# Patient Record
Sex: FEMALE | Race: WHITE | Employment: UNEMPLOYED | URBAN - METROPOLITAN AREA
[De-identification: names, ages, dates, MRNs, and addresses within clinical notes are randomized per-mention and may not be internally consistent; named-entity substitution may affect disease eponyms.]

---

## 2017-02-09 RX ORDER — METOPROLOL TARTRATE 25 MG/1
TABLET, FILM COATED ORAL
Qty: 180 TAB | Refills: 3 | Status: SHIPPED | OUTPATIENT
Start: 2017-02-09 | End: 2018-01-08 | Stop reason: SDUPTHER

## 2017-02-09 RX ORDER — CLOPIDOGREL BISULFATE 75 MG/1
TABLET ORAL
Qty: 90 TAB | Refills: 3 | Status: SHIPPED | OUTPATIENT
Start: 2017-02-09 | End: 2017-10-10 | Stop reason: SDUPTHER

## 2017-04-10 ENCOUNTER — HOSPITAL ENCOUNTER (OUTPATIENT)
Dept: MAMMOGRAPHY | Age: 57
Discharge: HOME | End: 2017-04-10
Attending: SPECIALIST
Payer: MEDICARE

## 2017-04-10 DIAGNOSIS — C50.912 MALIGNANT NEOPLASM OF LEFT FEMALE BREAST, UNSPECIFIED SITE OF BREAST: ICD-10-CM

## 2017-04-10 PROCEDURE — 77067 SCR MAMMO BI INCL CAD: CPT

## 2017-04-13 ENCOUNTER — OFFICE VISIT (OUTPATIENT)
Dept: SURGERY | Age: 57
End: 2017-04-13

## 2017-04-13 VITALS
RESPIRATION RATE: 18 BRPM | SYSTOLIC BLOOD PRESSURE: 129 MMHG | BODY MASS INDEX: 30.06 KG/M2 | HEART RATE: 57 BPM | TEMPERATURE: 97.5 F | WEIGHT: 210 LBS | HEIGHT: 70 IN | OXYGEN SATURATION: 97 % | DIASTOLIC BLOOD PRESSURE: 74 MMHG

## 2017-04-13 DIAGNOSIS — J44.1 CHRONIC OBSTRUCTIVE PULMONARY DISEASE WITH ACUTE EXACERBATION (HCC): Chronic | ICD-10-CM

## 2017-04-13 DIAGNOSIS — I25.10 CORONARY ARTERY DISEASE INVOLVING NATIVE CORONARY ARTERY OF NATIVE HEART WITHOUT ANGINA PECTORIS: ICD-10-CM

## 2017-04-13 DIAGNOSIS — C50.912 MALIGNANT NEOPLASM OF LEFT FEMALE BREAST, UNSPECIFIED SITE OF BREAST: Primary | ICD-10-CM

## 2017-04-13 DIAGNOSIS — I15.0 RENOVASCULAR HYPERTENSION: ICD-10-CM

## 2017-04-13 NOTE — PATIENT INSTRUCTIONS
If you have any questions or concerns about today's appointment, the verbal and/or written instructions you were given for follow up care, please call our office at [phone].     [facility] - [address], [facility]5 [facility]    [phone] office  [phone] fax

## 2017-04-13 NOTE — PROGRESS NOTES
Patient is a 49 y.o. female who presents for a follow up breast exam for a previous left mastectomy done for invasive ductal carcinoma in 2010. Patient denies any new breast changes or areas of concern today. 1. Have you been to the ER, urgent care clinic since your last visit? Hospitalized since your last visit? No    2. Have you seen or consulted any other health care providers outside of the [address] [place] [address] since your last visit? Include any pap smears or colon screening.  Yes, oncologist.

## 2017-04-13 NOTE — MR AVS SNAPSHOT
Visit Information Date & Time Provider Department Dept. Phone Encounter #  
 4/13/2017  9:45 AM Nabil Tyson MD [facility] [phone] [id number] Follow-up Instructions Return in about 1 year (around 4/13/2018). Upcoming Health Maintenance Date Due Hepatitis C Screening 1960 FOOT EXAM Q1 7/28/1972 MICROALBUMIN Q1 7/28/1972 EYE EXAM RETINAL OR DILATED Q1 7/28/1972 Pneumococcal 19-64 Highest Risk (1 of 3 - PCV13) 7/28/1981 DTaP/Tdap/Td series (1 - Tdap) 7/28/1983 HEMOGLOBIN A1C Q6M 12/22/2010 FOBT Q 1 YEAR AGE 50-75 7/28/2012 LIPID PANEL Q1 2/13/2013 INFLUENZA AGE 9 TO ADULT 8/1/2016 PAP AKA CERVICAL CYTOLOGY 4/22/2018 BREAST CANCER SCRN MAMMOGRAM 4/10/2019 Allergies as of 4/13/2017  Review Complete On: 4/13/2017 By: Nabil Tyson MD  
 No Known Allergies Current Immunizations  Never Reviewed No immunizations on file. Not reviewed this visit You Were Diagnosed With   
  
 Codes Comments Malignant neoplasm of left female breast, unspecified site of breast ([facility]    -  Primary ICD-10-CM: Q56.464 ICD-9-CM: 174.9 Chronic obstructive pulmonary disease with acute exacerbation (HCC)     ICD-10-CM: J44.1 ICD-9-CM: 491.21 Coronary artery disease involving native coronary artery of native heart without angina pectoris     ICD-10-CM: I25.10 ICD-9-CM: 414.01 Renovascular hypertension     ICD-10-CM: I15.0 ICD-9-CM: 405.91 Vitals BP Pulse Temp Resp Height(growth percentile) Weight(growth percentile) 129/74 (BP 1 Location: Right arm, BP Patient Position: Sitting) (!) 57 97.5 °F (36.4 °C) (Oral) 18 5' 10\" (1.778 m) 210 lb (95.3 kg) SpO2 BMI OB Status Smoking Status 97% 30.13 kg/m2 Chemically Induced Current Every Day Smoker BMI and BSA Data Body Mass Index Body Surface Area  
 30.13 kg/m 2 2.17 m 2 Preferred Pharmacy Pharmacy Name Phone [facility], [place] - [address] [phone] Your Updated Medication List  
  
   
This list is accurate as of: 4/13/17 10:29 AM.  Always use your most recent med list.  
  
  
  
  
 albuterol 90 mcg/actuation inhaler Commonly known as:  PROVENTIL HFA, VENTOLIN HFA, PROAIR HFA Take 1 Puff by inhalation every four (4) hours as needed. aspirin delayed-release 81 mg tablet Take  by mouth daily. clopidogrel 75 mg Tab Commonly known as:  PLAVIX TAKE 1 TABLET EVERY DAY  
  
 CRESTOR 20 mg tablet Generic drug:  rosuvastatin Take 20 mg by mouth daily. * cyanocobalamin 1,000 mcg/mL injection Commonly known as:  VITAMIN B12  
1,000 mcg by IntraMUSCular route every thirty (30) days. * VITAMIN B12 PO Take  by mouth.  
  
 esomeprazole 20 mg capsule Commonly known as:  Laurey Doll Take 20 mg by mouth daily. losartan 25 mg tablet Commonly known as:  COZAAR Take 12.5 mg by mouth two (2) times a day. metFORMIN 1,000 mg tablet Commonly known as:  GLUCOPHAGE Take 1,000 mg by mouth two (2) times daily (with meals). metoprolol tartrate 25 mg tablet Commonly known as:  LOPRESSOR  
TAKE 1 TABLET TWICE DAILY  
  
 nitroglycerin 0.4 mg SL tablet Commonly known as:  NITROSTAT  
1 Tab by SubLINGual route every five (5) minutes as needed for Chest Pain. SYNTHROID 150 mcg tablet Generic drug:  levothyroxine Take  by mouth Daily (before breakfast). tamoxifen 20 mg tablet Commonly known as:  NOLVADEX Take 20 mg by mouth daily. VITAMIN C 250 mg tablet Generic drug:  ascorbic acid (vitamin C) Take 250 mg by mouth daily. VITAMIN D2 PO Take 4,000 Units by mouth daily. * Notice: This list has 2 medication(s) that are the same as other medications prescribed for you. Read the directions carefully, and ask your doctor or other care provider to review them with you. Follow-up Instructions Return in about 1 year (around 4/13/2018). Patient Instructions If you have any questions or concerns about today's appointment, the verbal and/or written instructions you were given for follow up care, please call our office at [phone]. [facility] - [facility] [address] [facility], [address] [place], [address] [place] [address] Road 
 
[phone] office [phone] fax Introducing [facility]! Dear Mary Munoz: Thank you for requesting a [employer] account. Our records indicate that you have previously registered for a [employer] account but its currently inactive. Please call our [employer] support line at [phone]. Additional Information If you have questions, please visit the Frequently Asked Questions section of the [employer] website at [email or web address] Remember, [employer] is NOT to be used for urgent needs. For medical emergencies, dial 911. Now available from your iPhone and Android! Please provide this summary of care documentation to your next provider. Your primary care clinician is listed as [facility] [address] If you have any questions after today's visit, please call [phone].

## 2017-04-13 NOTE — PROGRESS NOTES
Ynes Joy comes to the office today for her 7 year follow-up from a previous left mastectomy for invasive 2 cm ductal carcinoma. [place] lymph nodes were negative. The tumor was estrogen and progesterone receptor positive but HER-2 negative. Around the time of the patient's surgery she had significant coronary artery disease and stents placed and had to be on Plavix when the surgery was done. She developed significant bruising but did heal.  She has been followed by Dr. Nick Martell and was placed on tamoxifen 7 years ago. He has recommended that she be on this for 10 years. She has tolerated this without difficulty. Her menstrual periods stopped when she was on chemotherapy initially. The patient denies any new right breast lumps. She has not had any edema in the left arm. Occasionally she will get some aching in her left elbow and slight weakness but that is intermittent. She denies any other new aches, pains, shortness of breath or headaches. Her last mammogram just 3 days ago was negative.     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    Anxiety     Breast CA ([address] 12/2009    S/P Lt Mastectomy and Chemo    CAD (coronary artery disease) 8/30/2010    S/P CABG X 4 ([address] to LAD, Sequential SVG to D2, Ramus, OM) 01/2011    Cardiomyopathy ([address]     LVEF 60% (08/14), 40% (2011)    Chemotherapy 4/2010    for 4 months    COPD (chronic obstructive pulmonary disease) ([address] 8/30/2010    Depression     Diabetes ([address]     GERD (gastroesophageal reflux disease)     Headache     Hyperlipidemia     Hypertension     Schatzki's ring     S/P EGD and Dilation (07/16)    Thyroid disease     Tobacco abuse      Past Surgical History:   Procedure Laterality Date    BREAST SURGERY PROCEDURE UNLISTED  2/22/10    left w/sentinel node bx    CABG, ARTERY-VEIN, FOUR  12/2010    CARDIAC SURG PROCEDURE UNLIST      stent placement before CABG    HX APPENDECTOMY      HX CHOLECYSTECTOMY      HX GYN      tubal ligation    HX HEART CATHETERIZATION  11/11/09; 10/21/09    HX HEART CATHETERIZATION  10/15/10; 10/06/09    left heart    HX HEART CATHETERIZATION  11/7/2011    left heart cath, no new findings    HX OTHER SURGICAL  4/6/2010    Insertion right internal jugular single lumen MediPort.  HX RADICAL MASTECTOMY Left 2/2010    left, with chemo     Social History     Social History    Marital status:      Spouse name: N/A    Number of children: N/A    Years of education: N/A     Social History Main Topics    Smoking status: Current Every Day Smoker     Packs/day: 0.25    Smokeless tobacco: Never Used    Alcohol use No    Drug use: No    Sexual activity: Not Asked     Other Topics Concern    None     Social History Narrative     Current Outpatient Prescriptions   Medication Sig Dispense Refill    CYANOCOBALAMIN, VITAMIN B-12, (VITAMIN B12 PO) Take  by mouth.  clopidogrel (PLAVIX) 75 mg tab TAKE 1 TABLET EVERY DAY 90 Tab 3    metoprolol tartrate (LOPRESSOR) 25 mg tablet TAKE 1 TABLET TWICE DAILY 180 Tab 3    esomeprazole (NEXIUM) 20 mg capsule Take 20 mg by mouth daily.  losartan (COZAAR) 25 mg tablet Take 12.5 mg by mouth two (2) times a day.  metFORMIN (GLUCOPHAGE) 1,000 mg tablet Take 1,000 mg by mouth two (2) times daily (with meals).  nitroglycerin (NITROSTAT) 0.4 mg SL tablet 1 Tab by SubLINGual route every five (5) minutes as needed for Chest Pain. 1 Bottle 2    albuterol (PROVENTIL HFA, VENTOLIN HFA) 90 mcg/actuation inhaler Take 1 Puff by inhalation every four (4) hours as needed.  levothyroxine (SYNTHROID) 150 mcg tablet Take  by mouth Daily (before breakfast).  aspirin delayed-release 81 mg tablet Take  by mouth daily.  ascorbic acid (VITAMIN C) 250 mg tablet Take 250 mg by mouth daily.  tamoxifen (NOLVADEX) 20 mg tablet Take 20 mg by mouth daily.       ERGOCALCIFEROL, VITAMIN D2, (VITAMIN D PO) Take 4,000 Units by mouth daily.  rosuvastatin (CRESTOR) 20 mg tablet Take 20 mg by mouth daily.  cyanocobalamin (VITAMIN B12) 1,000 mcg/mL injection 1,000 mcg by IntraMUSCular route every thirty (30) days. The patient's review of systems has not changed. She does continue to smoke. She has occasional shortness of breath and uses an inhaler. She denies any new cardiac disease but is being followed by her cardiologist for her coronary artery disease. She denies any new gastrointestinal or genitourinary problems. PHYSICAL EXAM    Visit Vitals    /74 (BP 1 Location: Right arm, BP Patient Position: Sitting)    Pulse (!) 57    Temp 97.5 °F (36.4 °C) (Oral)    Resp 18    Ht 5' 10\" (1.778 m)    Wt 95.3 kg (210 lb)    SpO2 97%    BMI 30.13 kg/m2        Constitutional:  Well-developed, well-nourished, no acute distress. Head:  Head, eyes, ears, nose, throat within normal limits. Skin:  No suspicious moles or rashes. Neck:  No masses or adenopathy. The airway appears normal. Thyroid is not enlarged and there are no palpable thyroid nodules. Lungs:  Lungs are clear to auscultation and percussion. No respiratory distress. No chest wall tenderness. Heart:  Heart is regular with no extra heart sounds or murmur heard. Breast Exam: The right breast is free of any tenderness or masses. The skin appears normal as does the nipple and areolar complex. The right axilla is negative. The left chest appears normal where the patient had her previous mastectomy. There is no evidence of any recurrent disease in the skin or axilla. Abdomen: The abdomen is soft and nontender without organomegaly or masses. Bowel sounds are active and of normal pitch. There is no abdominal distention. No hernias are evident. Extremities:  No tenderness of the extremities and no significant swelling. Psych:  Alert and oriented.     Assessment: 7 years post left mastectomy for 2 cm invasive ductal carcinoma with negative nodes. The tumor was estrogen and progestin receptor positive. The patient underwent chemotherapy and is now on tamoxifen. No evidence of recurrent disease. #2 COPD. #3 coronary artery disease. #4 continues to smoke. Recommendations: I recommend that the patient continue to have yearly right mammograms and breast examination. She was told that she can follow-up with her primary care physician and if they feel I need to see her I would be glad to do that. The above was dictated on voice recognition and there may be unrecognized errors.     Neetu Yoon MD

## 2017-04-19 ENCOUNTER — TELEPHONE (OUTPATIENT)
Dept: CARDIOLOGY CLINIC | Age: 57
End: 2017-04-19

## 2017-05-01 RX ORDER — TRAMADOL HYDROCHLORIDE 50 MG/1
50 TABLET ORAL
COMMUNITY
End: 2019-09-05 | Stop reason: ALTCHOICE

## 2017-05-02 ENCOUNTER — ANESTHESIA EVENT (OUTPATIENT)
Dept: ENDOSCOPY | Age: 57
End: 2017-05-02
Payer: MEDICARE

## 2017-05-03 ENCOUNTER — HOSPITAL ENCOUNTER (OUTPATIENT)
Age: 57
Setting detail: OUTPATIENT SURGERY
Discharge: HOME | End: 2017-05-03
Attending: INTERNAL MEDICINE | Admitting: INTERNAL MEDICINE
Payer: MEDICARE

## 2017-05-03 ENCOUNTER — ANESTHESIA (OUTPATIENT)
Dept: ENDOSCOPY | Age: 57
End: 2017-05-03
Payer: MEDICARE

## 2017-05-03 VITALS
DIASTOLIC BLOOD PRESSURE: 76 MMHG | HEIGHT: 70 IN | WEIGHT: 204 LBS | RESPIRATION RATE: 16 BRPM | TEMPERATURE: 97.4 F | HEART RATE: 56 BPM | BODY MASS INDEX: 29.2 KG/M2 | SYSTOLIC BLOOD PRESSURE: 155 MMHG | OXYGEN SATURATION: 100 %

## 2017-05-03 LAB
GLUCOSE BLD STRIP.AUTO-MCNC: 145 MG/DL (ref 70–110)
GLUCOSE BLD STRIP.AUTO-MCNC: 168 MG/DL (ref 70–110)
GLUCOSE BLD STRIP.AUTO-MCNC: 173 MG/DL (ref 70–110)
HCG UR QL: NEGATIVE

## 2017-05-03 PROCEDURE — 82962 GLUCOSE BLOOD TEST: CPT

## 2017-05-03 PROCEDURE — 76060000031 HC ANESTHESIA FIRST 0.5 HR: Performed by: INTERNAL MEDICINE

## 2017-05-03 PROCEDURE — 88305 TISSUE EXAM BY PATHOLOGIST: CPT | Performed by: INTERNAL MEDICINE

## 2017-05-03 PROCEDURE — 74011250636 HC RX REV CODE- 250/636

## 2017-05-03 PROCEDURE — 81025 URINE PREGNANCY TEST: CPT

## 2017-05-03 PROCEDURE — 77030031670 HC DEV INFL ENDOTEK BIG60 MRTM -B: Performed by: INTERNAL MEDICINE

## 2017-05-03 PROCEDURE — 74011250636 HC RX REV CODE- 250/636: Performed by: NURSE ANESTHETIST, CERTIFIED REGISTERED

## 2017-05-03 PROCEDURE — 77030009426 HC FCPS BIOP ENDOSC BSC -B: Performed by: INTERNAL MEDICINE

## 2017-05-03 PROCEDURE — 74011000250 HC RX REV CODE- 250

## 2017-05-03 PROCEDURE — 74011636637 HC RX REV CODE- 636/637: Performed by: ANESTHESIOLOGY

## 2017-05-03 PROCEDURE — 76040000019: Performed by: INTERNAL MEDICINE

## 2017-05-03 RX ORDER — SODIUM CHLORIDE, SODIUM LACTATE, POTASSIUM CHLORIDE, CALCIUM CHLORIDE 600; 310; 30; 20 MG/100ML; MG/100ML; MG/100ML; MG/100ML
50 INJECTION, SOLUTION INTRAVENOUS CONTINUOUS
Status: DISCONTINUED | OUTPATIENT
Start: 2017-05-04 | End: 2017-05-03 | Stop reason: HOSPADM

## 2017-05-03 RX ORDER — SODIUM CHLORIDE 0.9 % (FLUSH) 0.9 %
5-10 SYRINGE (ML) INJECTION EVERY 8 HOURS
Status: DISCONTINUED | OUTPATIENT
Start: 2017-05-03 | End: 2017-05-03 | Stop reason: HOSPADM

## 2017-05-03 RX ORDER — FAMOTIDINE 20 MG/1
20 TABLET, FILM COATED ORAL ONCE
Status: DISCONTINUED | OUTPATIENT
Start: 2017-05-04 | End: 2017-05-03 | Stop reason: HOSPADM

## 2017-05-03 RX ORDER — INSULIN LISPRO 100 [IU]/ML
INJECTION, SOLUTION INTRAVENOUS; SUBCUTANEOUS ONCE
Status: DISCONTINUED | OUTPATIENT
Start: 2017-05-04 | End: 2017-05-03

## 2017-05-03 RX ORDER — INSULIN LISPRO 100 [IU]/ML
INJECTION, SOLUTION INTRAVENOUS; SUBCUTANEOUS ONCE
Status: COMPLETED | OUTPATIENT
Start: 2017-05-03 | End: 2017-05-03

## 2017-05-03 RX ORDER — DEXTROMETHORPHAN/PSEUDOEPHED 2.5-7.5/.8
1.2 DROPS ORAL
Status: DISCONTINUED | OUTPATIENT
Start: 2017-05-03 | End: 2017-05-03 | Stop reason: HOSPADM

## 2017-05-03 RX ORDER — SODIUM CHLORIDE 0.9 % (FLUSH) 0.9 %
5-10 SYRINGE (ML) INJECTION AS NEEDED
Status: DISCONTINUED | OUTPATIENT
Start: 2017-05-03 | End: 2017-05-03 | Stop reason: HOSPADM

## 2017-05-03 RX ORDER — PROPOFOL 10 MG/ML
INJECTION, EMULSION INTRAVENOUS AS NEEDED
Status: DISCONTINUED | OUTPATIENT
Start: 2017-05-03 | End: 2017-05-03 | Stop reason: HOSPADM

## 2017-05-03 RX ORDER — PROPOFOL 10 MG/ML
INJECTION, EMULSION INTRAVENOUS
Status: DISCONTINUED | OUTPATIENT
Start: 2017-05-03 | End: 2017-05-03 | Stop reason: HOSPADM

## 2017-05-03 RX ORDER — LIDOCAINE HYDROCHLORIDE 20 MG/ML
INJECTION, SOLUTION EPIDURAL; INFILTRATION; INTRACAUDAL; PERINEURAL AS NEEDED
Status: DISCONTINUED | OUTPATIENT
Start: 2017-05-03 | End: 2017-05-03 | Stop reason: HOSPADM

## 2017-05-03 RX ADMIN — PROPOFOL 50 MG: 10 INJECTION, EMULSION INTRAVENOUS at 09:07

## 2017-05-03 RX ADMIN — SODIUM CHLORIDE, SODIUM LACTATE, POTASSIUM CHLORIDE, AND CALCIUM CHLORIDE 50 ML/HR: 600; 310; 30; 20 INJECTION, SOLUTION INTRAVENOUS at 08:51

## 2017-05-03 RX ADMIN — INSULIN LISPRO 2 UNITS: 100 INJECTION, SOLUTION INTRAVENOUS; SUBCUTANEOUS at 09:40

## 2017-05-03 RX ADMIN — PROPOFOL 50 MG: 10 INJECTION, EMULSION INTRAVENOUS at 09:01

## 2017-05-03 RX ADMIN — LIDOCAINE HYDROCHLORIDE 40 MG: 20 INJECTION, SOLUTION EPIDURAL; INFILTRATION; INTRACAUDAL; PERINEURAL at 09:01

## 2017-05-03 RX ADMIN — PROPOFOL 160 MCG/KG/MIN: 10 INJECTION, EMULSION INTRAVENOUS at 09:02

## 2017-05-03 NOTE — PROCEDURES
Colonoscopy Report    Patient: Poli Perry MRN: [id number]  SSN: [id number]    YOB: 1960  Age: 49 y.o. Sex: female      Date of Procedure: 5/3/2017    IMPRESSION:  1. Single 3mm rectal polyp removed with biopsy forceps. 2. Internal hemorrhoids. 3. Otherwise normal colonoscopy. RECOMMENDATIONS:  1. Resume regular diet, Recommend high fiber. 2. Will contact with polyp results in 2 weeks. These results will determine timing to next screening. Patient will be instructed to contact our office if they have not received the results by three weeks. Indication:  Personal history of colon polyps (screening only)  Procedure Performed: Colonoscopy polypectomy (cold biopsy)  Endoscopist: Darell Hall MD  Assistant: Endoscopy Technician-1: Margo Saez  Endoscopy RN-1: Evie Ascencio RN  Endoscopy RN-2: Magi Horton RN  ASA: ASA 3 - Patient with moderate systemic disease with functional limitations  Mallampati Score: II (soft palate, uvula, fauces visible)  Anesthesia: MAC anesthesia  Endoscope:     [x]  CF H 190AL   []  PCF H190AL   []  GIF H 190    Extent of Examination:cecum, which was identified by the ileocecal valve and appendiceal orifice  Quality of Preparation:     []  Excellent   [x]  Very Good   [] Fair but adequate   [] Fair, inadequate   []  Poor      Technique: The procedure was discussed with the patient including risks, benefits, alternatives including risks of IV sedation, bleeding, perforation and missed polyp. A safety timeout was performed. The patient was given incremental doses of intravenous sedation to achieve moderate sedation. The patients vital signs were monitored at all times including heart rate, rhythm, blood pressure and oxygen saturation. The patient was placed in left lateral position. When adequate sedation was achieved a perianal inspection and a digital rectal exam were performed.  Video colonoscope was introduced into the rectum and advanced under direct vision up to the cecum, which was identified by the ileocecal valve and appendiceal orifice. The cecum was identified by IC valve, appendiceal orifice and crows foot. With adequate insufflation and maneuvering of the withdrawing scope, the colonic mucosa was visualized carefully. Retroflexion was performed in the rectum and the distal rectum visualized. The patient tolerated the procedure very well and was transferred to recovery area. Findings:  1. Normal rectal exam.   2. There was a single 3mm sessile polyp in the rectum. The polyp was removed with biopsy forceps. 3. There were small hemorrhoids in the distal rectum. 4. The colonic mucosa was otherwise normal with no other polyps and no ulceration, mass, stricture.        EBL:None  Specimen:   ID Type Source Tests Collected by Time Destination   1 : bx polyp Preservative Rectum  Raz Payan MD 5/3/2017 2849 Pathology       Raz Payan MD  May 3, 2017  9:20 AM

## 2017-05-03 NOTE — IP AVS SNAPSHOT
[address] [address] Korina Rothman  
[phone] Patient: Jeaneth Birch MRN: [id number] :1960 You are allergic to the following Allergen Reactions Shellfish Derived Hives Eyes and Throat swelling Recent Documentation Height Weight Breastfeeding? BMI OB Status Smoking Status 1.778 m 92.5 kg No 29.27 kg/m2 Chemically Induced Current Every Day Smoker Emergency Contacts Name Discharge Info Relation Home Work Mobile [address] [facility] [employer] CAREGIVER [3] Spouse [3] [phone] Daylin Wray DISCHARGE CAREGIVER [3] Friend [5] About your hospitalization You were admitted on:  May 3, 2017 You last received care in the:  [facility] PHASE 2 RECOVERY You were discharged on:  May 3, 2017 Unit phone number:  [phone] Why you were hospitalized Your primary diagnosis was:  Not on File Providers Seen During Your Hospitalizations Provider Role Specialty Primary office phone Richmond Villafana MD Attending Provider Gastroenterology [phone] Your Primary Care Physician (PCP) Primary Care Physician Office Phone Office Fax Hailee Little [phone] Follow-up Information Follow up With Details Comments Contact Info Crystal Davis MD   [facility] [address] 
[phone] Current Discharge Medication List  
  
CONTINUE these medications which have NOT CHANGED Dose & Instructions Dispensing Information Comments Morning Noon Evening Bedtime  
 albuterol 90 mcg/actuation inhaler Commonly known as:  PROVENTIL HFA, VENTOLIN HFA, PROAIR HFA Your last dose was: Your next dose is:    
   
   
 Dose:  1 Puff Take 1 Puff by inhalation every four (4) hours as needed. Refills:  0  
     
   
   
   
  
 aspirin delayed-release 81 mg tablet Your last dose was: Your next dose is: Take  by mouth daily. Refills:  0  
     
   
   
   
  
 clopidogrel 75 mg Tab Commonly known as:  PLAVIX Your last dose was: Your next dose is: TAKE 1 TABLET EVERY DAY Quantity:  90 Tab Refills:  3 CRESTOR 20 mg tablet Generic drug:  rosuvastatin Your last dose was: Your next dose is:    
   
   
 Dose:  20 mg Take 20 mg by mouth daily. Refills:  0  
     
   
   
   
  
 esomeprazole 20 mg capsule Commonly known as:  Hillary Pimentel Your last dose was: Your next dose is:    
   
   
 Dose:  20 mg Take 20 mg by mouth daily. Refills:  0  
     
   
   
   
  
 losartan 25 mg tablet Commonly known as:  COZAAR Your last dose was: Your next dose is:    
   
   
 Dose:  12.5 mg Take 12.5 mg by mouth two (2) times a day. Refills:  0  
     
   
   
   
  
 metFORMIN 1,000 mg tablet Commonly known as:  GLUCOPHAGE Your last dose was: Your next dose is:    
   
   
 Dose:  1000 mg Take 1,000 mg by mouth two (2) times daily (with meals). Refills:  0  
     
   
   
   
  
 metoprolol tartrate 25 mg tablet Commonly known as:  LOPRESSOR Your last dose was: Your next dose is: TAKE 1 TABLET TWICE DAILY Quantity:  180 Tab Refills:  3  
     
   
   
   
  
 nitroglycerin 0.4 mg SL tablet Commonly known as:  NITROSTAT Your last dose was: Your next dose is:    
   
   
 Dose:  0.4 mg  
1 Tab by SubLINGual route every five (5) minutes as needed for Chest Pain. Quantity:  1 Bottle Refills:  2 PROBIOTIC 4X 10-15 mg Tbec Generic drug:  B.infantis-B.ani-B.long-B.bifi Your last dose was: Your next dose is: Take  by mouth daily. Refills:  0  
     
   
   
   
  
 SYNTHROID 150 mcg tablet Generic drug:  levothyroxine Your last dose was: Your next dose is: Take  by mouth Daily (before breakfast). Refills:  0  
     
   
   
   
  
 tamoxifen 20 mg tablet Commonly known as:  NOLVADEX Your last dose was: Your next dose is:    
   
   
 Dose:  20 mg Take 20 mg by mouth daily. Refills:  0  
     
   
   
   
  
 traMADol 50 mg tablet Commonly known as:  ULTRAM  
   
Your last dose was: Your next dose is:    
   
   
 Dose:  50 mg Take 50 mg by mouth every six (6) hours as needed for Pain. Refills:  0  
     
   
   
   
  
 VITAMIN B12 PO Your last dose was: Your next dose is: Take  by mouth. Refills:  0  
     
   
   
   
  
 VITAMIN C 250 mg tablet Generic drug:  ascorbic acid (vitamin C) Your last dose was: Your next dose is:    
   
   
 Dose:  250 mg Take 250 mg by mouth daily. Refills:  0  
     
   
   
   
  
 VITAMIN D2 PO Your last dose was: Your next dose is:    
   
   
 Dose:  4000 Units Take 4,000 Units by mouth daily. Refills:  0 Discharge Instructions Patient Discharge Instructions Carlotta Scarce / [id number] : 1960 Admitted 5/3/2017 Discharged: 5/3/2017 Procedure Impression: 1. Single 3mm rectal polyp removed with biopsy forceps. 2. Internal hemorrhoids. 3. Otherwise normal colonoscopy. Recommendation: 1. Resume regular diet, recommend high fiber 2. Will contact with polyp results in 2 weeks. These results will determine timing to next colon cancer screening. 3. Please contact our office if you have not received the results by three weeks. Recommended Diet: Regular Diet Recommended Activity: 1. Do not drink alcohol, drive or operate machinery for 12 hours 2. Call if any fever, abdominal pain or bleeding noted. Signed By: Rodger Jean Baptiste MD   
 May 3, 2017 DISCHARGE SUMMARY from Nurse The following personal items are in your possession at time of discharge: 
 
Dental Appliances: None Visual Aid: None PATIENT INSTRUCTIONS: 
 
 
F-face looks uneven A-arms unable to move or move unevenly S-speech slurred or non-existent T-time-call 911 as soon as signs and symptoms begin-DO NOT go Back to bed or wait to see if you get better-TIME IS BRAIN. Warning Signs of HEART ATTACK Call 911 if you have these symptoms: 
? Chest discomfort. Most heart attacks involve discomfort in the center of the chest that lasts more than a few minutes, or that goes away and comes back. It can feel like uncomfortable pressure, squeezing, fullness, or pain. ? Discomfort in other areas of the upper body. Symptoms can include pain or discomfort in one or both arms, the back, neck, jaw, or stomach. ? Shortness of breath with or without chest discomfort. ? Other signs may include breaking out in a cold sweat, nausea, or lightheadedness. Don't wait more than five minutes to call [address]! Fast action can save your life. Calling 911 is almost always the fastest way to get lifesaving treatment. Emergency Medical Services staff can begin treatment when they arrive  up to an hour sooner than if someone gets to the hospital by car. The discharge information has been reviewed with the patient and spouse. The patient and spouse verbalized understanding. Discharge medications reviewed with the patient and spouse and appropriate educational materials and side effects teaching were provided. Patient armband removed and given to patient to take home. Patient was informed of the privacy risks if armband lost or stolen Discharge Orders None Introducing [facility]! Dear Ariana Schneider: Thank you for requesting a [employer] account. Our records indicate that you have previously registered for a [employer] account but its currently inactive. Please call our [employer] support line at [phone]. Additional Information If you have questions, please visit the Frequently Asked Questions section of the [employer] website at [email or web address] Remember, [employer] is NOT to be used for urgent needs. For medical emergencies, dial 911. Now available from your iPhone and Android! General Information Please provide this summary of care documentation to your next provider. Patient Signature:  ____________________________________________________________ Date:  ____________________________________________________________  
  
[facility] Provider Signature:  ____________________________________________________________ Date:  ____________________________________________________________

## 2017-05-03 NOTE — DISCHARGE INSTRUCTIONS
Patient Discharge Instructions    Rafa Verma / [id number] : 1960    Admitted 5/3/2017 Discharged: 5/3/2017         Procedure Impression:  1. Single 3mm rectal polyp removed with biopsy forceps. 2. Internal hemorrhoids. 3. Otherwise normal colonoscopy. Recommendation:  1. Resume regular diet, recommend high fiber  2. Will contact with polyp results in 2 weeks. These results will determine timing to next colon cancer screening. 3. Please contact our office if you have not received the results by three weeks. Recommended Diet: Regular Diet    Recommended Activity:    1. Do not drink alcohol, drive or operate machinery for 12 hours   2. Call if any fever, abdominal pain or bleeding noted. Signed By: Sonal Melgar MD     May 3, 2017         DISCHARGE SUMMARY from Nurse    The following personal items are in your possession at time of discharge:    Dental Appliances: None  Visual Aid: None                            PATIENT INSTRUCTIONS:    After general anesthesia or intravenous sedation, for 24 hours or while taking prescription Narcotics:  · Limit your activities  · Do not drive and operate hazardous machinery  · Do not make important personal or business decisions  · Do  not drink alcoholic beverages  · If you have not urinated within 8 hours after discharge, please contact your surgeon on call. Report the following to your surgeon:  · Excessive pain, swelling, redness or odor of or around the surgical area  · Temperature over 100.5  · Nausea and vomiting lasting longer than 4 hours or if unable to take medications  · Any signs of decreased circulation or nerve impairment to extremity: change in color, persistent  numbness, tingling, coldness or increase pain  · Any questions        What to do at Home:  Recommended activity: Activity as tolerated and no driving for today. *  Please give a list of your current medications to your Primary Care Provider.     *  Please update this list whenever your medications are discontinued, doses are      changed, or new medications (including over-the-counter products) are added. *  Please carry medication information at all times in case of emergency situations. These are general instructions for a healthy lifestyle:    No smoking/ No tobacco products/ Avoid exposure to second hand smoke    Surgeon General's Warning:  Quitting smoking now greatly reduces serious risk to your health. Obesity, smoking, and sedentary lifestyle greatly increases your risk for illness    A healthy diet, regular physical exercise & weight monitoring are important for maintaining a healthy lifestyle    You may be retaining fluid if you have a history of heart failure or if you experience any of the following symptoms:  Weight gain of 3 pounds or more overnight or 5 pounds in a week, increased swelling in our hands or feet or shortness of breath while lying flat in bed. Please call your doctor as soon as you notice any of these symptoms; do not wait until your next office visit. Recognize signs and symptoms of STROKE:    F-face looks uneven    A-arms unable to move or move unevenly    S-speech slurred or non-existent    T-time-call 911 as soon as signs and symptoms begin-DO NOT go       Back to bed or wait to see if you get better-TIME IS BRAIN. Warning Signs of HEART ATTACK     Call 911 if you have these symptoms:   Chest discomfort. Most heart attacks involve discomfort in the center of the chest that lasts more than a few minutes, or that goes away and comes back. It can feel like uncomfortable pressure, squeezing, fullness, or pain.  Discomfort in other areas of the upper body. Symptoms can include pain or discomfort in one or both arms, the back, neck, jaw, or stomach.  Shortness of breath with or without chest discomfort.  Other signs may include breaking out in a cold sweat, nausea, or lightheadedness.   Don't wait more than five minutes to call [address]! Fast action can save your life. Calling 911 is almost always the fastest way to get lifesaving treatment. Emergency Medical Services staff can begin treatment when they arrive -- up to an hour sooner than if someone gets to the hospital by car. The discharge information has been reviewed with the patient and spouse. The patient and spouse verbalized understanding. Discharge medications reviewed with the patient and spouse and appropriate educational materials and side effects teaching were provided. Patient armband removed and given to patient to take home.   Patient was informed of the privacy risks if armband lost or stolen

## 2017-05-03 NOTE — H&P
Date of Surgery Update:  Negrito Rondon was seen and examined. History and physical has been reviewed. The patient has been examined.  There have been no significant clinical changes since the completion of the originally dated History and Physical.    Signed By: Jeanne Watson MD     May 3, 2017 7:57 AM

## 2017-05-03 NOTE — ANESTHESIA POSTPROCEDURE EVALUATION
Post-Anesthesia Evaluation and Assessment    Patient: Emeterio Dodd MRN: [id number]  SSN: [id number]    YOB: 1960  Age: 49 y.o. Sex: female       Cardiovascular Function/Vital Signs  Visit Vitals    /48 (BP 1 Location: Right arm, BP Patient Position: At rest)    Pulse (!) 57    Temp 36.3 °C (97.4 °F)    Resp 22    Ht 5' 10\" (1.778 m)    Wt 92.5 kg (204 lb)    SpO2 100%    Breastfeeding No    BMI 29.27 kg/m2       Patient is status post MAC anesthesia for Procedure(s):  COLON . Nausea/Vomiting: None    Postoperative hydration reviewed and adequate. Pain:  Pain Scale 1: Numeric (0 - 10) (05/03/17 7497)  Pain Intensity 1: 0 (05/03/17 6614)   Managed    Neurological Status: At baseline    Mental Status and Level of Consciousness: Alert and oriented     Pulmonary Status:   O2 Device: Room air (05/03/17 160)   Adequate oxygenation and airway patent    Complications related to anesthesia: None    Post-anesthesia assessment completed.  No concerns    Signed By: Irasema Neal CRNA     May 3, 2017

## 2017-05-03 NOTE — ANESTHESIA PREPROCEDURE EVALUATION
Anesthetic History   No history of anesthetic complications            Review of Systems / Medical History  Patient summary reviewed and pertinent labs reviewed    Pulmonary    COPD: moderate      Smoker         Neuro/Psych         Psychiatric history     Cardiovascular    Hypertension: well controlled          Past MI and CAD    Exercise tolerance: >4 METS     GI/Hepatic/Renal     GERD: well controlled           Endo/Other    Diabetes: well controlled, type 2  Hypothyroidism: well controlled       Other Findings   Comments:   Risk Factors for Postoperative nausea/vomiting:       History of postoperative nausea/vomiting? NO       Female? YES       Motion sickness? NO       Intended opioid administration for postoperative analgesia? NO      Smoking Abstinence  Current Smoker? YES  Elective Surgery? YES  Seen preoperatively by anesthesiologist or proxy prior to day of surgery? YES  Pt abstained from smoking 24 hours prior to anesthesia?  NO           Physical Exam    Airway  Mallampati: III  TM Distance: 4 - 6 cm  Neck ROM: normal range of motion   Mouth opening: Normal     Cardiovascular    Rhythm: regular  Rate: normal         Dental  No notable dental hx       Pulmonary  Breath sounds clear to auscultation               Abdominal  GI exam deferred       Other Findings            Anesthetic Plan    ASA: 3  Anesthesia type: MAC            Anesthetic plan and risks discussed with: Patient

## 2017-05-23 ENCOUNTER — OFFICE VISIT (OUTPATIENT)
Dept: CARDIOLOGY CLINIC | Age: 57
End: 2017-05-23

## 2017-05-23 VITALS
SYSTOLIC BLOOD PRESSURE: 141 MMHG | HEART RATE: 60 BPM | DIASTOLIC BLOOD PRESSURE: 76 MMHG | WEIGHT: 212 LBS | BODY MASS INDEX: 30.35 KG/M2 | HEIGHT: 70 IN | OXYGEN SATURATION: 96 %

## 2017-05-23 DIAGNOSIS — R01.1 MURMUR, CARDIAC: Primary | ICD-10-CM

## 2017-05-23 NOTE — PROGRESS NOTES
1. Have you been to the ER, urgent care clinic since your last visit? Hospitalized since your last visit? No    2. Have you seen or consulted any other health care providers outside of the [address] [place] [address] since your last visit? Include any pap smears or colon screening.  No

## 2017-05-23 NOTE — LETTER
Patient:  Tisha Marin YOB: 1960 Date of Visit: 5/23/2017 Dear Susan Lindquist MD 
[address] [facility] [address] [place] [address] VIA Facsimile: [phone] 
 : 
 
 
 is 49 y. o.female with Coronary artery disease status post CABG in November 2010. Cardiomyopathy, hypertension, hyperlipidemia, diabetes, breast cancer status post mastectomy and chemotherapy. She also has anxiety and depression disorder. She has  tobacco abuse disorder. Ms. Sidney Wise is here today for follow up appointment. She denies any symptoms to suggest angina or heart failure. She denies any use of sublingual nitroglycerin since last visit. She denies any palpitations, presyncope or syncope. Unfortunately she continues to smoke one pack of cigarettes a day. PMH: 
Past Medical History:  
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  
 for 4 months  COPD (chronic obstructive pulmonary disease) ([address] 8/30/2010  Depression  Diabetes ([address]  GERD (gastroesophageal reflux disease)  Headache  Hyperlipidemia  Hypertension  Schatzki's ring S/P EGD and Dilation (07/16)  Thyroid disease  Tobacco abuse PSH: 
Past Surgical History:  
Procedure Laterality Date  BREAST SURGERY PROCEDURE UNLISTED  2/22/10  
 left w/sentinel node bx  CABG, ARTERY-VEIN, FOUR  12/2010  CARDIAC SURG PROCEDURE UNLIST    
 stent placement before CABG  
 COLONOSCOPY N/A 5/3/2017 COLON  performed by Odilon Saucedo MD at [address] [place] [address] Blvd  HX CHOLECYSTECTOMY  HX GYN    
 tubal ligation  HX HEART CATHETERIZATION  11/11/09; 10/21/09  HX HEART CATHETERIZATION  10/15/10; 10/06/09  
 left heart  HX HEART CATHETERIZATION  11/7/2011  
 left heart cath, no new findings  HX OTHER SURGICAL  4/6/2010 Insertion right internal jugular single lumen MediPort.  HX RADICAL MASTECTOMY Left 2/2010  
 left, with chemo MEDS: 
Current Outpatient Prescriptions on File Prior to Visit Medication Sig Dispense Refill  traMADol (ULTRAM) 50 mg tablet Take 50 mg by mouth every six (6) hours as needed for Pain.    
 B.infantis-B.ani-B.long-B.bifi (PROBIOTIC 4X) 10-15 mg TbEC Take  by mouth daily.  CYANOCOBALAMIN, VITAMIN B-12, (VITAMIN B12 PO) Take  by mouth.  clopidogrel (PLAVIX) 75 mg tab TAKE 1 TABLET EVERY DAY 90 Tab 3  
 metoprolol tartrate (LOPRESSOR) 25 mg tablet TAKE 1 TABLET TWICE DAILY 180 Tab 3  
 esomeprazole (NEXIUM) 20 mg capsule Take 20 mg by mouth daily.  losartan (COZAAR) 25 mg tablet Take 12.5 mg by mouth two (2) times a day.  metFORMIN (GLUCOPHAGE) 1,000 mg tablet Take 1,000 mg by mouth two (2) times daily (with meals).  nitroglycerin (NITROSTAT) 0.4 mg SL tablet 1 Tab by SubLINGual route every five (5) minutes as needed for Chest Pain. 1 Bottle 2  
 albuterol (PROVENTIL HFA, VENTOLIN HFA) 90 mcg/actuation inhaler Take 1 Puff by inhalation every four (4) hours as needed.  levothyroxine (SYNTHROID) 150 mcg tablet Take  by mouth Daily (before breakfast).  aspirin delayed-release 81 mg tablet Take  by mouth daily.  ascorbic acid (VITAMIN C) 250 mg tablet Take 250 mg by mouth daily.  tamoxifen (NOLVADEX) 20 mg tablet Take 20 mg by mouth daily.  ERGOCALCIFEROL, VITAMIN D2, (VITAMIN D PO) Take 4,000 Units by mouth daily.  rosuvastatin (CRESTOR) 20 mg tablet Take 20 mg by mouth daily. No current facility-administered medications on file prior to visit. Alleres and Sensitivities: 
Allergies Allergen Reactions  Shellfish Derived Hives Eyes and Throat swelling Family History: 
Family History Problem Relation Age of Onset  Hypertension Mother  Diabetes Mother  Breast Problems Mother   
  fiber cystic  Hypertension Other Social History: 
Social History Substance Use Topics  Smoking status: Current Every Day Smoker Packs/day: 0.50  Smokeless tobacco: Never Used  Alcohol use No  
 
Physical Exam: 
BP Readings from Last 3 Encounters:  
05/23/17 141/76  
05/03/17 155/76  
04/13/17 129/74 Pulse Readings from Last 3 Encounters:  
05/23/17 60  
05/03/17 (!) 56  
04/13/17 (!) 57 Wt Readings from Last 3 Encounters:  
05/23/17 212 lb (96.2 kg) 05/03/17 204 lb (92.5 kg) 04/13/17 210 lb (95.3 kg) Constitutional: Oriented to person, place, and time. HENT: Head: Normocephalic and atraumatic. Neck: No JVD present. Cardiovascular: Regular rhythm. No  gallop or rubs appriciated. Faint 2/6 aortic systolic murmur Lung[de-identified] Breath sounds normal. No respiratory distress. No ronchi or rales appriciated Abdominal: No tenderness. No rebound and no guarding. Musculoskeletal: There is no edema. No cynosis. Review of Data: 
EKG: (06/2011)Sinus rhythm at 73 bpm. No Dynamic ST changes of ischemia. No Significant Q waves. EKG (6/1/12) : Sinus rhythm at 66 beats per minute. No dynamic ST changes of ischemia. Some nonspecific T wave inversion in precordial leads. Unchanged from prior EKG. LABS:  
Lab Results Component Value Date/Time Sodium 138 11/02/2011 03:15 PM  
 Potassium 4.3 11/02/2011 03:15 PM  
 Chloride 104 11/02/2011 03:15 PM  
 CO2 29 11/02/2011 03:15 PM  
 Glucose 160 11/02/2011 03:15 PM  
 BUN 15 11/02/2011 03:15 PM  
 Creatinine 1.0 11/02/2011 03:15 PM  
 
Lab Results Component Value Date/Time  Cholesterol, total 121 03/22/2010 11:10 AM  
 HDL Cholesterol 29 03/22/2010 11:10 AM  
 LDL, calculated 69 03/22/2010 11:10 AM  
 Triglyceride 115 03/22/2010 11:10 AM  
 CHOL/HDL Ratio 4.2 03/22/2010 11:10 AM  
 
 
No results found for: GPT Lab Results Component Value Date/Time Hemoglobin A1c 5.7 06/22/2010 03:42 PM  
 
FLP: (2/2012) , , LDL 51, HDL 22 ECHO:(04/2011) LVEF 40%, Global hypokinesis ECHO(08/2014) at [facility] IMPRESSION:  
MILD CONCENTRIC LEFT VENTRICULAR HYPERTROPHY. NORMAL GLOBAL LEFT VENTRICULAR SYSTOLIC FUNCTION, EJECTION FRACTION OF 60%. NORMAL DIASTOLIC FILLING PATTERN FOR AGE. NORMAL RIGHT VENTRICULAR SIZE AND SYSTOLIC FUNCTION. MILD MITRAL REGURGITATION. ESTIMATED PULMONARY ARTERY PRESSURE IS 34 MMHG. STRESS(10/2011) 1. Reversible defect along the anterior wall with no significant wall motion abnormality. 2. Ejection fraction 47 percent. 3. Fixed defect of the inferior wall with associated hypokinesis. CARDIAC CATH(11/2011) 1. LM: Normal. 
2. LAD: Eccentric ostial 70-80%, mid to distal LAD is a very small-caliber vessel, probably about a 2-mm vessel. Competitive filing from [place] 3. DIAGONAL: Ostial 40-50% proximal moderate disease. 4. RAMUS: Proximal 100% in stent. 5. LCx/OM: OM2 mid 100%, . Native LCx diffuse luminal irregularities without any obstructive stenosis. less than 2-mm vessel. 7. RCA: Prox and Mid stent diffuse 20% in-stent restenosis. Otherwise, no obstructive disease. RPLS 60-70% tubular stenosis which appears unchanged from prior angiogram about a year ago. 8. SEQUENTIAL SVG TO THE DIAGONA, RAMUS, OM:  patent without any significant obstructive stenosis. There appears to be venous valve system distally into the graft. Native vessel which includes diagonal, ramus and obtuse marginal beyond graft appears to be patent and a very small-caliber vessel, less than 2-mm. 9. LIMA TO LAD: widely patent. Distally, LAD has no significant stenosis. This appears to be a less than 2-mm vessel. Impression / Plan: Ms. Amadou Aviles is a pleasant 49 y.o. female who is here for the follow up appointment. Coronary artery disease:  Four vessel CABG in 2010. No reports anginal symptoms. Continue same. No use of S/L NTG since last visit. Continue current cardiac medication. Cardiomyopathy:  Remote history of ejection fraction of 45%. Last ejection fraction 60% in 2014. No evidence of fluid overload. She is on Metoprolol 25 mg and losartan 12.5 mg BID. Continue same. Stable Hypertension:  Blood pressure is  142/70 mm Hg. Continue BB and ARB Hyperlipidemia:  Continue Crestor 20 mg daily without any side effect. Her lipid profile is being checked by Dr. Camelia Lundberg regularly. Denies any problem DM Goal A1C less than 7 from CV standpoint. Defer management to PCP. Last A1C was 6.5 in 2017 per patient Tobacco abuse:  She had tried Chantix in the past with some side effect. Smoking cessation encouraged again during this visit. Murmur: Faint aortic systolic murmur. New on exam. No specific symptoms. Will order echo to evaluate for VHD. This plan was discussed with Ms. Amadou Aviles in detail, who is in agreement. Please do not hesitate to call me if you have any questions or concerns. Sincerely, Marcelo Tavares MD

## 2017-05-23 NOTE — MR AVS SNAPSHOT
Visit Information Date & Time Provider Department Dept. Phone Encounter #  
 5/23/2017  9:00 AM Phil Farrar  [facility] Specialist at [facility] [address] [phone] [id number] Follow-up Instructions Return in about 1 year (around 5/23/2018). Upcoming Health Maintenance Date Due Hepatitis C Screening 1960 FOOT EXAM Q1 7/28/1972 MICROALBUMIN Q1 7/28/1972 EYE EXAM RETINAL OR DILATED Q1 7/28/1972 Pneumococcal 19-64 Highest Risk (1 of 3 - PCV13) 7/28/1981 DTaP/Tdap/Td series (1 - Tdap) 7/28/1983 HEMOGLOBIN A1C Q6M 12/22/2010 FOBT Q 1 YEAR AGE 50-75 7/28/2012 LIPID PANEL Q1 2/13/2013 INFLUENZA AGE 9 TO ADULT 8/1/2017 PAP AKA CERVICAL CYTOLOGY 4/22/2018 BREAST CANCER SCRN MAMMOGRAM 4/10/2019 Allergies as of 5/23/2017  Review Complete On: 5/23/2017 By: Dania Suh LPN Severity Noted Reaction Type Reactions Shellfish Derived  05/01/2017    Hives Eyes and Throat swelling Current Immunizations  Never Reviewed No immunizations on file. Not reviewed this visit You Were Diagnosed With   
  
 Codes Comments Murmur, cardiac    -  Primary ICD-10-CM: R01.1 ICD-9-CM: 238. 2 Vitals BP Pulse Height(growth percentile) Weight(growth percentile) SpO2 BMI  
 141/76 60 5' 10\" (1.778 m) 212 lb (96.2 kg) 96% 30.42 kg/m2 OB Status Smoking Status Chemically Induced Current Every Day Smoker BMI and BSA Data Body Mass Index Body Surface Area  
 30.42 kg/m 2 2.18 m 2 Preferred Pharmacy Pharmacy Name Phone [address] [phone] Your Updated Medication List  
  
   
This list is accurate as of: 5/23/17  9:13 AM.  Always use your most recent med list.  
  
  
  
  
 albuterol 90 mcg/actuation inhaler Commonly known as:  PROVENTIL HFA, VENTOLIN HFA, PROAIR HFA  
 Take 1 Puff by inhalation every four (4) hours as needed. aspirin delayed-release 81 mg tablet Take  by mouth daily. clopidogrel 75 mg Tab Commonly known as:  PLAVIX TAKE 1 TABLET EVERY DAY  
  
 CRESTOR 20 mg tablet Generic drug:  rosuvastatin Take 20 mg by mouth daily. esomeprazole 20 mg capsule Commonly known as:  Almaraz Sherman Take 20 mg by mouth daily. losartan 25 mg tablet Commonly known as:  COZAAR Take 12.5 mg by mouth two (2) times a day. metFORMIN 1,000 mg tablet Commonly known as:  GLUCOPHAGE Take 1,000 mg by mouth two (2) times daily (with meals). metoprolol tartrate 25 mg tablet Commonly known as:  LOPRESSOR  
TAKE 1 TABLET TWICE DAILY  
  
 nitroglycerin 0.4 mg SL tablet Commonly known as:  NITROSTAT  
1 Tab by SubLINGual route every five (5) minutes as needed for Chest Pain. pneumococcal 13 isaac conj dip 0.5 mL Syrg injection Commonly known as:  PREVNAR-13  
0.5 mL by IntraMUSCular route once. Was given in April PROBIOTIC 4X 10-15 mg Tbec Generic drug:  B.infantis-B.ani-B.long-B.bifi Take  by mouth daily. SYNTHROID 150 mcg tablet Generic drug:  levothyroxine Take  by mouth Daily (before breakfast). tamoxifen 20 mg tablet Commonly known as:  NOLVADEX Take 20 mg by mouth daily. traMADol 50 mg tablet Commonly known as:  ULTRAM  
Take 50 mg by mouth every six (6) hours as needed for Pain. VITAMIN B12 PO Take  by mouth. VITAMIN C 250 mg tablet Generic drug:  ascorbic acid (vitamin C) Take 250 mg by mouth daily. VITAMIN D2 PO Take 4,000 Units by mouth daily. Follow-up Instructions Return in about 1 year (around 5/23/2018). Patient Instructions Call on a Tuesday or Thursday after echo for results [phone] Introducing [facility]! Dear Pamela Wells: Thank you for requesting a [employer] account.   Our records indicate that you have previously registered for a [employer] account but its currently inactive. Please call our [employer] support line at [phone]. Additional Information If you have questions, please visit the Frequently Asked Questions section of the [employer] website at [email or web address] Remember, [employer] is NOT to be used for urgent needs. For medical emergencies, dial 911. Now available from your iPhone and Android! Please provide this summary of care documentation to your next provider. Your primary care clinician is [facility] as [facility] [address] If you have any questions after today's visit, please call [phone].

## 2017-05-23 NOTE — PROGRESS NOTES
is 49 y. o.female with Coronary artery disease status post CABG in November 2010. Cardiomyopathy, hypertension, hyperlipidemia, diabetes, breast cancer status post mastectomy and chemotherapy. She also has anxiety and depression disorder. She has  tobacco abuse disorder. Ms. Luis Manuel Noble is here today for follow up appointment. She denies any symptoms to suggest angina or heart failure. She denies any use of sublingual nitroglycerin since last visit. She denies any palpitations, presyncope or syncope. Unfortunately she continues to smoke one pack of cigarettes a day.     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    Anxiety     Breast CA ([facility] [address] 12/2009    S/P Lt Mastectomy and Chemo    CAD (coronary artery disease) 8/30/2010    S/P CABG X 4 ([address] to LAD, Sequential SVG to D2, Ramus, OM) 01/2011    Cardiomyopathy ([facility] [address]     LVEF 60% (08/14), 40% (2011)    Chemotherapy 4/2010    for 4 months    COPD (chronic obstructive pulmonary disease) ([facility] [address] 8/30/2010    Depression     Diabetes ([address]     GERD (gastroesophageal reflux disease)     Headache     Hyperlipidemia     Hypertension     Schatzki's ring     S/P EGD and Dilation (07/16)    Thyroid disease     Tobacco abuse      PSH:  Past Surgical History:   Procedure Laterality Date    BREAST SURGERY PROCEDURE UNLISTED  2/22/10    left w/sentinel node bx    CABG, ARTERY-VEIN, FOUR  12/2010    CARDIAC SURG PROCEDURE UNLIST      stent placement before CABG    COLONOSCOPY N/A 5/3/2017    COLON  performed by Loyd Melvin MD at [address] [place] [address] HX APPENDECTOMY      HX CHOLECYSTECTOMY      HX GYN      tubal ligation    HX HEART CATHETERIZATION  11/11/09; 10/21/09    HX HEART CATHETERIZATION  10/15/10; 10/06/09    left heart    HX HEART CATHETERIZATION  11/7/2011    left heart cath, no new findings    HX OTHER SURGICAL  4/6/2010    Insertion right internal jugular single lumen MediPort.  HX RADICAL MASTECTOMY Left 2/2010    left, with chemo     MEDS:  Current Outpatient Prescriptions on File Prior to Visit   Medication Sig Dispense Refill    traMADol (ULTRAM) 50 mg tablet Take 50 mg by mouth every six (6) hours as needed for Pain.      B.infantis-B.ani-B.long-B.bifi (PROBIOTIC 4X) 10-15 mg TbEC Take  by mouth daily.  CYANOCOBALAMIN, VITAMIN B-12, (VITAMIN B12 PO) Take  by mouth.  clopidogrel (PLAVIX) 75 mg tab TAKE 1 TABLET EVERY DAY 90 Tab 3    metoprolol tartrate (LOPRESSOR) 25 mg tablet TAKE 1 TABLET TWICE DAILY 180 Tab 3    esomeprazole (NEXIUM) 20 mg capsule Take 20 mg by mouth daily.  losartan (COZAAR) 25 mg tablet Take 12.5 mg by mouth two (2) times a day.  metFORMIN (GLUCOPHAGE) 1,000 mg tablet Take 1,000 mg by mouth two (2) times daily (with meals).  nitroglycerin (NITROSTAT) 0.4 mg SL tablet 1 Tab by SubLINGual route every five (5) minutes as needed for Chest Pain. 1 Bottle 2    albuterol (PROVENTIL HFA, VENTOLIN HFA) 90 mcg/actuation inhaler Take 1 Puff by inhalation every four (4) hours as needed.  levothyroxine (SYNTHROID) 150 mcg tablet Take  by mouth Daily (before breakfast).  aspirin delayed-release 81 mg tablet Take  by mouth daily.  ascorbic acid (VITAMIN C) 250 mg tablet Take 250 mg by mouth daily.  tamoxifen (NOLVADEX) 20 mg tablet Take 20 mg by mouth daily.  ERGOCALCIFEROL, VITAMIN D2, (VITAMIN D PO) Take 4,000 Units by mouth daily.  rosuvastatin (CRESTOR) 20 mg tablet Take 20 mg by mouth daily. No current facility-administered medications on file prior to visit.       Alleres and Sensitivities:  Allergies   Allergen Reactions    Shellfish Derived Hives     Eyes and Throat swelling       Family History:  Family History   Problem Relation Age of Onset    Hypertension Mother     Diabetes Mother     Breast Problems Mother      fiber cystic    Hypertension Other      Social History:  Social History   Substance Use Topics    Smoking status: Current Every Day Smoker     Packs/day: 0.50    Smokeless tobacco: Never Used    Alcohol use No     Physical Exam:  BP Readings from Last 3 Encounters:   05/23/17 141/76   05/03/17 155/76   04/13/17 129/74     Pulse Readings from Last 3 Encounters:   05/23/17 60   05/03/17 (!) 56   04/13/17 (!) 57     Wt Readings from Last 3 Encounters:   05/23/17 212 lb (96.2 kg)   05/03/17 204 lb (92.5 kg)   04/13/17 210 lb (95.3 kg)     Constitutional: Oriented to person, place, and time. HENT: Head: Normocephalic and atraumatic. Neck: No JVD present. Cardiovascular: Regular rhythm. No  gallop or rubs appriciated. Faint 2/6 aortic systolic murmur  Lung[de-identified] Breath sounds normal. No respiratory distress. No ronchi or rales appriciated  Abdominal: No tenderness. No rebound and no guarding. Musculoskeletal: There is no edema. No cynosis. Review of Data:  EKG: (06/2011)Sinus rhythm at 73 bpm. No Dynamic ST changes of ischemia. No Significant Q waves. EKG (6/1/12) : Sinus rhythm at 66 beats per minute. No dynamic ST changes of ischemia. Some nonspecific T wave inversion in precordial leads. Unchanged from prior EKG.     LABS:   Lab Results   Component Value Date/Time    Sodium 138 11/02/2011 03:15 PM    Potassium 4.3 11/02/2011 03:15 PM    Chloride 104 11/02/2011 03:15 PM    CO2 29 11/02/2011 03:15 PM    Glucose 160 11/02/2011 03:15 PM    BUN 15 11/02/2011 03:15 PM    Creatinine 1.0 11/02/2011 03:15 PM     Lab Results   Component Value Date/Time    Cholesterol, total 121 03/22/2010 11:10 AM    HDL Cholesterol 29 03/22/2010 11:10 AM    LDL, calculated 69 03/22/2010 11:10 AM    Triglyceride 115 03/22/2010 11:10 AM    CHOL/HDL Ratio 4.2 03/22/2010 11:10 AM       No results found for: GPT    Lab Results   Component Value Date/Time    Hemoglobin A1c 5.7 06/22/2010 03:42 PM     FLP: (2/2012) , , LDL 51, HDL 22    ECHO:(04/2011)  LVEF 40%, Global hypokinesis    ECHO(08/2014) at [facility]  IMPRESSION:   MILD CONCENTRIC LEFT VENTRICULAR HYPERTROPHY. NORMAL GLOBAL LEFT VENTRICULAR SYSTOLIC FUNCTION, EJECTION FRACTION OF 60%. NORMAL DIASTOLIC FILLING PATTERN FOR AGE. NORMAL RIGHT VENTRICULAR SIZE AND SYSTOLIC FUNCTION. MILD MITRAL REGURGITATION. ESTIMATED PULMONARY ARTERY PRESSURE IS 34 MMHG. STRESS(10/2011)  1. Reversible defect along the anterior wall with no significant wall motion abnormality. 2. Ejection fraction 47 percent. 3. Fixed defect of the inferior wall with associated hypokinesis. CARDIAC CATH(11/2011)  1. LM: Normal.  2. LAD: Eccentric ostial 70-80%, mid to distal LAD is a very small-caliber vessel, probably about a 2-mm vessel. Competitive filing from [facility]   3. DIAGONAL: Ostial 40-50% proximal moderate disease. 4. RAMUS: Proximal 100% in stent. 5. LCx/OM: OM2 mid 100%, . Native LCx diffuse luminal irregularities without any obstructive stenosis. less than 2-mm vessel. 7. RCA: Prox and Mid stent diffuse 20% in-stent restenosis. Otherwise, no obstructive disease. RPLS 60-70% tubular stenosis which appears unchanged from prior angiogram about a year ago. 8. SEQUENTIAL SVG TO THE DIAGONA, RAMUS, OM:  patent without any significant obstructive stenosis. There appears to be venous valve system distally into the graft. Native vessel which includes diagonal, ramus and obtuse marginal beyond graft appears to be patent and a very small-caliber vessel, less than 2-mm. 9. LIM[facility] TO LAD: widely patent. Distally, LAD has no significant stenosis. This appears to be a less than 2-mm vessel. Impression / Plan:  Ms. Diego Melendrez is a pleasant 49 y.o. female who is here for the follow up appointment. Coronary artery disease:  Four vessel CABG in 2010. No reports anginal symptoms. Continue same. No use of S/L NTG since last visit. Continue current cardiac medication. Cardiomyopathy:  Remote history of ejection fraction of 45%.   Last ejection fraction 60% in 2014. No evidence of fluid overload. She is on Metoprolol 25 mg and losartan 12.5 mg BID. Continue same. Stable    Hypertension:  Blood pressure is  142/70 mm Hg. Continue BB and ARB    Hyperlipidemia:  Continue Crestor 20 mg daily without any side effect. Her lipid profile is being checked by Dr. Juarez Johnson regularly. Denies any problem    DM Goal A1C less than 7 from CV standpoint. Defer management to PCP. Last A1C was 6.5 in 2017 per patient    Tobacco abuse:  She had tried Chantix in the past with some side effect. Smoking cessation encouraged again during this visit. Murmur: Faint aortic systolic murmur. New on exam. No specific symptoms. Will order echo to evaluate for VHD. This plan was discussed with Ms. Flor Monterroso in detail, who is in agreement. Please do not hesitate to call me if you have any questions or concerns.

## 2017-05-26 NOTE — COMMUNICATION BODY
is 49 y. o.female with Coronary artery disease status post CABG in November 2010. Cardiomyopathy, hypertension, hyperlipidemia, diabetes, breast cancer status post mastectomy and chemotherapy. She also has anxiety and depression disorder. She has  tobacco abuse disorder. Ms. Tiana Lynch is here today for follow up appointment. She denies any symptoms to suggest angina or heart failure. She denies any use of sublingual nitroglycerin since last visit. She denies any palpitations, presyncope or syncope. Unfortunately she continues to smoke one pack of cigarettes a day.     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    Anxiety     Breast CA ([address] 12/2009    S/P Lt Mastectomy and Chemo    CAD (coronary artery disease) 8/30/2010    S/P CABG X 4 ([address] to LAD, Sequential SVG to D2, Ramus, OM) 01/2011    Cardiomyopathy ([address]     LVEF 60% (08/14), 40% (2011)    Chemotherapy 4/2010    for 4 months    COPD (chronic obstructive pulmonary disease) ([address] 8/30/2010    Depression     Diabetes ([address]     GERD (gastroesophageal reflux disease)     Headache     Hyperlipidemia     Hypertension     Schatzki's ring     S/P EGD and Dilation (07/16)    Thyroid disease     Tobacco abuse      PSH:  Past Surgical History:   Procedure Laterality Date    BREAST SURGERY PROCEDURE UNLISTED  2/22/10    left w/sentinel node bx    CABG, ARTERY-VEIN, FOUR  12/2010    CARDIAC SURG PROCEDURE UNLIST      stent placement before CABG    COLONOSCOPY N/A 5/3/2017    COLON  performed by Pat Valadez MD at [address] [place] [address] HX APPENDECTOMY      HX CHOLECYSTECTOMY      HX GYN      tubal ligation    HX HEART CATHETERIZATION  11/11/09; 10/21/09    HX HEART CATHETERIZATION  10/15/10; 10/06/09    left heart    HX HEART CATHETERIZATION  11/7/2011    left heart cath, no new findings    HX OTHER SURGICAL  4/6/2010    Insertion right internal jugular single lumen MediPort.  HX RADICAL MASTECTOMY Left 2/2010    left, with chemo     MEDS:  Current Outpatient Prescriptions on File Prior to Visit   Medication Sig Dispense Refill    traMADol (ULTRAM) 50 mg tablet Take 50 mg by mouth every six (6) hours as needed for Pain.      B.infantis-B.ani-B.long-B.bifi (PROBIOTIC 4X) 10-15 mg TbEC Take  by mouth daily.  CYANOCOBALAMIN, VITAMIN B-12, (VITAMIN B12 PO) Take  by mouth.  clopidogrel (PLAVIX) 75 mg tab TAKE 1 TABLET EVERY DAY 90 Tab 3    metoprolol tartrate (LOPRESSOR) 25 mg tablet TAKE 1 TABLET TWICE DAILY 180 Tab 3    esomeprazole (NEXIUM) 20 mg capsule Take 20 mg by mouth daily.  losartan (COZAAR) 25 mg tablet Take 12.5 mg by mouth two (2) times a day.  metFORMIN (GLUCOPHAGE) 1,000 mg tablet Take 1,000 mg by mouth two (2) times daily (with meals).  nitroglycerin (NITROSTAT) 0.4 mg SL tablet 1 Tab by SubLINGual route every five (5) minutes as needed for Chest Pain. 1 Bottle 2    albuterol (PROVENTIL HFA, VENTOLIN HFA) 90 mcg/actuation inhaler Take 1 Puff by inhalation every four (4) hours as needed.  levothyroxine (SYNTHROID) 150 mcg tablet Take  by mouth Daily (before breakfast).  aspirin delayed-release 81 mg tablet Take  by mouth daily.  ascorbic acid (VITAMIN C) 250 mg tablet Take 250 mg by mouth daily.  tamoxifen (NOLVADEX) 20 mg tablet Take 20 mg by mouth daily.  ERGOCALCIFEROL, VITAMIN D2, (VITAMIN D PO) Take 4,000 Units by mouth daily.  rosuvastatin (CRESTOR) 20 mg tablet Take 20 mg by mouth daily. No current facility-administered medications on file prior to visit.       Alleres and Sensitivities:  Allergies   Allergen Reactions    Shellfish Derived Hives     Eyes and Throat swelling       Family History:  Family History   Problem Relation Age of Onset    Hypertension Mother     Diabetes Mother     Breast Problems Mother      fiber cystic    Hypertension Other      Social History:  Social History   Substance Use Topics    Smoking status: Current Every Day Smoker     Packs/day: 0.50    Smokeless tobacco: Never Used    Alcohol use No     Physical Exam:  BP Readings from Last 3 Encounters:   05/23/17 141/76   05/03/17 155/76   04/13/17 129/74     Pulse Readings from Last 3 Encounters:   05/23/17 60   05/03/17 (!) 56   04/13/17 (!) 57     Wt Readings from Last 3 Encounters:   05/23/17 212 lb (96.2 kg)   05/03/17 204 lb (92.5 kg)   04/13/17 210 lb (95.3 kg)     Constitutional: Oriented to person, place, and time. HENT: Head: Normocephalic and atraumatic. Neck: No JVD present. Cardiovascular: Regular rhythm. No  gallop or rubs appriciated. Faint 2/6 aortic systolic murmur  Lung[de-identified] Breath sounds normal. No respiratory distress. No ronchi or rales appriciated  Abdominal: No tenderness. No rebound and no guarding. Musculoskeletal: There is no edema. No cynosis. Review of Data:  EKG: (06/2011)Sinus rhythm at 73 bpm. No Dynamic ST changes of ischemia. No Significant Q waves. EKG (6/1/12) : Sinus rhythm at 66 beats per minute. No dynamic ST changes of ischemia. Some nonspecific T wave inversion in precordial leads. Unchanged from prior EKG.     LABS:   Lab Results   Component Value Date/Time    Sodium 138 11/02/2011 03:15 PM    Potassium 4.3 11/02/2011 03:15 PM    Chloride 104 11/02/2011 03:15 PM    CO2 29 11/02/2011 03:15 PM    Glucose 160 11/02/2011 03:15 PM    BUN 15 11/02/2011 03:15 PM    Creatinine 1.0 11/02/2011 03:15 PM     Lab Results   Component Value Date/Time    Cholesterol, total 121 03/22/2010 11:10 AM    HDL Cholesterol 29 03/22/2010 11:10 AM    LDL, calculated 69 03/22/2010 11:10 AM    Triglyceride 115 03/22/2010 11:10 AM    CHOL/HDL Ratio 4.2 03/22/2010 11:10 AM       No results found for: GPT    Lab Results   Component Value Date/Time    Hemoglobin A1c 5.7 06/22/2010 03:42 PM     FLP: (2/2012) , , LDL 51, HDL 22    ECHO:(04/2011)  LVEF 40%, Global hypokinesis    ECHO(08/2014) at [facility]  IMPRESSION:   MILD CONCENTRIC LEFT VENTRICULAR HYPERTROPHY. NORMAL GLOBAL LEFT VENTRICULAR SYSTOLIC FUNCTION, EJECTION FRACTION OF 60%. NORMAL DIASTOLIC FILLING PATTERN FOR AGE. NORMAL RIGHT VENTRICULAR SIZE AND SYSTOLIC FUNCTION. MILD MITRAL REGURGITATION. ESTIMATED PULMONARY ARTERY PRESSURE IS 34 MMHG. STRESS(10/2011)  1. Reversible defect along the anterior wall with no significant wall motion abnormality. 2. Ejection fraction 47 percent. 3. Fixed defect of the inferior wall with associated hypokinesis. CARDIAC CATH(11/2011)  1. LM: Normal.  2. LAD: Eccentric ostial 70-80%, mid to distal LAD is a very small-caliber vessel, probably about a 2-mm vessel. Competitive filing from [facility]   3. DIAGONAL: Ostial 40-50% proximal moderate disease. 4. RAMUS: Proximal 100% in stent. 5. LCx/OM: OM2 mid 100%, . Native LCx diffuse luminal irregularities without any obstructive stenosis. less than 2-mm vessel. 7. RCA: Prox and Mid stent diffuse 20% in-stent restenosis. Otherwise, no obstructive disease. RPLS 60-70% tubular stenosis which appears unchanged from prior angiogram about a year ago. 8. SEQUENTIAL SVG TO THE DIAGONA, RAMUS, OM:  patent without any significant obstructive stenosis. There appears to be venous valve system distally into the graft. Native vessel which includes diagonal, ramus and obtuse marginal beyond graft appears to be patent and a very small-caliber vessel, less than 2-mm. 9. [facility] TO LAD: widely patent. Distally, LAD has no significant stenosis. This appears to be a less than 2-mm vessel. Impression / Plan:  Ms. Hank Carrasco is a pleasant 49 y.o. female who is here for the follow up appointment. Coronary artery disease:  Four vessel CABG in 2010. No reports anginal symptoms. Continue same. No use of S/L NTG since last visit. Continue current cardiac medication. Cardiomyopathy:  Remote history of ejection fraction of 45%.   Last ejection fraction 60% in 2014. No evidence of fluid overload. She is on Metoprolol 25 mg and losartan 12.5 mg BID. Continue same. Stable    Hypertension:  Blood pressure is  142/70 mm Hg. Continue BB and ARB    Hyperlipidemia:  Continue Crestor 20 mg daily without any side effect. Her lipid profile is being checked by Dr. Donny Malhotra regularly. Denies any problem    DM Goal A1C less than 7 from CV standpoint. Defer management to PCP. Last A1C was 6.5 in 2017 per patient    Tobacco abuse:  She had tried Chantix in the past with some side effect. Smoking cessation encouraged again during this visit. Murmur: Faint aortic systolic murmur. New on exam. No specific symptoms. Will order echo to evaluate for VHD. This plan was discussed with Ms. Luis Manuel Noble in detail, who is in agreement. Please do not hesitate to call me if you have any questions or concerns.

## 2017-06-06 ENCOUNTER — HOSPITAL ENCOUNTER (OUTPATIENT)
Dept: NON INVASIVE DIAGNOSTICS | Age: 57
Discharge: HOME | End: 2017-06-06
Attending: INTERNAL MEDICINE
Payer: MEDICARE

## 2017-06-06 DIAGNOSIS — R01.1 MURMUR, CARDIAC: ICD-10-CM

## 2017-06-06 PROCEDURE — 93306 TTE W/DOPPLER COMPLETE: CPT

## 2017-06-09 ENCOUNTER — TELEPHONE (OUTPATIENT)
Dept: CARDIOLOGY CLINIC | Age: 57
End: 2017-06-09

## 2017-06-09 NOTE — TELEPHONE ENCOUNTER
Verbal order and read back per Haley Lora MD    Echo ok, no changes.    Yearly follow up       Patient aware

## 2017-09-25 ENCOUNTER — HOSPITAL ENCOUNTER (OUTPATIENT)
Dept: GENERAL RADIOLOGY | Age: 57
Discharge: HOME | End: 2017-09-25
Attending: OBSTETRICS & GYNECOLOGY
Payer: MEDICARE

## 2017-09-25 DIAGNOSIS — Z78.0 MENOPAUSE: ICD-10-CM

## 2017-09-25 DIAGNOSIS — M81.0 OSTEOPOROSIS: ICD-10-CM

## 2017-09-25 PROCEDURE — 77080 DXA BONE DENSITY AXIAL: CPT

## 2017-10-11 RX ORDER — CLOPIDOGREL BISULFATE 75 MG/1
TABLET ORAL
Qty: 90 TAB | Refills: 3 | Status: SHIPPED | OUTPATIENT
Start: 2017-10-11 | End: 2018-11-28 | Stop reason: SDUPTHER

## 2018-01-08 RX ORDER — METOPROLOL TARTRATE 25 MG/1
TABLET, FILM COATED ORAL
Qty: 180 TAB | Refills: 3 | Status: SHIPPED | OUTPATIENT
Start: 2018-01-08 | End: 2018-11-28 | Stop reason: SDUPTHER

## 2018-01-18 ENCOUNTER — OFFICE VISIT (OUTPATIENT)
Dept: CARDIOLOGY CLINIC | Age: 58
End: 2018-01-18

## 2018-01-18 ENCOUNTER — HOSPITAL ENCOUNTER (OUTPATIENT)
Dept: GENERAL RADIOLOGY | Age: 58
Discharge: HOME | End: 2018-01-18
Payer: MEDICARE

## 2018-01-18 VITALS
OXYGEN SATURATION: 98 % | WEIGHT: 209 LBS | HEART RATE: 63 BPM | DIASTOLIC BLOOD PRESSURE: 86 MMHG | HEIGHT: 70 IN | BODY MASS INDEX: 29.92 KG/M2 | SYSTOLIC BLOOD PRESSURE: 164 MMHG

## 2018-01-18 DIAGNOSIS — M25.519 SHOULDER PAIN, UNSPECIFIED CHRONICITY, UNSPECIFIED LATERALITY: ICD-10-CM

## 2018-01-18 DIAGNOSIS — M25.519 SHOULDER PAIN, UNSPECIFIED CHRONICITY, UNSPECIFIED LATERALITY: Primary | ICD-10-CM

## 2018-01-18 PROCEDURE — 73030 X-RAY EXAM OF SHOULDER: CPT

## 2018-01-18 NOTE — LETTER
2/21/2018 Patient:  Nakul Sanches YOB: 1960 Date of Visit: 1/18/2018 Dear Rosario Maxwell MD 
[address] [place] [address] [id number] [facility] [address] VIA Facsimile: [phone] 
 : Thank you for referring Ms. Puma Truong to me for evaluation/treatment. Below are the relevant portions of my assessment and plan of care.  is 56 y. o.female with Coronary artery disease status post CABG in November 2010. Cardiomyopathy, hypertension, hyperlipidemia, diabetes, breast cancer status post mastectomy and chemotherapy. She also has anxiety and depression disorder. She has  tobacco abuse disorder. Ms. Preston Ribeiro is here today for follow up appointment after a year. She denies any symptoms to suggest angina or heart failure. She denies any use of sublingual nitroglycerin since last visit. She denies any palpitations, presyncope or syncope. Still smokes Has been having some Left shoulder and upper arm discomfort for a month, constant with varying frequency. Takes tylenol for it. Worse with shoulder movement. No CP or diaphoresis or SOB with it PMH: 
Past Medical History:  
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  
 for 4 months  COPD (chronic obstructive pulmonary disease) ([address] 8/30/2010  Depression  Diabetes ([address]  GERD (gastroesophageal reflux disease)  Headache(784.0)  Hyperlipidemia  Hypertension  Schatzki's ring S/P EGD and Dilation (07/16)  Thyroid disease  Tobacco abuse PSH: 
Past Surgical History:  
Procedure Laterality Date  BREAST SURGERY PROCEDURE UNLISTED  2/22/10 left w/sentinel node bx  CABG, ARTERY-VEIN, FOUR  12/2010  CARDIAC SURG PROCEDURE UNLIST    
 stent placement before CABG  
 COLONOSCOPY N/A 5/3/2017 COLON  performed by Kamilla Jimenez MD at [address] [place] [address] [address]  HX CHOLECYSTECTOMY  HX GYN    
 tubal ligation  HX HEART CATHETERIZATION  11/11/09; 10/21/09  HX HEART CATHETERIZATION  10/15/10; 10/06/09  
 left heart  HX HEART CATHETERIZATION  11/7/2011  
 left heart cath, no new findings  HX OTHER SURGICAL  4/6/2010 Insertion right internal jugular single lumen MediPort.  HX RADICAL MASTECTOMY Left 2/2010  
 left, with chemo MEDS: 
Current Outpatient Prescriptions on File Prior to Visit Medication Sig Dispense Refill  metoprolol tartrate (LOPRESSOR) 25 mg tablet TAKE 1 TABLET TWICE DAILY 180 Tab 3  clopidogrel (PLAVIX) 75 mg tab TAKE 1 TABLET EVERY DAY 90 Tab 3  pneumococcal 13 isaac conj dip (PREVNAR-13) 0.5 mL syrg injection 0.5 mL by IntraMUSCular route once. Was given in April  traMADol (ULTRAM) 50 mg tablet Take 50 mg by mouth every six (6) hours as needed for Pain.    
 B.infantis-B.ani-B.long-B.bifi (PROBIOTIC 4X) 10-15 mg TbEC Take  by mouth daily.  CYANOCOBALAMIN, VITAMIN B-12, (VITAMIN B12 PO) Take  by mouth.  esomeprazole (NEXIUM) 20 mg capsule Take 20 mg by mouth daily.  losartan (COZAAR) 25 mg tablet Take 12.5 mg by mouth two (2) times a day.  metFORMIN (GLUCOPHAGE) 1,000 mg tablet Take 1,000 mg by mouth two (2) times daily (with meals).  nitroglycerin (NITROSTAT) 0.4 mg SL tablet 1 Tab by SubLINGual route every five (5) minutes as needed for Chest Pain. 1 Bottle 2  
 albuterol (PROVENTIL HFA, VENTOLIN HFA) 90 mcg/actuation inhaler Take 1 Puff by inhalation every four (4) hours as needed.  levothyroxine (SYNTHROID) 150 mcg tablet Take  by mouth Daily (before breakfast).  aspirin delayed-release 81 mg tablet Take  by mouth daily.  ascorbic acid (VITAMIN C) 250 mg tablet Take 250 mg by mouth daily.  tamoxifen (NOLVADEX) 20 mg tablet Take 20 mg by mouth daily.  ERGOCALCIFEROL, VITAMIN D2, (VITAMIN D PO) Take 4,000 Units by mouth daily.  rosuvastatin (CRESTOR) 20 mg tablet Take 20 mg by mouth daily. No current facility-administered medications on file prior to visit. Alleres and Sensitivities: 
Allergies Allergen Reactions  Shellfish Derived Hives Eyes and Throat swelling Family History: 
Family History Problem Relation Age of Onset  Hypertension Mother  Diabetes Mother  Breast Problems Mother   
  fiber cystic  Hypertension Other Social History: 
Social History Substance Use Topics  Smoking status: Current Every Day Smoker Packs/day: 0.50  Smokeless tobacco: Never Used  Alcohol use No  
 
Physical Exam: 
BP Readings from Last 3 Encounters:  
05/23/17 141/76  
05/03/17 155/76  
04/13/17 129/74 Pulse Readings from Last 3 Encounters:  
05/23/17 60  
05/03/17 (!) 56  
04/13/17 (!) 57 Wt Readings from Last 3 Encounters:  
01/18/18 209 lb (94.8 kg) 05/23/17 212 lb (96.2 kg) 05/03/17 204 lb (92.5 kg) Constitutional: Oriented to person, place, and time. HENT: Head: Normocephalic and atraumatic. Neck: No JVD present. Cardiovascular: Regular rhythm. No  gallop or rubs appriciated. Faint 2/6 aortic systolic murmur Lung[de-identified] Breath sounds normal. No respiratory distress. No ronchi or rales appriciated Abdominal: No tenderness. No rebound and no guarding. Musculoskeletal: There is no edema. No cynosis. Review of Data: 
EKG: (06/2011)Sinus rhythm at 73 bpm. No Dynamic ST changes of ischemia. No Significant Q waves. EKG (6/1/12) : Sinus rhythm at 66 beats per minute. No dynamic ST changes of ischemia. Some nonspecific T wave inversion in precordial leads. Unchanged from prior EKG. LABS:  
Lab Results Component Value Date/Time Sodium 138 11/02/2011 03:15 PM  
 Potassium 4.3 11/02/2011 03:15 PM  
 Chloride 104 11/02/2011 03:15 PM  
 CO2 29 11/02/2011 03:15 PM  
 Glucose 160 11/02/2011 03:15 PM  
 BUN 15 11/02/2011 03:15 PM  
 Creatinine 1.0 11/02/2011 03:15 PM  
 
Lab Results Component Value Date/Time Cholesterol, total 121 03/22/2010 11:10 AM  
 HDL Cholesterol 29 03/22/2010 11:10 AM  
 LDL, calculated 69 03/22/2010 11:10 AM  
 Triglyceride 115 03/22/2010 11:10 AM  
 CHOL/HDL Ratio 4.2 03/22/2010 11:10 AM  
 
 
No results found for: GPT Lab Results Component Value Date/Time Hemoglobin A1c 5.7 06/22/2010 03:42 PM  
 
FLP: (2/2012) , , LDL 51, HDL 22 ECHO:(04/2011) LVEF 40%, Global hypokinesis ECHO(08/2014) at [facility] IMPRESSION:  
MILD CONCENTRIC LEFT VENTRICULAR HYPERTROPHY. NORMAL GLOBAL LEFT VENTRICULAR SYSTOLIC FUNCTION, EJECTION FRACTION OF 60%. NORMAL DIASTOLIC FILLING PATTERN FOR AGE. NORMAL RIGHT VENTRICULAR SIZE AND SYSTOLIC FUNCTION. ECHO (06/17) SUMMARY: 
Left ventricle: Systolic function was at the lower limits of normal. 
Ejection fraction was estimated to be 50 %. There was hypokinesis of the 
basal-mid inferior and basal inferolateral wall(s). Wall thickness was 
mildly increased. Right ventricle: Systolic function was mildly reduced. TAPSV 8 cm/s. Mitral valve: There was mild regurgitation. Aortic valve: There was no stenosis. Tricuspid valve: There was mild regurgitation. STRESS(10/2011) 1. Reversible defect along the anterior wall with no significant wall motion abnormality. 2. Ejection fraction 47 percent. 3. Fixed defect of the inferior wall with associated hypokinesis. CARDIAC CATH(11/2011) 1. LM: Normal. 
2. LAD: Eccentric ostial 70-80%, mid to distal LAD is a very small-caliber vessel, probably about a 2-mm vessel. Competitive filing from [facility] 3. DIAGONAL: Ostial 40-50% proximal moderate disease. 4. RAMUS: Proximal 100% in stent. 5. LCx/OM: OM2 mid 100%, . Native LCx diffuse luminal irregularities without any obstructive stenosis. less than 2-mm vessel. 7. RCA: Prox and Mid stent diffuse 20% in-stent restenosis. Otherwise, no obstructive disease. RPLS 60-70% tubular stenosis which appears unchanged from prior angiogram about a year ago. 8. SEQUENTIAL SVG TO THE DIAGONA, RAMUS, OM:  patent without any significant obstructive stenosis. There appears to be venous valve system distally into the graft. Native vessel which includes diagonal, ramus and obtuse marginal beyond graft appears to be patent and a very small-caliber vessel, less than 2-mm. 9. LIM[place] TO LAD: widely patent. Distally, LAD has no significant stenosis. This appears to be a less than 2-mm vessel. Impression / Plan: Ms. Clifton Jimenez is a pleasant 56 y.o. female who is here for the follow up appointment. Coronary artery disease:   
Four vessel CABG in 2010. No reports anginal symptoms. Continue same. No use of S/L NTG since last visit. Continue current cardiac medication. On ASA, Plavix, Statin, BB Cardiomyopathy:  Remote history of ejection fraction of 45%. Last ejection fraction 60% in 2014 and 50% in 06/17. No evidence of fluid overload. She is on Metoprolol 25 mg and losartan 12.5 mg BID. Continue same. Stable Hypertension:  Blood pressure is  142/70 mm Hg. Continue BB and ARB Hyperlipidemia:  Continue Crestor 20 mg daily without any side effect. Her lipid profile is being checked by Dr. Jj Raymond regularly. Denies any problem. No side effects. DM Goal A1C less than 7 from CV standpoint. Defer management to PCP. Last A1C was 6.8 in 2017 per patient Tobacco abuse:  She had tried Chantix in the past with some side effect. Smoking cessation encouraged again during this visit. Still smoking 3/4-1 PPD. Unable to quit several times in past.  
 
Murmur: Faint aortic systolic murmur.  Stable on exam.  No specific symptoms. ECHO in 06/17 without significant VHD. Left shoulder pain: Reproducible with shoulder movement. Especially with shoulder abduction. Doubt angina. ?? Arthritis. Patient was told she had arthritis. Will proceed with X ray of shoulder. Advised to take tylenol and follow up with PCP. This plan was discussed with Ms. Estle Scheuermann in detail, who is in agreement. Please do not hesitate to call me if you have any questions or concerns. Sincerely, Bryan iFsh MD

## 2018-01-18 NOTE — PROGRESS NOTES
is 56 y. o.female with Coronary artery disease status post CABG in November 2010. Cardiomyopathy, hypertension, hyperlipidemia, diabetes, breast cancer status post mastectomy and chemotherapy. She also has anxiety and depression disorder. She has  tobacco abuse disorder. Ms. Francesca Chino is here today for follow up appointment after a year. She denies any symptoms to suggest angina or heart failure. She denies any use of sublingual nitroglycerin since last visit. She denies any palpitations, presyncope or syncope. Still smokes  Has been having some Left shoulder and upper arm discomfort for a month, constant with varying frequency. Takes tylenol for it. Worse with shoulder movement.  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    Anxiety     Breast CA ([address] 12/2009    S/P Lt Mastectomy and Chemo    CAD (coronary artery disease) 8/30/2010    S/P CABG X 4 ([address] to LAD, Sequential SVG to D2, Ramus, OM) 01/2011    Cardiomyopathy ([address]     LVEF 60% (08/14), 40% (2011)    Chemotherapy 4/2010    for 4 months    COPD (chronic obstructive pulmonary disease) ([address] 8/30/2010    Depression     Diabetes ([address]     GERD (gastroesophageal reflux disease)     Headache(784.0)     Hyperlipidemia     Hypertension     Schatzki's ring     S/P EGD and Dilation (07/16)    Thyroid disease     Tobacco abuse      PSH:  Past Surgical History:   Procedure Laterality Date    BREAST SURGERY PROCEDURE UNLISTED  2/22/10    left w/sentinel node bx    CABG, ARTERY-VEIN, FOUR  12/2010    CARDIAC SURG PROCEDURE UNLIST      stent placement before CABG    COLONOSCOPY N/A 5/3/2017    COLON  performed by [facility] at [facility] ENDOSCOPY    HX APPENDECTOMY      HX CHOLECYSTECTOMY      HX GYN      tubal ligation    HX HEART CATHETERIZATION  11/11/09; 10/21/09    HX HEART CATHETERIZATION  10/15/10; 10/06/09    left heart    HX HEART CATHETERIZATION  11/7/2011    left heart cath, no new findings    HX OTHER SURGICAL  4/6/2010    Insertion right internal jugular single lumen MediPort.  HX RADICAL MASTECTOMY Left 2/2010    left, with chemo     MEDS:  Current Outpatient Prescriptions on File Prior to Visit   Medication Sig Dispense Refill    metoprolol tartrate (LOPRESSOR) 25 mg tablet TAKE 1 TABLET TWICE DAILY 180 Tab 3    clopidogrel (PLAVIX) 75 mg tab TAKE 1 TABLET EVERY DAY 90 Tab 3    pneumococcal 13 isaac conj dip (PREVNAR-13) 0.5 mL syrg injection 0.5 mL by IntraMUSCular route once. Was given in April      traMADol (ULTRAM) 50 mg tablet Take 50 mg by mouth every six (6) hours as needed for Pain.      B.infantis-B.ani-B.long-B.bifi (PROBIOTIC 4X) 10-15 mg TbEC Take  by mouth daily.  CYANOCOBALAMIN, VITAMIN B-12, (VITAMIN B12 PO) Take  by mouth.  esomeprazole (NEXIUM) 20 mg capsule Take 20 mg by mouth daily.  losartan (COZAAR) 25 mg tablet Take 12.5 mg by mouth two (2) times a day.  metFORMIN (GLUCOPHAGE) 1,000 mg tablet Take 1,000 mg by mouth two (2) times daily (with meals).  nitroglycerin (NITROSTAT) 0.4 mg SL tablet 1 Tab by SubLINGual route every five (5) minutes as needed for Chest Pain. 1 Bottle 2    albuterol (PROVENTIL HFA, VENTOLIN HFA) 90 mcg/actuation inhaler Take 1 Puff by inhalation every four (4) hours as needed.  levothyroxine (SYNTHROID) 150 mcg tablet Take  by mouth Daily (before breakfast).  aspirin delayed-release 81 mg tablet Take  by mouth daily.  ascorbic acid (VITAMIN C) 250 mg tablet Take 250 mg by mouth daily.  tamoxifen (NOLVADEX) 20 mg tablet Take 20 mg by mouth daily.  ERGOCALCIFEROL, VITAMIN D2, (VITAMIN D PO) Take 4,000 Units by mouth daily.  rosuvastatin (CRESTOR) 20 mg tablet Take 20 mg by mouth daily. No current facility-administered medications on file prior to visit.       Phyllis gomez Sensitivities:  Allergies   Allergen Reactions    Shellfish Derived Hives     Eyes and Throat swelling       Family History:  Family History   Problem Relation Age of Onset    Hypertension Mother     Diabetes Mother     Breast Problems Mother      fiber cystic    Hypertension Other      Social History:  Social History   Substance Use Topics    Smoking status: Current Every Day Smoker     Packs/day: 0.50    Smokeless tobacco: Never Used    Alcohol use No     Physical Exam:  BP Readings from Last 3 Encounters:   05/23/17 141/76   05/03/17 155/76   04/13/17 129/74     Pulse Readings from Last 3 Encounters:   05/23/17 60   05/03/17 (!) 56   04/13/17 (!) 57     Wt Readings from Last 3 Encounters:   01/18/18 209 lb (94.8 kg)   05/23/17 212 lb (96.2 kg)   05/03/17 204 lb (92.5 kg)     Constitutional: Oriented to person, place, and time. HENT: Head: Normocephalic and atraumatic. Neck: No JVD present. Cardiovascular: Regular rhythm. No  gallop or rubs appriciated. Faint 2/6 aortic systolic murmur  Lung[de-identified] Breath sounds normal. No respiratory distress. No ronchi or rales appriciated  Abdominal: No tenderness. No rebound and no guarding. Musculoskeletal: There is no edema. No cynosis. Review of Data:  EKG: (06/2011)Sinus rhythm at 73 bpm. No Dynamic ST changes of ischemia. No Significant Q waves. EKG (6/1/12) : Sinus rhythm at 66 beats per minute. No dynamic ST changes of ischemia. Some nonspecific T wave inversion in precordial leads. Unchanged from prior EKG.     LABS:   Lab Results   Component Value Date/Time    Sodium 138 11/02/2011 03:15 PM    Potassium 4.3 11/02/2011 03:15 PM    Chloride 104 11/02/2011 03:15 PM    CO2 29 11/02/2011 03:15 PM    Glucose 160 11/02/2011 03:15 PM    BUN 15 11/02/2011 03:15 PM    Creatinine 1.0 11/02/2011 03:15 PM     Lab Results   Component Value Date/Time    Cholesterol, total 121 03/22/2010 11:10 AM    HDL Cholesterol 29 03/22/2010 11:10 AM    LDL, calculated 69 03/22/2010 11:10 AM    Triglyceride 115 03/22/2010 11:10 AM    CHOL/HDL Ratio 4.2 03/22/2010 11:10 AM       No results found for: GPT    Lab Results   Component Value Date/Time    Hemoglobin A1c 5.7 06/22/2010 03:42 PM     FLP: (2/2012) , , LDL 51, HDL 22    ECHO:(04/2011)  LVEF 40%, Global hypokinesis    [facility]CHO(08/2014) at [facility]  IMPRESSION:   MILD CONCENTRIC LEFT VENTRICULAR HYPERTROPHY. NORMAL GLOBAL LEFT VENTRICULAR SYSTOLIC FUNCTION, EJECTION FRACTION OF 60%. NORMAL DIASTOLIC FILLING PATTERN FOR AGE. NORMAL RIGHT VENTRICULAR SIZE AND SYSTOLIC FUNCTION. ECHO (06/17)  SUMMARY:  Left ventricle: Systolic function was at the lower limits of normal.  Ejection fraction was estimated to be 50 %. There was hypokinesis of the  basal-mid inferior and basal inferolateral wall(s). Wall thickness was  mildly increased. Right ventricle: Systolic function was mildly reduced. TAPSV 8 cm/s. Mitral valve: There was mild regurgitation. Aortic valve: There was no stenosis. Tricuspid valve: There was mild regurgitation. STRESS(10/2011)  1. Reversible defect along the anterior wall with no significant wall motion abnormality. 2. Ejection fraction 47 percent. 3. Fixed defect of the inferior wall with associated hypokinesis. CARDIAC CATH(11/2011)  1. LM: Normal.  2. LAD: Eccentric ostial 70-80%, mid to distal LAD is a very small-caliber vessel, probably about a 2-mm vessel. Competitive filing from [facility]   3. DIAGONAL: Ostial 40-50% proximal moderate disease. 4. RAMUS: Proximal 100% in stent. 5. LCx/OM: OM2 mid 100%, . Native LCx diffuse luminal irregularities without any obstructive stenosis. less than 2-mm vessel. 7. RCA: Prox and Mid stent diffuse 20% in-stent restenosis. Otherwise, no obstructive disease. RPLS 60-70% tubular stenosis which appears unchanged from prior angiogram about a year ago. 8. SEQUENTIAL SVG TO THE DIAGONA, RAMUS, OM:  patent without any significant obstructive stenosis.  There appears to be venous valve system distally into the graft. Native vessel which includes diagonal, ramus and obtuse marginal beyond graft appears to be patent and a very small-caliber vessel, less than 2-mm. 9. LIM[place] TO LAD: widely patent. Distally, LAD has no significant stenosis. This appears to be a less than 2-mm vessel. Impression / Plan:  Ms. Sravanthi Villanueva is a pleasant 56 y.o. female who is here for the follow up appointment. Coronary artery disease:    Four vessel CABG in 2010. No reports anginal symptoms. Continue same. No use of S/L NTG since last visit. Continue current cardiac medication. On ASA, Plavix, Statin, BB    Cardiomyopathy:  Remote history of ejection fraction of 45%. Last ejection fraction 60% in 2014 and 50% in 06/17. No evidence of fluid overload. She is on Metoprolol 25 mg and losartan 12.5 mg BID. Continue same. Stable    Hypertension:  Blood pressure is  142/70 mm Hg. Continue BB and ARB    Hyperlipidemia:  Continue Crestor 20 mg daily without any side effect. Her lipid profile is being checked by Dr. Jazmyn Deshpande regularly. Denies any problem. No side effects. DM Goal A1C less than 7 from CV standpoint. Defer management to PCP. Last A1C was 6.8 in 2017 per patient    Tobacco abuse:  She had tried Chantix in the past with some side effect. Smoking cessation encouraged again during this visit. Still smoking 3/4-1 PPD. Unable to quit several times in past.     Murmur: Faint aortic systolic murmur. Stable on exam.  No specific symptoms. ECHO in 06/17 without significant VHD. Left shoulder pain: Reproducible with shoulder movement. Especially with shoulder abduction. Doubt angina. ?? Arthritis. Patient was told she had arthritis. Will proceed with X ray of shoulder. Advised to take tylenol and follow up with PCP. This plan was discussed with Ms. Sravanthi Villanueva in detail, who is in agreement. Please do not hesitate to call me if you have any questions or concerns.

## 2018-01-18 NOTE — MR AVS SNAPSHOT
[address] [facility] [address] [facility] [address] [id number] 
[phone] Patient: Chi Scott MRN: [id number] Visit Information Date & Time Provider Department Dept. Phone Encounter #  
 2018 11:30 AM [place] Gil  [facility] Specialist at [facility] [phone] [id number] Follow-up Instructions Return in about 1 year (around 2019). Upcoming Health Maintenance Date Due Hepatitis C Screening 1960 FOOT EXAM Q1 1972 MICROALBUMIN Q1 1972 EYE EXAM RETINAL OR DILATED Q1 1972 Pneumococcal 19-64 Highest Risk (1 of 3 - PCV13) 1981 DTaP/Tdap/Td series (1 - Tdap) 1983 HEMOGLOBIN A1C Q6M 2010 FOBT Q 1 YEAR AGE 50-75 2012 LIPID PANEL Q1 2013 Influenza Age 5 to Adult 2017 PAP AKA CERVICAL CYTOLOGY 2018 BREAST CANCER SCRN MAMMOGRAM 4/10/2019 Allergies as of 2018  Review Complete On: 2018 By: Lauren Beltran RN Severity Noted Reaction Type Reactions Shellfish Derived  2017    Hives Eyes and Throat swelling Current Immunizations  Never Reviewed No immunizations on file. Not reviewed this visit You Were Diagnosed With   
  
 Codes Comments Shoulder pain, unspecified chronicity, unspecified laterality    -  Primary ICD-10-CM: M25.519 ICD-9-CM: 719.41 Vitals BP Pulse Height(growth percentile) Weight(growth percentile) SpO2 BMI  
 164/86 63 5' 10\" (1.778 m) 209 lb (94.8 kg) 98% 29.99 kg/m2 OB Status Smoking Status Chemically Induced Current Every Day Smoker Vitals History BMI and BSA Data Body Mass Index Body Surface Area  
 29.99 kg/m 2 2.16 m 2 Preferred Pharmacy Pharmacy Name Phone [facility] [facility] [address] [phone] Your Updated Medication List  
  
   
 This list is accurate as of: 1/18/18 11:52 AM.  Always use your most recent med list.  
  
  
  
  
 albuterol 90 mcg/actuation inhaler Commonly known as:  PROVENTIL HFA, VENTOLIN HFA, PROAIR HFA Take 1 Puff by inhalation every four (4) hours as needed. aspirin delayed-release 81 mg tablet Take  by mouth daily. clopidogrel 75 mg Tab Commonly known as:  PLAVIX TAKE 1 TABLET EVERY DAY  
  
 CRESTOR 20 mg tablet Generic drug:  rosuvastatin Take 20 mg by mouth daily. esomeprazole 20 mg capsule Commonly known as:  Alverna Danker Take 20 mg by mouth daily. losartan 25 mg tablet Commonly known as:  COZAAR Take 12.5 mg by mouth two (2) times a day. metFORMIN 1,000 mg tablet Commonly known as:  GLUCOPHAGE Take 1,000 mg by mouth two (2) times daily (with meals). metoprolol tartrate 25 mg tablet Commonly known as:  LOPRESSOR  
TAKE 1 TABLET TWICE DAILY  
  
 nitroglycerin 0.4 mg SL tablet Commonly known as:  NITROSTAT  
1 Tab by SubLINGual route every five (5) minutes as needed for Chest Pain. pneumococcal 13 isaac conj dip 0.5 mL Syrg injection Commonly known as:  PREVNAR-13  
0.5 mL by IntraMUSCular route once. Was given in April PROBIOTIC 4X 10-15 mg Tbec Generic drug:  B.infantis-B.ani-B.long-B.bifi Take  by mouth daily. SYNTHROID 150 mcg tablet Generic drug:  levothyroxine Take  by mouth Daily (before breakfast). tamoxifen 20 mg tablet Commonly known as:  NOLVADEX Take 20 mg by mouth daily. traMADol 50 mg tablet Commonly known as:  ULTRAM  
Take 50 mg by mouth every six (6) hours as needed for Pain. VITAMIN B12 PO Take  by mouth. VITAMIN C 250 mg tablet Generic drug:  ascorbic acid (vitamin C) Take 250 mg by mouth daily. VITAMIN D2 PO Take 4,000 Units by mouth daily. We Performed the Following AMB POC EKG ROUTINE W/ 12 LEADS, INTER & REP [37933 CPT(R)] Follow-up Instructions Return in about 1 year (around 1/18/2019). Introducing \[facility] [place] [facility]\"" & HEALTH SERVICES! Annmarie Byrne introduces [employer] patient portal. Now you can access parts of your medical record, email your doctor's office, and request medication refills online. 1. In your internet browser, go to [email or web address] 2. Click on the First Time User? Click Here link in the Sign In box. You will see the New Member Sign Up page. 3. Enter your [employer] Access Code exactly as it appears below. You will not need to use this code after youve completed the sign-up process. If you do not sign up before the expiration date, you must request a new code. · [employer] Access Code: [email or web address] Expires: 4/18/2018 11:27 AM 
 
4. Enter the last four digits of your Social Security Number (xxxx) and Date of Birth (mm/dd/yyyy) as indicated and click Submit. You will be taken to the next sign-up page. 5. Create a [employer] ID. This will be your [employer] login ID and cannot be changed, so think of one that is secure and easy to remember. 6. Create a [employer] password. You can change your password at any time. 7. Enter your Password Reset Question and Answer. This can be used at a later time if you forget your password. 8. Enter your e-mail address. You will receive e-mail notification when new information is available in [address] 9. Click Sign Up. You can now view and download portions of your medical record. 10. Click the Download Summary menu link to download a portable copy of your medical information. If you have questions, please visit the Frequently Asked Questions section of the [employer] website. Remember, [employer] is NOT to be used for urgent needs. For medical emergencies, dial 911. Now available from your iPhone and Android! Please provide this summary of care documentation to your next provider. Your primary care clinician is listed as Leesa Klinefelter. If you have any questions after today's visit, please call [phone].

## 2018-01-19 ENCOUNTER — TELEPHONE (OUTPATIENT)
Dept: CARDIOLOGY CLINIC | Age: 58
End: 2018-01-19

## 2018-01-19 NOTE — TELEPHONE ENCOUNTER
Verbal order and read back per Silvio Mullen MD    Possible arthritic changes, send report to PCP     Patient aware of results

## 2018-02-12 ENCOUNTER — HOSPITAL ENCOUNTER (OUTPATIENT)
Dept: MRI IMAGING | Age: 58
Discharge: HOME | End: 2018-02-12
Attending: SPECIALIST
Payer: MEDICARE

## 2018-02-12 DIAGNOSIS — C50.912 MALIGNANT NEOPLASM OF LEFT BREAST (HCC): ICD-10-CM

## 2018-02-12 DIAGNOSIS — M25.512 LEFT SHOULDER PAIN: ICD-10-CM

## 2018-02-12 PROCEDURE — 73221 MRI JOINT UPR EXTREM W/O DYE: CPT

## 2018-02-21 NOTE — COMMUNICATION BODY
is 56 y. o.female with Coronary artery disease status post CABG in November 2010. Cardiomyopathy, hypertension, hyperlipidemia, diabetes, breast cancer status post mastectomy and chemotherapy. She also has anxiety and depression disorder. She has  tobacco abuse disorder. Ms. Donna Patterson is here today for follow up appointment after a year. She denies any symptoms to suggest angina or heart failure. She denies any use of sublingual nitroglycerin since last visit. She denies any palpitations, presyncope or syncope. Still smokes  Has been having some Left shoulder and upper arm discomfort for a month, constant with varying frequency. Takes tylenol for it. Worse with shoulder movement.  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    Anxiety     Breast CA ([address] 12/2009    S/P Lt Mastectomy and Chemo    CAD (coronary artery disease) 8/30/2010    S/P CABG X 4 ([address] to LAD, Sequential SVG to D2, Ramus, OM) 01/2011    Cardiomyopathy ([address]     LVEF 60% (08/14), 40% (2011)    Chemotherapy 4/2010    for 4 months    COPD (chronic obstructive pulmonary disease) ([address] 8/30/2010    Depression     Diabetes ([address]     GERD (gastroesophageal reflux disease)     Headache(784.0)     Hyperlipidemia     Hypertension     Schatzki's ring     S/P EGD and Dilation (07/16)    Thyroid disease     Tobacco abuse      PSH:  Past Surgical History:   Procedure Laterality Date    BREAST SURGERY PROCEDURE UNLISTED  2/22/10    left w/sentinel node bx    CABG, ARTERY-VEIN, FOUR  12/2010    CARDIAC SURG PROCEDURE UNLIST      stent placement before CABG    COLONOSCOPY N/A 5/3/2017    COLON  performed by [facility]wini [facility]buena [facility] at [facility] ENDOSCOPY    HX APPENDECTOMY      HX CHOLECYSTECTOMY      HX GYN      tubal ligation    HX HEART CATHETERIZATION  11/11/09; 10/21/09    HX HEART CATHETERIZATION  10/15/10; 10/06/09    left heart    HX HEART CATHETERIZATION  11/7/2011    left heart cath, no new findings    HX OTHER SURGICAL  4/6/2010    Insertion right internal jugular single lumen MediPort.  HX RADICAL MASTECTOMY Left 2/2010    left, with chemo     MEDS:  Current Outpatient Prescriptions on File Prior to Visit   Medication Sig Dispense Refill    metoprolol tartrate (LOPRESSOR) 25 mg tablet TAKE 1 TABLET TWICE DAILY 180 Tab 3    clopidogrel (PLAVIX) 75 mg tab TAKE 1 TABLET EVERY DAY 90 Tab 3    pneumococcal 13 isaac conj dip (PREVNAR-13) 0.5 mL syrg injection 0.5 mL by IntraMUSCular route once. Was given in April      traMADol (ULTRAM) 50 mg tablet Take 50 mg by mouth every six (6) hours as needed for Pain.      B.infantis-B.ani-B.long-B.bifi (PROBIOTIC 4X) 10-15 mg TbEC Take  by mouth daily.  CYANOCOBALAMIN, VITAMIN B-12, (VITAMIN B12 PO) Take  by mouth.  esomeprazole (NEXIUM) 20 mg capsule Take 20 mg by mouth daily.  losartan (COZAAR) 25 mg tablet Take 12.5 mg by mouth two (2) times a day.  metFORMIN (GLUCOPHAGE) 1,000 mg tablet Take 1,000 mg by mouth two (2) times daily (with meals).  nitroglycerin (NITROSTAT) 0.4 mg SL tablet 1 Tab by SubLINGual route every five (5) minutes as needed for Chest Pain. 1 Bottle 2    albuterol (PROVENTIL HFA, VENTOLIN HFA) 90 mcg/actuation inhaler Take 1 Puff by inhalation every four (4) hours as needed.  levothyroxine (SYNTHROID) 150 mcg tablet Take  by mouth Daily (before breakfast).  aspirin delayed-release 81 mg tablet Take  by mouth daily.  ascorbic acid (VITAMIN C) 250 mg tablet Take 250 mg by mouth daily.  tamoxifen (NOLVADEX) 20 mg tablet Take 20 mg by mouth daily.  ERGOCALCIFEROL, VITAMIN D2, (VITAMIN D PO) Take 4,000 Units by mouth daily.  rosuvastatin (CRESTOR) 20 mg tablet Take 20 mg by mouth daily. No current facility-administered medications on file prior to visit.       Phyllis gomez Sensitivities:  Allergies   Allergen Reactions    Shellfish Derived Hives     Eyes and Throat swelling       Family History:  Family History   Problem Relation Age of Onset    Hypertension Mother     Diabetes Mother     Breast Problems Mother      fiber cystic    Hypertension Other      Social History:  Social History   Substance Use Topics    Smoking status: Current Every Day Smoker     Packs/day: 0.50    Smokeless tobacco: Never Used    Alcohol use No     Physical Exam:  BP Readings from Last 3 Encounters:   05/23/17 141/76   05/03/17 155/76   04/13/17 129/74     Pulse Readings from Last 3 Encounters:   05/23/17 60   05/03/17 (!) 56   04/13/17 (!) 57     Wt Readings from Last 3 Encounters:   01/18/18 209 lb (94.8 kg)   05/23/17 212 lb (96.2 kg)   05/03/17 204 lb (92.5 kg)     Constitutional: Oriented to person, place, and time. HENT: Head: Normocephalic and atraumatic. Neck: No JVD present. Cardiovascular: Regular rhythm. No  gallop or rubs appriciated. Faint 2/6 aortic systolic murmur  Lung[de-identified] Breath sounds normal. No respiratory distress. No ronchi or rales appriciated  Abdominal: No tenderness. No rebound and no guarding. Musculoskeletal: There is no edema. No cynosis. Review of Data:  EKG: (06/2011)Sinus rhythm at 73 bpm. No Dynamic ST changes of ischemia. No Significant Q waves. EKG (6/1/12) : Sinus rhythm at 66 beats per minute. No dynamic ST changes of ischemia. Some nonspecific T wave inversion in precordial leads. Unchanged from prior EKG.     LABS:   Lab Results   Component Value Date/Time    Sodium 138 11/02/2011 03:15 PM    Potassium 4.3 11/02/2011 03:15 PM    Chloride 104 11/02/2011 03:15 PM    CO2 29 11/02/2011 03:15 PM    Glucose 160 11/02/2011 03:15 PM    BUN 15 11/02/2011 03:15 PM    Creatinine 1.0 11/02/2011 03:15 PM     Lab Results   Component Value Date/Time    Cholesterol, total 121 03/22/2010 11:10 AM    HDL Cholesterol 29 03/22/2010 11:10 AM    LDL, calculated 69 03/22/2010 11:10 AM    Triglyceride 115 03/22/2010 11:10 AM    CHOL/HDL Ratio 4.2 03/22/2010 11:10 AM       No results found for: GPT    Lab Results   Component Value Date/Time    Hemoglobin A1c 5.7 06/22/2010 03:42 PM     FLP: (2/2012) , , LDL 51, HDL 22    ECHO:(04/2011)  LVEF 40%, Global hypokinesis    ECHO(08/2014) at [facility]  IMPRESSION:   MILD CONCENTRIC LEFT VENTRICULAR HYPERTROPHY. NORMAL GLOBAL LEFT VENTRICULAR SYSTOLIC FUNCTION, EJECTION FRACTION OF 60%. NORMAL DIASTOLIC FILLING PATTERN FOR AGE. NORMAL RIGHT VENTRICULAR SIZE AND SYSTOLIC FUNCTION. ECHO (06/17)  SUMMARY:  Left ventricle: Systolic function was at the lower limits of normal.  Ejection fraction was estimated to be 50 %. There was hypokinesis of the  basal-mid inferior and basal inferolateral wall(s). Wall thickness was  mildly increased. Right ventricle: Systolic function was mildly reduced. TAPSV 8 cm/s. Mitral valve: There was mild regurgitation. Aortic valve: There was no stenosis. Tricuspid valve: There was mild regurgitation. STRESS(10/2011)  1. Reversible defect along the anterior wall with no significant wall motion abnormality. 2. Ejection fraction 47 percent. 3. Fixed defect of the inferior wall with associated hypokinesis. CARDIAC CATH(11/2011)  1. LM: Normal.  2. LAD: Eccentric ostial 70-80%, mid to distal LAD is a very small-caliber vessel, probably about a 2-mm vessel. Competitive filing from LIMA   3. DIAGONAL: Ostial 40-50% proximal moderate disease. 4. RAMUS: Proximal 100% in stent. 5. LCx/OM: OM2 mid 100%, . Native LCx diffuse luminal irregularities without any obstructive stenosis. less than 2-mm vessel. 7. RCA: Prox and Mid stent diffuse 20% in-stent restenosis. Otherwise, no obstructive disease. RPLS 60-70% tubular stenosis which appears unchanged from prior angiogram about a year ago. 8. SEQUENTIAL SVG TO THE DIAGONA, RAMUS, OM:  patent without any significant obstructive stenosis.  There appears to be venous valve system distally into the graft. Native vessel which includes diagonal, ramus and obtuse marginal beyond graft appears to be patent and a very small-caliber vessel, less than 2-mm. 9. LIMA TO LAD: widely patent. Distally, LAD has no significant stenosis. This appears to be a less than 2-mm vessel. Impression / Plan:  Ms. Ina Urias is a pleasant 56 y.o. female who is here for the follow up appointment. Coronary artery disease:    Four vessel CABG in 2010. No reports anginal symptoms. Continue same. No use of S/L NTG since last visit. Continue current cardiac medication. On ASA, Plavix, Statin, BB    Cardiomyopathy:  Remote history of ejection fraction of 45%. Last ejection fraction 60% in 2014 and 50% in 06/17. No evidence of fluid overload. She is on Metoprolol 25 mg and losartan 12.5 mg BID. Continue same. Stable    Hypertension:  Blood pressure is  142/70 mm Hg. Continue BB and ARB    Hyperlipidemia:  Continue Crestor 20 mg daily without any side effect. Her lipid profile is being checked by Dr. Michelle Ramirez regularly. Denies any problem. No side effects. DM Goal A1C less than 7 from CV standpoint. Defer management to PCP. Last A1C was 6.8 in 2017 per patient    Tobacco abuse:  She had tried Chantix in the past with some side effect. Smoking cessation encouraged again during this visit. Still smoking 3/4-1 PPD. Unable to quit several times in past.     Murmur: Faint aortic systolic murmur. Stable on exam.  No specific symptoms. ECHO in 06/17 without significant VHD. Left shoulder pain: Reproducible with shoulder movement. Especially with shoulder abduction. Doubt angina. ?? Arthritis. Patient was told she had arthritis. Will proceed with X ray of shoulder. Advised to take tylenol and follow up with PCP. This plan was discussed with Ms. Ina Urias in detail, who is in agreement. Please do not hesitate to call me if you have any questions or concerns.

## 2018-04-11 ENCOUNTER — HOSPITAL ENCOUNTER (OUTPATIENT)
Dept: MAMMOGRAPHY | Age: 58
Discharge: HOME | End: 2018-04-11
Payer: MEDICARE

## 2018-04-11 DIAGNOSIS — Z12.31 VISIT FOR SCREENING MAMMOGRAM: ICD-10-CM

## 2018-04-11 PROCEDURE — 77063 BREAST TOMOSYNTHESIS BI: CPT

## 2018-04-30 ENCOUNTER — TELEPHONE (OUTPATIENT)
Dept: CARDIOLOGY CLINIC | Age: 58
End: 2018-04-30

## 2018-04-30 NOTE — TELEPHONE ENCOUNTER
Patient needs to have D and C done on May 8th, wants to know if ok to hold Plavix for 5-7 days.      Verbal order and read back per Marbin Swift MD  Yes she can hold Plavix but should remain on Aspirin     Patient aware

## 2018-04-30 NOTE — LETTER
5/2/2018 12:48 PM 
 
Ms. Roxanne Leiva [phone] Mary Mckeon: 
BOLIVAR # [phone] Ina Kong was seen in our office on 01/18/2018 for cardiac evaluation. From a cardiac standpoint she may proceed with her upcoming procedure, but should remain on her beta blockers through out the perioperative period. She should hold Plavix for 5-7 days prior, but remain on Aspirin 81mg. Please feel free to contact our office if you have any questions regarding this patient. Sincerely, Kris Bansal MD

## 2018-11-08 ENCOUNTER — OFFICE VISIT (OUTPATIENT)
Dept: CARDIOLOGY CLINIC | Age: 58
End: 2018-11-08

## 2018-11-08 VITALS
BODY MASS INDEX: 31.35 KG/M2 | WEIGHT: 219 LBS | DIASTOLIC BLOOD PRESSURE: 72 MMHG | OXYGEN SATURATION: 95 % | SYSTOLIC BLOOD PRESSURE: 146 MMHG | HEIGHT: 70 IN | HEART RATE: 55 BPM

## 2018-11-08 DIAGNOSIS — I25.810 CORONARY ARTERY DISEASE INVOLVING CORONARY BYPASS GRAFT OF NATIVE HEART WITHOUT ANGINA PECTORIS: Primary | ICD-10-CM

## 2018-11-08 DIAGNOSIS — R60.9 EDEMA, UNSPECIFIED TYPE: ICD-10-CM

## 2018-11-08 RX ORDER — FUROSEMIDE 20 MG/1
20 TABLET ORAL DAILY
Qty: 30 TAB | Refills: 11 | Status: SHIPPED | OUTPATIENT
Start: 2018-11-08 | End: 2020-08-17 | Stop reason: ALTCHOICE

## 2018-11-08 RX ORDER — NITROGLYCERIN 0.4 MG/1
0.4 TABLET SUBLINGUAL
Qty: 1 BOTTLE | Refills: 2 | Status: SHIPPED | OUTPATIENT
Start: 2018-11-08 | End: 2019-10-03 | Stop reason: SDUPTHER

## 2018-11-08 NOTE — PROGRESS NOTES
1. Have you been to the ER, urgent care clinic since your last visit? Hospitalized since your last visit? No    2. Have you seen or consulted any other health care providers outside of the [address] [place] [address] since your last visit? Include any pap smears or colon screening.  No

## 2018-11-08 NOTE — PROGRESS NOTES
is 66 y. o.female with Coronary artery disease status post CABG in November 2010. Cardiomyopathy, hypertension, hyperlipidemia, diabetes, breast cancer status post mastectomy and chemotherapy. She also has anxiety and depression disorder. She has  tobacco abuse disorder. Ms. Betsey Parada is here today for follow up appointment after a year. Denies any resting or exertional chest pain or chest pressure to suggest angina or any dyspnea to suggest heart failure. No presyncope or syncope  Denies any PND   LE edema on and off lately. Taking all medications regularly. Patient agreeable for MALIK and possible cardioversion. 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    Anxiety     Breast CA ([address] 12/2009    S/P Lt Mastectomy and Chemo    CAD (coronary artery disease) 8/30/2010    S/P CABG X 4 ([address] to LAD, Sequential SVG to D2, Ramus, OM) 01/2011    Cardiomyopathy ([address]     LVEF 60% (08/14), 40% (2011)    Chemotherapy 4/2010    for 4 months    COPD (chronic obstructive pulmonary disease) ([address] 8/30/2010    Depression     Diabetes ([address]     GERD (gastroesophageal reflux disease)     Headache(784.0)     Hyperlipidemia     Hypertension     Schatzki's ring     S/P EGD and Dilation (07/16)    Thyroid disease     Tobacco abuse      PSH:  Past Surgical History:   Procedure Laterality Date    BREAST SURGERY PROCEDURE UNLISTED  2/22/10    left w/sentinel node bx    CABG, ARTERY-VEIN, FOUR  12/2010    CARDIAC SURG PROCEDURE UNLIST      stent placement before CABG    HX APPENDECTOMY      HX CHOLECYSTECTOMY      HX GYN      tubal ligation    HX HEART CATHETERIZATION  11/11/09; 10/21/09    HX HEART CATHETERIZATION  10/15/10; 10/06/09    left heart    HX HEART CATHETERIZATION  11/7/2011    left heart cath, no new findings    HX OTHER SURGICAL  4/6/2010    Insertion right internal jugular single lumen MediPort.     HX RADICAL MASTECTOMY Left 2/2010    left, with chemo     MEDS:  Current Outpatient Medications on File Prior to Visit   Medication Sig Dispense Refill    metoprolol tartrate (LOPRESSOR) 25 mg tablet TAKE 1 TABLET TWICE DAILY 180 Tab 3    clopidogrel (PLAVIX) 75 mg tab TAKE 1 TABLET EVERY DAY 90 Tab 3    pneumococcal 13 isaac conj dip (PREVNAR-13) 0.5 mL syrg injection 0.5 mL by IntraMUSCular route once. Was given in April      traMADol (ULTRAM) 50 mg tablet Take 50 mg by mouth every six (6) hours as needed for Pain.  CYANOCOBALAMIN, VITAMIN B-12, (VITAMIN B12 PO) Take  by mouth.  esomeprazole (NEXIUM) 20 mg capsule Take 20 mg by mouth daily.  losartan (COZAAR) 25 mg tablet Take 12.5 mg by mouth two (2) times a day.  metFORMIN (GLUCOPHAGE) 1,000 mg tablet Take 1,000 mg by mouth two (2) times daily (with meals).  nitroglycerin (NITROSTAT) 0.4 mg SL tablet 1 Tab by SubLINGual route every five (5) minutes as needed for Chest Pain. 1 Bottle 2    albuterol (PROVENTIL HFA, VENTOLIN HFA) 90 mcg/actuation inhaler Take 1 Puff by inhalation every four (4) hours as needed.  levothyroxine (SYNTHROID) 150 mcg tablet Take  by mouth Daily (before breakfast).  aspirin delayed-release 81 mg tablet Take  by mouth daily.  ascorbic acid (VITAMIN C) 250 mg tablet Take 250 mg by mouth daily.  tamoxifen (NOLVADEX) 20 mg tablet Take 20 mg by mouth daily.  ERGOCALCIFEROL, VITAMIN D2, (VITAMIN D PO) Take 4,000 Units by mouth daily.  rosuvastatin (CRESTOR) 20 mg tablet Take 20 mg by mouth daily. No current facility-administered medications on file prior to visit.       Alleres and Sensitivities:  Allergies   Allergen Reactions    Shellfish Derived Hives     Eyes and Throat swelling       Family History:  Family History   Problem Relation Age of Onset    Hypertension Mother     Diabetes Mother     Breast Problems Mother         fiber cystic    Hypertension Other      Social History:  Social History     Tobacco Use    Smoking status: Current Every Day Smoker     Packs/day: 0.50    Smokeless tobacco: Never Used   Substance Use Topics    Alcohol use: No    Drug use: No     Physical Exam:  BP Readings from Last 3 Encounters:   11/08/18 146/72   01/18/18 164/86   05/23/17 141/76     Pulse Readings from Last 3 Encounters:   11/08/18 (!) 55   01/18/18 63   05/23/17 60     Wt Readings from Last 3 Encounters:   11/08/18 219 lb (99.3 kg)   01/18/18 209 lb (94.8 kg)   05/23/17 212 lb (96.2 kg)     Constitutional: Oriented to person, place, and time. HENT: Head: Normocephalic and atraumatic. Neck: No JVD present. Cardiovascular: Regular rhythm. No  gallop or rubs appriciated. Faint 2/6 aortic systolic murmur  Lung[de-identified] Breath sounds normal. No respiratory distress. No ronchi or rales appriciated  Abdominal: No tenderness. No rebound and no guarding. Musculoskeletal: There is +1 edema. No cynosis. Review of Data:  EKG: (06/2011)Sinus rhythm at 73 bpm. No Dynamic ST changes of ischemia. No Significant Q waves. EKG (6/1/12) : Sinus rhythm at 66 beats per minute. No dynamic ST changes of ischemia. Some nonspecific T wave inversion in precordial leads. Unchanged from prior EKG.     LABS:   Lab Results   Component Value Date/Time    Sodium 138 11/02/2011 03:15 PM    Potassium 4.3 11/02/2011 03:15 PM    Chloride 104 11/02/2011 03:15 PM    CO2 29 11/02/2011 03:15 PM    Glucose 160 (H) 11/02/2011 03:15 PM    BUN 15 11/02/2011 03:15 PM    Creatinine 1.0 11/02/2011 03:15 PM     Lab Results   Component Value Date/Time    Cholesterol, total 121 03/22/2010 11:10 AM    HDL Cholesterol 29 (L) 03/22/2010 11:10 AM    LDL, calculated 69 03/22/2010 11:10 AM    Triglyceride 115 03/22/2010 11:10 AM    CHOL/HDL Ratio 4.2 03/22/2010 11:10 AM       No results found for: GPT    Lab Results   Component Value Date/Time    Hemoglobin A1c 5.7 06/22/2010 03:42 PM     FLP: (2/2012) , , LDL 51, HDL 22    ECHO:(04/2011)  LVEF 40%, Global hypokinesis    ECHO(08/2014) at [facility]  IMPRESSION:   MILD CONCENTRIC LEFT VENTRICULAR HYPERTROPHY. NORMAL GLOBAL LEFT VENTRICULAR SYSTOLIC FUNCTION, EJECTION FRACTION OF 60%. NORMAL DIASTOLIC FILLING PATTERN FOR AGE. NORMAL RIGHT VENTRICULAR SIZE AND SYSTOLIC FUNCTION. ECHO (06/17)  SUMMARY:  Left ventricle: Systolic function was at the lower limits of normal.  Ejection fraction was estimated to be 50 %. There was hypokinesis of the  basal-mid inferior and basal inferolateral wall(s). Wall thickness was  mildly increased. Right ventricle: Systolic function was mildly reduced. TAPSV 8 cm/s. Mitral valve: There was mild regurgitation. Aortic valve: There was no stenosis. Tricuspid valve: There was mild regurgitation. STRESS(10/2011)  1. Reversible defect along the anterior wall with no significant wall motion abnormality. 2. Ejection fraction 47 percent. 3. Fixed defect of the inferior wall with associated hypokinesis. CARDIAC CATH(11/2011)  1. LM: Normal.  2. LAD: Eccentric ostial 70-80%, mid to distal LAD is a very small-caliber vessel, probably about a 2-mm vessel. Competitive filing from [facility]   3. DIAGONAL: Ostial 40-50% proximal moderate disease. 4. RAMUS: Proximal 100% in stent. 5. LCx/OM: OM2 mid 100%, . Native LCx diffuse luminal irregularities without any obstructive stenosis. less than 2-mm vessel. 7. RCA: Prox and Mid stent diffuse 20% in-stent restenosis. Otherwise, no obstructive disease. RPLS 60-70% tubular stenosis which appears unchanged from prior angiogram about a year ago. 8. SEQUENTIAL SVG TO THE DIAGONA, RAMUS, OM:  patent without any significant obstructive stenosis. There appears to be venous valve system distally into the graft. Native vessel which includes diagonal, ramus and obtuse marginal beyond graft appears to be patent and a very small-caliber vessel, less than 2-mm. 9. LIMA TO LAD: widely patent.  Distally, LAD has no significant stenosis. This appears to be a less than 2-mm vessel. Impression / Plan:  Ms. Skylar Baez is a pleasant 66 y.o. female who is here for the follow up appointment. Coronary artery disease:    Four vessel CABG in 2010. No reports anginal symptoms. Continue same. No use of S/L NTG since last visit. Continue current cardiac medication. On ASA, Plavix, Statin, BB  No angina. Continue same. Cardiomyopathy:  Remote history of ejection fraction of 45%. Last ejection fraction 60% in 2014 and 50% in 06/17. She is on Metoprolol 25 mg and losartan 12.5 mg BID. Edema +  Will obtain echo to eval EF because of edema  Will give Lasix 20 mg as needed basis. Hypertension:  Blood pressure is  114/77 mm Hg. Continue BB and ARB    Hyperlipidemia:  Continue Crestor 20 mg daily without any side effect. Her lipid profile is being checked by Dr. Atul Blanchard regularly. Denies any problem. No side effects. DM Goal A1C less than 7 from CV standpoint. Defer management to PCP. Last A1C was 6.7 in 2018 per patient    Tobacco abuse:  She had tried Chantix in the past with some side effect. Smoking cessation encouraged again during this visit. Still smoking 3/4-1 PPD. Unable to quit several times in past.     This plan was discussed with Ms. Skylar Baez in detail, who is in agreement. Please do not hesitate to call me if you have any questions or concerns.

## 2018-11-08 NOTE — PATIENT INSTRUCTIONS
Sharon Johnson will call to schedule your testing within 24 hours. If you do not hear from her, then please call her directly at [phone].  Testing is done in [facility]     Call for results about 4-5 days after completed

## 2018-11-16 ENCOUNTER — HOSPITAL ENCOUNTER (OUTPATIENT)
Dept: NON INVASIVE DIAGNOSTICS | Age: 58
Discharge: HOME | End: 2018-11-16
Attending: INTERNAL MEDICINE
Payer: MEDICARE

## 2018-11-16 VITALS
BODY MASS INDEX: 31.35 KG/M2 | WEIGHT: 219 LBS | SYSTOLIC BLOOD PRESSURE: 132 MMHG | HEIGHT: 70 IN | DIASTOLIC BLOOD PRESSURE: 78 MMHG

## 2018-11-16 DIAGNOSIS — R60.9 EDEMA, UNSPECIFIED TYPE: ICD-10-CM

## 2018-11-16 DIAGNOSIS — I25.810 CORONARY ARTERY DISEASE INVOLVING CORONARY BYPASS GRAFT OF NATIVE HEART WITHOUT ANGINA PECTORIS: ICD-10-CM

## 2018-11-16 LAB — ECHO PULMONARY ARTERY SYSTOLIC PRESSURE (PASP): 40 MMHG

## 2018-11-16 PROCEDURE — 93306 TTE W/DOPPLER COMPLETE: CPT

## 2018-11-21 ENCOUNTER — TELEPHONE (OUTPATIENT)
Dept: CARDIOLOGY CLINIC | Age: 58
End: 2018-11-21

## 2018-11-21 NOTE — TELEPHONE ENCOUNTER
Contacted pt at [facility] number. Two patient Identifiers confirmed. Advised pt per Dr Dahiana Arechiga notes. Pt verbalized understanding.

## 2018-11-21 NOTE — TELEPHONE ENCOUNTER
Incoming from pt. Two patient Identifiers confirmed. Pt requests results of Echo. Advised I would consult with Dr Jean Paul Andres and advise. Advised he was covering the hospital today but was in and out of the office. Pt verbalized understanding and stated it is ok to leave a results  on her phone.

## 2018-11-29 RX ORDER — CLOPIDOGREL BISULFATE 75 MG/1
TABLET ORAL
Qty: 90 TAB | Refills: 3 | Status: SHIPPED | OUTPATIENT
Start: 2018-11-29 | End: 2019-11-08 | Stop reason: ALTCHOICE

## 2018-11-29 RX ORDER — METOPROLOL TARTRATE 25 MG/1
TABLET, FILM COATED ORAL
Qty: 180 TAB | Refills: 3 | Status: SHIPPED | OUTPATIENT
Start: 2018-11-29 | End: 2019-11-08 | Stop reason: ALTCHOICE

## 2019-04-24 ENCOUNTER — HOSPITAL ENCOUNTER (OUTPATIENT)
Dept: MAMMOGRAPHY | Age: 59
Discharge: HOME | End: 2019-04-24
Attending: INTERNAL MEDICINE
Payer: MEDICARE

## 2019-04-24 DIAGNOSIS — Z12.31 VISIT FOR SCREENING MAMMOGRAM: ICD-10-CM

## 2019-04-24 PROCEDURE — 77063 BREAST TOMOSYNTHESIS BI: CPT

## 2019-04-29 ENCOUNTER — OFFICE VISIT (OUTPATIENT)
Dept: CARDIOLOGY CLINIC | Age: 59
End: 2019-04-29

## 2019-04-29 VITALS
SYSTOLIC BLOOD PRESSURE: 148 MMHG | DIASTOLIC BLOOD PRESSURE: 82 MMHG | HEIGHT: 70 IN | OXYGEN SATURATION: 99 % | WEIGHT: 216 LBS | BODY MASS INDEX: 30.92 KG/M2 | HEART RATE: 61 BPM

## 2019-04-29 DIAGNOSIS — R00.2 HEART PALPITATIONS: ICD-10-CM

## 2019-04-29 DIAGNOSIS — I25.10 CORONARY ARTERY DISEASE INVOLVING NATIVE CORONARY ARTERY OF NATIVE HEART WITHOUT ANGINA PECTORIS: Primary | ICD-10-CM

## 2019-04-29 NOTE — PROGRESS NOTES
is 66 y. o.female with Coronary artery disease status post CABG in November 2010. Cardiomyopathy, hypertension, hyperlipidemia, diabetes, breast cancer status post mastectomy and chemotherapy. She also has anxiety and depression disorder. She has  tobacco abuse disorder. Ms. Keegan Mijares is here today for follow up appointment after a year. She denies any chest pain or chest tightness. She has some left shoulder discomfort with some tingling numbness radiating down the left arm. She denies any palpitation, presyncope or syncope. She continues to have occasional left lower extremity swelling. She takes Lasix only as needed. She denies PND.     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    Anxiety     Breast CA ([address] 12/2009    S/P Lt Mastectomy and Chemo    CAD (coronary artery disease) 8/30/2010    S/P CABG X 4 ([address] to LAD, Sequential SVG to D2, Ramus, OM) 01/2011    Cardiomyopathy ([address]     LVEF 60% (08/14), 40% (2011)    Chemotherapy 4/2010    for 4 months    COPD (chronic obstructive pulmonary disease) ([address] 8/30/2010    Depression     Diabetes ([address]     GERD (gastroesophageal reflux disease)     Headache(784.0)     Hyperlipidemia     Hypertension     Menopause     Schatzki's ring     S/P EGD and Dilation (07/16)    Thyroid disease     Tobacco abuse      PSH:  Past Surgical History:   Procedure Laterality Date    BREAST SURGERY PROCEDURE UNLISTED  2/22/10    left w/sentinel node bx    CABG, ARTERY-VEIN, FOUR  12/2010    CARDIAC SURG PROCEDURE UNLIST      stent placement before CABG    CHEST SURGERY PROCEDURE UNLISTED Right     Prior port placement    COLONOSCOPY N/A 5/3/2017    COLON  per[facility]COPY    HX APPENDECTOMY      HX CHOLECYSTECTOMY      HX GYN      tubal ligation    HX HEART CATHETERIZATION  11/11/09; 10/21/09    HX HEART CATHETERIZATION  10/15/10; 10/06/09    left heart    HX HEART CATHETERIZATION  11/7/2011    left heart cath, no new findings    HX OTHER SURGICAL  4/6/2010    Insertion right internal jugular single lumen MediPort.  HX RADICAL MASTECTOMY Left 2/2010    left, with chemo     MEDS:  Current Outpatient Medications on File Prior to Visit   Medication Sig Dispense Refill    clopidogrel (PLAVIX) 75 mg tab TAKE 1 TABLET EVERY DAY 90 Tab 3    metoprolol tartrate (LOPRESSOR) 25 mg tablet TAKE 1 TABLET TWICE DAILY 180 Tab 3    furosemide (LASIX) 20 mg tablet Take 1 Tab by mouth daily. 30 Tab 11    nitroglycerin (NITROSTAT) 0.4 mg SL tablet 1 Tab by SubLINGual route every five (5) minutes as needed for Chest Pain. 1 Bottle 2    pneumococcal 13 isaac conj dip (PREVNAR-13) 0.5 mL syrg injection 0.5 mL by IntraMUSCular route once. Was given in April      traMADol (ULTRAM) 50 mg tablet Take 50 mg by mouth every six (6) hours as needed for Pain.  CYANOCOBALAMIN, VITAMIN B-12, (VITAMIN B12 PO) Take  by mouth.  esomeprazole (NEXIUM) 20 mg capsule Take 20 mg by mouth daily.  losartan (COZAAR) 25 mg tablet Take 12.5 mg by mouth two (2) times a day.  metFORMIN (GLUCOPHAGE) 1,000 mg tablet Take 1,000 mg by mouth two (2) times daily (with meals).  albuterol (PROVENTIL HFA, VENTOLIN HFA) 90 mcg/actuation inhaler Take 1 Puff by inhalation every four (4) hours as needed.  levothyroxine (SYNTHROID) 150 mcg tablet Take  by mouth Daily (before breakfast).  aspirin delayed-release 81 mg tablet Take  by mouth daily.  ascorbic acid (VITAMIN C) 250 mg tablet Take 250 mg by mouth daily.  tamoxifen (NOLVADEX) 20 mg tablet Take 20 mg by mouth daily.  ERGOCALCIFEROL, VITAMIN D2, (VITAMIN D PO) Take 4,000 Units by mouth daily.  rosuvastatin (CRESTOR) 20 mg tablet Take 20 mg by mouth daily. No current facility-administered medications on file prior to visit.       Alleres and Sensitivities:  Allergies   Allergen Reactions    Shellfish Derived Hives     Eyes and Throat swelling       Family History:  Family History   Problem Relation Age of Onset    Hypertension Mother     Diabetes Mother     Breast Problems Mother         fiber cystic    Hypertension Other      Social History:  Social History     Tobacco Use    Smoking status: Current Every Day Smoker     Packs/day: 0.50    Smokeless tobacco: Never Used   Substance Use Topics    Alcohol use: No    Drug use: No     Physical Exam:  BP Readings from Last 3 Encounters:   11/16/18 132/78   11/08/18 146/72   01/18/18 164/86     Pulse Readings from Last 3 Encounters:   11/08/18 (!) 55   01/18/18 63   05/23/17 60     Wt Readings from Last 3 Encounters:   11/16/18 219 lb (99.3 kg)   11/08/18 219 lb (99.3 kg)   01/18/18 209 lb (94.8 kg)     Constitutional: Oriented to person, place, and time. HENT: Head: Normocephalic and atraumatic. Neck: No JVD present. Cardiovascular: Regular rhythm. No  gallop or rubs appriciated. Faint 2/6 aortic systolic murmur  Lung[de-identified] Breath sounds normal. No respiratory distress. No ronchi or rales appriciated  Abdominal: No tenderness. No rebound and no guarding. Musculoskeletal: There is trace left lower extremity edema. No cynosis. Review of Data:  EKG: (06/2011)Sinus rhythm at 73 bpm. No Dynamic ST changes of ischemia. No Significant Q waves. EKG (6/1/12) : Sinus rhythm at 66 beats per minute. No dynamic ST changes of ischemia. Some nonspecific T wave inversion in precordial leads. Unchanged from prior EKG.     LABS:   Lab Results   Component Value Date/Time    Sodium 138 11/02/2011 03:15 PM    Potassium 4.3 11/02/2011 03:15 PM    Chloride 104 11/02/2011 03:15 PM    CO2 29 11/02/2011 03:15 PM    Glucose 160 (H) 11/02/2011 03:15 PM    BUN 15 11/02/2011 03:15 PM    Creatinine 1.0 11/02/2011 03:15 PM     Lab Results   Component Value Date/Time    Cholesterol, total 121 03/22/2010 11:10 AM    HDL Cholesterol 29 (L) 03/22/2010 11:10 AM    LDL, calculated 69 03/22/2010 11:10 AM    Triglyceride 115 03/22/2010 11:10 AM    CHOL/HDL Ratio 4.2 03/22/2010 11:10 AM       No results found for: GPT    Lab Results   Component Value Date/Time    Hemoglobin A1c 5.7 06/22/2010 03:42 PM     FLP: (2/2012) , , LDL 51, HDL 22    ECHO:(04/2011)  LVEF 40%, Global hypokinesis    [facility](08/2014) at [facility]  IMPRESSION:   MILD CONCENTRIC LEFT VENTRICULAR HYPERTROPHY. NORMAL GLOBAL LEFT VENTRICULAR SYSTOLIC FUNCTION, EJECTION FRACTION OF 60%. NORMAL DIASTOLIC FILLING PATTERN FOR AGE. NORMAL RIGHT VENTRICULAR SIZE AND SYSTOLIC FUNCTION. ECHO (11/18)  Left Ventricle Normal cavity size. Mild concentric hypertrophy observed. There is low normal systolic function. The calculated ejection fraction is 50%. Visually measured ejection fraction. Possible mild basal and Mid Inferior wall hypokinesis noted. There is mild (grade 1) left ventricular diastolic dysfunction. Left Atrium The cavity size is mildly dilated. Right Ventricle Normal cavity size. Global systolic function is borderline low. Right Atrium The cavity size is mildly dilated. Aortic Valve Trileaflet aortic valve structure. No stenosis and no regurgitation. Mitral Valve No stenosis. Mitral valve thickening. Mild regurgitation. Tricuspid Valve Normal valve structure and no stenosis. Mild tricuspid valve regurgitation. Pulmonary arterial systolic pressure is 40 mmHg. Pulmonic Valve The pulmonic valve was not well visualized. No stenosis and no regurgitation. STRESS(10/2011)  1. Reversible defect along the anterior wall with no significant wall motion abnormality. 2. Ejection fraction 47 percent. 3. Fixed defect of the inferior wall with associated hypokinesis. CARDIAC CATH(11/2011)  1. LM: Normal.  2. LAD: Eccentric ostial 70-80%, mid to distal LAD is a very small-caliber vessel, probably about a 2-mm vessel. Competitive filing from [facility]   3.  DIAGONAL: Ostial 40-50% proximal moderate disease. 4. RAMUS: Proximal 100% in stent. 5. LCx/OM: OM2 mid 100%, . Native LCx diffuse luminal irregularities without any obstructive stenosis. less than 2-mm vessel. 7. RCA: Prox and Mid stent diffuse 20% in-stent restenosis. Otherwise, no obstructive disease. RPLS 60-70% tubular stenosis which appears unchanged from prior angiogram about a year ago. 8. SEQUENTIAL SVG TO THE DIAGONA, RAMUS, OM:  patent without any significant obstructive stenosis. There appears to be venous valve system distally into the graft. Native vessel which includes diagonal, ramus and obtuse marginal beyond graft appears to be patent and a very small-caliber vessel, less than 2-mm. 9. LIMA TO LAD: widely patent. Distally, LAD has no significant stenosis. This appears to be a less than 2-mm vessel. Impression / Plan:  Ms. Rachel Tong is a pleasant 66 y.o. female who is here for the follow up appointment. Coronary artery disease:    Four vessel CABG in 2010. On ASA, Plavix, Statin, BB  Has been having some left shoulder discomfort with tingling and numbness. Her symptoms prior to her CABG was mostly left shoulder pain. She also has rotator cuff injury and tendinitis based on MRI last year. Certainly possible that this could be musculoskeletal however she has a significant coronary artery disease history. We will proceed with nuclear stress test to make sure this is not ischemic symptom    Cardiomyopathy:  Remote history of ejection fraction of 45%. Last ejection fraction 50-55% in December 2018   she is on Metoprolol 25 mg and losartan 12.5 mg BID. Edema +  Continue Lasix 20 mg as needed and advised patient to use compression stockings    Hypertension:  Blood pressure is 148/82 mmHg mm Hg. Continue BB and ARB    Hyperlipidemia:  Continue Crestor 20 mg daily without any side effect. Her lipid profile is being checked by Dr. Mirta Landry regularly. Denies any problem. No side effects.      DM Goal A1C less than 7 from CV standpoint. Defer management to PCP. Goal hemoglobin A1c less than 7 is recommended from cardiology standpoint    Tobacco abuse:  She had tried Chantix in the past with some side effect. Smoking cessation encouraged again during this visit. Still smoking  PPD. Unable to quit several times in past.     This plan was discussed with Ms. Antionette Valadez in detail, who is in agreement. Please do not hesitate to call me if you have any questions or concerns.

## 2019-04-29 NOTE — PATIENT INSTRUCTIONS
Nuclear stress-Zohreh will call to schedule your testing within 48 hours.      If you do not hear from her, then please call central scheduling at [phone] or [phone] Heidi [facility])    All testing/lab work is completed at [address] [place] [address]     No appointment required for lab work  Hours are Mon-Fri 7:00 am-5:30 pm

## 2019-04-29 NOTE — PROGRESS NOTES
1. Have you been to the ER, urgent care clinic since your last visit? Hospitalized since your last visit? No    2. Have you seen or consulted any other health care providers outside of the [address] [place] [address] since your last visit? Include any pap smears or colon screening.  No

## 2019-05-10 ENCOUNTER — HOSPITAL ENCOUNTER (OUTPATIENT)
Dept: NUCLEAR MEDICINE | Age: 59
Discharge: HOME | End: 2019-05-10
Attending: INTERNAL MEDICINE
Payer: MEDICARE

## 2019-05-10 ENCOUNTER — HOSPITAL ENCOUNTER (OUTPATIENT)
Dept: NON INVASIVE DIAGNOSTICS | Age: 59
Discharge: HOME | End: 2019-05-10
Attending: INTERNAL MEDICINE
Payer: MEDICARE

## 2019-05-10 VITALS
DIASTOLIC BLOOD PRESSURE: 70 MMHG | SYSTOLIC BLOOD PRESSURE: 185 MMHG | HEIGHT: 70 IN | BODY MASS INDEX: 30.92 KG/M2 | WEIGHT: 216 LBS

## 2019-05-10 DIAGNOSIS — I25.10 CORONARY ARTERY DISEASE INVOLVING NATIVE CORONARY ARTERY OF NATIVE HEART WITHOUT ANGINA PECTORIS: ICD-10-CM

## 2019-05-10 DIAGNOSIS — R00.2 HEART PALPITATIONS: ICD-10-CM

## 2019-05-10 PROCEDURE — 93017 CV STRESS TEST TRACING ONLY: CPT

## 2019-05-10 PROCEDURE — A9500 TC99M SESTAMIBI: HCPCS

## 2019-05-10 PROCEDURE — 74011250636 HC RX REV CODE- 250/636: Performed by: INTERNAL MEDICINE

## 2019-05-10 RX ADMIN — REGADENOSON 0.4 MG: 0.08 INJECTION, SOLUTION INTRAVENOUS at 09:08

## 2019-05-16 LAB
STRESS BASELINE HR: 60 BPM
STRESS ESTIMATED WORKLOAD: 1.4 METS
STRESS EXERCISE DUR MIN: NORMAL
STRESS PEAK DIAS BP: 75 MMHG
STRESS PEAK SYS BP: 196 MMHG
STRESS PERCENT HR ACHIEVED: 62 %
STRESS POST PEAK HR: 101 BPM
STRESS RATE PRESSURE PRODUCT: NORMAL BPM*MMHG
STRESS TARGET HR: 164 BPM

## 2019-05-28 ENCOUNTER — OFFICE VISIT (OUTPATIENT)
Dept: CARDIOLOGY CLINIC | Age: 59
End: 2019-05-28

## 2019-05-28 VITALS
HEIGHT: 70 IN | SYSTOLIC BLOOD PRESSURE: 131 MMHG | DIASTOLIC BLOOD PRESSURE: 68 MMHG | BODY MASS INDEX: 30.64 KG/M2 | OXYGEN SATURATION: 95 % | WEIGHT: 214 LBS | HEART RATE: 61 BPM

## 2019-05-28 DIAGNOSIS — I25.10 CORONARY ARTERY DISEASE INVOLVING NATIVE CORONARY ARTERY OF NATIVE HEART WITHOUT ANGINA PECTORIS: Primary | ICD-10-CM

## 2019-05-28 NOTE — PROGRESS NOTES
is 66 y. o.female with Coronary artery disease status post CABG in November 2010. Cardiomyopathy, hypertension, hyperlipidemia, diabetes, breast cancer status post mastectomy and chemotherapy. She also has anxiety and depression disorder. She has  tobacco abuse disorder. Ms. Juan Araujo is here today for follow up after nuclear stress test completed. She was concerned about some left shoulder discomfort, and numbness and tingling in her left arm, so nuclear stress test was completed for further risk stratification. Results are outlined below, no ischemia documented. She does continue to have left shoulder discomfort when lifting her arm above shoulder level. She did have MRI in February 2018 with supraspinatus tendon tear. She does follow with orthopedic group for this issue. She denies any chest pain, shortness of breath, syncope, near syncope. She had some LE edema, though this has improved after cutting salt. No orthopnea, palpitations or PND.     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    Anxiety     Breast CA ([facility] [address] 12/2009    S/P Lt Mastectomy and Chemo    CAD (coronary artery disease) 8/30/2010    S/P CABG X 4 ([address] to LAD, Sequential SVG to D2, Ramus, OM) 01/2011    Cardiomyopathy ([address]     LVEF 60% (08/14), 40% (2011)    Chemotherapy 4/2010    for 4 months    COPD (chronic obstructive pulmonary disease) ([address] 8/30/2010    Depression     Diabetes ([address]     GERD (gastroesophageal reflux disease)     Headache(784.0)     Hyperlipidemia     Hypertension     Menopause     Schatzki's ring     S/P EGD and Dilation (07/16)    Thyroid disease     Tobacco abuse      PSH:  Past Surgical History:   Procedure Laterality Date    BREAST SURGERY PROCEDURE UNLISTED  2/22/10    left w/sentinel node bx    CABG, ARTERY-VEIN, FOUR  12/2010    CARDIAC SURG PROCEDURE UNLIST      stent placement before CABG    CHEST SURGERY PROCEDURE UNLISTED Right     Prior port placement    COLONOSCOPY N/A 5/3/2017    COLON  performed by Margarito Lan [facility] at [facility] ENDOSCOPY    HX APPENDECTOMY      HX CHOLECYSTECTOMY      HX GYN      tubal ligation    HX HEART CATHETERIZATION  11/11/09; 10/21/09    HX HEART CATHETERIZATION  10/15/10; 10/06/09    left heart    HX HEART CATHETERIZATION  11/7/2011    left heart cath, no new findings    HX OTHER SURGICAL  4/6/2010    Insertion right internal jugular single lumen MediPort.  HX RADICAL MASTECTOMY Left 2/2010    left, with chemo     MEDS:  Current Outpatient Medications on File Prior to Visit   Medication Sig Dispense Refill    clopidogrel (PLAVIX) 75 mg tab TAKE 1 TABLET EVERY DAY 90 Tab 3    metoprolol tartrate (LOPRESSOR) 25 mg tablet TAKE 1 TABLET TWICE DAILY 180 Tab 3    furosemide (LASIX) 20 mg tablet Take 1 Tab by mouth daily. 30 Tab 11    nitroglycerin (NITROSTAT) 0.4 mg SL tablet 1 Tab by SubLINGual route every five (5) minutes as needed for Chest Pain. 1 Bottle 2    pneumococcal 13 isaac conj dip (PREVNAR-13) 0.5 mL syrg injection 0.5 mL by IntraMUSCular route once. Was given in April      traMADol (ULTRAM) 50 mg tablet Take 50 mg by mouth every six (6) hours as needed for Pain.  CYANOCOBALAMIN, VITAMIN B-12, (VITAMIN B12 PO) Take  by mouth.  esomeprazole (NEXIUM) 20 mg capsule Take 20 mg by mouth daily.  losartan (COZAAR) 25 mg tablet Take 12.5 mg by mouth two (2) times a day.  metFORMIN (GLUCOPHAGE) 1,000 mg tablet Take 1,000 mg by mouth two (2) times daily (with meals).  albuterol (PROVENTIL HFA, VENTOLIN HFA) 90 mcg/actuation inhaler Take 1 Puff by inhalation every four (4) hours as needed.  levothyroxine (SYNTHROID) 150 mcg tablet Take  by mouth Daily (before breakfast).  aspirin delayed-release 81 mg tablet Take  by mouth daily.  ascorbic acid (VITAMIN C) 250 mg tablet Take 250 mg by mouth daily.       tamoxifen (NOLVADEX) 20 mg tablet Take 20 mg by mouth daily.  rosuvastatin (CRESTOR) 20 mg tablet Take 20 mg by mouth daily. No current facility-administered medications on file prior to visit. Alleres and Sensitivities:  Allergies   Allergen Reactions    Shellfish Derived Hives     Eyes and Throat swelling       Family History:  Family History   Problem Relation Age of Onset    Hypertension Mother     Diabetes Mother     Breast Problems Mother         fiber cystic    Hypertension Other      Social History:  Social History     Tobacco Use    Smoking status: Current Every Day Smoker     Packs/day: 0.50    Smokeless tobacco: Never Used   Substance Use Topics    Alcohol use: No    Drug use: No     Physical Exam:  BP Readings from Last 3 Encounters:   05/28/19 131/68   05/10/19 185/70   04/29/19 148/82     Pulse Readings from Last 3 Encounters:   05/28/19 61   04/29/19 61   11/08/18 (!) 55     Wt Readings from Last 3 Encounters:   05/28/19 214 lb (97.1 kg)   05/10/19 216 lb (98 kg)   04/29/19 216 lb (98 kg)     Constitutional: Oriented to person, place, and time. HENT: Head: Normocephalic and atraumatic. Neck: No JVD present. Cardiovascular: Regular rate and rhythm rhythm. No murmurs, gallop or rubs appreciated  Lung[de-identified] Breath sounds normal. No respiratory distress. No ronchi or rales appreciated  Abdominal: No tenderness. No rebound and no guarding. Musculoskeletal: No LE edema     Review of Data:  EKG: (06/2011)Sinus rhythm at 73 bpm. No Dynamic ST changes of ischemia. No Significant Q waves. EKG (6/1/12) : Sinus rhythm at 66 beats per minute. No dynamic ST changes of ischemia. Some nonspecific T wave inversion in precordial leads. Unchanged from prior EKG.     LABS:   Lab Results   Component Value Date/Time    Sodium 138 11/02/2011 03:15 PM    Potassium 4.3 11/02/2011 03:15 PM    Chloride 104 11/02/2011 03:15 PM    CO2 29 11/02/2011 03:15 PM    Glucose 160 (H) 11/02/2011 03:15 PM    BUN 15 11/02/2011 03:15 PM Creatinine 1.0 11/02/2011 03:15 PM     Lab Results   Component Value Date/Time    Cholesterol, total 121 03/22/2010 11:10 AM    HDL Cholesterol 29 (L) 03/22/2010 11:10 AM    LDL, calculated 69 03/22/2010 11:10 AM    Triglyceride 115 03/22/2010 11:10 AM    CHOL/HDL Ratio 4.2 03/22/2010 11:10 AM       No results found for: GPT    Lab Results   Component Value Date/Time    Hemoglobin A1c 5.7 06/22/2010 03:42 PM     FLP: (2/2012) , , LDL 51, HDL 22    ECHO:(04/2011)  LVEF 40%, Global hypokinesis    ECHO(08/2014) at [facility]  IMPRESSION:   MILD CONCENTRIC LEFT VENTRICULAR HYPERTROPHY. NORMAL GLOBAL LEFT VENTRICULAR SYSTOLIC FUNCTION, EJECTION FRACTION OF 60%. NORMAL DIASTOLIC FILLING PATTERN FOR AGE. NORMAL RIGHT VENTRICULAR SIZE AND SYSTOLIC FUNCTION. ECHO (11/18)  Left Ventricle Normal cavity size. Mild concentric hypertrophy observed. There is low normal systolic function. The calculated ejection fraction is 50%. Visually measured ejection fraction. Possible mild basal and Mid Inferior wall hypokinesis noted. There is mild (grade 1) left ventricular diastolic dysfunction. Left Atrium The cavity size is mildly dilated. Right Ventricle Normal cavity size. Global systolic function is borderline low. Right Atrium The cavity size is mildly dilated. Aortic Valve Trileaflet aortic valve structure. No stenosis and no regurgitation. Mitral Valve No stenosis. Mitral valve thickening. Mild regurgitation. Tricuspid Valve Normal valve structure and no stenosis. Mild tricuspid valve regurgitation. Pulmonary arterial systolic pressure is 40 mmHg. Pulmonic Valve The pulmonic valve was not well visualized. No stenosis and no regurgitation. STRESS(10/2011)  1. Reversible defect along the anterior wall with no significant wall motion abnormality. 2. Ejection fraction 47 percent. 3. Fixed defect of the inferior wall with associated hypokinesis.       Nuclear stress test 05/10/19  · Baseline ECG: Sinus rhythm, non-specific ST-T wave abnormalities. · Inconclusive stress test.  · Gated SPECT: Left ventricular function post-stress was abnormal. Calculated ejection fraction is 44%. · Inferior/inferolateral hypokinesis  · Left ventricular perfusion is abnormal.  · Myocardial perfusion imaging defect 1: There is a defect that is moderate in size with a moderate reduction in uptake present in the inferior location(s) that is non-reversible. There is abnormal wall motion in the defect area. The defect appears to be infarction. · There was no convincing evidence of significant reversible defect to suggest on going major ischemia. · Abnormal myocardial perfusion imaging. Fixed defect consistent with prior myocardial infarction. CARDIAC CATH(11/2011)  1. LM: Normal.  2. LAD: Eccentric ostial 70-80%, mid to distal LAD is a very small-caliber vessel, probably about a 2-mm vessel. Competitive filing from [facility]   3. DIAGONAL: Ostial 40-50% proximal moderate disease. 4. RAMUS: Proximal 100% in stent. 5. LCx/OM: OM2 mid 100%, . Native LCx diffuse luminal irregularities without any obstructive stenosis. less than 2-mm vessel. 7. RCA: Prox and Mid stent diffuse 20% in-stent restenosis. Otherwise, no obstructive disease. RPLS 60-70% tubular stenosis which appears unchanged from prior angiogram about a year ago. 8. SEQUENTIAL SVG TO THE DIAGONA, RAMUS, OM:  patent without any significant obstructive stenosis. There appears to be venous valve system distally into the graft. Native vessel which includes diagonal, ramus and obtuse marginal beyond graft appears to be patent and a very small-caliber vessel, less than 2-mm. 9. LIMA TO LAD: widely patent. Distally, LAD has no significant stenosis. This appears to be a less than 2-mm vessel. Impression / Plan:  Ms. Jason Degroot is a pleasant 66 y.o. female who is here for the follow up appointment. Coronary artery disease:    Four vessel CABG in 2010.      On ASA, Plavix, Statin, BB  She had a nuclear stress test May 10th which showed inferior scar, no ischemia. She has not had any chest pain. She has some left shoulder discomfort and confirmed rotator cuff musculoskeletal injury on MRI. Discussed results with patient, she is to continue follow up with PCP and ortho. Continue routine cardiology follow up or sooner if needed. Cardiomyopathy:  Remote history of ejection fraction of 45%. Last ejection fraction 50-55% in December 2018   she is on Metoprolol 25 mg and losartan 12.5 mg BID. She states that she recently cut some salty and processed foods out of her diet and her edema significantly improved. Continue Lasix as needed. Encouraged low salt diet and daily weights. Hypertension:  Blood pressure is 131/68 mmHg mm Hg. Continue Lopressor and Losartan    Hyperlipidemia:  Continue Crestor 20 mg daily. Her lipid profile is being checked by Dr. Violeta Browning regularly. DM Goal A1C less than 7 from CV standpoint. Defer management to PCP. Goal hemoglobin A1c less than 7 is recommended from cardiology standpoint    Tobacco abuse:  Smoking cessation encouraged    This plan was discussed with Ms. Neelam Dumont in detail, who is in agreement. Please do not hesitate to call me if you have any questions or concerns.      Britni Santiago PA-C

## 2019-09-03 ENCOUNTER — TELEPHONE (OUTPATIENT)
Dept: CARDIOLOGY CLINIC | Age: 59
End: 2019-09-03

## 2019-09-03 NOTE — TELEPHONE ENCOUNTER
Patient called and states she left message for nurse to call her back today, she wants to be called on the home or cell number. Both numbers in chart .

## 2019-09-03 NOTE — TELEPHONE ENCOUNTER
Called patient and she advised that she has been feeling \"possible palpitations\" on and off since last week. Patient denies chest pain and no SOB a this time. Patient was advised to go to ER if symptoms worsen and was given office appointment to see Dr. Gladys Simmons.

## 2019-09-05 ENCOUNTER — HOSPITAL ENCOUNTER (OUTPATIENT)
Dept: LAB | Age: 59
Discharge: HOME | End: 2019-09-05
Payer: MEDICARE

## 2019-09-05 ENCOUNTER — OFFICE VISIT (OUTPATIENT)
Dept: CARDIOLOGY CLINIC | Age: 59
End: 2019-09-05

## 2019-09-05 ENCOUNTER — HOSPITAL ENCOUNTER (OUTPATIENT)
Dept: NON INVASIVE DIAGNOSTICS | Age: 59
Discharge: HOME | End: 2019-09-05
Attending: INTERNAL MEDICINE
Payer: MEDICARE

## 2019-09-05 VITALS
HEART RATE: 67 BPM | WEIGHT: 213 LBS | OXYGEN SATURATION: 97 % | DIASTOLIC BLOOD PRESSURE: 68 MMHG | BODY MASS INDEX: 30.56 KG/M2 | SYSTOLIC BLOOD PRESSURE: 125 MMHG

## 2019-09-05 DIAGNOSIS — R00.2 PALPITATION: Primary | ICD-10-CM

## 2019-09-05 DIAGNOSIS — R00.2 PALPITATION: ICD-10-CM

## 2019-09-05 DIAGNOSIS — E78.00 PURE HYPERCHOLESTEROLEMIA: ICD-10-CM

## 2019-09-05 LAB
CHOLEST SERPL-MCNC: 84 MG/DL
HDLC SERPL-MCNC: 26 MG/DL (ref 40–60)
HDLC SERPL: 3.2 {RATIO} (ref 0–5)
LDLC SERPL CALC-MCNC: 28.6 MG/DL (ref 0–100)
LIPID PROFILE,FLP: ABNORMAL
TRIGL SERPL-MCNC: 147 MG/DL (ref ?–150)
VLDLC SERPL CALC-MCNC: 29.4 MG/DL

## 2019-09-05 PROCEDURE — 36415 COLL VENOUS BLD VENIPUNCTURE: CPT

## 2019-09-05 PROCEDURE — 80061 LIPID PANEL: CPT

## 2019-09-05 PROCEDURE — 93226 XTRNL ECG REC<48 HR SCAN A/R: CPT

## 2019-09-05 NOTE — PATIENT INSTRUCTIONS
48 hr holter and lipids-Zohreh will call to schedule your testing within 48 hours.      If you do not hear from her, then please call central scheduling at [phone] or [phone] Moe Rogers)    All testing/lab work is completed at [address] [place] [address]     No appointment required for lab work  Hours are Mon-Fri 7:00 am-5:30 pm    Call office 3 days after turing in holter for results

## 2019-09-05 NOTE — PROGRESS NOTES
1. Have you been to the ER, urgent care clinic since your last visit? Hospitalized since your last visit? no    2. Have you seen or consulted any other health care providers outside of the [address] [place] [address] since your last visit? Include any pap smears or colon screening.  no

## 2019-09-05 NOTE — PROGRESS NOTES
is 64 y. o.female with Coronary artery disease status post CABG in November 2010. Cardiomyopathy, hypertension, hyperlipidemia, diabetes, breast cancer status post mastectomy and chemotherapy. She also has anxiety and depression disorder. She has  tobacco abuse disorder. Ms. Ynes Quinn is here today for an add-on appointment. For last 1 week, she has been expressing some fluttering sensation in the chest and palpitation. She feels like her heart rate is racing out of randomly. It lasts for about couple hours and resolves itself. It is not associated with any dizziness, presyncope or syncope. She feels little short of breath along with this. She denies any weight loss or weight gain. She denies diarrhea or constipation. She denies any excessive cold or hot. She denies any chest pain or chest pressure. She denies use of any sublingual nitroglycerin. Usually this happens towards the end of the day in the evening time or at night. No exertional symptoms.   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    Anxiety     Breast CA ([address] 12/2009    S/P Lt Mastectomy and Chemo    CAD (coronary artery disease) 8/30/2010    S/P CABG X 4 ([employer] [address] to LAD, Sequential SVG to D2, Ramus, OM) 01/2011    Cardiomyopathy ([address]     LVEF 60% (08/14), 40% (2011)    Chemotherapy 4/2010    for 4 months    COPD (chronic obstructive pulmonary disease) ([address] 8/30/2010    Depression     Diabetes ([address]     GERD (gastroesophageal reflux disease)     Headache(784.0)     Hyperlipidemia     Hypertension     Menopause     Schatzki's ring     S/P EGD and Dilation (07/16)    Thyroid disease     Tobacco abuse      PSH:  Past Surgical History:   Procedure Laterality Date    BREAST SURGERY PROCEDURE UNLISTED  2/22/10    left w/sentinel node bx    CABG, ARTERY-VEIN, FOUR  12/2010    CARDIAC SURG PROCEDURE UNLIST      stent placement before CABG    CHEST SURGERY PROCEDURE UNLISTED Right     Prior port placement    COLONOSCOPY N/A 5/3/2017    COLON  performed by Hudson Escobar MD at [facility] HX APPENDECTOMY      HX CHOLECYSTECTOMY      HX GYN      tubal ligation    HX HEART CATHETERIZATION  11/11/09; 10/21/09    HX HEART CATHETERIZATION  10/15/10; 10/06/09    left heart    HX HEART CATHETERIZATION  11/7/2011    left heart cath, no new findings    HX OTHER SURGICAL  4/6/2010    Insertion right internal jugular single lumen MediPort.  HX RADICAL MASTECTOMY Left 2/2010    left, with chemo     MEDS:  Current Outpatient Medications on File Prior to Visit   Medication Sig Dispense Refill    clopidogrel (PLAVIX) 75 mg tab TAKE 1 TABLET EVERY DAY 90 Tab 3    metoprolol tartrate (LOPRESSOR) 25 mg tablet TAKE 1 TABLET TWICE DAILY 180 Tab 3    furosemide (LASIX) 20 mg tablet Take 1 Tab by mouth daily. 30 Tab 11    nitroglycerin (NITROSTAT) 0.4 mg SL tablet 1 Tab by SubLINGual route every five (5) minutes as needed for Chest Pain. 1 Bottle 2    pneumococcal 13 isaac conj dip (PREVNAR-13) 0.5 mL syrg injection 0.5 mL by IntraMUSCular route once. Was given in April      traMADol (ULTRAM) 50 mg tablet Take 50 mg by mouth every six (6) hours as needed for Pain.  CYANOCOBALAMIN, VITAMIN B-12, (VITAMIN B12 PO) Take  by mouth.  esomeprazole (NEXIUM) 20 mg capsule Take 20 mg by mouth daily.  losartan (COZAAR) 25 mg tablet Take 12.5 mg by mouth two (2) times a day.  metFORMIN (GLUCOPHAGE) 1,000 mg tablet Take 1,000 mg by mouth two (2) times daily (with meals).  albuterol (PROVENTIL HFA, VENTOLIN HFA) 90 mcg/actuation inhaler Take 1 Puff by inhalation every four (4) hours as needed.  levothyroxine (SYNTHROID) 150 mcg tablet Take  by mouth Daily (before breakfast).  aspirin delayed-release 81 mg tablet Take  by mouth daily.  ascorbic acid (VITAMIN C) 250 mg tablet Take 250 mg by mouth daily.  tamoxifen (NOLVADEX) 20 mg tablet Take 20 mg by mouth daily.  rosuvastatin (CRESTOR) 20 mg tablet Take 20 mg by mouth daily. No current facility-administered medications on file prior to visit. Alleres and Sensitivities:  Allergies   Allergen Reactions    Shellfish Derived Hives     Eyes and Throat swelling       Family History:  Family History   Problem Relation Age of Onset    Hypertension Mother     Diabetes Mother     Breast Problems Mother         fiber cystic    Hypertension Other      Social History:  Social History     Tobacco Use    Smoking status: Current Every Day Smoker     Packs/day: 0.50    Smokeless tobacco: Never Used   Substance Use Topics    Alcohol use: No    Drug use: No     Physical Exam:  BP Readings from Last 3 Encounters:   05/28/19 131/68   05/10/19 185/70   04/29/19 148/82     Pulse Readings from Last 3 Encounters:   05/28/19 61   04/29/19 61   11/08/18 (!) 55     Wt Readings from Last 3 Encounters:   05/28/19 214 lb (97.1 kg)   05/10/19 216 lb (98 kg)   04/29/19 216 lb (98 kg)     Constitutional: Oriented to person, place, and time. HENT: Head: Normocephalic and atraumatic. Neck: No JVD present. Cardiovascular: Regular rate and rhythm rhythm. No murmurs, gallop or rubs appreciated  Lung[de-identified] Breath sounds normal. No respiratory distress. No ronchi or rales appreciated  Abdominal: No tenderness. No rebound and no guarding. Musculoskeletal: No LE edema     Review of Data:  EKG: (06/2011)Sinus rhythm at 73 bpm. No Dynamic ST changes of ischemia. No Significant Q waves. EKG (6/1/12) : Sinus rhythm at 66 beats per minute. No dynamic ST changes of ischemia. Some nonspecific T wave inversion in precordial leads. Unchanged from prior EKG.     LABS:   Lab Results   Component Value Date/Time    Sodium 138 11/02/2011 03:15 PM    Potassium 4.3 11/02/2011 03:15 PM    Chloride 104 11/02/2011 03:15 PM    CO2 29 11/02/2011 03:15 PM    Glucose 160 (H) 11/02/2011 03:15 PM    BUN 15 11/02/2011 03:15 PM    Creatinine 1.0 11/02/2011 03:15 PM     Lab Results   Component Value Date/Time    Cholesterol, total 121 03/22/2010 11:10 AM    HDL Cholesterol 29 (L) 03/22/2010 11:10 AM    LDL, calculated 69 03/22/2010 11:10 AM    Triglyceride 115 03/22/2010 11:10 AM    CHOL/HDL Ratio 4.2 03/22/2010 11:10 AM       No results found for: GPT    Lab Results   Component Value Date/Time    Hemoglobin A1c 5.7 06/22/2010 03:42 PM     FLP: (2/2012) , , LDL 51, HDL 22    ECHO:(04/2011)  LVEF 40%, Global hypokinesis    [facility]CHO(08/2014) at [facility]  IMPRESSION:   MILD CONCENTRIC LEFT VENTRICULAR HYPERTROPHY. NORMAL GLOBAL LEFT VENTRICULAR SYSTOLIC FUNCTION, EJECTION FRACTION OF 60%. NORMAL DIASTOLIC FILLING PATTERN FOR AGE. NORMAL RIGHT VENTRICULAR SIZE AND SYSTOLIC FUNCTION. ECHO (11/18)  Left Ventricle Normal cavity size. Mild concentric hypertrophy observed. There is low normal systolic function. The calculated ejection fraction is 50%. Visually measured ejection fraction. Possible mild basal and Mid Inferior wall hypokinesis noted. There is mild (grade 1) left ventricular diastolic dysfunction. Left Atrium The cavity size is mildly dilated. Right Ventricle Normal cavity size. Global systolic function is borderline low. Right Atrium The cavity size is mildly dilated. Aortic Valve Trileaflet aortic valve structure. No stenosis and no regurgitation. Mitral Valve No stenosis. Mitral valve thickening. Mild regurgitation. Tricuspid Valve Normal valve structure and no stenosis. Mild tricuspid valve regurgitation. Pulmonary arterial systolic pressure is 40 mmHg. Pulmonic Valve The pulmonic valve was not well visualized. No stenosis and no regurgitation. STRESS(10/2011)  1. Reversible defect along the anterior wall with no significant wall motion abnormality. 2. Ejection fraction 47 percent. 3. Fixed defect of the inferior wall with associated hypokinesis.       Nuclear stress test 05/10/19  · Baseline ECG: Sinus rhythm, non-specific ST-T wave abnormalities. · Inconclusive stress test.  · Gated SPECT: Left ventricular function post-stress was abnormal. Calculated ejection fraction is 44%. · Inferior/inferolateral hypokinesis  · Left ventricular perfusion is abnormal.  · Myocardial perfusion imaging defect 1: There is a defect that is moderate in size with a moderate reduction in uptake present in the inferior location(s) that is non-reversible. There is abnormal wall motion in the defect area. The defect appears to be infarction. · There was no convincing evidence of significant reversible defect to suggest on going major ischemia. · Abnormal myocardial perfusion imaging. Fixed defect consistent with prior myocardial infarction. CARDIAC CATH(11/2011)  1. LM: Normal.  2. LAD: Eccentric ostial 70-80%, mid to distal LAD is a very small-caliber vessel, probably about a 2-mm vessel. Competitive filing from [facility]   3. DIAGONAL: Ostial 40-50% proximal moderate disease. 4. RAMUS: Proximal 100% in stent. 5. LCx/OM: OM2 mid 100%, . Native LCx diffuse luminal irregularities without any obstructive stenosis. less than 2-mm vessel. 7. RCA: Prox and Mid stent diffuse 20% in-stent restenosis. Otherwise, no obstructive disease. RPLS 60-70% tubular stenosis which appears unchanged from prior angiogram about a year ago. 8. SEQUENTIAL SVG TO THE DIAGONA, RAMUS, OM:  patent without any significant obstructive stenosis. There appears to be venous valve system distally into the graft. Native vessel which includes diagonal, ramus and obtuse marginal beyond graft appears to be patent and a very small-caliber vessel, less than 2-mm. 9. [facility] TO LAD: widely patent. Distally, LAD has no significant stenosis. This appears to be a less than 2-mm vessel. Impression / Plan:  Ms. Wojciech Adams is a pleasant 64 y.o. female who is here for the follow up appointment.     Coronary artery disease:    Four vessel CABG in 2010. On ASA, Plavix, Statin, BB  Nuclear stress test May 2019 which showed inferior scar, no ischemia. \  No anginal symptoms and no use of nitroglycerin since last time  Stable    Cardiomyopathy:  Remote history of ejection fraction of 45%. Last ejection fraction 50-55% in December 2018   Continue beta-blocker and losartan. . Continue Lasix as needed. Encouraged low salt diet and daily weights. Hypertension:  Blood pressure is 125/68 mmHg mm Hg. Continue Lopressor and Losartan    Hyperlipidemia:  Continue Crestor 20 mg daily. Her lipid profile is being checked by PCP. Will check lipid profile as it has not been done for almost a year according to patient. Alternatively she can get it done at PCP office    DM Goal A1C less than 7 from CV standpoint. Defer management to PCP. Goal hemoglobin A1c less than 7 is recommended from cardiology standpoint    Tobacco abuse:  Smoking cessation encouraged again today. Smoking 1 PPD. Tried chantix and nicotine patch before. This plan was discussed with Ms. Tashi Raygoza in detail, who is in agreement. Please do not hesitate to call me if you have any questions or concerns.

## 2019-09-13 ENCOUNTER — TELEPHONE (OUTPATIENT)
Dept: CARDIOLOGY CLINIC | Age: 59
End: 2019-09-13

## 2019-09-16 NOTE — TELEPHONE ENCOUNTER
Patient aware of results, however she states she is still having issues with the palpitations, she says it occurs mainly at night when she lays down to go to bed. Its not every night but it occurs enough that she is bothered by it. She wants to know what else can be done, medication adjustments, more testing, etc. She states we can leave a  Message on her home answering machine if she does not answer. Advised that Dr. Derek Patton is not in the office today but that he will be here tomorrow and will get his recommendations and call her. Patient verbalized understanding.

## 2019-09-25 NOTE — TELEPHONE ENCOUNTER
Forwarded a second message to Dr. Robyn Chaudhry requesting recommendations so that patient can be called and given further instructions or find out if she needs another follow up visit

## 2019-09-26 NOTE — TELEPHONE ENCOUNTER
Spoke to Dr. Gonzalo Hurtado in office, he states if patient still having palpitations, she can come in for another follow up visit.      Tried to reach patient, [facility] for patient to call and schedule appt with Dr. Gonzalo Hurtado

## 2019-10-01 NOTE — TELEPHONE ENCOUNTER
Spoke to patient today. She states that palpitations are ok now. She is recently having should, arm and neck pain. She take Tylenol and it goes away, however she states it scares her and that she has had heart attacks in the past. She is declining 30 day event for now, but would like to see Dr. Robyn Chaudhry this week. Appt is scheduled for Thursday. Verbal order and read back per Eleuterio Cevallos MD  She can do 30 day event monitor if still complaining of palpitations.

## 2019-10-03 ENCOUNTER — OFFICE VISIT (OUTPATIENT)
Dept: CARDIOLOGY CLINIC | Age: 59
End: 2019-10-03

## 2019-10-03 VITALS
SYSTOLIC BLOOD PRESSURE: 127 MMHG | WEIGHT: 212 LBS | DIASTOLIC BLOOD PRESSURE: 68 MMHG | OXYGEN SATURATION: 97 % | BODY MASS INDEX: 30.42 KG/M2 | HEART RATE: 72 BPM

## 2019-10-03 DIAGNOSIS — E78.00 PURE HYPERCHOLESTEROLEMIA: Primary | ICD-10-CM

## 2019-10-03 DIAGNOSIS — I25.10 CORONARY ARTERY DISEASE INVOLVING NATIVE CORONARY ARTERY OF NATIVE HEART WITHOUT ANGINA PECTORIS: ICD-10-CM

## 2019-10-03 DIAGNOSIS — R00.2 PALPITATION: ICD-10-CM

## 2019-10-03 DIAGNOSIS — I10 ESSENTIAL HYPERTENSION WITH GOAL BLOOD PRESSURE LESS THAN 140/90: ICD-10-CM

## 2019-10-03 RX ORDER — MELATONIN
DAILY
COMMUNITY
End: 2021-02-10

## 2019-10-03 RX ORDER — NITROGLYCERIN 0.4 MG/1
0.4 TABLET SUBLINGUAL
Qty: 1 BOTTLE | Refills: 2 | Status: SHIPPED | OUTPATIENT
Start: 2019-10-03 | End: 2021-11-30 | Stop reason: SDUPTHER

## 2019-10-03 NOTE — PROGRESS NOTES
is 64 y. o.female with Coronary artery disease status post CABG in November 2010. Cardiomyopathy, hypertension, hyperlipidemia, diabetes, breast cancer status post mastectomy and chemotherapy. She also has anxiety and depression disorder. She has  tobacco abuse disorder. Ms. Galilea Hanley is here today for an add-on appointment. He is here today for follow-up appointment after Holter monitor placement. She feels that her symptoms of palpitation has essentially resolved. She has some chronic neck and left shoulder pain. She has a known history of left shoulder tendon damage causing chronic pain. No change in that pain pattern. She denies any anginal symptoms.   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    Anxiety     Breast CA ([address] 12/2009    S/P Lt Mastectomy and Chemo    CAD (coronary artery disease) 8/30/2010    S/P CABG X 4 ([address] to LAD, Sequential SVG to D2, Ramus, OM) 01/2011    Cardiomyopathy ([address]     LVEF 60% (08/14), 40% (2011)    Chemotherapy 4/2010    for 4 months    COPD (chronic obstructive pulmonary disease) ([address] 8/30/2010    Depression     Diabetes ([address]     GERD (gastroesophageal reflux disease)     Headache(784.0)     Hyperlipidemia     Hypertension     Menopause     Schatzki's ring     S/P EGD and Dilation (07/16)    Thyroid disease     Tobacco abuse      PSH:  Past Surgical History:   Procedure Laterality Date    BREAST SURGERY PROCEDURE UNLISTED  2/22/10    left w/sentinel node bx    CABG, ARTERY-VEIN, FOUR  12/2010    CARDIAC SURG PROCEDURE UNLIST      stent placement before CABG    CHEST SURGERY PROCEDURE UNLISTED Right     Prior port placement    COLONOSCOPY N/A 5/3/2017    COLON  performed [facility] ENDOSCOPY    HX APPENDECTOMY      HX CHOLECYSTECTOMY      HX GYN      tubal ligation    HX HEART CATHETERIZATION  11/11/09; 10/21/09    HX HEART CATHETERIZATION  10/15/10; 10/06/09    left heart    HX HEART CATHETERIZATION  11/7/2011    left heart cath, no new findings    HX OTHER SURGICAL  4/6/2010    Insertion right internal jugular single lumen MediPort.  HX RADICAL MASTECTOMY Left 2/2010    left, with chemo     MEDS:  Current Outpatient Medications on File Prior to Visit   Medication Sig Dispense Refill    cholecalciferol (VITAMIN D3) (1000 Units /25 mcg) tablet Take  by mouth daily.  clopidogrel (PLAVIX) 75 mg tab TAKE 1 TABLET EVERY DAY 90 Tab 3    metoprolol tartrate (LOPRESSOR) 25 mg tablet TAKE 1 TABLET TWICE DAILY 180 Tab 3    furosemide (LASIX) 20 mg tablet Take 1 Tab by mouth daily. 30 Tab 11    nitroglycerin (NITROSTAT) 0.4 mg SL tablet 1 Tab by SubLINGual route every five (5) minutes as needed for Chest Pain. 1 Bottle 2    CYANOCOBALAMIN, VITAMIN B-12, (VITAMIN B12 PO) Take  by mouth.  esomeprazole (NEXIUM) 20 mg capsule Take 20 mg by mouth daily.  losartan (COZAAR) 25 mg tablet Take 12.5 mg by mouth two (2) times a day.  metFORMIN (GLUCOPHAGE) 1,000 mg tablet Take 1,000 mg by mouth two (2) times daily (with meals).  albuterol (PROVENTIL HFA, VENTOLIN HFA) 90 mcg/actuation inhaler Take 1 Puff by inhalation every four (4) hours as needed.  levothyroxine (SYNTHROID) 150 mcg tablet Take  by mouth Daily (before breakfast).  aspirin delayed-release 81 mg tablet Take  by mouth daily.  ascorbic acid (VITAMIN C) 250 mg tablet Take 250 mg by mouth daily.  tamoxifen (NOLVADEX) 20 mg tablet Take 20 mg by mouth daily.  rosuvastatin (CRESTOR) 20 mg tablet Take 20 mg by mouth daily. No current facility-administered medications on file prior to visit.       Alleres and Sensitivities:  Allergies   Allergen Reactions    Shellfish Derived Hives     Eyes and Throat swelling       Family History:  Family History   Problem Relation Age of Onset    Hypertension Mother     Diabetes Mother     Breast Problems Mother         fiber cystic    Hypertension Other      Social History:  Social History     Tobacco Use    Smoking status: Current Every Day Smoker     Packs/day: 0.50    Smokeless tobacco: Never Used   Substance Use Topics    Alcohol use: No    Drug use: No     Physical Exam:  BP Readings from Last 3 Encounters:   10/03/19 127/68   09/05/19 125/68   05/28/19 131/68     Pulse Readings from Last 3 Encounters:   10/03/19 72   09/05/19 67   05/28/19 61     Wt Readings from Last 3 Encounters:   10/03/19 212 lb (96.2 kg)   09/05/19 213 lb (96.6 kg)   05/28/19 214 lb (97.1 kg)     Constitutional: Oriented to person, place, and time. HENT: Head: Normocephalic and atraumatic. Neck: No JVD present. Cardiovascular: Regular rate and rhythm rhythm. No, gallop or rubs appreciated. OLIVIA 2/6 base of heart, carotid radiation  Lung[de-identified] Breath sounds normal. No respiratory distress. No ronchi or rales appreciated  Abdominal: No tenderness. No rebound and no guarding. Musculoskeletal: No LE edema     Review of Data:  EKG: (06/2011)Sinus rhythm at 73 bpm. No Dynamic ST changes of ischemia. No Significant Q waves. EKG (6/1/12) : Sinus rhythm at 66 beats per minute. No dynamic ST changes of ischemia. Some nonspecific T wave inversion in precordial leads. Unchanged from prior EKG.     LABS:   Lab Results   Component Value Date/Time    Sodium 138 11/02/2011 03:15 PM    Potassium 4.3 11/02/2011 03:15 PM    Chloride 104 11/02/2011 03:15 PM    CO2 29 11/02/2011 03:15 PM    Glucose 160 (H) 11/02/2011 03:15 PM    BUN 15 11/02/2011 03:15 PM    Creatinine 1.0 11/02/2011 03:15 PM     Lab Results   Component Value Date/Time    Cholesterol, total 84 09/05/2019 09:07 AM    HDL Cholesterol 26 (L) 09/05/2019 09:07 AM    LDL, calculated 28.6 09/05/2019 09:07 AM    Triglyceride 147 09/05/2019 09:07 AM    CHOL/HDL Ratio 3.2 09/05/2019 09:07 AM       No results found for: GPT    Lab Results   Component Value Date/Time    Hemoglobin A1c 5.7 06/22/2010 03:42 PM     FLP: (2/2012) , , LDL 51, HDL 22    ECHO:(04/2011)  LVEF 40%, Global hypokinesis    ECHO(08/2014) at [facility]  IMPRESSION:   MILD CONCENTRIC LEFT VENTRICULAR HYPERTROPHY. NORMAL GLOBAL LEFT VENTRICULAR SYSTOLIC FUNCTION, EJECTION FRACTION OF 60%. NORMAL DIASTOLIC FILLING PATTERN FOR AGE. NORMAL RIGHT VENTRICULAR SIZE AND SYSTOLIC FUNCTION. ECHO (11/18)  Left Ventricle Normal cavity size. Mild concentric hypertrophy observed. There is low normal systolic function. The calculated ejection fraction is 50%. Visually measured ejection fraction. Possible mild basal and Mid Inferior wall hypokinesis noted. There is mild (grade 1) left ventricular diastolic dysfunction. Left Atrium The cavity size is mildly dilated. Right Ventricle Normal cavity size. Global systolic function is borderline low. Right Atrium The cavity size is mildly dilated. Aortic Valve Trileaflet aortic valve structure. No stenosis and no regurgitation. Mitral Valve No stenosis. Mitral valve thickening. Mild regurgitation. Tricuspid Valve Normal valve structure and no stenosis. Mild tricuspid valve regurgitation. Pulmonary arterial systolic pressure is 40 mmHg. Pulmonic Valve The pulmonic valve was not well visualized. No stenosis and no regurgitation. HOLTER (9/19)  Hannah Leiva was in Sinus rhythm. The average heart rate, excluding ectopy, was 62 BPM with a minimum of 48 BPM at 05:45 D3 and a maximum of 109 BPM at 08:44 D3. Heart beats, including ectopy, totaled 870091 beats. VENTRICULAR ECTOPICS totaled 600 averaging 12.5 per hour with 572 single, 20 paired, 8 trigeminy and 0 R on T.  SUPRAVENTRICULAR ECTOPICS totaled 84 averaging 1.8 per hour ,with 52 single and 14 paired beats. SUPRAVENTRICULAR TACHYCARDIA occurred 4 times. The fastest run was at 125 BPM and occurred at 02:00 D2 with 4 beats.   The longest run was 8 beats at 15:05 D1 at a rate of 121 BPM.  Patient diary was submitted, symptoms noted. No patient triggered events noted. Patient reported \"fluttering\" several time. Rhythm was sinus and HR less than 100 bpm that time. Nuclear stress test 05/10/19  · Baseline ECG: Sinus rhythm, non-specific ST-T wave abnormalities. · Inconclusive stress test.  · Gated SPECT: Left ventricular function post-stress was abnormal. Calculated ejection fraction is 44%. · Inferior/inferolateral hypokinesis  · Left ventricular perfusion is abnormal.  · Myocardial perfusion imaging defect 1: There is a defect that is moderate in size with a moderate reduction in uptake present in the inferior location(s) that is non-reversible. There is abnormal wall motion in the defect area. The defect appears to be infarction. · There was no convincing evidence of significant reversible defect to suggest on going major ischemia. · Abnormal myocardial perfusion imaging. Fixed defect consistent with prior myocardial infarction. CARDIAC CATH(11/2011)  1. LM: Normal.  2. LAD: Eccentric ostial 70-80%, mid to distal LAD is a very small-caliber vessel, probably about a 2-mm vessel. Competitive filing from [facility]   3. DIAGONAL: Ostial 40-50% proximal moderate disease. 4. RAMUS: Proximal 100% in stent. 5. LCx/OM: OM2 mid 100%, . Native LCx diffuse luminal irregularities without any obstructive stenosis. less than 2-mm vessel. 7. RCA: Prox and Mid stent diffuse 20% in-stent restenosis. Otherwise, no obstructive disease. RPLS 60-70% tubular stenosis which appears unchanged from prior angiogram about a year ago. 8. SEQUENTIAL SVG TO THE DIAGONA, RAMUS, OM:  patent without any significant obstructive stenosis. There appears to be venous valve system distally into the graft. Native vessel which includes diagonal, ramus and obtuse marginal beyond graft appears to be patent and a very small-caliber vessel, less than 2-mm.   9. LIMA TO LAD: widely patent. Distally, LAD has no significant stenosis. This appears to be a less than 2-mm vessel. Impression / Plan:  Ms. Rodrick Franco is a pleasant 64 y.o. female who is here for the follow up appointment. Coronary artery disease:    Four vessel CABG in 2010. On ASA, Plavix, Statin, BB  Nuclear stress test May 2019 which showed inferior scar, no ischemia. No anginal symptoms and no use of nitroglycerin since last time. Will refill nitroglycerin  Stable. Continue same    Cardiomyopathy:  Remote history of ejection fraction of 45%. Last ejection fraction 50-55% in December 2018   Continue beta-blocker and losartan. . Continue Lasix as needed. Encouraged low salt diet and daily weights. Hypertension:  Blood pressure is 127/68 mmHg mm Hg. Continue Lopressor and Losartan    Hyperlipidemia:  Continue Crestor 20 mg daily. Last LDL 28.    DM Goal A1C less than 7 from CV standpoint. Defer management to PCP. Goal hemoglobin A1c less than 7 is recommended from cardiology standpoint    Tobacco abuse:  Smoking cessation encouraged again today. Smoking 1 PPD. Tried chantix and nicotine patch before. This plan was discussed with Ms. Rodrick Franco in detail, who is in agreement. Please do not hesitate to call me if you have any questions or concerns.

## 2019-10-03 NOTE — PROGRESS NOTES
1. Have you been to the ER, urgent care clinic since your last visit? Hospitalized since your last visit? No     2. Have you seen or consulted any other health care providers outside of the [address] [place] [address] since your last visit? Include any pap smears or colon screening.  No

## 2019-10-16 ENCOUNTER — TELEPHONE (OUTPATIENT)
Dept: CARDIOLOGY CLINIC | Age: 59
End: 2019-10-16

## 2019-10-16 NOTE — TELEPHONE ENCOUNTER
Verbal order and read back per Burton Mendenhall MD  No pre-medication required.  Patient does not have valve disease     LTR done

## 2019-10-16 NOTE — LETTER
10/16/2019 3:26 PM 
 
Ms. Rita Leiva [phone] [place] [address] To whom it may concern:  
 
Jere Mixon was seen in our office on 10/3/2019 for cardiac evaluation. From a cardiac standpoint she may proceed with dental cleaning, she does not require any pre-medication. Please feel free to contact our office if you have any questions regarding this patient. Sincerely, Jojo Adams MD

## 2019-10-16 NOTE — TELEPHONE ENCOUNTER
Shi Min with [facility] would like to know does patient need to be premedicated prior to teeth cleaning. Please fax note to [phone].

## 2019-10-17 ENCOUNTER — TELEPHONE (OUTPATIENT)
Dept: CARDIOLOGY CLINIC | Age: 59
End: 2019-10-17

## 2019-10-17 NOTE — TELEPHONE ENCOUNTER
Verbal order and read back per Shakeel Sousa MD  Ok to hold plavix 5-7 days prior, patient should remain on Aspirin 81mg Daily. LTR done and faxed to dental office.

## 2019-10-17 NOTE — TELEPHONE ENCOUNTER
Marjorie Lubin from [facility] called to inquire about plavix protocol for Tooth Extraction. Patient has history of Plavix and they would like to know what the protocol is for stopping prior to extraction.     Please fax to [phone]

## 2019-10-17 NOTE — LETTER
10/17/2019 2:32 PM 
 
Ms. Alysia Leiva [phone] [facility] [address] Patricia Bingham was seen in our office on 10/3/2019 for cardiac evaluation. From a cardiac standpoint she may proceed with upcoming dental extraction, but should hold her Plavix for 5-7 days prior, she should remain on Aspirin 81mg. Please feel free to contact our office if you have any questions regarding this patient. Sincerely, Roberto Tripp MD

## 2019-10-17 NOTE — TELEPHONE ENCOUNTER
Patient would like to know if she can take Valium and can she hold Plavix for dental procedure? Please call and advise. She would like to be called on her home phone and it is ok to leave a message.

## 2019-10-17 NOTE — TELEPHONE ENCOUNTER
Verbal order and read back per Nacho Deluna MD  Ok to hold plavix 5 days, remain on ASA 81mg, ok to take Valium, he is not doing a prescscription for it though.

## 2019-11-04 ENCOUNTER — TELEPHONE (OUTPATIENT)
Dept: CARDIOLOGY CLINIC | Age: 59
End: 2019-11-04

## 2019-11-04 NOTE — TELEPHONE ENCOUNTER
Patient called and states she was in [place] recently for newly diagnosed afib. She was prescribed new medications, including amiodarone, eliquis and metoprolol. She states that at night only when she tries to lay down she gets short of breath, during the day she is fine. She has not been taking her lasix. She does admit to some swelling but only in the left leg. She has an appt with Dr. Michael Santos this week but is wondering why she is short of breath when she lays down. Advised that Dr. Michael Santos is not in office but messages were sent to him requesting advice and recommendations.

## 2019-11-06 NOTE — TELEPHONE ENCOUNTER
Verbal order and read back per Jojo Adams MD  Patient to take Lasix 20mg today and tomorrow and come for appt on Friday morning. Patient aware and verbalized understanding.

## 2019-11-08 ENCOUNTER — OFFICE VISIT (OUTPATIENT)
Dept: CARDIOLOGY CLINIC | Age: 59
End: 2019-11-08

## 2019-11-08 ENCOUNTER — DOCUMENTATION ONLY (OUTPATIENT)
Dept: CARDIOLOGY CLINIC | Age: 59
End: 2019-11-08

## 2019-11-08 VITALS
DIASTOLIC BLOOD PRESSURE: 64 MMHG | HEART RATE: 55 BPM | BODY MASS INDEX: 31.57 KG/M2 | WEIGHT: 220 LBS | SYSTOLIC BLOOD PRESSURE: 108 MMHG | OXYGEN SATURATION: 98 %

## 2019-11-08 DIAGNOSIS — I48.91 ATRIAL FIBRILLATION, UNSPECIFIED TYPE (HCC): Primary | ICD-10-CM

## 2019-11-08 RX ORDER — AMIODARONE HYDROCHLORIDE 200 MG/1
TABLET ORAL
COMMUNITY
End: 2020-01-16 | Stop reason: SDUPTHER

## 2019-11-08 RX ORDER — IBUPROFEN 200 MG
1 TABLET ORAL EVERY 24 HOURS
COMMUNITY
End: 2020-08-17 | Stop reason: ALTCHOICE

## 2019-11-08 RX ORDER — METOPROLOL SUCCINATE 50 MG/1
TABLET, EXTENDED RELEASE ORAL 2 TIMES DAILY
COMMUNITY
End: 2021-11-30 | Stop reason: DRUGHIGH

## 2019-11-08 RX ORDER — METOPROLOL SUCCINATE 25 MG/1
TABLET, EXTENDED RELEASE ORAL 2 TIMES DAILY
COMMUNITY
End: 2020-09-14 | Stop reason: ALTCHOICE

## 2019-11-08 NOTE — PATIENT INSTRUCTIONS
Stop Plavix   Start Aspirin 81mg Daily              Veda          Patient  EP Instructions      1. You are scheduled to have a MALIK/cardioversion on November 15, 2019 at 10:00 am. Please arrive at 8:00 am.    2. Please go to [facility] and park in the outpatient parking lot. Once you enter check in with the  there. The  will either give you directions or assist you in getting to the cath holding area. 3.  [x]       You are not to eat or drink anything after midnight the morning of the               procedure. 4. Please continue to take your medications with a small sip of water on the morning of the procedure with the following exceptions:      5. If you are diabetic, do not take your insulin/sugar pill the morning of the procedure. Pre -procedure LABWORK is to be done within one week of your procedure date     6. We encourage families to wait in the waiting room on the first floor while the procedure is being done. The Doctor will come out and talk with you as soon as the procedure is over. 7. There is the possibility that you may spend the night in the hospital, depending on the results of the procedure. This will be determined after the procedure is done. 8.   If you or your family have any questions, please call our office Monday-Friday 9:00am         -4:30 pm , at [phone], and ask to speak to one of the nurses.

## 2019-11-08 NOTE — PROGRESS NOTES
Spoke to [facility] to obtain auth for CPT code 80941 MALIK and 66625 for Cardioversion     Date of service 11/15/2019    According to [address] does NOT require auth, however CPT [id number] does require auth.      Call was transferred to [facility] to obtain auth for CPT [id number] spoke to Ezio Post approved   # [id number]  30 days valid from 11/15/2019-12/14/19

## 2019-11-08 NOTE — PROGRESS NOTES
is 64 y. o.female with Coronary artery disease status post CABG in November 2010. Cardiomyopathy, hypertension, hyperlipidemia, diabetes, breast cancer status post mastectomy and chemotherapy. She also has anxiety and depression disorder. She has  tobacco abuse disorder. Ms. Edinson Fernández is here today for an add-on appointment. 2 weeks ago, patient was admitted to hospital.  She was found to have atrial fibrillation with rapid ventricle response. She was started on blood thinner. She was also started on amiodarone and was discharged to follow-up with me. She continues to feel frequent fluttering which is very symptomatic and makes her fatigued tired and having shortness of breath. She denies chest pain or chest tightness. She is taking medication regularly.   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    Anxiety     Breast CA ([address] 12/2009    S/P Lt Mastectomy and Chemo    CAD (coronary artery disease) 8/30/2010    S/P CABG X 4 ([address] to LAD, Sequential SVG to D2, Ramus, OM) 01/2011    Cardiomyopathy ([address]     LVEF 60% (08/14), 40% (2011)    Chemotherapy 4/2010    for 4 months    COPD (chronic obstructive pulmonary disease) ([address] 8/30/2010    Depression     Diabetes ([address]     GERD (gastroesophageal reflux disease)     Hyperlipidemia     Hypertension     Schatzki's ring     S/P EGD and Dilation (07/16)    Thyroid disease     Tobacco abuse      PSH:  Past Surgical History:   Procedure Laterality Date    BREAST SURGERY PROCEDURE UNLISTED  2/22/10    left w/sentinel node bx    CABG, ARTERY-VEIN, FOUR  12/2010    CARDIAC SURG PROCEDURE UNLIST      stent placement before CABG    CHEST SURGERY PROCEDURE UNLISTED Right     Prior port placement    COLONOSCOPY N/A 5/3/2017    COLON  performed [facility]OSCOPY    HX APPENDECTOMY      HX CHOLECYSTECTOMY      HX GYN      tubal ligation    HX HEART CATHETERIZATION  11/11/09; 10/21/09    HX HEART CATHETERIZATION  10/15/10; 10/06/09    left heart    HX HEART CATHETERIZATION  11/7/2011    left heart cath, no new findings    HX OTHER SURGICAL  4/6/2010    Insertion right internal jugular single lumen MediPort.  HX RADICAL MASTECTOMY Left 2/2010    left, with chemo     MEDS:  Current Outpatient Medications on File Prior to Visit   Medication Sig Dispense Refill    metoprolol succinate (TOPROL XL) 25 mg XL tablet Take  by mouth two (2) times a day.  apixaban (ELIQUIS) 5 mg tablet Take 5 mg by mouth two (2) times a day.  metoprolol succinate (TOPROL XL) 50 mg XL tablet Take  by mouth two (2) times a day.  amiodarone (CORDARONE) 200 mg tablet Take  by mouth.  nicotine (NICODERM CQ) 21 mg/24 hr 1 Patch by TransDERmal route every twenty-four (24) hours.  cholecalciferol (VITAMIN D3) (1000 Units /25 mcg) tablet Take  by mouth daily.  nitroglycerin (NITROSTAT) 0.4 mg SL tablet 1 Tab by SubLINGual route every five (5) minutes as needed for Chest Pain. 1 Bottle 2    furosemide (LASIX) 20 mg tablet Take 1 Tab by mouth daily. 30 Tab 11    CYANOCOBALAMIN, VITAMIN B-12, (VITAMIN B12 PO) Take  by mouth.  esomeprazole (NEXIUM) 20 mg capsule Take 20 mg by mouth daily.  metFORMIN (GLUCOPHAGE) 1,000 mg tablet Take 1,000 mg by mouth two (2) times daily (with meals).  albuterol (PROVENTIL HFA, VENTOLIN HFA) 90 mcg/actuation inhaler Take 1 Puff by inhalation every four (4) hours as needed.  levothyroxine (SYNTHROID) 150 mcg tablet Take  by mouth Daily (before breakfast).  aspirin delayed-release 81 mg tablet Take  by mouth daily.  ascorbic acid (VITAMIN C) 250 mg tablet Take 250 mg by mouth daily.  tamoxifen (NOLVADEX) 20 mg tablet Take 20 mg by mouth daily.  rosuvastatin (CRESTOR) 20 mg tablet Take 20 mg by mouth daily.        No current facility-administered medications on file prior to visit. Alleres and Sensitivities:  Allergies   Allergen Reactions    Shellfish Derived Hives     Eyes and Throat swelling       Family History:  Family History   Problem Relation Age of Onset    Hypertension Mother     Diabetes Mother     Breast Problems Mother         fiber cystic    Hypertension Other      Social History:  Social History     Tobacco Use    Smoking status: Current Every Day Smoker     Packs/day: 0.50    Smokeless tobacco: Never Used   Substance Use Topics    Alcohol use: No    Drug use: No     Physical Exam:  BP Readings from Last 3 Encounters:   11/08/19 108/64   10/03/19 127/68   09/05/19 125/68     Pulse Readings from Last 3 Encounters:   11/08/19 (!) 55   10/03/19 72   09/05/19 67     Wt Readings from Last 3 Encounters:   11/08/19 220 lb (99.8 kg)   10/03/19 212 lb (96.2 kg)   09/05/19 213 lb (96.6 kg)     Constitutional: Oriented to person, place, and time. HENT: Head: Normocephalic and atraumatic. Neck: No JVD present. Cardiovascular: Regular rate and rhythm rhythm. No, gallop or rubs appreciated. OLIVIA 2/6 base of heart, carotid radiation  Lung[de-identified] Breath sounds normal. No respiratory distress. No ronchi or rales appreciated  Abdominal: No tenderness. No rebound and no guarding. Musculoskeletal: No LE edema     Review of Data:  EKG: (06/2011)Sinus rhythm at 73 bpm. No Dynamic ST changes of ischemia. No Significant Q waves. EKG (6/1/12) : Sinus rhythm at 66 beats per minute. No dynamic ST changes of ischemia. Some nonspecific T wave inversion in precordial leads. Unchanged from prior EKG.     LABS:   Lab Results   Component Value Date/Time    Sodium 138 11/02/2011 03:15 PM    Potassium 4.3 11/02/2011 03:15 PM    Chloride 104 11/02/2011 03:15 PM    CO2 29 11/02/2011 03:15 PM    Glucose 160 (H) 11/02/2011 03:15 PM    BUN 15 11/02/2011 03:15 PM    Creatinine 1.0 11/02/2011 03:15 PM     Lab Results   Component Value Date/Time    Cholesterol, total 84 09/05/2019 09:07 AM    HDL Cholesterol 26 (L) 09/05/2019 09:07 AM    LDL, calculated 28.6 09/05/2019 09:07 AM    Triglyceride 147 09/05/2019 09:07 AM    CHOL/HDL Ratio 3.2 09/05/2019 09:07 AM       No results found for: GPT    Lab Results   Component Value Date/Time    Hemoglobin A1c 5.7 06/22/2010 03:42 PM     FLP: (2/2012) , , LDL 51, HDL 22    ECHO:(04/2011)  LVEF 40%, Global hypokinesis    HOLTER (9/19)  Lauren Leiva was in Sinus rhythm. The average heart rate, excluding ectopy, was 62 BPM with a minimum of 48 BPM at 05:45 D3 and a maximum of 109 BPM at 08:44 D3. Heart beats, including ectopy, totaled 410206 beats. VENTRICULAR ECTOPICS totaled 600 averaging 12.5 per hour with 572 single, 20 paired, 8 trigeminy and 0 R on T.  SUPRAVENTRICULAR ECTOPICS totaled 84 averaging 1.8 per hour ,with 52 single and 14 paired beats. SUPRAVENTRICULAR TACHYCARDIA occurred 4 times. The fastest run was at 125 BPM and occurred at 02:00 D2 with 4 beats. The longest run was 8 beats at 15:05 D1 at a rate of 121 BPM.  Patient diary was submitted, symptoms noted. No patient triggered events noted. Patient reported \"fluttering\" several time. Rhythm was sinus and HR less than 100 bpm that time. ECHO (10/19)   NORMAL LEFT VENTRICULAR CAVITY SIZE. MODERATE LEFT VENTRICULAR HYPERTROPHY PRESENT. LEFT VENTRICULAR FUNCTION APPEARS MILDLY DECREASED WITH GLOBAL HYPOKINESIS ALTHOUGH AFIB   WITH RVR HINDERS ACCURATE ASSESSMENT OF WALL MOTION AND FUNCTION. UNABLE TO DETERMINE   DIASTOLIC FUNCTION DUE TO ARRYTHMIA. ESTIMATED EJECTION FRACTION OF 40%   MILDLY DILATED RIGHT VENTRICLE WITH MILDLY DECREASED APPEARING FUNCTION ALTHOUGH AFIB WITH   RVR HINDERS ACCURATE ASSESSMENT OF WALL MOTION AND FUNCTION.    MILD TRICUSPID REGURGITATION WITH AN ESTIMATED PULMONARY ARTERY PRESSURE OF 29MMHG    Nuclear stress test 05/10/19  · Baseline ECG: Sinus rhythm, non-specific ST-T wave abnormalities. · Inconclusive stress test.  · Gated SPECT: Left ventricular function post-stress was abnormal. Calculated ejection fraction is 44%. · Inferior/inferolateral hypokinesis  · Left ventricular perfusion is abnormal.  · Myocardial perfusion imaging defect 1: There is a defect that is moderate in size with a moderate reduction in uptake present in the inferior location(s) that is non-reversible. There is abnormal wall motion in the defect area. The defect appears to be infarction. · There was no convincing evidence of significant reversible defect to suggest on going major ischemia. · Abnormal myocardial perfusion imaging. Fixed defect consistent with prior myocardial infarction. CARDIAC CATH(11/2011)  1. LM: Normal.  2. LAD: Eccentric ostial 70-80%, mid to distal LAD is a very small-caliber vessel, probably about a 2-mm vessel. Competitive filing from [facility]   3. DIAGONAL: Ostial 40-50% proximal moderate disease. 4. RAMUS: Proximal 100% in stent. 5. LCx/OM: OM2 mid 100%, . Native LCx diffuse luminal irregularities without any obstructive stenosis. less than 2-mm vessel. 7. RCA: Prox and Mid stent diffuse 20% in-stent restenosis. Otherwise, no obstructive disease. RPLS 60-70% tubular stenosis which appears unchanged from prior angiogram about a year ago. 8. SEQUENTIAL SVG TO THE DIAGONA, RAMUS, OM:  patent without any significant obstructive stenosis. There appears to be venous valve system distally into the graft. Native vessel which includes diagonal, ramus and obtuse marginal beyond graft appears to be patent and a very small-caliber vessel, less than 2-mm. 9. LIMA TO LAD: widely patent. Distally, LAD has no significant stenosis. This appears to be a less than 2-mm vessel. Impression / Plan:  Ms. Gavino Vegas is a pleasant 64 y.o. female who is here for the follow up appointment. Coronary artery disease:    Four vessel CABG in 2010. On  Plavix, Statin, BB.   Nuclear stress test May 2019 which showed inferior scar, no ischemia. Will discontinue Plavix and start baby aspirin 81 mg daily as she is on Eliquis for atrial fibrillation and stroke prophylaxis. Stable. Continue same    Cardiomyopathy:  Remote history of ejection fraction of 45%. Ejection fraction 50-55% in December 2018   Last LVEF 40% in October 2019 in setting of A. fib  Continue beta-blocker and losartan. . Continue Lasix as needed. Atrial fibrillation:  Diagnosed in October 2018. She remains in atrial fibrillation. She is symptomatic from it  Discussed rate versus rhythm control strategy. As she is highly symptomatic, and also has LV dysfunction, recommend MALIK guided cardioversion. She has been on Eliquis only for 10 days. I recommend to continue same. Continue with beta-blocker and amiodarone  DW patient in detail about management of a.fib in detail. Rate control vs. Rhythm control strategies were discussed in detail. Chemical vs electrical cardioversion discussed. Risk, benefit, alternatives and complications of each strategies discussed in detail as well. Discuss with patient about MALIK procedure. Risk, benefit and alternatives discussed with patient about procedure. All complication of procedure including but not limited to emergent thoracic surgery, esophageal damage other complication discussed in detail. Discussed risk, benefit and alternatives for electrical cardioversion as well. She is agreeable with the procedure    Hypertension: Stable. Continue same    Hyperlipidemia:  Continue Crestor 20 mg daily. Last LDL 28.    DM Goal A1C less than 7 from CV standpoint. Defer management to PCP. Goal hemoglobin A1c less than 7 is recommended from cardiology standpoint    Tobacco abuse:  Smoking cessation encouraged again today. Smoking 1 PPD. Tried chantix and nicotine patch before. This plan was discussed with Ms. Irlanda Ivy in detail, who is in agreement.      Please do not hesitate to call me if you have any questions or concerns.

## 2019-11-11 ENCOUNTER — TELEPHONE (OUTPATIENT)
Dept: CARDIOLOGY CLINIC | Age: 59
End: 2019-11-11

## 2019-11-11 NOTE — TELEPHONE ENCOUNTER
Patient returned call, she is aware of time change. She was questioning not being able to eat or drink since she is diabetic. Spoke with Dr. Val Pires.      He states she has to be NPO for at least 8 hours, so she could eat but would need to be done by 5:00am    Patient aware

## 2019-11-11 NOTE — TELEPHONE ENCOUNTER
MALIK/Cardioversion is being changed to 1:00pm. Due to echo tech needed later time   Patient will check in at 11:00am     Tried to Call to make patient aware, and re-faxed paperwork to cath lab to make them aware

## 2019-11-13 ENCOUNTER — HOSPITAL ENCOUNTER (OUTPATIENT)
Dept: PREADMISSION TESTING | Age: 59
Discharge: HOME | End: 2019-11-13
Payer: MEDICARE

## 2019-11-13 DIAGNOSIS — I48.91 ATRIAL FIBRILLATION, UNSPECIFIED TYPE (HCC): ICD-10-CM

## 2019-11-13 LAB
ANION GAP SERPL CALC-SCNC: 6 MMOL/L (ref 3–18)
BUN SERPL-MCNC: 14 MG/DL (ref 7–18)
BUN/CREAT SERPL: 11 (ref 12–20)
CALCIUM SERPL-MCNC: 8.4 MG/DL (ref 8.5–10.1)
CHLORIDE SERPL-SCNC: 106 MMOL/L (ref 100–111)
CO2 SERPL-SCNC: 30 MMOL/L (ref 21–32)
CREAT SERPL-MCNC: 1.23 MG/DL (ref 0.6–1.3)
GLUCOSE SERPL-MCNC: 119 MG/DL (ref 74–99)
POTASSIUM SERPL-SCNC: 4.1 MMOL/L (ref 3.5–5.5)
SODIUM SERPL-SCNC: 142 MMOL/L (ref 136–145)

## 2019-11-13 PROCEDURE — 80048 BASIC METABOLIC PNL TOTAL CA: CPT

## 2019-11-13 PROCEDURE — 36415 COLL VENOUS BLD VENIPUNCTURE: CPT

## 2019-11-14 ENCOUNTER — ANESTHESIA EVENT (OUTPATIENT)
Dept: CARDIAC CATH/INVASIVE PROCEDURES | Age: 59
End: 2019-11-14
Payer: MEDICARE

## 2019-11-15 ENCOUNTER — ANESTHESIA (OUTPATIENT)
Dept: CARDIAC CATH/INVASIVE PROCEDURES | Age: 59
End: 2019-11-15
Payer: MEDICARE

## 2019-11-15 ENCOUNTER — HOSPITAL ENCOUNTER (OUTPATIENT)
Dept: NON INVASIVE DIAGNOSTICS | Age: 59
Discharge: HOME | End: 2019-11-15
Payer: MEDICARE

## 2019-11-15 ENCOUNTER — APPOINTMENT (OUTPATIENT)
Dept: NON INVASIVE DIAGNOSTICS | Age: 59
End: 2019-11-15
Attending: INTERNAL MEDICINE
Payer: MEDICARE

## 2019-11-15 ENCOUNTER — TELEPHONE (OUTPATIENT)
Dept: CARDIOLOGY CLINIC | Age: 59
End: 2019-11-15

## 2019-11-15 ENCOUNTER — HOSPITAL ENCOUNTER (OUTPATIENT)
Age: 59
Setting detail: OUTPATIENT SURGERY
Discharge: HOME | End: 2019-11-15
Attending: INTERNAL MEDICINE | Admitting: INTERNAL MEDICINE
Payer: MEDICARE

## 2019-11-15 VITALS
OXYGEN SATURATION: 96 % | DIASTOLIC BLOOD PRESSURE: 82 MMHG | HEART RATE: 86 BPM | TEMPERATURE: 98.4 F | SYSTOLIC BLOOD PRESSURE: 141 MMHG | RESPIRATION RATE: 16 BRPM

## 2019-11-15 DIAGNOSIS — I48.91 ATRIAL FIBRILLATION (HCC): ICD-10-CM

## 2019-11-15 DIAGNOSIS — I48.91 A-FIB (HCC): ICD-10-CM

## 2019-11-15 LAB
GLUCOSE BLD STRIP.AUTO-MCNC: 124 MG/DL (ref 70–110)
GLUCOSE BLD STRIP.AUTO-MCNC: 156 MG/DL (ref 70–110)

## 2019-11-15 PROCEDURE — 93225 XTRNL ECG REC<48 HRS REC: CPT

## 2019-11-15 PROCEDURE — 93005 ELECTROCARDIOGRAM TRACING: CPT

## 2019-11-15 PROCEDURE — 82962 GLUCOSE BLOOD TEST: CPT

## 2019-11-15 PROCEDURE — 74011636637 HC RX REV CODE- 636/637: Performed by: NURSE ANESTHETIST, CERTIFIED REGISTERED

## 2019-11-15 RX ORDER — SODIUM CHLORIDE, SODIUM LACTATE, POTASSIUM CHLORIDE, CALCIUM CHLORIDE 600; 310; 30; 20 MG/100ML; MG/100ML; MG/100ML; MG/100ML
25 INJECTION, SOLUTION INTRAVENOUS CONTINUOUS
Status: DISCONTINUED | OUTPATIENT
Start: 2019-11-15 | End: 2019-11-15 | Stop reason: HOSPADM

## 2019-11-15 RX ORDER — INSULIN LISPRO 100 [IU]/ML
INJECTION, SOLUTION INTRAVENOUS; SUBCUTANEOUS ONCE
Status: COMPLETED | OUTPATIENT
Start: 2019-11-15 | End: 2019-11-15

## 2019-11-15 RX ORDER — LIDOCAINE HYDROCHLORIDE 10 MG/ML
0.1 INJECTION, SOLUTION EPIDURAL; INFILTRATION; INTRACAUDAL; PERINEURAL AS NEEDED
Status: DISCONTINUED | OUTPATIENT
Start: 2019-11-15 | End: 2019-11-15 | Stop reason: HOSPADM

## 2019-11-15 RX ADMIN — INSULIN LISPRO 2 UNITS: 100 INJECTION, SOLUTION INTRAVENOUS; SUBCUTANEOUS at 12:19

## 2019-11-15 NOTE — PROGRESS NOTES
Patient came to hospital for MALIK followed by possible cardioversion. On exam patient appears to be in sinus rhythm.   EKG confirmed that patient is sinus rhythm  MALIK and cardioversion has been canceled  We will place Holter monitor as patient continues to feel occasional fluttering and shortness of breath to make sure if it is from underlying atrial fibrillation or other etiology need to be ruled out

## 2019-11-15 NOTE — PERIOP NOTES
Pre-Op Summary    Pt arrived via car with family/friend and is oriented to time, place, person and situation. Patient with steady gait with none assistive devices. Visit Vitals  /82   Pulse 86   Temp 98.4 °F (36.9 °C)   Resp 16   SpO2 96%       Peripheral IV located on Right forearm. FSBG 156. Treated with 2 units Humalog. Patients belongings are located with , Terrie Diaz. Patient's point of contact is Lott Canavan and their contact number is: [phone]. They will be in the waiting room. They are able to receive medication information. They will be their ride home.

## 2019-11-15 NOTE — TELEPHONE ENCOUNTER
Verbal order and read back per Nikita Mendez MD   Order and schedule pt to p/u holter today. Contacted [employer] spoke with Nahed Felder. Two patient Identifiers confirmed. No auth required for holter . Pt scheduled to p/u at [facility] at 3 pm. ESTER Sims to advised Dr Nicolette Austin. No other issues noted.

## 2019-11-15 NOTE — PERIOP NOTES
Phase 2 Recovery Summary    Report received from Cath LAb    Vitals:    11/15/19 1150 11/15/19 1221   BP: 141/82    Pulse:  86   Resp:  16   Temp:  98.4 °F (36.9 °C)   SpO2:  96%       oriented to time, place, person and situation          Lines and Drains  Peripheral Intravenous Line: removed at discharge. Peripheral IV 11/15/19 Posterior;Right Forearm (Active)   Site Assessment Clean, dry, & intact 11/15/2019  1:45 PM   Phlebitis Assessment 0 11/15/2019  1:45 PM   Infiltration Assessment 0 11/15/2019  1:45 PM   Dressing Status Clean, dry, & intact 11/15/2019  1:45 PM   Dressing Type Tape;Transparent 11/15/2019  1:45 PM   Hub Color/Line Status Pink; Infusing 11/15/2019  1:45 PM   Alcohol Cap Used Yes 11/15/2019  1:45 PM       Wound        Patient assisted to chair with minimal assist. Pateint tolerating liquids well. Patient's family at bedside. Discharge discussed with patient and family. No questions or concerns at this time. Patient had time to ask any questions. Patient discharged to home, with .       Bethany Khan RN

## 2019-11-15 NOTE — DISCHARGE INSTRUCTIONS
DISCHARGE SUMMARY from Nurse    PATIENT INSTRUCTIONS:    After general anesthesia or intravenous sedation, for 24 hours or while taking prescription Narcotics:  · Limit your activities  · Do not drive and operate hazardous machinery  · Do not make important personal or business decisions  · Do  not drink alcoholic beverages  · If you have not urinated within 8 hours after discharge, please contact your surgeon on call. Report the following to your surgeon:  · Excessive pain, swelling, redness or odor of or around the surgical area  · Temperature over 100.5  · Nausea and vomiting lasting longer than 4 hours or if unable to take medications  · Any signs of decreased circulation or nerve impairment to extremity: change in color, persistent  numbness, tingling, coldness or increase pain  · Any questions    These are general instructions for a healthy lifestyle:    No smoking/ No tobacco products/ Avoid exposure to second hand smoke  Surgeon General's Warning:  Quitting smoking now greatly reduces serious risk to your health. Obesity, smoking, and sedentary lifestyle greatly increases your risk for illness    A healthy diet, regular physical exercise & weight monitoring are important for maintaining a healthy lifestyle    You may be retaining fluid if you have a history of heart failure or if you experience any of the following symptoms:  Weight gain of 3 pounds or more overnight or 5 pounds in a week, increased swelling in our hands or feet or shortness of breath while lying flat in bed. Please call your doctor as soon as you notice any of these symptoms; do not wait until your next office visit. The discharge information has been reviewed with the patient. The patient verbalized understanding.   Discharge medications reviewed with the patient and appropriate educational materials and side effects teaching were provided. __________________________________________________________________________________________________________________________________  Patient Education        Holter Monitoring: About This Test  What is it? A Holter monitor is a small machine that records the electrical activity of your heart. You wear it for at least 24 to 48 hours while you do all your normal activities. The monitor has wires that attach to small electrode pads. These pads are taped to your chest.  This kind of machine has many different names. It is sometimes called an ambulatory monitor, an ambulatory electrocardiogram, or an ambulatory EKG. It can also be called a 24-hour EKG or a cardiac event monitor. Why is this test done? You may have this test to find out if you have a problem with your heart. Many heart problems can only be noticed when you are doing something. They may happen when you exercise, eat, have sex, or sleep. Or they may happen when you have a bowel movement or you feel stressed. Your Holter monitor will record the way your heart beats during all of these activities. Holter monitoring also will:  · Look for what may cause chest pain, dizziness, or fainting. · Check to see if treatment for an irregular heartbeat is working. How can you prepare for the test?  · Before the test, talk to your doctor about all your health problems. Tell him or her about all the medicines and vitamins you take. · Take a shower or bath before the pads are put onto your chest. You must not get the pads wet during the test.  · Wear a loose blouse or shirt. What happens before the test?  · Areas of your chest may be shaved and cleaned. · The electrode pads are attached to your chest with a paste or gel. · Your doctor will show you how to wear or carry the monitor. For example, you might wear the monitor on a strap over your shoulder, hooked on a belt, or placed in a pocket. It does not weigh much.   · Your doctor may give you extra instructions on how to use the monitor at home. What happens during the test?  · You need to record your activities and symptoms. Your monitor might have a button that you can push when you feel symptoms. · You will also keep a diary. You will write down the time your symptoms started. And you will write down what type of activity you were doing. · You can use the clock on the monitor to help you keep track of the time your symptoms started. What else should you know about the test?  · Your doctor will tell you if you need to stay away from strong electromagnetic fields while wearing a monitor. These may include items such as magnets, microwave ovens, and electric blankets. · The pads may make your skin itch a little. And your skin may look or feel irritated after the pads are removed. How long does the test take? You usually wear the monitor for 24 to 48 hours. Some people wear it longer than 48 hours. What happens after the test?  · Your doctor will give you extra instructions on what to do when the test is done. · You may return to the doctor's office or hospital to have the pads removed. Or you may be able to take them off yourself by following the instructions. · Your doctor will tell you how to return the monitor. · You can go back to your usual activities right away. Where can you learn more? Go to http://bryan-cuong.info/. Enter E003 in the search box to learn more about \"Holter Monitoring: About This Test.\"  Current as of: April 9, 2019  Content Version: 12.2  © 6692-7526 [employer]. Care instructions adapted under license by [employer] (which disclaims liability or warranty for this information). If you have questions about a medical condition or this instruction, always ask your healthcare professional. [place] [address] any warranty or liability for your use of this information.        _Patient armband removed and given to patient to take home.   Patient was informed of the privacy risks if armband lost or stolen

## 2019-11-16 LAB
ATRIAL RATE: 55 BPM
CALCULATED P AXIS, ECG09: 52 DEGREES
CALCULATED R AXIS, ECG10: 101 DEGREES
CALCULATED T AXIS, ECG11: -153 DEGREES
DIAGNOSIS, 93000: NORMAL
P-R INTERVAL, ECG05: 148 MS
Q-T INTERVAL, ECG07: 554 MS
QRS DURATION, ECG06: 100 MS
QTC CALCULATION (BEZET), ECG08: 529 MS
VENTRICULAR RATE, ECG03: 55 BPM

## 2019-12-09 ENCOUNTER — HOSPITAL ENCOUNTER (OUTPATIENT)
Dept: GENERAL RADIOLOGY | Age: 59
Discharge: HOME | End: 2019-12-09
Attending: INTERNAL MEDICINE
Payer: MEDICARE

## 2019-12-09 DIAGNOSIS — J90 PLEURAL EFFUSION: ICD-10-CM

## 2019-12-09 PROCEDURE — 71046 X-RAY EXAM CHEST 2 VIEWS: CPT

## 2019-12-17 ENCOUNTER — TELEPHONE (OUTPATIENT)
Dept: CARDIOLOGY CLINIC | Age: 59
End: 2019-12-17

## 2019-12-17 NOTE — TELEPHONE ENCOUNTER
Jerri  with [employer] called on behalf of patient Alonzo Munoz to report she was admitted to [facility] for Afib. Patient was told to take metoprolol ER 75 mg twice daily, but since has seen her PCP who advised her to take one tablet daily because medication is extended release. According to [address] [facility] patient was in normal sinus rhythm upon release from hospital and  has continued to take metoprolol ER twice daily and is tolerating medication well, she would like to be advised by Dr. Eloise Hidalgo on whether to continue this dosage. Please contact patient and advise.

## 2019-12-19 NOTE — TELEPHONE ENCOUNTER
Verbal order and read back per Des Lopez MD  [facility] for pt to take medication metoprolol 75 bid

## 2019-12-19 NOTE — TELEPHONE ENCOUNTER
Contacted pt at [facility] number. Two patient Identifiers confirmed. Advised pt per Dr Liang Dietrich. Pt verbalized understanding.

## 2020-01-17 RX ORDER — AMIODARONE HYDROCHLORIDE 200 MG/1
200 TABLET ORAL DAILY
Qty: 90 TAB | Refills: 3 | Status: SHIPPED | OUTPATIENT
Start: 2020-01-17 | End: 2020-02-27 | Stop reason: SDUPTHER

## 2020-02-27 RX ORDER — AMIODARONE HYDROCHLORIDE 200 MG/1
200 TABLET ORAL DAILY
Qty: 90 TAB | Refills: 3 | Status: SHIPPED | OUTPATIENT
Start: 2020-02-27 | End: 2020-03-23 | Stop reason: SDUPTHER

## 2020-03-25 RX ORDER — AMIODARONE HYDROCHLORIDE 200 MG/1
200 TABLET ORAL DAILY
Qty: 90 TAB | Refills: 3 | Status: SHIPPED | OUTPATIENT
Start: 2020-03-25 | End: 2020-12-18

## 2020-07-16 ENCOUNTER — HOSPITAL ENCOUNTER (OUTPATIENT)
Dept: GENERAL RADIOLOGY | Age: 60
Discharge: HOME | End: 2020-07-16
Payer: MEDICARE

## 2020-07-16 DIAGNOSIS — M54.2 NECK PAIN: ICD-10-CM

## 2020-07-16 PROCEDURE — 72040 X-RAY EXAM NECK SPINE 2-3 VW: CPT

## 2020-07-16 PROCEDURE — 72050 X-RAY EXAM NECK SPINE 4/5VWS: CPT

## 2020-07-27 ENCOUNTER — HOSPITAL ENCOUNTER (OUTPATIENT)
Dept: MAMMOGRAPHY | Age: 60
Discharge: HOME | End: 2020-07-27
Attending: INTERNAL MEDICINE
Payer: MEDICARE

## 2020-07-27 DIAGNOSIS — Z12.31 VISIT FOR SCREENING MAMMOGRAM: ICD-10-CM

## 2020-07-27 PROCEDURE — 77063 BREAST TOMOSYNTHESIS BI: CPT

## 2020-08-14 ENCOUNTER — HOSPITAL ENCOUNTER (OUTPATIENT)
Dept: VASCULAR SURGERY | Age: 60
Discharge: HOME | End: 2020-08-14
Attending: INTERNAL MEDICINE
Payer: MEDICARE

## 2020-08-14 ENCOUNTER — HOSPITAL ENCOUNTER (OUTPATIENT)
Dept: NON INVASIVE DIAGNOSTICS | Age: 60
Discharge: HOME | End: 2020-08-14
Attending: INTERNAL MEDICINE
Payer: MEDICARE

## 2020-08-14 VITALS
HEIGHT: 70 IN | SYSTOLIC BLOOD PRESSURE: 141 MMHG | WEIGHT: 220 LBS | DIASTOLIC BLOOD PRESSURE: 82 MMHG | BODY MASS INDEX: 31.5 KG/M2

## 2020-08-14 DIAGNOSIS — I50.9 CHF (CONGESTIVE HEART FAILURE) (HCC): ICD-10-CM

## 2020-08-14 DIAGNOSIS — R09.89 CAROTID BRUIT: ICD-10-CM

## 2020-08-14 DIAGNOSIS — I25.10 CORONARY ATHEROSCLEROSIS OF NATIVE CORONARY ARTERY: ICD-10-CM

## 2020-08-14 LAB
ECHO AO ROOT DIAM: 3.51 CM
ECHO IVC PROX: 1.67 CM
ECHO IVC SNIFF: 1.67 CM
ECHO LA AREA 2C: 21.83 CM2
ECHO LA AREA 4C: 28 CM2
ECHO LA MAJOR AXIS: 4.93 CM
ECHO LA MINOR AXIS: 2.27 CM
ECHO LA TO AORTIC ROOT RATIO: 1.41
ECHO LA VOL 2C: 63.06 ML (ref 22–52)
ECHO LA VOL 4C: 98.72 ML (ref 22–52)
ECHO LA VOL BP: 87.53 ML (ref 22–52)
ECHO LA VOL/BSA BIPLANE: 40.27 ML/M2 (ref 16–28)
ECHO LA VOLUME INDEX A2C: 29.01 ML/M2 (ref 16–28)
ECHO LA VOLUME INDEX A4C: 45.42 ML/M2 (ref 16–28)
ECHO LV E' LATERAL VELOCITY: 6.56 CM/S
ECHO LV E' SEPTAL VELOCITY: 5.3 CM/S
ECHO LV EDV A2C: 158.4 ML
ECHO LV EDV A4C: 196.3 ML
ECHO LV EDV BP: 178.1 ML (ref 56–104)
ECHO LV EDV INDEX A4C: 90.3 ML/M2
ECHO LV EDV INDEX BP: 81.9 ML/M2
ECHO LV EDV NDEX A2C: 72.9 ML/M2
ECHO LV EDV TEICHHOLZ: 0.7 ML
ECHO LV EJECTION FRACTION A2C: 41 %
ECHO LV EJECTION FRACTION A4C: 37 %
ECHO LV EJECTION FRACTION BIPLANE: 37.8 % (ref 55–100)
ECHO LV ESV A2C: 93.7 ML
ECHO LV ESV A4C: 124.7 ML
ECHO LV ESV BP: 110.8 ML (ref 19–49)
ECHO LV ESV INDEX A2C: 43.1 ML/M2
ECHO LV ESV INDEX A4C: 57.4 ML/M2
ECHO LV ESV INDEX BP: 51 ML/M2
ECHO LV ESV TEICHHOLZ: 0.35 ML
ECHO LV INTERNAL DIMENSION DIASTOLIC: 5.11 CM (ref 3.9–5.3)
ECHO LV INTERNAL DIMENSION SYSTOLIC: 3.82 CM
ECHO LV IVSD: 1.25 CM (ref 0.6–0.9)
ECHO LV MASS 2D: 226.7 G (ref 67–162)
ECHO LV MASS INDEX 2D: 104.3 G/M2 (ref 43–95)
ECHO LV POSTERIOR WALL DIASTOLIC: 1.04 CM (ref 0.6–0.9)
ECHO LVOT DIAM: 2.04 CM
ECHO LVOT PEAK GRADIENT: 6.98 MMHG
ECHO LVOT SV: 96.2 ML
ECHO LVOT VTI: 29.31 CM
ECHO MV A VELOCITY: 43.22 CM/S
ECHO MV E DECELERATION TIME (DT): 174.2 MS
ECHO MV E VELOCITY: 113.07 CM/S
ECHO MV E/A RATIO: 2.62
ECHO MV E/E' LATERAL: 17.24
ECHO MV E/E' RATIO (AVERAGED): 19.29
ECHO MV E/E' SEPTAL: 21.33
ECHO RA MINOR AXIS: 4.13 CM
ECHO RV TAPSE: 1.75 CM (ref 1.5–2)
ECHO TV REGURGITANT MAX VELOCITY: 364.43 CM/S
ECHO TV REGURGITANT PEAK GRADIENT: 53.1 MMHG
LVFS 2D: 25.33 %
LVOT MG: 2.68 MMHG
LVOT MV: 0.75 CM/S
LVSV (MOD BI): 30.35 ML
LVSV (MOD SINGLE 4C): 32.23 ML
LVSV (MOD SINGLE): 29.13 ML
LVSV (TEICH): 27.88 ML
MV DEC SLOPE: 6.49

## 2020-08-14 PROCEDURE — 93306 TTE W/DOPPLER COMPLETE: CPT

## 2020-08-14 PROCEDURE — 93880 EXTRACRANIAL BILAT STUDY: CPT

## 2020-08-15 LAB
LEFT CCA DIST DIAS: 13.4 CM/S
LEFT CCA DIST SYS: 58.8 CM/S
LEFT CCA MID DIAS: 20.5 CM/S
LEFT CCA MID SYS: 84.3 CM/S
LEFT CCA PROX DIAS: 18.8 CM/S
LEFT CCA PROX SYS: 75.7 CM/S
LEFT ECA DIAS: 14 CM/S
LEFT ECA SYS: 154.3 CM/S
LEFT ICA DIST DIAS: 24.3 CM/S
LEFT ICA DIST SYS: 111.6 CM/S
LEFT ICA MID DIAS: 47.9 CM/S
LEFT ICA MID SYS: 273.6 CM/S
LEFT ICA PROX DIAS: 78.8 CM/S
LEFT ICA PROX SYS: 209.5 CM/S
LEFT ICA/CCA SYS: 3.2
LEFT SUBCLAVIAN DIAS: 14.5 CM/S
LEFT SUBCLAVIAN SYS: 252.7 CM/S
LEFT VERTEBRAL DIAS: 17 CM/S
LEFT VERTEBRAL SYS: 57.2 CM/S
RIGHT CCA DIST DIAS: 14.2 CM/S
RIGHT CCA DIST SYS: 50.7 CM/S
RIGHT CCA MID DIAS: 19.2 CM/S
RIGHT CCA MID SYS: 56.7 CM/S
RIGHT CCA PROX DIAS: 15.8 CM/S
RIGHT CCA PROX SYS: 50.7 CM/S
RIGHT ECA DIAS: 17.1 CM/S
RIGHT ECA SYS: 69.9 CM/S
RIGHT ICA DIST DIAS: 26 CM/S
RIGHT ICA DIST SYS: 64.7 CM/S
RIGHT ICA MID DIAS: 30.8 CM/S
RIGHT ICA MID SYS: 113.7 CM/S
RIGHT ICA PROX DIAS: 32.7 CM/S
RIGHT ICA PROX SYS: 129.3 CM/S
RIGHT ICA/CCA SYS: 2.3
RIGHT SUBCLAVIAN DIAS: 0 CM/S
RIGHT SUBCLAVIAN SYS: 417.4 CM/S
RIGHT VERTEBRAL DIAS: 12.7 CM/S
RIGHT VERTEBRAL SYS: 47 CM/S

## 2020-08-17 ENCOUNTER — OFFICE VISIT (OUTPATIENT)
Dept: CARDIOLOGY CLINIC | Age: 60
End: 2020-08-17

## 2020-08-17 ENCOUNTER — HOSPITAL ENCOUNTER (OUTPATIENT)
Dept: LAB | Age: 60
Discharge: HOME | End: 2020-08-17
Payer: MEDICARE

## 2020-08-17 VITALS
DIASTOLIC BLOOD PRESSURE: 74 MMHG | BODY MASS INDEX: 32.26 KG/M2 | RESPIRATION RATE: 18 BRPM | WEIGHT: 224.8 LBS | TEMPERATURE: 97.1 F | OXYGEN SATURATION: 97 % | SYSTOLIC BLOOD PRESSURE: 150 MMHG | HEART RATE: 54 BPM

## 2020-08-17 DIAGNOSIS — Z72.0 TOBACCO ABUSE: ICD-10-CM

## 2020-08-17 DIAGNOSIS — I50.20 SYSTOLIC CONGESTIVE HEART FAILURE, UNSPECIFIED HF CHRONICITY (HCC): ICD-10-CM

## 2020-08-17 DIAGNOSIS — I50.20 SYSTOLIC CONGESTIVE HEART FAILURE, UNSPECIFIED HF CHRONICITY (HCC): Primary | ICD-10-CM

## 2020-08-17 LAB
ANION GAP SERPL CALC-SCNC: 7 MMOL/L (ref 3–18)
BUN SERPL-MCNC: 20 MG/DL (ref 7–18)
BUN/CREAT SERPL: 14 (ref 12–20)
CALCIUM SERPL-MCNC: 8.2 MG/DL (ref 8.5–10.1)
CHLORIDE SERPL-SCNC: 110 MMOL/L (ref 100–111)
CO2 SERPL-SCNC: 24 MMOL/L (ref 21–32)
CREAT SERPL-MCNC: 1.42 MG/DL (ref 0.6–1.3)
GLUCOSE SERPL-MCNC: 135 MG/DL (ref 74–99)
POTASSIUM SERPL-SCNC: 4.1 MMOL/L (ref 3.5–5.5)
SODIUM SERPL-SCNC: 141 MMOL/L (ref 136–145)

## 2020-08-17 PROCEDURE — 80048 BASIC METABOLIC PNL TOTAL CA: CPT

## 2020-08-17 PROCEDURE — 36415 COLL VENOUS BLD VENIPUNCTURE: CPT

## 2020-08-17 RX ORDER — FUROSEMIDE 40 MG/1
40 TABLET ORAL
Qty: 90 TAB | Refills: 3 | Status: SHIPPED | OUTPATIENT
Start: 2020-08-17 | End: 2021-02-10

## 2020-08-17 RX ORDER — SACUBITRIL AND VALSARTAN 24; 26 MG/1; MG/1
1 TABLET, FILM COATED ORAL 2 TIMES DAILY
Qty: 60 TAB | Refills: 5 | Status: SHIPPED | OUTPATIENT
Start: 2020-08-17 | End: 2020-09-14 | Stop reason: ALTCHOICE

## 2020-08-17 NOTE — PROGRESS NOTES
is 64 y. o.female with Coronary artery disease status post CABG in November 2010. Cardiomyopathy, hypertension, hyperlipidemia, diabetes, breast cancer status post mastectomy and chemotherapy. She also has anxiety and depression disorder. She has  tobacco abuse disorder. Ms. Cindy Clements is here today for follow-up appointment. For last few weeks, patient has been experiencing worsening shortness of breath and some PND. She also has some lower extremity swelling. She tells me that her Lasix was stopped by PCP because of slight worsening of creatinine. I have no records of this at this time. Since she has stopped Lasix, her symptoms have gotten worse. She also had echocardiogram done. She denies any chest pain or chest tightness.   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    Anxiety     Breast CA ([address] 12/2009    S/P Lt Mastectomy and Chemo    CAD (coronary artery disease) 8/30/2010    S/P CABG X 4 ([address] to LAD, Sequential SVG to D2, Ramus, OM) 01/2011    Cardiomyopathy ([address]     LVEF 35-40 % (08/20) 60% (08/14), 40% (2011)    Chemotherapy 4/2010    for 4 months    COPD (chronic obstructive pulmonary disease) ([address] 8/30/2010    Depression     Diabetes ([address]     GERD (gastroesophageal reflux disease)     Hyperlipidemia     Hypertension     Schatzki's ring     S/P EGD and Dilation (07/16)    Thyroid disease     Tobacco abuse      PSH:  Past Surgical History:   Procedure Laterality Date    BREAST SURGERY PROCEDURE UNLISTED  2/22/10    left w/sentinel node bx    CABG, ARTERY-VEIN, FOUR  12/2010    CARDIAC SURG PROCEDURE UNLIST      stent placement before CABG    CHEST SURGERY PROCEDURE UNLISTED Right     Prior port placement    COLONOSCOPY N/A 5/3/2017    COLON  performed by [facility]j Hancock [facility] at [facility] ENDOSCOPY    [address] [facility] HX CHOLECYSTECTOMY      HX GYN      tubal ligation    HX HEART CATHETERIZATION  11/11/09; 10/21/09    HX HEART CATHETERIZATION  10/15/10; 10/06/09    left heart    HX HEART CATHETERIZATION  11/7/2011    left heart cath, no new findings    HX OTHER SURGICAL  4/6/2010    Insertion right internal jugular single lumen MediPort.  HX RADICAL MASTECTOMY Left 2/2010    left, with chemo     MEDS:  Current Outpatient Medications on File Prior to Visit   Medication Sig Dispense Refill    amiodarone (CORDARONE) 200 mg tablet Take 1 Tab by mouth daily. 90 Tab 3    metoprolol succinate (TOPROL XL) 25 mg XL tablet Take  by mouth two (2) times a day.  apixaban (ELIQUIS) 5 mg tablet Take 5 mg by mouth two (2) times a day.  metoprolol succinate (TOPROL XL) 50 mg XL tablet Take  by mouth two (2) times a day.  nicotine (NICODERM CQ) 21 mg/24 hr 1 Patch by TransDERmal route every twenty-four (24) hours.  cholecalciferol (VITAMIN D3) (1000 Units /25 mcg) tablet Take  by mouth daily.  nitroglycerin (NITROSTAT) 0.4 mg SL tablet 1 Tab by SubLINGual route every five (5) minutes as needed for Chest Pain. 1 Bottle 2    furosemide (LASIX) 20 mg tablet Take 1 Tab by mouth daily. 30 Tab 11    CYANOCOBALAMIN, VITAMIN B-12, (VITAMIN B12 PO) Take  by mouth.  esomeprazole (NEXIUM) 20 mg capsule Take 20 mg by mouth daily.  metFORMIN (GLUCOPHAGE) 1,000 mg tablet Take 1,000 mg by mouth two (2) times daily (with meals).  albuterol (PROVENTIL HFA, VENTOLIN HFA) 90 mcg/actuation inhaler Take 1 Puff by inhalation every four (4) hours as needed.  levothyroxine (SYNTHROID) 150 mcg tablet Take  by mouth Daily (before breakfast).  aspirin delayed-release 81 mg tablet Take  by mouth daily.  ascorbic acid (VITAMIN C) 250 mg tablet Take 250 mg by mouth daily.  tamoxifen (NOLVADEX) 20 mg tablet Take 20 mg by mouth daily.  rosuvastatin (CRESTOR) 20 mg tablet Take 20 mg by mouth daily. No current facility-administered medications on file prior to visit. Alleres and Sensitivities:  Allergies   Allergen Reactions    Shellfish Derived Hives     Eyes and Throat swelling       Family History:  Family History   Problem Relation Age of Onset    Hypertension Mother     Diabetes Mother     Breast Problems Mother         fiber cystic    Hypertension Other      Social History:  Social History     Tobacco Use    Smoking status: Current Every Day Smoker     Packs/day: 0.50    Smokeless tobacco: Never Used   Substance Use Topics    Alcohol use: No    Drug use: No     Physical Exam:  BP Readings from Last 3 Encounters:   08/14/20 141/82   11/15/19 141/82   11/08/19 108/64     Pulse Readings from Last 3 Encounters:   11/15/19 86   11/08/19 (!) 55   10/03/19 72     Wt Readings from Last 3 Encounters:   08/14/20 220 lb (99.8 kg)   11/08/19 220 lb (99.8 kg)   10/03/19 212 lb (96.2 kg)     Constitutional: Oriented to person, place, and time. HENT: Head: Normocephalic and atraumatic. Neck: No JVD present. Cardiovascular: Regular rate and rhythm rhythm. No, gallop or rubs appreciated. OLIVIA 2/6 base of heart, carotid radiation  Lung[de-identified] Breath sounds normal. No respiratory distress. No ronchi or rales appreciated  Abdominal: No tenderness. No rebound and no guarding. Musculoskeletal: 1+ LE edema     Review of Data:  EKG: (06/2011)Sinus rhythm at 73 bpm. No Dynamic ST changes of ischemia. No Significant Q waves. EKG (6/1/12) : Sinus rhythm at 66 beats per minute. No dynamic ST changes of ischemia. Some nonspecific T wave inversion in precordial leads. Unchanged from prior EKG.     LABS:   Lab Results   Component Value Date/Time    Sodium 142 11/13/2019 11:01 AM    Potassium 4.1 11/13/2019 11:01 AM    Chloride 106 11/13/2019 11:01 AM    CO2 30 11/13/2019 11:01 AM    Glucose 119 (H) 11/13/2019 11:01 AM    BUN 14 11/13/2019 11:01 AM    Creatinine 1.23 11/13/2019 11:01 AM     Lab Results Component Value Date/Time    Cholesterol, total 84 09/05/2019 09:07 AM    HDL Cholesterol 26 (L) 09/05/2019 09:07 AM    LDL, calculated 28.6 09/05/2019 09:07 AM    Triglyceride 147 09/05/2019 09:07 AM    CHOL/HDL Ratio 3.2 09/05/2019 09:07 AM       No components found for: GPT    Lab Results   Component Value Date/Time    Hemoglobin A1c 5.7 06/22/2010 03:42 PM     FLP: (2/2012) , , LDL 51, HDL 22    ECHO:(04/2011)  LVEF 40%, Global hypokinesis    HOLTER (11/19)  Tristan Leiva was in Normal Sinus. The average heart rate, excluding ectopy, was 54 BPM with a minimum of 48 BPM at 04:54 D3 and a maximum of 75 BPM at 05:14 D2. Heart beats, including ectopy, totaled [id number] beats. VENTRICULAR ECTOPICS totaled 225 averaging 4.8 per hour with 222 single, 0 paired, 0 trigeminy and 0 R on T. There was 1 VENTRICULAR TACHYCARDIA with 3 beats 03:34 D3 at a rate of 66 BPM.  SUPRAVENTRICULAR ECTOPICS totaled 308 averaging 6.6 per hour ,with 265 single and 24 paired beats. Patient diary was submitted symptoms noted, no patient triggered events noted. Reported \"Shortness of breath\". Rhythm was sinus and HR less than 100 bpm    ECHO (08/20)  Left Ventricle  Normal cavity size. Mild septal wall hypertrophy. Wall motion: normal. Moderate systolic dysfunction. The estimated ejection fraction is 35 - 40%. Visually measured ejection fraction. There is severe (grade 3) left ventricular diastolic dysfunction. Wall Scoring  The left ventricular wall motion is globally hypokinetic. Left Atrium  Dilated left atrium. Left Atrium volume index is 40.34 mL/m2. Right Ventricle  Normal cavity size and global systolic function. Right Atrium  Mildly dilated right atrium. Aortic Valve  Trileaflet valve structure, no stenosis and no regurgitation. Mitral Valve  No stenosis. Mitral valve non-specific thickening. Mild to moderate regurgitation.     Tricuspid Valve  Normal valve structure and no stenosis. Mild regurgitation. Pulmonic Valve  Pulmonic valve not well visualized. No stenosis. Trace regurgitation. Aorta  Normal aortic root. Pulmonary Artery  Pulmonary arterial systolic pressure (PASP) is 56 mmHg. Pulmonary hypertension found to be moderate. Nuclear stress test 05/10/19  · Baseline ECG: Sinus rhythm, non-specific ST-T wave abnormalities. · Inconclusive stress test.  · Gated SPECT: Left ventricular function post-stress was abnormal. Calculated ejection fraction is 44%. · Inferior/inferolateral hypokinesis  · Left ventricular perfusion is abnormal.  · Myocardial perfusion imaging defect 1: There is a defect that is moderate in size with a moderate reduction in uptake present in the inferior location(s) that is non-reversible. There is abnormal wall motion in the defect area. The defect appears to be infarction. · There was no convincing evidence of significant reversible defect to suggest on going major ischemia. · Abnormal myocardial perfusion imaging. Fixed defect consistent with prior myocardial infarction. CARDIAC CATH(11/2011)  1. LM: Normal.  2. LAD: Eccentric ostial 70-80%, mid to distal LAD is a very small-caliber vessel, probably about a 2-mm vessel. Competitive filing from [facility]   3. DIAGONAL: Ostial 40-50% proximal moderate disease. 4. RAMUS: Proximal 100% in stent. 5. LCx/OM: OM2 mid 100%, . Native LCx diffuse luminal irregularities without any obstructive stenosis. less than 2-mm vessel. 7. RCA: Prox and Mid stent diffuse 20% in-stent restenosis. Otherwise, no obstructive disease. RPLS 60-70% tubular stenosis which appears unchanged from prior angiogram about a year ago. 8. SEQUENTIAL SVG TO THE DIAGONA, RAMUS, OM:  patent without any significant obstructive stenosis. There appears to be venous valve system distally into the graft.  Native vessel which includes diagonal, ramus and obtuse marginal beyond graft appears to be patent and a very small-caliber vessel, less than 2-mm. 9. LIMA TO LAD: widely patent. Distally, LAD has no significant stenosis. This appears to be a less than 2-mm vessel. Impression / Plan:  Ms. Alberta Adams is a pleasant 64 y.o. female who is here for the follow up appointment. Coronary artery disease:    Four vessel CABG in 2010. On aspirin, Statin, BB. Nuclear stress test May 2019 which showed inferior scar, no ischemia. Stable. Continue same    Cardiomyopathy:  Remote history of ejection fraction of 45%. Ejection fraction 50-55% in December 2018   Last LVEF 40% in October 2019 in setting of A. Fib  LVEF 35-40 percent in August 2020. Echo finding discussed with patient. CHF diagnosis and management discussed in detail. Salt and fluid restriction discussed again. Will check BMP today  If the BMP is stable, will start patient on Entresto for CHF and LV dysfunction. NYHA class II symptoms  Already on beta-blocker  Will provide patient Lasix 40 mg only as needed basis. Atrial fibrillation:  Diagnosed in October 2018. Asymptomatic from A. fib. On sinus rhythm on exam.  She is on amiodarone and beta-blocker. She is also on Eliquis. Continue same    Hypertension: Stable. Currently on beta-blocker. Will start Entresto because of CHF and hypertension    Hyperlipidemia:  Continue Crestor 20 mg daily. Last LDL 28.    DM Goal A1C less than 7 from CV standpoint. Defer management to PCP. Goal hemoglobin A1c less than 7 is recommended from cardiology standpoint    Tobacco abuse:    Patient is currently smoking 1 pack of cigarettes a day  Patient educated for consequences/sequela and risk of smoking including but not limited to CAD/PAD/stroke/COPD/lung cancer/other cancer and death. Patient understood completely and expressed and verbalized understanding. Today I had a lengthy discussion with the patient for more than 5 minutes discussing about importance of smoking cessation. Counseling was offered.   He is working towards that goal.  Medications options were discussed and patient will think about it. Have failed Chantix in the past because of behavioral problems. This plan was discussed with Ms. Nena Pires in detail, who is in agreement. Please do not hesitate to call me if you have any questions or concerns.

## 2020-08-17 NOTE — PROGRESS NOTES
Konstantin Tena presents today for   Chief Complaint   Patient presents with    Follow-up       Jayy Leiva preferred language for health care discussion is english/other. Is someone accompanying this pt? n    Is the patient using any DME equipment during [address]? n    Depression Screening:  3 most recent PHQ Screens 11/8/2019   Little interest or pleasure in doing things Not at all   Feeling down, depressed, irritable, or hopeless Not at all   Total Score PHQ 2 0       Learning Assessment:  Learning Assessment 10/16/2014   PRIMARY LEARNER Patient   HIGHEST LEVEL OF EDUCATION - PRIMARY LEARNER  GRADUATED HIGH SCHOOL OR GED   BARRIERS PRIMARY LEARNER NONE   CO-LEARNER CAREGIVER No   PRIMARY LANGUAGE ENGLISH   LEARNER PREFERENCE PRIMARY VIDEOS   ANSWERED BY Lidia Leiva   RELATIONSHIP SELF       Abuse Screening:  No flowsheet data found. Fall Risk  No flowsheet data found. Pt currently taking Anticoagulant therapy? y    Coordination of Care:  1. Have you been to the ER, urgent care clinic since your last visit? Hospitalized since your last visit? n    2. Have you seen or consulted any other health care providers outside of the [address] [place] [address] since your last visit? Include any pap smears or colon screening.  n

## 2020-09-14 ENCOUNTER — OFFICE VISIT (OUTPATIENT)
Dept: CARDIOLOGY CLINIC | Age: 60
End: 2020-09-14

## 2020-09-14 VITALS
TEMPERATURE: 97.1 F | SYSTOLIC BLOOD PRESSURE: 135 MMHG | WEIGHT: 228 LBS | BODY MASS INDEX: 32.64 KG/M2 | HEART RATE: 50 BPM | HEIGHT: 70 IN | DIASTOLIC BLOOD PRESSURE: 65 MMHG | OXYGEN SATURATION: 98 %

## 2020-09-14 DIAGNOSIS — I25.10 CORONARY ARTERY DISEASE INVOLVING NATIVE CORONARY ARTERY OF NATIVE HEART WITHOUT ANGINA PECTORIS: ICD-10-CM

## 2020-09-14 DIAGNOSIS — E78.00 PURE HYPERCHOLESTEROLEMIA: ICD-10-CM

## 2020-09-14 DIAGNOSIS — I50.20 SYSTOLIC CONGESTIVE HEART FAILURE, UNSPECIFIED HF CHRONICITY (HCC): Primary | ICD-10-CM

## 2020-09-14 DIAGNOSIS — I48.0 PAROXYSMAL ATRIAL FIBRILLATION (HCC): ICD-10-CM

## 2020-09-14 DIAGNOSIS — I10 ESSENTIAL HYPERTENSION WITH GOAL BLOOD PRESSURE LESS THAN 140/90: ICD-10-CM

## 2020-09-14 RX ORDER — LISINOPRIL 5 MG/1
TABLET ORAL DAILY
COMMUNITY
End: 2020-09-21 | Stop reason: SDUPTHER

## 2020-09-14 NOTE — PROGRESS NOTES
is 64 y. o.female with Coronary artery disease status post CABG in November 2010. Cardiomyopathy, hypertension, hyperlipidemia, diabetes, breast cancer status post mastectomy and chemotherapy. She also has anxiety and depression disorder. She has  tobacco abuse disorder. Ms. Wendy Cortes is here today for follow-up appointment. She continues to to feel fatigued and tired. She denies presyncope or syncope. She has some dyspnea. Occasional PND. She is taking Lasix probably once every third day. She also has some lower extremity swelling. She denies any chest pain or chest tightness.   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    Anxiety     Breast CA ([address] 12/2009    S/P Lt Mastectomy and Chemo    CAD (coronary artery disease) 8/30/2010    S/P CABG X 4 ([address] to LAD, Sequential SVG to D2, Ramus, OM) 01/2011    Cardiomyopathy ([address]     LVEF 35-40 % (08/20) 60% (08/14), 40% (2011)    Chemotherapy 4/2010    for 4 months    COPD (chronic obstructive pulmonary disease) [facility] 8/30/2010    Depression     Diabetes ([address]     GERD (gastroesophageal reflux disease)     Hyperlipidemia     Hypertension     Schatzki's ring     S/P EGD and Dilation (07/16)    Thyroid disease     Tobacco abuse      PSH:  Past Surgical History:   Procedure Laterality Date    BREAST SURGERY PROCEDURE UNLISTED  2/22/10    left w/sentinel node bx    CABG, ARTERY-VEIN, FOUR  12/2010    CARDIAC SURG PROCEDURE UNLIST      stent placement before CABG    CHEST SURGERY PROCEDURE UNLISTED Right     Prior port placement    COLONOSCOPY N/A 5/3/2017    COLON  performed by Ele Cortez MD at [address] ENDOSCOPY    HX APPENDECTOMY      HX CHOLECYSTECTOMY      HX GYN      tubal ligation    HX HEART CATHETERIZATION  11/11/09; 10/21/09    HX HEART CATHETERIZATION  10/15/10; 10/06/09 left heart    HX HEART CATHETERIZATION  11/7/2011    left heart cath, no new findings    HX OTHER SURGICAL  4/6/2010    Insertion right internal jugular single lumen MediPort.  HX RADICAL MASTECTOMY Left 2/2010    left, with chemo     MEDS:  Current Outpatient Medications on File Prior to Visit   Medication Sig Dispense Refill    furosemide (LASIX) 40 mg tablet Take 1 Tab by mouth daily as needed for Other (swelling). 90 Tab 3    amiodarone (CORDARONE) 200 mg tablet Take 1 Tab by mouth daily. 90 Tab 3    metoprolol succinate (TOPROL XL) 25 mg XL tablet Take  by mouth two (2) times a day.  apixaban (ELIQUIS) 5 mg tablet Take 5 mg by mouth two (2) times a day.  metoprolol succinate (TOPROL XL) 50 mg XL tablet Take  by mouth two (2) times a day.  cholecalciferol (VITAMIN D3) (1000 Units /25 mcg) tablet Take  by mouth daily.  nitroglycerin (NITROSTAT) 0.4 mg SL tablet 1 Tab by SubLINGual route every five (5) minutes as needed for Chest Pain. 1 Bottle 2    esomeprazole (NEXIUM) 20 mg capsule Take 20 mg by mouth daily.  metFORMIN (GLUCOPHAGE) 1,000 mg tablet Take 1,000 mg by mouth two (2) times daily (with meals).  albuterol (PROVENTIL HFA, VENTOLIN HFA) 90 mcg/actuation inhaler Take 1 Puff by inhalation every four (4) hours as needed.  levothyroxine (SYNTHROID) 150 mcg tablet Take  by mouth Daily (before breakfast).  aspirin delayed-release 81 mg tablet Take  by mouth daily.  ascorbic acid (VITAMIN C) 250 mg tablet Take 250 mg by mouth daily.  tamoxifen (NOLVADEX) 20 mg tablet Take 20 mg by mouth daily.  rosuvastatin (CRESTOR) 20 mg tablet Take 20 mg by mouth daily.  sacubitriL-valsartan (Entresto) 24-26 mg tablet Take 1 Tab by mouth two (2) times a day. 60 Tab 5    CYANOCOBALAMIN, VITAMIN B-12, (VITAMIN B12 PO) Take  by mouth. No current facility-administered medications on file prior to visit.       Alleres and Sensitivities:  Allergies Allergen Reactions    Shellfish Derived Hives     Eyes and Throat swelling       Family History:  Family History   Problem Relation Age of Onset    Hypertension Mother     Diabetes Mother     Breast Problems Mother         fiber cystic    Hypertension Other      Social History:  Social History     Tobacco Use    Smoking status: Current Every Day Smoker     Packs/day: 0.50    Smokeless tobacco: Never Used   Substance Use Topics    Alcohol use: No    Drug use: No     Physical Exam:  BP Readings from Last 3 Encounters:   09/14/20 135/65   08/17/20 150/74   08/14/20 141/82     Pulse Readings from Last 3 Encounters:   09/14/20 (!) 50   08/17/20 (!) 54   11/15/19 86     Wt Readings from Last 3 Encounters:   09/14/20 228 lb (103.4 kg)   08/17/20 224 lb 12.8 oz (102 kg)   08/14/20 220 lb (99.8 kg)     Constitutional: Oriented to person, place, and time. HENT: Head: Normocephalic and atraumatic. Neck: No JVD present. Cardiovascular: Regular rate and rhythm rhythm. No, gallop or rubs appreciated. OLIVIA 2/6 base of heart, carotid radiation  Lung[de-identified] Breath sounds normal. No respiratory distress. No ronchi or rales appreciated  Abdominal: No tenderness. No rebound and no guarding. Musculoskeletal: 1+ LE edema     Review of Data:  EKG: (06/2011)Sinus rhythm at 73 bpm. No Dynamic ST changes of ischemia. No Significant Q waves. EKG (6/1/12) : Sinus rhythm at 66 beats per minute. No dynamic ST changes of ischemia. Some nonspecific T wave inversion in precordial leads. Unchanged from prior EKG.     LABS:   Lab Results   Component Value Date/Time    Sodium 141 08/17/2020 09:26 AM    Potassium 4.1 08/17/2020 09:26 AM    Chloride 110 08/17/2020 09:26 AM    CO2 24 08/17/2020 09:26 AM    Glucose 135 (H) 08/17/2020 09:26 AM    BUN 20 (H) 08/17/2020 09:26 AM    Creatinine 1.42 (H) 08/17/2020 09:26 AM     Lab Results   Component Value Date/Time    Cholesterol, total 84 09/05/2019 09:07 AM    HDL Cholesterol 26 (L) 09/05/2019 09:07 AM    LDL, calculated 28.6 09/05/2019 09:07 AM    Triglyceride 147 09/05/2019 09:07 AM    CHOL/HDL Ratio 3.2 09/05/2019 09:07 AM       No components found for: GPT    Lab Results   Component Value Date/Time    Hemoglobin A1c 5.7 06/22/2010 03:42 PM     FLP: (2/2012) , , LDL 51, HDL 22    ECHO:(04/2011)  LVEF 40%, Global hypokinesis    HOLTER (11/19)  Naima Leiva was in Normal Sinus. The average heart rate, excluding ectopy, was 54 BPM with a minimum of 48 BPM at 04:54 D3 and a maximum of 75 BPM at 05:14 D2. Heart beats, including ectopy, totaled 943504 beats. VENTRICULAR ECTOPICS totaled 225 averaging 4.8 per hour with 222 single, 0 paired, 0 trigeminy and 0 R on T. There was 1 VENTRICULAR TACHYCARDIA with 3 beats 03:34 D3 at a rate of 66 BPM.  SUPRAVENTRICULAR ECTOPICS totaled 308 averaging 6.6 per hour ,with 265 single and 24 paired beats. Patient diary was submitted symptoms noted, no patient triggered events noted. Reported \"Shortness of breath\". Rhythm was sinus and HR less than 100 bpm    ECHO (08/20)  Left Ventricle  Normal cavity size. Mild septal wall hypertrophy. Wall motion: normal. Moderate systolic dysfunction. The estimated ejection fraction is 35 - 40%. Visually measured ejection fraction. There is severe (grade 3) left ventricular diastolic dysfunction. Wall Scoring  The left ventricular wall motion is globally hypokinetic. Left Atrium  Dilated left atrium. Left Atrium volume index is 40.34 mL/m2. Right Ventricle  Normal cavity size and global systolic function. Right Atrium  Mildly dilated right atrium. Aortic Valve  Trileaflet valve structure, no stenosis and no regurgitation. Mitral Valve  No stenosis. Mitral valve non-specific thickening. Mild to moderate regurgitation. Tricuspid Valve  Normal valve structure and no stenosis. Mild regurgitation. Pulmonic Valve  Pulmonic valve not well visualized. No stenosis.  Trace regurgitation. Aorta  Normal aortic root. Pulmonary Artery  Pulmonary arterial systolic pressure (PASP) is 56 mmHg. Pulmonary hypertension found to be moderate. Nuclear stress test 05/10/19  · Baseline ECG: Sinus rhythm, non-specific ST-T wave abnormalities. · Inconclusive stress test.  · Gated SPECT: Left ventricular function post-stress was abnormal. Calculated ejection fraction is 44%. · Inferior/inferolateral hypokinesis  · Left ventricular perfusion is abnormal.  · Myocardial perfusion imaging defect 1: There is a defect that is moderate in size with a moderate reduction in uptake present in the inferior location(s) that is non-reversible. There is abnormal wall motion in the defect area. The defect appears to be infarction. · There was no convincing evidence of significant reversible defect to suggest on going major ischemia. · Abnormal myocardial perfusion imaging. Fixed defect consistent with prior myocardial infarction. CARDIAC CATH(11/2011)  1. LM: Normal.  2. LAD: Eccentric ostial 70-80%, mid to distal LAD is a very small-caliber vessel, probably about a 2-mm vessel. Competitive filing from [facility]   3. DIAGONAL: Ostial 40-50% proximal moderate disease. 4. RAMUS: Proximal 100% in stent. 5. LCx/OM: OM2 mid 100%, . Native LCx diffuse luminal irregularities without any obstructive stenosis. less than 2-mm vessel. 7. RCA: Prox and Mid stent diffuse 20% in-stent restenosis. Otherwise, no obstructive disease. RPLS 60-70% tubular stenosis which appears unchanged from prior angiogram about a year ago. 8. SEQUENTIAL SVG TO THE DIAGONA, RAMUS, OM:  patent without any significant obstructive stenosis. There appears to be venous valve system distally into the graft. Native vessel which includes diagonal, ramus and obtuse marginal beyond graft appears to be patent and a very small-caliber vessel, less than 2-mm. 9. LIMA TO LAD: widely patent. Distally, LAD has no significant stenosis.  This appears to be a less than 2-mm vessel. Impression / Plan:  Ms. Nicho Ramey is a pleasant 64 y.o. female who is here for the follow up appointment. Coronary artery disease:    Four vessel CABG in 2010. On aspirin, Statin, BB. Nuclear stress test May 2019 which showed inferior scar, no ischemia. Stable. Continue same    Cardiomyopathy:  Remote history of ejection fraction of 45%. Ejection fraction 50-55% in December 2018 . Last LVEF 40% in October 2019 in setting of A. Fib  LVEF 35-40 percent in August 2020. Taking Lasix once or twice a week. Evidence of edema on exam.  Have asked patient to take Lasix once a day for 1 week and then change to every other day after that. Already on beta-blocker. Will reduce dose to 50 mg twice daily because of resting bradycardia and fatigue. Unable to afford Entresto. Will discontinue Entresto and start lisinopril 5 mg daily    Atrial fibrillation:  Diagnosed in October 2018. Asymptomatic from A. fib. On sinus rhythm on exam.  She is on amiodarone and beta-blocker. She is also on Eliquis. Will reduce dose to 50 mg twice daily because of resting bradycardia and fatigue. Hypertension: Stable. Currently on beta-blocker. Will reduce dose to 50 mg twice daily because of resting bradycardia and fatigue. Unable to afford Entresto. Will discontinue Entresto and start lisinopril 5 mg daily    Hyperlipidemia:  Continue Crestor 20 mg daily. Last LDL 28.    DM Goal A1C less than 7 from CV standpoint. Defer management to PCP. Goal hemoglobin A1c less than 7 is recommended from cardiology standpoint    Tobacco abuse:    Patient is currently smoking 1 pack of cigarettes a day    This plan was discussed with Ms. Nicho Ramey in detail, who is in agreement. Please do not hesitate to call me if you have any questions or concerns.

## 2020-09-14 NOTE — PROGRESS NOTES
Dia Mati presents today for   Chief Complaint   Patient presents with    Follow-up       Keegan Leiva preferred language for health care discussion is english/other. Is someone accompanying this pt? no    Is the patient using any DME equipment during [address]? no    Depression Screening:  3 most recent PHQ Screens 11/8/2019   Little interest or pleasure in doing things Not at all   Feeling down, depressed, irritable, or hopeless Not at all   Total Score PHQ 2 0       Learning Assessment:  Learning Assessment 10/16/2014   PRIMARY LEARNER Patient   HIGHEST LEVEL OF EDUCATION - PRIMARY LEARNER  GRADUATED HIGH SCHOOL OR GED   BARRIERS PRIMARY LEARNER NONE   CO-LEARNER CAREGIVER No   PRIMARY LANGUAGE ENGLISH   LEARNER PREFERENCE PRIMARY VIDEOS   ANSWERED BY Wili Leiva   RELATIONSHIP SELF       Abuse Screening:  No flowsheet data found. Fall Risk  No flowsheet data found. Pt currently taking Anticoagulant therapy? yes    Coordination of Care:  1. Have you been to the ER, urgent care clinic since your last visit? Hospitalized since your last visit? no    2. Have you seen or consulted any other health care providers outside of the [address] [place] [address] since your last visit? Include any pap smears or colon screening.  no

## 2020-09-14 NOTE — PATIENT INSTRUCTIONS
Medication Changes Stop [place] Start Lisinopril 5 mg daily Start taking lasix 40 mg every other day Start taking toprol 50 mg twice a day

## 2020-09-21 NOTE — TELEPHONE ENCOUNTER
Requested Prescriptions     Pending Prescriptions Disp Refills    lisinopriL (PRINIVIL, ZESTRIL) 5 mg tablet        Sig: Take  by mouth daily.

## 2020-09-22 RX ORDER — LISINOPRIL 5 MG/1
5 TABLET ORAL DAILY
Qty: 90 TAB | Refills: 3 | Status: SHIPPED | OUTPATIENT
Start: 2020-09-22 | End: 2021-11-30

## 2020-10-22 ENCOUNTER — HOSPITAL ENCOUNTER (OUTPATIENT)
Dept: GENERAL RADIOLOGY | Age: 60
Discharge: HOME | End: 2020-10-22
Payer: MEDICARE

## 2020-10-22 ENCOUNTER — TRANSCRIBE ORDER (OUTPATIENT)
Dept: REGISTRATION | Age: 60
End: 2020-10-22

## 2020-10-22 DIAGNOSIS — I10 ESSENTIAL HYPERTENSION, MALIGNANT: Primary | ICD-10-CM

## 2020-10-22 DIAGNOSIS — E11.9 DIABETES MELLITUS (HCC): ICD-10-CM

## 2020-10-22 DIAGNOSIS — I10 ESSENTIAL HYPERTENSION, MALIGNANT: ICD-10-CM

## 2020-10-22 PROCEDURE — 72100 X-RAY EXAM L-S SPINE 2/3 VWS: CPT

## 2020-11-20 ENCOUNTER — TRANSCRIBE ORDER (OUTPATIENT)
Dept: SCHEDULING | Age: 60
End: 2020-11-20

## 2020-11-20 DIAGNOSIS — M79.89 SWELLING OF LIMB: Primary | ICD-10-CM

## 2020-11-25 ENCOUNTER — HOSPITAL ENCOUNTER (OUTPATIENT)
Dept: CT IMAGING | Age: 60
Discharge: HOME | End: 2020-11-25
Attending: INTERNAL MEDICINE
Payer: MEDICARE

## 2020-11-25 ENCOUNTER — HOSPITAL ENCOUNTER (OUTPATIENT)
Dept: VASCULAR SURGERY | Age: 60
Discharge: HOME | End: 2020-11-25
Attending: INTERNAL MEDICINE
Payer: MEDICARE

## 2020-11-25 DIAGNOSIS — M79.89 SWELLING OF LIMB: ICD-10-CM

## 2020-11-25 PROCEDURE — 73700 CT LOWER EXTREMITY W/O DYE: CPT

## 2020-11-25 PROCEDURE — 93971 EXTREMITY STUDY: CPT

## 2020-12-18 RX ORDER — AMIODARONE HYDROCHLORIDE 200 MG/1
TABLET ORAL
Qty: 90 TAB | Refills: 3 | Status: SHIPPED | OUTPATIENT
Start: 2020-12-18 | End: 2021-10-05

## 2021-02-10 ENCOUNTER — HOSPITAL ENCOUNTER (OUTPATIENT)
Dept: PREADMISSION TESTING | Age: 61
Discharge: HOME | End: 2021-02-10
Payer: MEDICARE

## 2021-02-10 ENCOUNTER — TRANSCRIBE ORDER (OUTPATIENT)
Dept: REGISTRATION | Age: 61
End: 2021-02-10

## 2021-02-10 DIAGNOSIS — Z01.812 BLOOD TESTS PRIOR TO TREATMENT OR PROCEDURE: Primary | ICD-10-CM

## 2021-02-10 DIAGNOSIS — Z01.812 BLOOD TESTS PRIOR TO TREATMENT OR PROCEDURE: ICD-10-CM

## 2021-02-10 DIAGNOSIS — Z20.828 EXPOSURE TO SARS-ASSOCIATED CORONAVIRUS: ICD-10-CM

## 2021-02-10 PROCEDURE — U0003 INFECTIOUS AGENT DETECTION BY NUCLEIC ACID (DNA OR RNA); SEVERE ACUTE RESPIRATORY SYNDROME CORONAVIRUS 2 (SARS-COV-2) (CORONAVIRUS DISEASE [COVID-19]), AMPLIFIED PROBE TECHNIQUE, MAKING USE OF HIGH THROUGHPUT TECHNOLOGIES AS DESCRIBED BY CMS-2020-01-R: HCPCS

## 2021-02-10 RX ORDER — ERGOCALCIFEROL 1.25 MG/1
50000 CAPSULE ORAL
COMMUNITY
End: 2022-02-25

## 2021-02-10 RX ORDER — BUMETANIDE 1 MG/1
1 TABLET ORAL DAILY
COMMUNITY
End: 2022-02-03

## 2021-02-11 LAB — SARS-COV-2, COV2NT: NOT DETECTED

## 2021-02-12 ENCOUNTER — ANESTHESIA EVENT (OUTPATIENT)
Dept: ENDOSCOPY | Age: 61
End: 2021-02-12
Payer: MEDICARE

## 2021-02-15 ENCOUNTER — HOSPITAL ENCOUNTER (OUTPATIENT)
Age: 61
Setting detail: OUTPATIENT SURGERY
Discharge: HOME | End: 2021-02-15
Attending: INTERNAL MEDICINE | Admitting: INTERNAL MEDICINE
Payer: MEDICARE

## 2021-02-15 ENCOUNTER — ANESTHESIA (OUTPATIENT)
Dept: ENDOSCOPY | Age: 61
End: 2021-02-15
Payer: MEDICARE

## 2021-02-15 VITALS
RESPIRATION RATE: 18 BRPM | BODY MASS INDEX: 29.42 KG/M2 | HEIGHT: 70 IN | DIASTOLIC BLOOD PRESSURE: 44 MMHG | HEART RATE: 46 BPM | OXYGEN SATURATION: 96 % | WEIGHT: 205.5 LBS | SYSTOLIC BLOOD PRESSURE: 112 MMHG | TEMPERATURE: 96.5 F

## 2021-02-15 LAB
GLUCOSE BLD STRIP.AUTO-MCNC: 129 MG/DL (ref 70–110)
GLUCOSE BLD STRIP.AUTO-MCNC: 151 MG/DL (ref 70–110)

## 2021-02-15 PROCEDURE — 00813 ANES UPR LWR GI NDSC PX: CPT | Performed by: ANESTHESIOLOGY

## 2021-02-15 PROCEDURE — 2709999900 HC NON-CHARGEABLE SUPPLY: Performed by: INTERNAL MEDICINE

## 2021-02-15 PROCEDURE — 77030031670 HC DEV INFL ENDOTEK BIG60 MRTM -B: Performed by: INTERNAL MEDICINE

## 2021-02-15 PROCEDURE — 88342 IMHCHEM/IMCYTCHM 1ST ANTB: CPT

## 2021-02-15 PROCEDURE — 82962 GLUCOSE BLOOD TEST: CPT

## 2021-02-15 PROCEDURE — 00813 ANES UPR LWR GI NDSC PX: CPT | Performed by: NURSE ANESTHETIST, CERTIFIED REGISTERED

## 2021-02-15 PROCEDURE — 88305 TISSUE EXAM BY PATHOLOGIST: CPT

## 2021-02-15 PROCEDURE — 77030021593 HC FCPS BIOP ENDOSC BSC -A: Performed by: INTERNAL MEDICINE

## 2021-02-15 PROCEDURE — 76060000032 HC ANESTHESIA 0.5 TO 1 HR: Performed by: INTERNAL MEDICINE

## 2021-02-15 PROCEDURE — 74011250636 HC RX REV CODE- 250/636: Performed by: NURSE ANESTHETIST, CERTIFIED REGISTERED

## 2021-02-15 PROCEDURE — 76040000007: Performed by: INTERNAL MEDICINE

## 2021-02-15 PROCEDURE — 74011000250 HC RX REV CODE- 250: Performed by: NURSE ANESTHETIST, CERTIFIED REGISTERED

## 2021-02-15 RX ORDER — SODIUM CHLORIDE, SODIUM LACTATE, POTASSIUM CHLORIDE, CALCIUM CHLORIDE 600; 310; 30; 20 MG/100ML; MG/100ML; MG/100ML; MG/100ML
50 INJECTION, SOLUTION INTRAVENOUS CONTINUOUS
Status: DISCONTINUED | OUTPATIENT
Start: 2021-02-16 | End: 2021-02-15 | Stop reason: HOSPADM

## 2021-02-15 RX ORDER — SODIUM CHLORIDE 0.9 % (FLUSH) 0.9 %
5-40 SYRINGE (ML) INJECTION AS NEEDED
Status: CANCELLED | OUTPATIENT
Start: 2021-02-15

## 2021-02-15 RX ORDER — LIDOCAINE HYDROCHLORIDE 20 MG/ML
INJECTION, SOLUTION EPIDURAL; INFILTRATION; INTRACAUDAL; PERINEURAL AS NEEDED
Status: DISCONTINUED | OUTPATIENT
Start: 2021-02-15 | End: 2021-02-15 | Stop reason: HOSPADM

## 2021-02-15 RX ORDER — LIDOCAINE HYDROCHLORIDE 10 MG/ML
0.1 INJECTION, SOLUTION EPIDURAL; INFILTRATION; INTRACAUDAL; PERINEURAL AS NEEDED
Status: DISCONTINUED | OUTPATIENT
Start: 2021-02-15 | End: 2021-02-15 | Stop reason: HOSPADM

## 2021-02-15 RX ORDER — PROPOFOL 10 MG/ML
INJECTION, EMULSION INTRAVENOUS AS NEEDED
Status: DISCONTINUED | OUTPATIENT
Start: 2021-02-15 | End: 2021-02-15 | Stop reason: HOSPADM

## 2021-02-15 RX ORDER — DEXTROMETHORPHAN/PSEUDOEPHED 2.5-7.5/.8
1.2 DROPS ORAL
Status: CANCELLED | OUTPATIENT
Start: 2021-02-15

## 2021-02-15 RX ORDER — MIDAZOLAM HYDROCHLORIDE 1 MG/ML
INJECTION, SOLUTION INTRAMUSCULAR; INTRAVENOUS AS NEEDED
Status: DISCONTINUED | OUTPATIENT
Start: 2021-02-15 | End: 2021-02-15 | Stop reason: HOSPADM

## 2021-02-15 RX ORDER — SODIUM CHLORIDE 0.9 % (FLUSH) 0.9 %
5-40 SYRINGE (ML) INJECTION EVERY 8 HOURS
Status: CANCELLED | OUTPATIENT
Start: 2021-02-15

## 2021-02-15 RX ADMIN — SODIUM CHLORIDE, SODIUM LACTATE, POTASSIUM CHLORIDE, AND CALCIUM CHLORIDE 50 ML/HR: 600; 310; 30; 20 INJECTION, SOLUTION INTRAVENOUS at 07:44

## 2021-02-15 RX ADMIN — PROPOFOL 30 MG: 10 INJECTION, EMULSION INTRAVENOUS at 09:16

## 2021-02-15 RX ADMIN — PROPOFOL 50 MG: 10 INJECTION, EMULSION INTRAVENOUS at 09:21

## 2021-02-15 RX ADMIN — MIDAZOLAM 2 MG: 1 INJECTION INTRAMUSCULAR; INTRAVENOUS at 09:02

## 2021-02-15 RX ADMIN — LIDOCAINE HYDROCHLORIDE 80 MG: 20 INJECTION, SOLUTION INTRAVENOUS at 09:06

## 2021-02-15 RX ADMIN — PROPOFOL 50 MG: 10 INJECTION, EMULSION INTRAVENOUS at 09:10

## 2021-02-15 RX ADMIN — PROPOFOL 20 MG: 10 INJECTION, EMULSION INTRAVENOUS at 09:12

## 2021-02-15 RX ADMIN — PROPOFOL 100 MG: 10 INJECTION, EMULSION INTRAVENOUS at 09:06

## 2021-02-15 NOTE — ANESTHESIA POSTPROCEDURE EVALUATION
POST OPERATIVE ROUNDS  Anesthesia Note      Patient is s/p-Procedure(s):  COLONOSCOPY  ESOPHAGOGASTRODUODENOSCOPY (EGD) with bxs    Anesthesia-MAC        Mental status-Alert,oriented x 3    No apparent Nausea/vomiting. Oxygenation-adequate. Neurological status-grossly intact. No apparent Anesthetic complications. Ronda Yoder CRNA    Procedure(s):  COLONOSCOPY  ESOPHAGOGASTRODUODENOSCOPY (EGD) with bxs.     MAC    <BSHSIANPOST>    INITIAL Post-op Vital signs:   Vitals Value Taken Time   /62 02/15/21 0932   Temp     Pulse 56 02/15/21 0932   Resp 16 02/15/21 0932   SpO2 96 % 02/15/21 0932

## 2021-02-15 NOTE — PROCEDURES
Endoscopy Procedure Note    Patient: Oskar Parker MRN: [id number]  SSN: [id number]    YOB: 1960  Age: 64 y.o. Sex: female      Date/Time:  2/15/2021 9:31 AM    Esophagogastroduodenoscopy (EGD) Procedure Note    Procedure: Esophagogastroduodenoscopy with biopsy    IMPRESSION:   1. Gastritis. Biopsies taken for H. pylori  2. A 3cm hiatal hernia. 3. Otherwise normal EGD. Biopsies taken for celiac disease. RECOMMENDATIONS:  1. Resume regular diet. 2. Will contact with biopsy results in 1 week      Indication: Iron deficiency anemia  :  David Means MD  Assistants: Endoscopy Technician-1: [facility]  Endoscopy RN-1: Ciara Hudson RN    Referring Provider:   Shana Linares MD  History: The history and physical exam were reviewed and updated. Endoscope: Olympus GIF-190 diagnostic endoscope  Extent of Exam: third portion of the duodenum  ASA: ASA 3 - Patient with moderate systemic disease with functional limitations  Anethesia/Sedation:  MAC anesthesia    Description of the procedure: The procedure was discussed with the patient including risks, benefits, alternatives including risks of iv sedation, bleeding, perforation and aspiration. A safety timeout was performed. The patient was placed in the left lateral decubitus position. A bite block was placed. The patient was given incremental doses of intravenous sedation until moderate sedation was achieved. The patients vital signs were monitored at all times including heart rate/rhythm, blood pressure and oxygen saturation. The endoscope was then passed under direct visualization to the third portion of the duodenum. The endoscope was then slowly withdrawn while visualizing the mucosa. In the stomach a retroflexion was performed and gastric fundus and cardia visualized. The endoscope was then slowly withdrawn. The patient was then transferred to recovery in stable condition.                 Findings: Esophagus: The esophageal mucosa was normal with no ulceration, mass or stricture. There was no evidence of Pardo's esophagus or reflux esophagitis. Stomach: The gastric mucosa was edematous and erythematous with mild nodularity. No ulceration, mass, stricture. Four pinch biopsies taken for H. Pylori. There was a 3cm hiatal hernia. Duodenum: The duodenum mucosa was normal with no ulceration, mass, stricture and no evidence of villous atrophy. Four pinch biopsies taken for celiac disease. Therapies:  None    Specimens:   ID Type Source Tests Collected by Time Destination   1 : Bxs r/o celiac ds Preservative Duodenum  Leila Blake MD 2/15/2021 0911 Pathology   2 : Bxs r/o h pylori Preservative Gastric  Leila Blake MD 2/15/2021 1632 Pathology               Complications:   None; patient tolerated the procedure well. EBL:None    Discharge disposition:  Home in the company of  when able to ambulate    Claudia Christianson MD  February 15, 2021  9:31 AM          Colonoscopy Report    Patient: Loraine Delarosa MRN: [id number]  SSN: [id number]    YOB: 1960  Age: 64 y.o. Sex: female      Date of Procedure: 2/15/2021    IMPRESSION:  1. Hemorrhoids  2. Otherwise normal colonoscopy including terminal ileum. RECOMMENDATIONS:  1. Resume regular diet, Recommend high fiber. 2. Repeat colonoscopy in 5 years.      Indication:  Iron deficiency anemia  Procedure Performed: Colonoscopy   Endoscopist: Claudia Christianson MD  Assistant: Endoscopy Technician-1: Lay Melendrez  Endoscopy RN-1: Rhea Broussard RN  ASA: ASA 3 - Patient with moderate systemic disease with functional limitations  Mallampati Score: II (soft palate, uvula, fauces visible)  Anesthesia: MAC anesthesia  Endoscope:     [x]  CF H 190AL   []  PCF H190AL   []  GIF H 190    Extent of Examination:terminal ileum  Quality of Preparation:     []  Excellent   [x]  Very Good   [] Fair but adequate   [] Fair, inadequate   []  Poor      Technique: The procedure was discussed with the patient including risks, benefits, alternatives including risks of IV sedation, bleeding, perforation and missed polyp. A safety timeout was performed. The patient was given incremental doses of intravenous sedation to achieve moderate sedation. The patients vital signs were monitored at all times including heart rate, rhythm, blood pressure and oxygen saturation. The patient was placed in left lateral position. When adequate sedation was achieved a perianal inspection and a digital rectal exam were performed. Video colonoscope was introduced into the rectum and advanced under direct vision up to the terminal ileum. The cecum was identified by IC valve, appendiceal orifice and crows foot. With adequate insufflation and maneuvering of the withdrawing scope, the colonic mucosa was visualized carefully. Retroflexion was performed in the rectum and the distal rectum visualized. The patient tolerated the procedure very well and was transferred to recovery area. Findings:  1. Normal rectal exam.   2. Normal terminal ileum with no ulceration, mass, stricture. 3. There were small hemorrhoids in the distal rectum. 4. The colonic mucosa was otherwise normal with no polyps, masses, ulcerations, strictures.         EBL:None  Specimen:   ID Type Source Tests Collected by Time Destination   1 : Bxs r/o celiac ds Preservative Duodenum  Kranthi Yu MD 2/15/2021 0911 Pathology   2 : Bxs r/o h pylori Preservative Gastric  Kranthi Yu MD 2/15/2021 3527 Pathology       Umer Escalera MD  February 15, 2021  9:33 AM

## 2021-02-15 NOTE — PERIOP NOTES
Pre-Op Summary    Pt arrived via car with family/friend and is oriented to time, place, person and situation. Patient with steady gait with none assistive devices. Visit Vitals  /68 (BP 1 Location: Right arm, BP Patient Position: Sitting)   Pulse (!) 58   Temp 97.5 °F (36.4 °C)   Resp 18   Ht 5' 10\" (1.778 m)   Wt 93.2 kg (205 lb 8 oz)   SpO2 100%   Breastfeeding No   BMI 29.49 kg/m²       Peripheral IV located on Right forearm. Patients belongings are located with pt. Patient's point of contact is dung and their contact number is: [phone]. They will be leaving and coming back. They are able to receive medication information. They will be their ride home.

## 2021-02-15 NOTE — ANESTHESIA PREPROCEDURE EVALUATION
Relevant Problems   No relevant active problems       Anesthetic History     History of awareness of surgery under anesthesia          Review of Systems / Medical History  Patient summary reviewed and pertinent labs reviewed    Pulmonary    COPD      Smoker         Neuro/Psych   Within defined limits           Cardiovascular    Hypertension        Dysrhythmias : atrial fibrillation  CAD    Exercise tolerance: >4 METS     GI/Hepatic/Renal     GERD           Endo/Other    Diabetes: type 2  Hypothyroidism       Other Findings            Physical Exam    Airway  Mallampati: II  TM Distance: 4 - 6 cm  Neck ROM: normal range of motion   Mouth opening: Normal     Cardiovascular  Regular rate and rhythm,  S1 and S2 normal,  no murmur, click, rub, or gallop  Rhythm: regular  Rate: normal         Dental    Dentition: Lower dentition intact and Upper dentition intact  Comments: Broken L upper molar   Pulmonary  Breath sounds clear to auscultation               Abdominal  GI exam deferred       Other Findings            Anesthetic Plan    ASA: 3  Anesthesia type: MAC          Induction: Intravenous  Anesthetic plan and risks discussed with: Patient

## 2021-02-15 NOTE — DISCHARGE INSTRUCTIONS
DISCHARGE SUMMARY from Nurse    PATIENT INSTRUCTIONS:    After general anesthesia or intravenous sedation, for 24 hours or while taking prescription Narcotics:  · Limit your activities  · Do not drive and operate hazardous machinery  · Do not make important personal or business decisions  · Do  not drink alcoholic beverages  · If you have not urinated within 8 hours after discharge, please contact your surgeon on call. Report the following to your surgeon:  · Excessive pain, swelling, redness or odor of or around the surgical area  · Temperature over 100.5  · Nausea and vomiting lasting longer than 4 hours or if unable to take medications  · Any signs of decreased circulation or nerve impairment to extremity: change in color, persistent  numbness, tingling, coldness or increase pain  · Any questions    What to do at Home:  Recommended activity: Activity as tolerated and no driving for today,       These are general instructions for a healthy lifestyle:    No smoking/ No tobacco products/ Avoid exposure to second hand smoke  Surgeon General's Warning:  Quitting smoking now greatly reduces serious risk to your health. Obesity, smoking, and sedentary lifestyle greatly increases your risk for illness    A healthy diet, regular physical exercise & weight monitoring are important for maintaining a healthy lifestyle    You may be retaining fluid if you have a history of heart failure or if you experience any of the following symptoms:  Weight gain of 3 pounds or more overnight or 5 pounds in a week, increased swelling in our hands or feet or shortness of breath while lying flat in bed. Please call your doctor as soon as you notice any of these symptoms; do not wait until your next office visit. The discharge information has been reviewed with the patient. The patient verbalized understanding.   Discharge medications reviewed with the patient and appropriate educational materials and side effects teaching were provided. Patient armband removed and shredded  ___________________________________________________________________________________________________________________________________Patient Discharge Instructions    Jayashree Merida / [id number] : 1960    Admitted 2/15/2021 Discharged: 2/15/2021         Procedure Impression:  1. Gastritis. Biopsies taken for H. pylori  2. A 3cm hiatal hernia. 3. Otherwise normal EGD. Biopsies taken for celiac disease. 4. Hemorrhoids. 5. Otherwise normal colonoscopy including terminal ileum. Recommendation:  1. Resume regular diet. 2. Will contact with biopsy results in 1-2 weeks   3. Please contact our office if you have not received the results by three weeks. Recommended Diet: Regular Diet    Recommended Activity:    1. Do not drink alcohol, drive or operate machinery for 12 hours   2. Call if any fever, abdominal pain or bleeding noted.        Signed By: Caleb Hernandez MD     February 15, 2021

## 2021-02-15 NOTE — H&P
History and Physical    Meaghan Evangelista [facility] SYS        1960  [id number]        [id number]     Pre-Procedure Diagnosis:  IOA D50.9    Chief Complaint:  No chief complaint on file. HPI: Pt with iron deficiency anemia requiring iron infusions. Negative EGD and colonoscopy in the past. She has a recurrence. No active bleeding noted. No abdominal pain. No weight loss. No change in bowel movements. No other complaints. 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    Anxiety     Breast CA [facility] 12/2009    S/P Lt Mastectomy and Chemo    CAD (coronary artery disease) 8/30/2010    S/P CABG X 4 ([address] to LAD, Sequential SVG to D2, Ramus, OM) 01/2011    Cardiomyopathy [facility]     LVEF 35-40 % (08/20) 60% (08/14), 40% (2011)    Chemotherapy 4/2010    for 4 months    COPD (chronic obstructive pulmonary disease) ([facility] 8/30/2010    Depression     Diabetes ([facility]     GERD (gastroesophageal reflux disease)     Hyperlipidemia     Hypertension     Schatzki's ring     S/P EGD and Dilation (07/16)    Thyroid disease     Tobacco abuse      Past Surgical History:   Procedure Laterality Date    COLONOSCOPY N/A 5/3/2017    COLON  performed by Nikia Shaw MD at [address] HX APPENDECTOMY      HX CHOLECYSTECTOMY      HX GYN      tubal ligation    HX HEART CATHETERIZATION  11/11/09; 10/21/09    HX HEART CATHETERIZATION  10/15/10; 10/06/09    left heart    HX HEART CATHETERIZATION  11/7/2011    left heart cath, no new findings    HX OTHER SURGICAL  4/6/2010    Insertion right internal jugular single lumen MediPort.     HX RADICAL MASTECTOMY Left 2/2010    left, with chemo    [place] BREAST SURGERY PROCEDURE UNLISTED  2/22/10    left w/sentinel node bx    [place] CABG, ARTERY-VEIN, FOUR  12/2010    [place] CARDIAC SURG PROCEDURE UNLIST      stent placement before CABG    [place] CHEST SURGERY PROCEDURE UNLISTED Right     Prior port placement     Family History   Problem Relation Age of Onset    Hypertension Mother     Diabetes Mother     Breast Problems Mother         fiber cystic    Hypertension Other      Social History     Socioeconomic History    Marital status:      Spouse name: Not on file    Number of children: Not on file    Years of education: Not on file    Highest education level: Not on file   Tobacco Use    Smoking status: Current Every Day Smoker     Packs/day: 1.00    Smokeless tobacco: Never Used   Substance and Sexual Activity    Alcohol use: No    Drug use: No       Allergies: Allergies   Allergen Reactions    Shellfish Derived Hives     Eyes and Throat swelling     Medications:   Current Facility-Administered Medications   Medication Dose Route Frequency    lidocaine (PF) (XYLOCAINE) 10 mg/mL (1 %) injection 0.1 mL  0.1 mL SubCUTAneous PRN    [START ON 2/16/2021] lactated Ringers infusion  50 mL/hr IntraVENous CONTINUOUS     Vital Signs   Visit Vitals  /68 (BP 1 Location: Right arm, BP Patient Position: Sitting)   Pulse (!) 58   Temp 97.5 °F (36.4 °C)   Resp 18   Ht 5' 10\" (1.778 m)   Wt 93.2 kg (205 lb 8 oz)   SpO2 100%   Breastfeeding No   BMI 29.49 kg/m²       Review of Systems  A comprehensive review of systems was negative except for that written in the History of Present Illness. Physical Exam:  General:  Alert, cooperative, no distress, appears stated age. Eyes:  Conjunctivae/corneas clear. PERRL, EOMs intact. Fundi benign           Mouth/Throat: Lips, mucosa, and tongue normal. Teeth and gums normal.   Neck: Supple, symmetrical, trachea midline, no adenopathy, thyroid: no enlargement/tenderness/nodules, no carotid bruit and no JVD. Lungs:   Clear to auscultation bilaterally. Heart:  Regular rate and rhythm, S1, S2 normal, no murmur, click, rub or gallop. Abdomen:   Soft, non-tender.  Bowel sounds normal. No masses,  No organomegaly. Extremities: Extremities normal, atraumatic, no cyanosis or edema. Skin: Skin color, texture, turgor normal. No rashes or lesions             Laboratory Data:  Recent Results (from the past 24 hour(s))   GLUCOSE, POC    Collection Time: 02/15/21  7:44 AM   Result Value Ref Range    Glucose (POC) 151 (H) 70 - 110 mg/dL       Hospital Problems  Date Reviewed: 4/13/2017    None          Impression and Plan:  Iron Deficiency anemia. Recommend egd and colonoscopy.        David Means MD  2/15/2021  8:13 AM

## 2021-02-15 NOTE — PERIOP NOTES
Phase 2 Recovery Summary    Report received from [facility]:    02/10/21 1600 02/15/21 0728 02/15/21 0932 02/15/21 0938   BP:  110/68 (!) 141/62 (!) 112/44   Pulse:  (!) 58 (!) 56 (!) 46   Resp:  18 16 18   Temp:  97.5 °F (36.4 °C)  (!) 96.5 °F (35.8 °C)   SpO2:  100% 96% 96%   Weight: 99.8 kg (220 lb) 93.2 kg (205 lb 8 oz)     Height: 5' 10\" (1.778 m) 5' 10\" (1.778 m)         oriented to time, place, person and situation          Lines and Drains  Peripheral Intravenous Line: Removed prior to discharge  Peripheral IV 02/15/21 Anterior;Right Forearm (Active)   Site Assessment Clean, dry, & intact 02/15/21 0744   Phlebitis Assessment 0 02/15/21 0744   Dressing Status Clean, dry, & intact 02/15/21 0744   Hub Color/Line Status Pink; Infusing 02/15/21 0744   Alcohol Cap Used Yes 02/15/21 0744       Wound        Patient assisted to chair with minimal assist. Pateint tolerating liquids well. Discharge discussed with patient,  and family over the phone due to covid restricitons. No questions or concerns at this time. Patient had time to ask any questions. Discharge medications reviewed with patient and appropriate educational materials and side effects teaching were provided. Patient discharged to home with her  without incident.        Cuco Farmer RN

## 2021-02-18 ENCOUNTER — OFFICE VISIT (OUTPATIENT)
Dept: CARDIOLOGY CLINIC | Age: 61
End: 2021-02-18
Payer: MEDICARE

## 2021-02-18 VITALS
BODY MASS INDEX: 30.24 KG/M2 | SYSTOLIC BLOOD PRESSURE: 138 MMHG | WEIGHT: 211.2 LBS | HEIGHT: 70 IN | HEART RATE: 60 BPM | RESPIRATION RATE: 16 BRPM | DIASTOLIC BLOOD PRESSURE: 62 MMHG | OXYGEN SATURATION: 98 % | TEMPERATURE: 97.6 F

## 2021-02-18 DIAGNOSIS — Z71.6 TOBACCO ABUSE COUNSELING: ICD-10-CM

## 2021-02-18 DIAGNOSIS — I48.0 PAROXYSMAL ATRIAL FIBRILLATION (HCC): Primary | ICD-10-CM

## 2021-02-18 PROCEDURE — 93000 ELECTROCARDIOGRAM COMPLETE: CPT | Performed by: INTERNAL MEDICINE

## 2021-02-18 PROCEDURE — G9899 SCRN MAM PERF RSLTS DOC: HCPCS | Performed by: INTERNAL MEDICINE

## 2021-02-18 PROCEDURE — 99214 OFFICE O/P EST MOD 30 MIN: CPT | Performed by: INTERNAL MEDICINE

## 2021-02-18 PROCEDURE — G8417 CALC BMI ABV UP PARAM F/U: HCPCS | Performed by: INTERNAL MEDICINE

## 2021-02-18 PROCEDURE — G8752 SYS BP LESS 140: HCPCS | Performed by: INTERNAL MEDICINE

## 2021-02-18 PROCEDURE — G8432 DEP SCR NOT DOC, RNG: HCPCS | Performed by: INTERNAL MEDICINE

## 2021-02-18 PROCEDURE — G8428 CUR MEDS NOT DOCUMENT: HCPCS | Performed by: INTERNAL MEDICINE

## 2021-02-18 PROCEDURE — 99406 BEHAV CHNG SMOKING 3-10 MIN: CPT | Performed by: INTERNAL MEDICINE

## 2021-02-18 PROCEDURE — 3017F COLORECTAL CA SCREEN DOC REV: CPT | Performed by: INTERNAL MEDICINE

## 2021-02-18 PROCEDURE — G8754 DIAS BP LESS 90: HCPCS | Performed by: INTERNAL MEDICINE

## 2021-02-18 NOTE — PROGRESS NOTES
is 64 y. o.female with Coronary artery disease status post CABG in November 2010. Cardiomyopathy, hypertension, hyperlipidemia, diabetes, breast cancer status post mastectomy and chemotherapy. She also has anxiety and depression disorder. She has  tobacco abuse disorder. Ms. Fer Celaya is here today for follow-up appointment. For last few days patient has been feeling some fluttering sensation in the chest on and off. She thinks that she may have episode of atrial fibrillation. She able to continue activity of daily living without any specific complaint. She denies presyncope or syncope. She denies any angina or heart failure symptoms.   She is taking Bumex under supervision of PCP and kidney specialist.    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    Anxiety     Breast CA ([address] 12/2009    S/P Lt Mastectomy and Chemo    CAD (coronary artery disease) 8/30/2010    S/P CABG X 4 ([address] to LAD, Sequential SVG to D2, Ramus, OM) 01/2011    Cardiomyopathy ([address]     LVEF 35-40 % (08/20) 60% (08/14), 40% (2011)    Chemotherapy 4/2010    for 4 months    COPD (chronic obstructive pulmonary disease) ([address] 8/30/2010    Depression     Diabetes ([address]     GERD (gastroesophageal reflux disease)     Hyperlipidemia     Hypertension     Schatzki's ring     S/P EGD and Dilation (07/16)    Thyroid disease     Tobacco abuse      PSH:  Past Surgical History:   Procedure Laterality Date    COLONOSCOPY N/A 5/3/2017    COLON  performed by [facility] at [facility] N/A 2/15/2021    COLONOSCOPY performed by Christelle Barakat MD at [address] HX APPENDECTOMY      HX CHOLECYSTECTOMY      HX GYN      tubal ligation    HX HEART CATHETERIZATION  11/11/09; 10/21/09    HX HEART CATHETERIZATION  10/15/10; 10/06/09    left heart    HX HEART CATHETERIZATION  11/7/2011    left heart cath, no new findings    HX OTHER SURGICAL  4/6/2010    Insertion right internal jugular single lumen MediPort.  HX RADICAL MASTECTOMY Left 2/2010    left, with chemo    [place] BREAST SURGERY PROCEDURE UNLISTED  2/22/10    left w/sentinel node bx    [place] CABG, ARTERY-VEIN, FOUR  12/2010    [place] CARDIAC SURG PROCEDURE UNLIST      stent placement before CABG    [place] CHEST SURGERY PROCEDURE UNLISTED Right     Prior port placement     MEDS:  Current Outpatient Medications on File Prior to Visit   Medication Sig Dispense Refill    bumetanide (BUMEX) 0.5 mg tablet Take 0.5 mg by mouth two (2) times a day.  ergocalciferol (Vitamin D2) 1,250 mcg (50,000 unit) capsule Take 50,000 Units by mouth every seven (7) days.  amiodarone (CORDARONE) 200 mg tablet TAKE 1 TABLET EVERY DAY 90 Tab 3    lisinopriL (PRINIVIL, ZESTRIL) 5 mg tablet Take 1 Tab by mouth daily. 90 Tab 3    apixaban (ELIQUIS) 5 mg tablet Take 5 mg by mouth two (2) times a day.  metoprolol succinate (TOPROL XL) 50 mg XL tablet Take  by mouth two (2) times a day.  nitroglycerin (NITROSTAT) 0.4 mg SL tablet 1 Tab by SubLINGual route every five (5) minutes as needed for Chest Pain. 1 Bottle 2    esomeprazole (NEXIUM) 20 mg capsule Take 20 mg by mouth daily.  metFORMIN (GLUCOPHAGE) 1,000 mg tablet Take 500 mg by mouth two (2) times daily (with meals).  albuterol (PROVENTIL HFA, VENTOLIN HFA) 90 mcg/actuation inhaler Take 1 Puff by inhalation every four (4) hours as needed.  levothyroxine (SYNTHROID) 150 mcg tablet Take  by mouth Daily (before breakfast).  aspirin delayed-release 81 mg tablet Take  by mouth daily.  ascorbic acid (VITAMIN C) 250 mg tablet Take 250 mg by mouth daily.  rosuvastatin (CRESTOR) 20 mg tablet Take 20 mg by mouth daily. No current facility-administered medications on file prior to visit.       Alleres and Sensitivities:  Allergies Allergen Reactions    Shellfish Derived Hives     Eyes and Throat swelling       Family History:  Family History   Problem Relation Age of Onset    Hypertension Mother     Diabetes Mother     Breast Problems Mother         fiber cystic    Hypertension Other      Social History:  Social History     Tobacco Use    Smoking status: Current Every Day Smoker     Packs/day: 1.00    Smokeless tobacco: Never Used   Substance Use Topics    Alcohol use: No    Drug use: No     Physical Exam:  BP Readings from Last 3 Encounters:   02/18/21 138/62   02/15/21 (!) 112/44   09/14/20 135/65     Pulse Readings from Last 3 Encounters:   02/18/21 60   02/15/21 (!) 46   09/14/20 (!) 50     Wt Readings from Last 3 Encounters:   02/18/21 211 lb 3.2 oz (95.8 kg)   02/15/21 205 lb 8 oz (93.2 kg)   09/14/20 228 lb (103.4 kg)     Constitutional: Oriented to person, place, and time. HENT: Head: Normocephalic and atraumatic. Neck: No JVD present. Cardiovascular: Regular rate and rhythm rhythm. No, gallop or rubs appreciated. OLIVIA 2/6 base of heart, carotid radiation  Lung[de-identified] Breath sounds normal. No respiratory distress. No ronchi or rales appreciated  Abdominal: No tenderness. No rebound and no guarding. Musculoskeletal: 1/trace + LE edema     Review of Data:  EKG: (06/2011)Sinus rhythm at 73 bpm. No Dynamic ST changes of ischemia. No Significant Q waves. EKG (6/1/12) : Sinus rhythm at 66 beats per minute. No dynamic ST changes of ischemia. Some nonspecific T wave inversion in precordial leads. Unchanged from prior EKG.     LABS:   Lab Results   Component Value Date/Time    Sodium 141 08/17/2020 09:26 AM    Potassium 4.1 08/17/2020 09:26 AM    Chloride 110 08/17/2020 09:26 AM    CO2 24 08/17/2020 09:26 AM    Glucose 135 (H) 08/17/2020 09:26 AM    BUN 20 (H) 08/17/2020 09:26 AM    Creatinine 1.42 (H) 08/17/2020 09:26 AM     Lab Results   Component Value Date/Time    Cholesterol, total 84 09/05/2019 09:07 AM    HDL Cholesterol 26 (L) 09/05/2019 09:07 AM    LDL, calculated 28.6 09/05/2019 09:07 AM    Triglyceride 147 09/05/2019 09:07 AM    CHOL/HDL Ratio 3.2 09/05/2019 09:07 AM       No components found for: GPT    Lab Results   Component Value Date/Time    Hemoglobin A1c 5.7 06/22/2010 03:42 PM     FLP: (2/2012) , , LDL 51, HDL 22    ECHO:(04/2011)  LVEF 40%, Global hypokinesis    HOLTER (11/19)  Arabella Leiva was in Normal Sinus. The average heart rate, excluding ectopy, was 54 BPM with a minimum of 48 BPM at 04:54 D3 and a maximum of 75 BPM at 05:14 D2. Heart beats, including ectopy, totaled [id number] beats. VENTRICULAR ECTOPICS totaled 225 averaging 4.8 per hour with 222 single, 0 paired, 0 trigeminy and 0 R on T. There was 1 VENTRICULAR TACHYCARDIA with 3 beats 03:34 D3 at a rate of 66 BPM.  SUPRAVENTRICULAR ECTOPICS totaled 308 averaging 6.6 per hour ,with 265 single and 24 paired beats. Patient diary was submitted symptoms noted, no patient triggered events noted. Reported \"Shortness of breath\". Rhythm was sinus and HR less than 100 bpm    ECHO (08/20)  Left Ventricle  Normal cavity size. Mild septal wall hypertrophy. Wall motion: normal. Moderate systolic dysfunction. The estimated ejection fraction is 35 - 40%. Visually measured ejection fraction. There is severe (grade 3) left ventricular diastolic dysfunction. Wall Scoring  The left ventricular wall motion is globally hypokinetic. Left Atrium  Dilated left atrium. Left Atrium volume index is 40.34 mL/m2. Right Ventricle  Normal cavity size and global systolic function. Right Atrium  Mildly dilated right atrium. Aortic Valve  Trileaflet valve structure, no stenosis and no regurgitation. Mitral Valve  No stenosis. Mitral valve non-specific thickening. Mild to moderate regurgitation. Tricuspid Valve  Normal valve structure and no stenosis. Mild regurgitation. Pulmonic Valve  Pulmonic valve not well visualized.  No stenosis. Trace regurgitation. Aorta  Normal aortic root. Pulmonary Artery  Pulmonary arterial systolic pressure (PASP) is 56 mmHg. Pulmonary hypertension found to be moderate. Nuclear stress test 05/10/19  · Baseline ECG: Sinus rhythm, non-specific ST-T wave abnormalities. · Inconclusive stress test.  · Gated SPECT: Left ventricular function post-stress was abnormal. Calculated ejection fraction is 44%. · Inferior/inferolateral hypokinesis  · Left ventricular perfusion is abnormal.  · Myocardial perfusion imaging defect 1: There is a defect that is moderate in size with a moderate reduction in uptake present in the inferior location(s) that is non-reversible. There is abnormal wall motion in the defect area. The defect appears to be infarction. · There was no convincing evidence of significant reversible defect to suggest on going major ischemia. · Abnormal myocardial perfusion imaging. Fixed defect consistent with prior myocardial infarction. CARDIAC CATH(11/2011)  1. LM: Normal.  2. LAD: Eccentric ostial 70-80%, mid to distal LAD is a very small-caliber vessel, probably about a 2-mm vessel. Competitive filing from [facility]   3. DIAGONAL: Ostial 40-50% proximal moderate disease. 4. RAMUS: Proximal 100% in stent. 5. LCx/OM: OM2 mid 100%, . Native LCx diffuse luminal irregularities without any obstructive stenosis. less than 2-mm vessel. 7. RCA: Prox and Mid stent diffuse 20% in-stent restenosis. Otherwise, no obstructive disease. RPLS 60-70% tubular stenosis which appears unchanged from prior angiogram about a year ago. 8. SEQUENTIAL SVG TO THE DIAGONA, RAMUS, OM:  patent without any significant obstructive stenosis. There appears to be venous valve system distally into the graft. Native vessel which includes diagonal, ramus and obtuse marginal beyond graft appears to be patent and a very small-caliber vessel, less than 2-mm. 9. LIMA TO LAD: widely patent.  Distally, LAD has no significant stenosis. This appears to be a less than 2-mm vessel. Impression / Plan:  Ms. Nato Whitlock is a pleasant 64 y.o. female who is here for the follow up appointment. Coronary artery disease:    Four vessel CABG in 2010. On aspirin, Statin, BB. Nuclear stress test May 2019 which showed inferior scar, no ischemia. Stable. Continue same    Cardiomyopathy:  Remote history of ejection fraction of 45%. Ejection fraction 50-55% in December 2018 . Last LVEF 40% in October 2019 in setting of A. Fib  LVEF 35-40 percent in August 2020. Currently taking Bumex twice a day according to PCP and kidney team recommendation  Continue metoprolol 50 mg twice daily. She is on lisinopril. Unable to afford Entresto in the past.      Atrial fibrillation:  Diagnosed in October 2018. On sinus rhythm on exam.  She is on amiodarone and beta-blocker. She is also on Eliquis. We will repeat EKG today. May have episode of atrial fibrillation last couple days ago. Hypertension: Stable. Continue current medications    Hyperlipidemia:  Continue Crestor 20 mg daily. Last LDL 28.    DM Goal A1C less than 7 from CV standpoint. Defer management to PCP. Goal hemoglobin A1c less than 7 is recommended from cardiology standpoint    Tobacco abuse:    Patient is currently smoking 1 pack of cigarettes a day   Patient educated for consequences/sequela and risk of smoking including but not limited to CAD/PAD/stroke/COPD/lung cancer/other cancer and death. Patient understood completely and expressed and verbalized understanding. Today I had a lengthy discussion with the patient for more than 5 minutes discussing about importance of smoking cessation. Medications options were discussed and patient will think about it. She is telling me that she is not ready to quit and does not want to try any medication    This plan was discussed with Ms. Nato Whitlock in detail, who is in agreement.      Please do not hesitate to call me if you have any questions or concerns.

## 2021-02-18 NOTE — PROGRESS NOTES
Jhony Flores presents today for   Chief Complaint   Patient presents with    Follow-up     possible A-fib       Ilda Saint Longtin preferred language for health care discussion is english/other. Personal Protective Equipment:   Personal Protective Equipment was used including: mask-surgical and hands-gloves. Patient was placed on no precaution(s). Patient was masked. Is someone accompanying this pt? No    Is the patient using any DME equipment during OV? No    Depression Screening:  3 most recent PHQ Screens 11/8/2019   Little interest or pleasure in doing things Not at all   Feeling down, depressed, irritable, or hopeless Not at all   Total Score PHQ 2 0       Learning Assessment:  Learning Assessment 10/16/2014   PRIMARY LEARNER Patient   HIGHEST LEVEL OF EDUCATION - PRIMARY LEARNER  GRADUATED HIGH SCHOOL OR GED   BARRIERS PRIMARY LEARNER NONE   CO-LEARNER CAREGIVER No   PRIMARY LANGUAGE ENGLISH   LEARNER PREFERENCE PRIMARY VIDEOS   ANSWERED BY Morena Leiva   RELATIONSHIP SELF       Abuse Screening:  No flowsheet data found. Fall Risk  No flowsheet data found. Pt currently taking Anticoagulant therapy? No    Coordination of Care:  1. Have you been to the ER, urgent care clinic since your last visit? Hospitalized since your last visit? No    2. Have you seen or consulted any other health care providers outside of the [address] [place] [address] since your last visit? Include any pap smears or colon screening.  [facility] - Colonoscopy - 2/15/2021

## 2021-08-06 ENCOUNTER — HOSPITAL ENCOUNTER (OUTPATIENT)
Dept: WOMENS IMAGING | Age: 61
Discharge: HOME | End: 2021-08-06
Payer: MEDICARE

## 2021-08-06 DIAGNOSIS — Z12.31 BREAST CANCER SCREENING BY MAMMOGRAM: ICD-10-CM

## 2021-08-06 DIAGNOSIS — Z12.31 VISIT FOR SCREENING MAMMOGRAM: ICD-10-CM

## 2021-08-06 PROCEDURE — 77063 BREAST TOMOSYNTHESIS BI: CPT

## 2021-10-05 RX ORDER — AMIODARONE HYDROCHLORIDE 200 MG/1
TABLET ORAL
Qty: 90 TABLET | Refills: 3 | Status: SHIPPED | OUTPATIENT
Start: 2021-10-05 | End: 2022-09-05

## 2021-11-02 ENCOUNTER — TELEPHONE (OUTPATIENT)
Dept: CARDIOLOGY CLINIC | Age: 61
End: 2021-11-02

## 2021-11-02 NOTE — TELEPHONE ENCOUNTER
Patient called to report SOB and cough. Patient requested soonest available appointment and PSR scheduled patient for 11/30/21. Patient would like to speak with nurse to see if she needs earlier appointment.

## 2021-11-02 NOTE — TELEPHONE ENCOUNTER
Contacted pt a  number. Two patient Identifiers confirmed. Advised pt per Dr Josee Tsai. Pt verbalized understanding.

## 2021-11-30 ENCOUNTER — OFFICE VISIT (OUTPATIENT)
Dept: CARDIOLOGY CLINIC | Age: 61
End: 2021-11-30
Payer: MEDICARE

## 2021-11-30 VITALS
BODY MASS INDEX: 32.28 KG/M2 | OXYGEN SATURATION: 99 % | HEART RATE: 52 BPM | SYSTOLIC BLOOD PRESSURE: 138 MMHG | WEIGHT: 225 LBS | TEMPERATURE: 98.2 F | DIASTOLIC BLOOD PRESSURE: 66 MMHG | RESPIRATION RATE: 16 BRPM

## 2021-11-30 DIAGNOSIS — R01.1 MURMUR: ICD-10-CM

## 2021-11-30 DIAGNOSIS — I50.23 SYSTOLIC CHF, ACUTE ON CHRONIC (HCC): Primary | ICD-10-CM

## 2021-11-30 PROCEDURE — G8428 CUR MEDS NOT DOCUMENT: HCPCS | Performed by: INTERNAL MEDICINE

## 2021-11-30 PROCEDURE — G8417 CALC BMI ABV UP PARAM F/U: HCPCS | Performed by: INTERNAL MEDICINE

## 2021-11-30 PROCEDURE — G9899 SCRN MAM PERF RSLTS DOC: HCPCS | Performed by: INTERNAL MEDICINE

## 2021-11-30 PROCEDURE — G8754 DIAS BP LESS 90: HCPCS | Performed by: INTERNAL MEDICINE

## 2021-11-30 PROCEDURE — G8510 SCR DEP NEG, NO PLAN REQD: HCPCS | Performed by: INTERNAL MEDICINE

## 2021-11-30 PROCEDURE — 3017F COLORECTAL CA SCREEN DOC REV: CPT | Performed by: INTERNAL MEDICINE

## 2021-11-30 PROCEDURE — 99214 OFFICE O/P EST MOD 30 MIN: CPT | Performed by: INTERNAL MEDICINE

## 2021-11-30 PROCEDURE — G8752 SYS BP LESS 140: HCPCS | Performed by: INTERNAL MEDICINE

## 2021-11-30 RX ORDER — SACUBITRIL AND VALSARTAN 24; 26 MG/1; MG/1
1 TABLET, FILM COATED ORAL 2 TIMES DAILY
Qty: 60 TABLET | Refills: 3 | Status: SHIPPED | OUTPATIENT
Start: 2021-11-30 | End: 2022-02-03 | Stop reason: SDUPTHER

## 2021-11-30 RX ORDER — METOPROLOL SUCCINATE 25 MG/1
25 TABLET, EXTENDED RELEASE ORAL 2 TIMES DAILY
Qty: 60 TABLET | Refills: 5 | Status: SHIPPED | OUTPATIENT
Start: 2021-11-30 | End: 2022-01-19 | Stop reason: SDUPTHER

## 2021-11-30 RX ORDER — NITROGLYCERIN 0.4 MG/1
0.4 TABLET SUBLINGUAL
Qty: 25 TABLET | Refills: 5 | Status: SHIPPED | OUTPATIENT
Start: 2021-11-30

## 2021-11-30 RX ORDER — METOPROLOL SUCCINATE 25 MG/1
25 TABLET, EXTENDED RELEASE ORAL DAILY
Qty: 60 TABLET | Refills: 5 | Status: SHIPPED | OUTPATIENT
Start: 2021-11-30 | End: 2021-11-30

## 2021-11-30 NOTE — PATIENT INSTRUCTIONS
Testing   Echo in 2 months  **call office 3-5 days after testing is completed for results**     New Medication/Medication Changes  Toprol XL 25 mg twice a day   Entresto 24/26 mg twice a day    **please allow 24-48 hrs for medication to be escribed to pharmacy** If you need any refills on medications please contact your pharmacy so that the request can be escribed to the provider for review. Other    BMP check in 2 weeks -Labs drawn in [facility] [address] Their hours of operation are Monday through Friday 7am-3:30 pm.  If you have and questions regarding directions please contact them at [phone].

## 2021-11-30 NOTE — PROGRESS NOTES
is 63 y. o.female with Coronary artery disease status post CABG in November 2010. Cardiomyopathy, hypertension, paroxysmal atrial fibrillation, hyperlipidemia, diabetes, breast cancer status post mastectomy and chemotherapy. She also has anxiety and depression disorder. She has  tobacco abuse disorder. Ms. Diantha Riedel is here today for follow-up appointment. Denies any hospital admission or ER visits since last time  She occasionally feels random episode of palpitation likely from A. fib however majority time she is doing very well. She does have some shortness of breath when she has reduced Bumex dose from twice a day to once a day.   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    Anxiety     Breast CA ([facility] 12/2009    S/P Lt Mastectomy and Chemo    CAD (coronary artery disease) 8/30/2010    S/P CABG X 4 ([address] to LAD, Sequential SVG to D2, Ramus, OM) 01/2011    Cardiomyopathy ([address]     LVEF 35-40 % (08/20) 60% (08/14), 40% (2011)    Chemotherapy 4/2010    for 4 months    COPD (chronic obstructive pulmonary disease) ([facility] 8/30/2010    Depression     Diabetes ([address]     GERD (gastroesophageal reflux disease)     Hyperlipidemia     Hypertension     Schatzki's ring     S/P EGD and Dilation (07/16)    Thyroid disease     Tobacco abuse      PSH:  Past Surgical History:   Procedure Laterality Date    COLONOSCOPY N/A 5/3/2017    COLON  performed by David Means MD at [facility] N/A 2/15/2021    COLONOSCOPY performed by Lida Leventhal, MD at [address] HX APPENDECTOMY      HX CHOLECYSTECTOMY      HX GYN      tubal ligation    HX HEART CATHETERIZATION  11/11/09; 10/21/09    HX HEART CATHETERIZATION  10/15/10; 10/06/09    left heart    HX HEART CATHETERIZATION  11/7/2011    left heart cath, no new findings    HX OTHER SURGICAL  4/6/2010    Insertion right internal jugular single lumen MediPort.  HX RADICAL MASTECTOMY Left 2/2010    left, with chemo    LA BREAST SURGERY PROCEDURE UNLISTED  2/22/10    left w/sentinel node bx    LA CABG, ARTERY-VEIN, FOUR  12/2010    LA CARDIAC SURG PROCEDURE UNLIST      stent placement before CABG    LA CHEST SURGERY PROCEDURE UNLISTED Right     Prior port placement     MEDS:  Current Outpatient Medications on File Prior to Visit   Medication Sig Dispense Refill    amiodarone (CORDARONE) 200 mg tablet TAKE 1 TABLET EVERY DAY 90 Tablet 3    bumetanide (BUMEX) 1 mg tablet Take 1 mg by mouth daily.  apixaban (ELIQUIS) 5 mg tablet Take 5 mg by mouth two (2) times a day.  metoprolol succinate (TOPROL XL) 50 mg XL tablet Take  by mouth two (2) times a day.  nitroglycerin (NITROSTAT) 0.4 mg SL tablet 1 Tab by SubLINGual route every five (5) minutes as needed for Chest Pain. 1 Bottle 2    esomeprazole (NEXIUM) 20 mg capsule Take 20 mg by mouth daily.  aspirin delayed-release 81 mg tablet Take  by mouth daily.  rosuvastatin (CRESTOR) 20 mg tablet Take 20 mg by mouth daily.  ergocalciferol (Vitamin D2) 1,250 mcg (50,000 unit) capsule Take 50,000 Units by mouth every seven (7) days.  metFORMIN (GLUCOPHAGE) 1,000 mg tablet Take 500 mg by mouth two (2) times daily (with meals).  albuterol (PROVENTIL HFA, VENTOLIN HFA) 90 mcg/actuation inhaler Take 1 Puff by inhalation every four (4) hours as needed.  levothyroxine (SYNTHROID) 150 mcg tablet Take  by mouth Daily (before breakfast).  ascorbic acid (VITAMIN C) 250 mg tablet Take 250 mg by mouth daily. No current facility-administered medications on file prior to visit.      Alleres and Sensitivities:  Allergies   Allergen Reactions    Shellfish Derived Hives Eyes and Throat swelling       Family History:  Family History   Problem Relation Age of Onset    Hypertension Mother     Diabetes Mother     Breast Problems Mother         fiber cystic    Hypertension Other      Social History:  Social History     Tobacco Use    Smoking status: Current Every Day Smoker     Packs/day: 1.00    Smokeless tobacco: Never Used   Vaping Use    Vaping Use: Never used   Substance Use Topics    Alcohol use: No    Drug use: No     Physical Exam:  BP Readings from Last 3 Encounters:   11/30/21 138/66   02/18/21 138/62   02/15/21 (!) 112/44     Pulse Readings from Last 3 Encounters:   11/30/21 (!) 52   02/18/21 60   02/15/21 (!) 46     Wt Readings from Last 3 Encounters:   11/30/21 102.1 kg (225 lb)   02/18/21 95.8 kg (211 lb 3.2 oz)   02/15/21 93.2 kg (205 lb 8 oz)     Constitutional: Oriented to person, place, and time. HENT: Head: Normocephalic and atraumatic. Neck: No JVD present. Cardiovascular: Regular rhythm. No, gallop or rubs appreciated. OLIVIA 2/6 base of heart, carotid radiation  Lung[de-identified] Breath sounds normal. No respiratory distress. No ronchi or rales appreciated  Abdominal: No tenderness. No rebound and no guarding. Musculoskeletal: trace + LE edema     Review of Data:  EKG: (06/2011)Sinus rhythm at 73 bpm. No Dynamic ST changes of ischemia. No Significant Q waves. EKG (6/1/12) : Sinus rhythm at 66 beats per minute. No dynamic ST changes of ischemia. Some nonspecific T wave inversion in precordial leads. Unchanged from prior EKG.     LABS:   Lab Results   Component Value Date/Time    Sodium 141 08/17/2020 09:26 AM    Potassium 4.1 08/17/2020 09:26 AM    Chloride 110 08/17/2020 09:26 AM    CO2 24 08/17/2020 09:26 AM    Glucose 135 (H) 08/17/2020 09:26 AM    BUN 20 (H) 08/17/2020 09:26 AM    Creatinine 1.42 (H) 08/17/2020 09:26 AM     Lab Results   Component Value Date/Time    Cholesterol, total 84 09/05/2019 09:07 AM    HDL Cholesterol 26 (L) 09/05/2019 09:07 AM LDL, calculated 28.6 09/05/2019 09:07 AM    Triglyceride 147 09/05/2019 09:07 AM    CHOL/HDL Ratio 3.2 09/05/2019 09:07 AM       No components found for: GPT    Lab Results   Component Value Date/Time    Hemoglobin A1c 5.7 06/22/2010 03:42 PM     FLP: (2/2012) , , LDL 51, HDL 22    ECHO:(04/2011)  LVEF 40%, Global hypokinesis    HOLTER (11/19)  Florentin Leiva Melvin was in Normal Sinus. The average heart rate, excluding ectopy, was 54 BPM with a minimum of 48 BPM at 04:54 D3 and a maximum of 75 BPM at 05:14 D2. Heart beats, including ectopy, totaled 681086 beats. VENTRICULAR ECTOPICS totaled 225 averaging 4.8 per hour with 222 single, 0 paired, 0 trigeminy and 0 R on T. There was 1 VENTRICULAR TACHYCARDIA with 3 beats 03:34 D3 at a rate of 66 BPM.  SUPRAVENTRICULAR ECTOPICS totaled 308 averaging 6.6 per hour ,with 265 single and 24 paired beats. Patient diary was submitted symptoms noted, no patient triggered events noted. Reported \"Shortness of breath\". Rhythm was sinus and HR less than 100 bpm    ECHO (08/20)  Left Ventricle  Normal cavity size. Mild septal wall hypertrophy. Wall motion: normal. Moderate systolic dysfunction. The estimated ejection fraction is 35 - 40%. Visually measured ejection fraction. There is severe (grade 3) left ventricular diastolic dysfunction. Wall Scoring  The left ventricular wall motion is globally hypokinetic. Left Atrium  Dilated left atrium. Left Atrium volume index is 40.34 mL/m2. Right Ventricle  Normal cavity size and global systolic function. Right Atrium  Mildly dilated right atrium. Aortic Valve  Trileaflet valve structure, no stenosis and no regurgitation. Mitral Valve  No stenosis. Mitral valve non-specific thickening. Mild to moderate regurgitation. Tricuspid Valve  Normal valve structure and no stenosis. Mild regurgitation. Pulmonic Valve  Pulmonic valve not well visualized. No stenosis. Trace regurgitation. Aorta  Normal aortic root. Pulmonary Artery  Pulmonary arterial systolic pressure (PASP) is 56 mmHg. Pulmonary hypertension found to be moderate. Nuclear stress test 05/10/19  · Baseline ECG: Sinus rhythm, non-specific ST-T wave abnormalities. · Inconclusive stress test.  · Gated SPECT: Left ventricular function post-stress was abnormal. Calculated ejection fraction is 44%. · Inferior/inferolateral hypokinesis  · Left ventricular perfusion is abnormal.  · Myocardial perfusion imaging defect 1: There is a defect that is moderate in size with a moderate reduction in uptake present in the inferior location(s) that is non-reversible. There is abnormal wall motion in the defect area. The defect appears to be infarction. · There was no convincing evidence of significant reversible defect to suggest on going major ischemia. · Abnormal myocardial perfusion imaging. Fixed defect consistent with prior myocardial infarction. CARDIAC CATH(11/2011)  1. LM: Normal.  2. LAD: Eccentric ostial 70-80%, mid to distal LAD is a very small-caliber vessel, probably about a 2-mm vessel. Competitive filing from [facility]   3. DIAGONAL: Ostial 40-50% proximal moderate disease. 4. RAMUS: Proximal 100% in stent. 5. LCx/OM: OM2 mid 100%, . Native LCx diffuse luminal irregularities without any obstructive stenosis. less than 2-mm vessel. 7. RCA: Prox and Mid stent diffuse 20% in-stent restenosis. Otherwise, no obstructive disease. RPLS 60-70% tubular stenosis which appears unchanged from prior angiogram about a year ago. 8. SEQUENTIAL SVG TO THE DIAGONA, RAMUS, OM:  patent without any significant obstructive stenosis. There appears to be venous valve system distally into the graft. Native vessel which includes diagonal, ramus and obtuse marginal beyond graft appears to be patent and a very small-caliber vessel, less than 2-mm. 9. LIMA TO LAD: widely patent. Distally, LAD has no significant stenosis.  This appears to be a less than 2-mm vessel. Impression / Plan:  Ms. Tabatha Niño is a pleasant 63 y.o. female who is here for the follow up appointment. Coronary artery disease:    Four vessel CABG in 2010. On aspirin, Statin, BB. Nuclear stress test May 2019 which showed inferior scar, no ischemia. Stable. Continue same. Denies any symptoms suggestive of any angina    Cardiomyopathy:  Remote history of ejection fraction of 45%. Ejection fraction 50-55% in December 2018 . Last LVEF 40% in October 2019 in setting of A. Fib  LVEF 35-40 percent in August 2020. Patient was on lisinopril however this was discontinued in 10/2021 because of elevated creatinine up to 1.8. Repeat creatinine was 1.52 weeks ago. I will start patient on Entresto because of LV dysfunction and NYHA class II symptoms. Side effect discussed. Will check BMP 2 weeks after starting Entresto. I have asked patient to use Bumex only as needed once starting Entresto. She understands the detail symptoms of congestive heart failure including but not limited to weight gain, edema, dyspnea and PND. Currently on beta-blocker    Atrial fibrillation:  Diagnosed in October 2018. On sinus rhythm on exam.  She is on amiodarone and beta-blocker. She is also on Eliquis. Because of bradycardia, I will decrease Toprol dose to 25 mg twice daily. She occasionally has dizziness but no presyncope or syncope    Hypertension: Stable. Currently on Toprol and as mentioned above decreasing the dose of Toprol. Starting Arshad-Tenorio. Hyperlipidemia:  Continue Crestor 20 mg daily. Last LDL 28.    DM Goal A1C less than 7 from CV standpoint. Defer management to PCP. Goal hemoglobin A1c less than 7 is recommended from cardiology standpoint    This plan was discussed with Ms. Tabatha Niño in detail, who is in agreement. Please do not hesitate to call me if you have any questions or concerns. 1.87

## 2021-11-30 NOTE — LETTER
11/30/2021    Patient: Shandra Leiva   YOB: 1960   Date of Visit: 11/30/2021     Dorota Mcneill MD  [address] [place] [id number]  Via Fax: [phone]    Dear Dorota Mcneill MD,      Thank you for referring Ms. Amilcar Ramey to [address] [place] [address] for evaluation. My notes for this consultation are attached. If you have questions, please do not hesitate to call me. I look forward to following your patient along with you.       Sincerely,    Maria Guadalupe Romero MD

## 2021-12-02 ENCOUNTER — TELEPHONE (OUTPATIENT)
Dept: CARDIOLOGY CLINIC | Age: 61
End: 2021-12-02

## 2021-12-02 NOTE — TELEPHONE ENCOUNTER
Patient called to report that she was told in order to use the 30 day free card for entresto she needed a prior auth.   Contacted pharmacy to confirm, and was told because in the donut hole she does need prior auth

## 2022-01-19 NOTE — TELEPHONE ENCOUNTER
PCP: Carrol Tay MD    Last appt: 11/30/2021  Future Appointments   Date Time Provider Jonh Chávez   1/25/2022 10:00 AM [facility] ECHO 1 [facility] BS AMB   3/1/2022 11:00 AM Suki Jerez MD [facility] BS AMB   8/8/2022  7:45 AM [address] [facility] BX RM 1 [facility]       Requested Prescriptions     Pending Prescriptions Disp Refills    metoprolol succinate (TOPROL-XL) 25 mg XL tablet 60 Tablet 5     Sig: Take 1 Tablet by mouth two (2) times a day.

## 2022-01-19 NOTE — TELEPHONE ENCOUNTER
Requested Prescriptions     Pending Prescriptions Disp Refills    metoprolol succinate (TOPROL-XL) 25 mg XL tablet 60 Tablet 5     Sig: Take 1 Tablet by mouth two (2) times a day. Patient has been taking medication incorrectly and needs a new refill.  Thought she was supposed to take meds 2x a day and has now ran out

## 2022-01-20 ENCOUNTER — TELEPHONE (OUTPATIENT)
Dept: CARDIOLOGY CLINIC | Age: 62
End: 2022-01-20

## 2022-01-20 RX ORDER — METOPROLOL SUCCINATE 25 MG/1
25 TABLET, EXTENDED RELEASE ORAL 2 TIMES DAILY
Qty: 60 TABLET | Refills: 5 | Status: ON HOLD | OUTPATIENT
Start: 2022-01-20 | End: 2022-09-05 | Stop reason: SDUPTHER

## 2022-01-20 NOTE — TELEPHONE ENCOUNTER
Patient started new metoprolol prescription and is still not feeling well. Ms. Savanna Sandoval went to see her pcp and her BP was 87/49. She says she has not feelt great in the past 2 months. Hoping that she could possibly get put back on the original dosage of her metoprolol.

## 2022-01-24 ENCOUNTER — TELEPHONE (OUTPATIENT)
Dept: CARDIOLOGY CLINIC | Age: 62
End: 2022-01-24

## 2022-01-24 NOTE — TELEPHONE ENCOUNTER
Attempted to contact pt at  number, no answer. Lvm for pt to return call to office at [phone]. Will continue to try to contact pt.

## 2022-01-31 NOTE — TELEPHONE ENCOUNTER
PCP: Kina Tanner MD    Last appt: 1/25/2022  Future Appointments   Date Time Provider Jonh Chávez   3/1/2022 11:00 AM Chao Solis MD [facility] BS AMB   8/8/2022  7:45 AM [address] [facility] RM 1 [facility] 150 [facility]       Requested Prescriptions     Pending Prescriptions Disp Refills    apixaban (Eliquis) 5 mg tablet 60 Tablet 5     Sig: Take 1 Tablet by mouth two (2) times a day.

## 2022-02-02 ENCOUNTER — TELEPHONE (OUTPATIENT)
Dept: CARDIOLOGY CLINIC | Age: 62
End: 2022-02-02

## 2022-02-02 NOTE — TELEPHONE ENCOUNTER
PCP: Yohan Livingston MD    Last appt: 1/25/2022  Future Appointments   Date Time Provider Jonh Chávez   3/1/2022 11:00 AM Mai Dash MD [facility] BS AMB   8/8/2022  7:45 AM [address] [facility] RM 1 [facility]       Requested Prescriptions     Pending Prescriptions Disp Refills    rosuvastatin (Crestor) 20 mg tablet 90 Tablet 3     Sig: Take 1 Tablet by mouth daily.

## 2022-02-02 NOTE — TELEPHONE ENCOUNTER
----- Message from Bella Butts MD sent at 2/1/2022  3:39 PM EST -----  Please call the patient regarding her abnormal result.     Re: echo

## 2022-02-02 NOTE — TELEPHONE ENCOUNTER
Contacted pt at [facility] number. Two patient Identifiers confirmed. Advised pt per Dr Cody Mena. Pt verbalized understanding and scheduled tomorrow to review results. Jim Cooper

## 2022-02-03 ENCOUNTER — OFFICE VISIT (OUTPATIENT)
Dept: CARDIOLOGY CLINIC | Age: 62
End: 2022-02-03
Payer: MEDICARE

## 2022-02-03 VITALS
HEART RATE: 63 BPM | SYSTOLIC BLOOD PRESSURE: 151 MMHG | TEMPERATURE: 98 F | DIASTOLIC BLOOD PRESSURE: 71 MMHG | RESPIRATION RATE: 18 BRPM | OXYGEN SATURATION: 98 %

## 2022-02-03 DIAGNOSIS — R93.1 ABNORMAL ECHOCARDIOGRAM: ICD-10-CM

## 2022-02-03 DIAGNOSIS — I50.23 SYSTOLIC CHF, ACUTE ON CHRONIC (HCC): Primary | ICD-10-CM

## 2022-02-03 PROCEDURE — G9899 SCRN MAM PERF RSLTS DOC: HCPCS | Performed by: INTERNAL MEDICINE

## 2022-02-03 PROCEDURE — 3017F COLORECTAL CA SCREEN DOC REV: CPT | Performed by: INTERNAL MEDICINE

## 2022-02-03 PROCEDURE — G8754 DIAS BP LESS 90: HCPCS | Performed by: INTERNAL MEDICINE

## 2022-02-03 PROCEDURE — G8432 DEP SCR NOT DOC, RNG: HCPCS | Performed by: INTERNAL MEDICINE

## 2022-02-03 PROCEDURE — G8428 CUR MEDS NOT DOCUMENT: HCPCS | Performed by: INTERNAL MEDICINE

## 2022-02-03 PROCEDURE — G8753 SYS BP > OR = 140: HCPCS | Performed by: INTERNAL MEDICINE

## 2022-02-03 PROCEDURE — G8417 CALC BMI ABV UP PARAM F/U: HCPCS | Performed by: INTERNAL MEDICINE

## 2022-02-03 PROCEDURE — 99215 OFFICE O/P EST HI 40 MIN: CPT | Performed by: INTERNAL MEDICINE

## 2022-02-03 RX ORDER — SACUBITRIL AND VALSARTAN 24; 26 MG/1; MG/1
1 TABLET, FILM COATED ORAL 2 TIMES DAILY
Qty: 60 TABLET | Refills: 5 | Status: SHIPPED | OUTPATIENT
Start: 2022-02-03 | End: 2022-09-05

## 2022-02-03 RX ORDER — ROSUVASTATIN CALCIUM 20 MG/1
20 TABLET, COATED ORAL DAILY
Qty: 90 TABLET | Refills: 3 | Status: ON HOLD | OUTPATIENT
Start: 2022-02-03 | End: 2022-09-05 | Stop reason: SDUPTHER

## 2022-02-03 RX ORDER — BUMETANIDE 1 MG/1
1 TABLET ORAL EVERY OTHER DAY
Qty: 90 TABLET | Refills: 3 | Status: ON HOLD | OUTPATIENT
Start: 2022-02-03 | End: 2022-09-05 | Stop reason: SDUPTHER

## 2022-02-03 NOTE — PROGRESS NOTES
Identified pt with two pt identifiers(name and ). Reviewed record in preparation for visit and have obtained necessary documentation. Jarvis Renee presents today for   Chief Complaint   Patient presents with    Cardiac Testing                Arjun Leiva preferred language for health care discussion is english/other. Personal Protective Equipment:   Personal Protective Equipment was used including: mask-surgical and hands-gloves. Patient was placed on no precaution(s). Patient was masked. Precautions:   Patient currently on None  Patient currently roomed with door closed. Is someone accompanying this pt? no    Is the patient using any DME equipment during [address]? no    Depression Screening:  3 most recent PHQ Screens 2021   Little interest or pleasure in doing things Not at all   Feeling down, depressed, irritable, or hopeless Not at all   Total Score PHQ 2 0       Learning Assessment:  Learning Assessment 10/16/2014   PRIMARY LEARNER Patient   HIGHEST LEVEL OF EDUCATION - PRIMARY LEARNER  GRADUATED HIGH SCHOOL OR GED   BARRIERS PRIMARY LEARNER NONE   CO-LEARNER CAREGIVER No   PRIMARY LANGUAGE ENGLISH   LEARNER PREFERENCE PRIMARY VIDEOS   ANSWERED BY Lani Leiva   RELATIONSHIP SELF       Abuse Screening:  No flowsheet data found. Fall Risk  No flowsheet data found. Pt currently taking Anticoagulant therapy? Yes   Pt currently taking Antiplatelet therapy? no    Coordination of Care:  1. Have you been to the ER, urgent care clinic since your last visit? Hospitalized since your last visit? yes    2. Have you seen or consulted any other health care providers outside of the [address] [place] [address] since your last visit? Include any pap smears or colon screening. no      Please see Red banners under Allergies and Med Rec to remove outside inquires. All correct information has been verified with patient and added to chart.      Medication's patient's would liked removed has been marked not taking to be removed per Verbal order and read back per Erick May MD

## 2022-02-03 NOTE — PROGRESS NOTES
is 63 y. o.female with Coronary artery disease status post CABG in November 2010. Cardiomyopathy, hypertension, paroxysmal atrial fibrillation, hyperlipidemia, diabetes, breast cancer status post mastectomy and chemotherapy. She also has anxiety and depression disorder. She has  tobacco abuse disorder. Ms. Kaycee Mclain is here today for follow-up appointment. Patient was admitted to the hospital last week with extreme fatigue and tiredness and was found to have a significant anemia. Patient was sent to hospital and she was in the hospital for 3 or 4 days. She required blood transfusion for anemia with hemoglobin down to 6.5. She was restarted back on Eliquis but her aspirin was discontinued  Patient's main concern is fatigue tiredness and shortness of breath. She denies any use of nitroglycerin. She denies presyncope or syncope. She denies any significant palpitation  Patient has not started Entresto since last visit.   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    Anxiety     Breast CA ([address] 12/2009    S/P Lt Mastectomy and Chemo    CAD (coronary artery disease) 8/30/2010    S/P CABG X 4 ([address] to LAD, Sequential SVG to D2, Ramus, OM) 01/2011    Cardiomyopathy ([address]     LVEF 35-40 % (08/20) 60% (08/14), 40% (2011)    Chemotherapy 4/2010    for 4 months    COPD (chronic obstructive pulmonary disease) ([address] 8/30/2010    Depression     Diabetes ([address]     GERD (gastroesophageal reflux disease)     Hyperlipidemia     Hypertension     Schatzki's ring     S/P EGD and Dilation (07/16)    Thyroid disease     Tobacco abuse      PSH:  Past Surgical History:   Procedure Laterality Date    COLONOSCOPY N/A 5/3/2017    COLON  performed by Cherelle Knight [facility] at [facility] N/A 2/15/2021    COLONOSCOPY performed by [facility] at [facility] ENDOSCOPY    HX APPENDECTOMY      HX CHOLECYSTECTOMY      HX GYN      tubal ligation    HX HEART CATHETERIZATION  11/11/09; 10/21/09    HX HEART CATHETERIZATION  10/15/10; 10/06/09    left heart    HX HEART CATHETERIZATION  11/7/2011    left heart cath, no new findings    HX OTHER SURGICAL  4/6/2010    Insertion right internal jugular single lumen MediPort.  HX RADICAL MASTECTOMY Left 2/2010    left, with chemo    CT BREAST SURGERY PROCEDURE UNLISTED  2/22/10    left w/sentinel node bx    CT CABG, ARTERY-VEIN, FOUR  12/2010    CT CARDIAC SURG PROCEDURE UNLIST      stent placement before CABG    CT CHEST SURGERY PROCEDURE UNLISTED Right     Prior port placement     MEDS:  Current Outpatient Medications on File Prior to Visit   Medication Sig Dispense Refill    rosuvastatin (Crestor) 20 mg tablet Take 1 Tablet by mouth daily. 90 Tablet 3    apixaban (Eliquis) 5 mg tablet Take 1 Tablet by mouth two (2) times a day. 60 Tablet 5    metoprolol succinate (TOPROL-XL) 25 mg XL tablet Take 1 Tablet by mouth two (2) times a day. (Patient taking differently: Take 25 mg by mouth daily.) 60 Tablet 5    nitroglycerin (NITROSTAT) 0.4 mg SL tablet 1 Tablet by SubLINGual route every five (5) minutes as needed for Chest Pain. 25 Tablet 5    amiodarone (CORDARONE) 200 mg tablet TAKE 1 TABLET EVERY DAY 90 Tablet 3    esomeprazole (NEXIUM) 20 mg capsule Take 20 mg by mouth two (2) times a day.  sacubitriL-valsartan (Entresto) 24-26 mg tablet Take 1 Tablet by mouth two (2) times a day. (Patient not taking: Reported on 2/3/2022) 60 Tablet 3    ergocalciferol (Vitamin D2) 1,250 mcg (50,000 unit) capsule Take 50,000 Units by mouth every seven (7) days.  metFORMIN (GLUCOPHAGE) 1,000 mg tablet Take 500 mg by mouth two (2) times daily (with meals).       albuterol (PROVENTIL HFA, VENTOLIN HFA) 90 mcg/actuation inhaler Take 1 Puff by inhalation every four (4) hours as needed.  levothyroxine (SYNTHROID) 150 mcg tablet Take  by mouth Daily (before breakfast).  ascorbic acid (VITAMIN C) 250 mg tablet Take 250 mg by mouth daily. No current facility-administered medications on file prior to visit. Alleres and Sensitivities:  Allergies   Allergen Reactions    Shellfish Derived Hives     Eyes and Throat swelling       Family History:  Family History   Problem Relation Age of Onset    Hypertension Mother     Diabetes Mother     Breast Problems Mother         fiber cystic    Hypertension Other      Social History:  Social History     Tobacco Use    Smoking status: Current Every Day Smoker     Packs/day: 1.00    Smokeless tobacco: Never Used   Vaping Use    Vaping Use: Never used   Substance Use Topics    Alcohol use: No    Drug use: No     Physical Exam:  BP Readings from Last 3 Encounters:   02/03/22 (!) 151/71   02/01/22 127/67   11/30/21 138/66     Pulse Readings from Last 3 Encounters:   02/03/22 63   11/30/21 (!) 52   02/18/21 60     Wt Readings from Last 3 Encounters:   02/01/22 102.1 kg (225 lb)   11/30/21 102.1 kg (225 lb)   02/18/21 95.8 kg (211 lb 3.2 oz)     Constitutional: Oriented to person, place, and time. HENT: Head: Normocephalic and atraumatic. Neck: No JVD present. Cardiovascular: Regular rhythm. No, gallop or rubs appreciated. OLIVIA 2/6 base of heart, carotid radiation  Lung[de-identified] Breath sounds normal. No respiratory distress. No ronchi or rales appreciated  Abdominal: No tenderness. No rebound and no guarding. Musculoskeletal: t 1++ LE edema     Review of Data:  EKG: (06/2011)Sinus rhythm at 73 bpm. No Dynamic ST changes of ischemia. No Significant Q waves. EKG (6/1/12) : Sinus rhythm at 66 beats per minute. No dynamic ST changes of ischemia. Some nonspecific T wave inversion in precordial leads.   Unchanged from prior EKG. LABS:   Lab Results   Component Value Date/Time    Sodium 141 08/17/2020 09:26 AM    Potassium 4.1 08/17/2020 09:26 AM    Chloride 110 08/17/2020 09:26 AM    CO2 24 08/17/2020 09:26 AM    Glucose 135 (H) 08/17/2020 09:26 AM    BUN 20 (H) 08/17/2020 09:26 AM    Creatinine 1.42 (H) 08/17/2020 09:26 AM     Lab Results   Component Value Date/Time    Cholesterol, total 84 09/05/2019 09:07 AM    HDL Cholesterol 26 (L) 09/05/2019 09:07 AM    LDL, calculated 28.6 09/05/2019 09:07 AM    Triglyceride 147 09/05/2019 09:07 AM    CHOL/HDL Ratio 3.2 09/05/2019 09:07 AM       No components found for: GPT    Lab Results   Component Value Date/Time    Hemoglobin A1c 5.7 06/22/2010 03:42 PM     FLP: (2/2012) , , LDL 51, HDL 22    ECHO:(04/2011)  LVEF 40%, Global hypokinesis    HOLTER (11/19)  Hector Leivawick Runner was in Normal Sinus. The average heart rate, excluding ectopy, was 54 BPM with a minimum of 48 BPM at 04:54 D3 and a maximum of 75 BPM at 05:14 D2. Heart beats, including ectopy, totaled 174464 beats. VENTRICULAR ECTOPICS totaled 225 averaging 4.8 per hour with 222 single, 0 paired, 0 trigeminy and 0 R on T. There was 1 VENTRICULAR TACHYCARDIA with 3 beats 03:34 D3 at a rate of 66 BPM.  SUPRAVENTRICULAR ECTOPICS totaled 308 averaging 6.6 per hour ,with 265 single and 24 paired beats. Patient diary was submitted symptoms noted, no patient triggered events noted. Reported \"Shortness of breath\". Rhythm was sinus and HR less than 100 bpm    ECHO (08/20)  Left Ventricle  Normal cavity size. Mild septal wall hypertrophy. Wall motion: normal. Moderate systolic dysfunction. The estimated ejection fraction is 35 - 40%. Visually measured ejection fraction. There is severe (grade 3) left ventricular diastolic dysfunction. Wall Scoring  The left ventricular wall motion is globally hypokinetic. Left Atrium  Dilated left atrium. Left Atrium volume index is 40.34 mL/m2.     Right Ventricle Normal cavity size and global systolic function. Right Atrium  Mildly dilated right atrium. Aortic Valve  Trileaflet valve structure, no stenosis and no regurgitation. Mitral Valve  No stenosis. Mitral valve non-specific thickening. Mild to moderate regurgitation. Tricuspid Valve  Normal valve structure and no stenosis. Mild regurgitation. Pulmonic Valve  Pulmonic valve not well visualized. No stenosis. Trace regurgitation. Aorta  Normal aortic root. Pulmonary Artery  Pulmonary arterial systolic pressure (PASP) is 56 mmHg. Pulmonary hypertension found to be moderate. Nuclear stress test 05/10/19  · Baseline ECG: Sinus rhythm, non-specific ST-T wave abnormalities. · Inconclusive stress test.  · Gated SPECT: Left ventricular function post-stress was abnormal. Calculated ejection fraction is 44%. · Inferior/inferolateral hypokinesis  · Left ventricular perfusion is abnormal.  · Myocardial perfusion imaging defect 1: There is a defect that is moderate in size with a moderate reduction in uptake present in the inferior location(s) that is non-reversible. There is abnormal wall motion in the defect area. The defect appears to be infarction. · There was no convincing evidence of significant reversible defect to suggest on going major ischemia. · Abnormal myocardial perfusion imaging. Fixed defect consistent with prior myocardial infarction. CARDIAC CATH(11/2011)  1. LM: Normal.  2. LAD: Eccentric ostial 70-80%, mid to distal LAD is a very small-caliber vessel, probably about a 2-mm vessel. Competitive filing from [facility]   3. DIAGONAL: Ostial 40-50% proximal moderate disease. 4. RAMUS: Proximal 100% in stent. 5. LCx/OM: OM2 mid 100%, . Native LCx diffuse luminal irregularities without any obstructive stenosis. less than 2-mm vessel. 7. RCA: Prox and Mid stent diffuse 20% in-stent restenosis. Otherwise, no obstructive disease.  RPLS 60-70% tubular stenosis which appears unchanged from prior angiogram about a year ago. 8. SEQUENTIAL SVG TO THE DIAGONA, RAMUS, OM:  patent without any significant obstructive stenosis. There appears to be venous valve system distally into the graft. Native vessel which includes diagonal, ramus and obtuse marginal beyond graft appears to be patent and a very small-caliber vessel, less than 2-mm. 9. LIMA TO LAD: widely patent. Distally, LAD has no significant stenosis. This appears to be a less than 2-mm vessel. Impression / Plan:  Ms. Patrick Camarena is a pleasant 63 y.o. female who is here for the follow up appointment. Coronary artery disease:    Four vessel CABG in 2010. On  Statin, BB. Aspirin was discontinued in 01/2022 after recurrent anemia. She is on Eliquis for stroke prophylaxis  Nuclear stress test May 2019 which showed inferior scar, no ischemia. Continue with medical management. Suboptimal candidate for invasive management at this time because of recurrent anemia requiring blood transfusion    Cardiomyopathy:  Remote history of ejection fraction of 45%. Ejection fraction 50-55% in December 2018 . Last LVEF 40% in October 2019 in setting of A. Fib  LVEF 35-40 percent in August 2020. LVEF 30% by echo in 01/2022  Patient was on lisinopril however this was discontinued in 10/2021 because of elevated creatinine up to 1.8. Repeat creatinine was 1.52 weeks ago. I will start patient on Entresto because of LV dysfunction and NYHA class II symptoms. Side effect discussed. Will check BMP 2 weeks after starting Entresto. I have asked patient to use Bumex every other day as patient has significant lower extremity swelling and slight decrease in the breath sound    Atrial fibrillation:  Diagnosed in October 2018. On sinus rhythm on exam.  She is on amiodarone and beta-blocker. She is also on Eliquis. Patient had recurrent anemia requiring blood transfusion in 2021 and again in 01/2022.   According to patient she had EGD and colonoscopy last year which were unremarkable. She is moderate risk for stroke because of moderate chads 2 vascular score. I will refer her to my partner Dr. Jeanine Neville for consideration for watchman device as patient has recurrent anemia being on Eliquis. Hypertension: Stable. Currently on Toprol and as mentioned above decreasing the dose of Toprol. Starting [place] Memos. Hyperlipidemia:  Continue Crestor 20 mg daily. Last LDL 28.    DM Goal A1C less than 7 from CV standpoint. Defer management to PCP. Goal hemoglobin A1c less than 7 is recommended from cardiology standpoint    This plan was discussed with Ms. Cande Aquino in detail, who is in agreement. Please do not hesitate to call me if you have any questions or concerns.

## 2022-02-03 NOTE — PATIENT INSTRUCTIONS
New Medication/Medication Changes  Entresto 24/26 mg twice a day  Bumex 1 mg every other day  **please allow 24-48 hrs for medication to be escribed to pharmacy** If you need any refills on medications please contact your pharmacy so that the request can be escribed to the provider for review.       Other Testing  Watchman device -Joslyn Still- they will contact you for appt date and time

## 2022-02-16 ENCOUNTER — TELEPHONE (OUTPATIENT)
Dept: CARDIOLOGY CLINIC | Age: 62
End: 2022-02-16

## 2022-02-16 NOTE — TELEPHONE ENCOUNTER
Gastro office called back regarding having Ms. Leiva off of her medication for upcoming procedure on the 26th

## 2022-02-18 ENCOUNTER — TELEPHONE (OUTPATIENT)
Dept: CARDIOLOGY CLINIC | Age: 62
End: 2022-02-18

## 2022-02-18 NOTE — TELEPHONE ENCOUNTER
Patient called 2- about medication concerns. Patient asked for a call back at her mobil number.  Thank you

## 2022-02-21 NOTE — TELEPHONE ENCOUNTER
Patient states that [facility] ER stopped aspirin and wants to know if she should take the Aspirin when she stops Eliquis for her Endoscopy on 2/28/2

## 2022-02-22 NOTE — TELEPHONE ENCOUNTER
Contacted pt at cell number. Two patient Identifiers confirmed. Advised I would review with Dr Jean Claude Bustillo and Marbella Jean Baptiste. Pt verbalized understanding.

## 2022-02-23 NOTE — TELEPHONE ENCOUNTER
Patient still has yet to receive a definitive answer regarding her EGD procedure that is scheduled on Monday.  [place] [facility] [place]

## 2022-02-24 RX ORDER — ASPIRIN 81 MG/1
TABLET ORAL EVERY OTHER DAY
COMMUNITY
End: 2022-03-07 | Stop reason: ALTCHOICE

## 2022-02-24 NOTE — TELEPHONE ENCOUNTER
Contacted pt at [facility] number. Two patient Identifiers confirmed. Advised pt per Dr Ian Burt. Pt verbalized understanding.

## 2022-03-07 ENCOUNTER — OFFICE VISIT (OUTPATIENT)
Dept: CARDIOTHORACIC SURGERY | Age: 62
End: 2022-03-07
Payer: MEDICARE

## 2022-03-07 VITALS
HEART RATE: 53 BPM | HEIGHT: 68 IN | SYSTOLIC BLOOD PRESSURE: 150 MMHG | WEIGHT: 233 LBS | TEMPERATURE: 97.4 F | RESPIRATION RATE: 18 BRPM | DIASTOLIC BLOOD PRESSURE: 62 MMHG | BODY MASS INDEX: 35.31 KG/M2 | OXYGEN SATURATION: 99 %

## 2022-03-07 DIAGNOSIS — I50.23 SYSTOLIC CHF, ACUTE ON CHRONIC (HCC): Primary | ICD-10-CM

## 2022-03-07 PROCEDURE — G8510 SCR DEP NEG, NO PLAN REQD: HCPCS | Performed by: STUDENT IN AN ORGANIZED HEALTH CARE EDUCATION/TRAINING PROGRAM

## 2022-03-07 PROCEDURE — G8427 DOCREV CUR MEDS BY ELIG CLIN: HCPCS | Performed by: STUDENT IN AN ORGANIZED HEALTH CARE EDUCATION/TRAINING PROGRAM

## 2022-03-07 PROCEDURE — 3017F COLORECTAL CA SCREEN DOC REV: CPT | Performed by: STUDENT IN AN ORGANIZED HEALTH CARE EDUCATION/TRAINING PROGRAM

## 2022-03-07 PROCEDURE — G8754 DIAS BP LESS 90: HCPCS | Performed by: STUDENT IN AN ORGANIZED HEALTH CARE EDUCATION/TRAINING PROGRAM

## 2022-03-07 PROCEDURE — 99214 OFFICE O/P EST MOD 30 MIN: CPT | Performed by: STUDENT IN AN ORGANIZED HEALTH CARE EDUCATION/TRAINING PROGRAM

## 2022-03-07 PROCEDURE — 93000 ELECTROCARDIOGRAM COMPLETE: CPT | Performed by: STUDENT IN AN ORGANIZED HEALTH CARE EDUCATION/TRAINING PROGRAM

## 2022-03-07 PROCEDURE — G8753 SYS BP > OR = 140: HCPCS | Performed by: STUDENT IN AN ORGANIZED HEALTH CARE EDUCATION/TRAINING PROGRAM

## 2022-03-07 PROCEDURE — G8417 CALC BMI ABV UP PARAM F/U: HCPCS | Performed by: STUDENT IN AN ORGANIZED HEALTH CARE EDUCATION/TRAINING PROGRAM

## 2022-03-07 PROCEDURE — G0403 EKG FOR INITIAL PREVENT EXAM: HCPCS | Performed by: STUDENT IN AN ORGANIZED HEALTH CARE EDUCATION/TRAINING PROGRAM

## 2022-03-07 PROCEDURE — G9899 SCRN MAM PERF RSLTS DOC: HCPCS | Performed by: STUDENT IN AN ORGANIZED HEALTH CARE EDUCATION/TRAINING PROGRAM

## 2022-03-07 NOTE — LETTER
3/7/2022    Patient: Pelon Leiva   YOB: 1960   Date of Visit: 3/7/2022     Ana Paula Navarro MD  [address] [address]  [facility] [address]    Dear Ana Paula Navarro MD,      Thank you for referring Ms. Carlos Albrecht to [facility] for evaluation. My notes for this consultation are attached. If you have questions, please do not hesitate to call me. I look forward to following your patient along with you.       Sincerely,    Norma Pagan MD

## 2022-03-07 NOTE — PROGRESS NOTES
Chief Complaint   Patient presents with    Referral / Consult     Systolic CHF, acute on chronic and anormal echo     1. Have you been to the ER, urgent care clinic since your last visit? Hospitalized since your last visit? No    2. Have you seen or consulted any other health care providers outside of the [address] [place] [address] since your last visit? Include any pap smears or colon screening.  No    Angeline Carias LPN

## 2022-03-07 NOTE — PROGRESS NOTES
[facility]      New Office Patient Visit  Date of Visit: 03/07/22   Referring Physician: Dr. Kirstie Sierra MD  Other Care Providers: Dr. Luc Jurado. Frances Gupta at [address] [place] [address] endoscopy    History of Present Illness: Ms. France Hartmann is a 59-year-old female with past medical history of hypertension, hyperlipidemia, coronary artery disease status post coronary artery bypass grafting with 4 bypass grafts in 2011, ischemic cardiomyopathy with last EF about 30 to 35%, prior left breast cancer status postmastectomy and chemo therapy, no radiation, now in remission, paroxysmal atrial fibrillation on Eliquis, iron deficiency anemia and recent admission for acute on chronic anemia without clear source needing blood transfusion who is referred here by her outpatient cardiologist for evaluation for possible left atrial appendage closure with watchman device. Overall she has been doing well and has been struggling with anemia for about a year or so. She intermittently needs iron transfusions and has had this recent admission along with 1 prior to that where she needed a blood transfusion. She underwent a recent EGD which we do not have records of but patient noted that no source of bleeding was found. She also had a colonoscopy in May 2021 without clear source of bleeding. She currently remains on Eliquis and denies any black or tarry stool or other signs of anemia. She reports compliance so far with the medication but expresses interest in alternative therapies to needing long-term blood transfusions. Denies any nausea, vomiting, abdominal pain, fever, chills, sputum production. No hematuria or other bleeding complaints    Review of Systems:  Cardiac symptoms as noted above in HPI. All others negative.   Denies fatigue, malaise, skin rash, joint pain, blurring vision, photophobia, neck pain, hemoptysis, chronic cough, nausea, vomiting, hematuria, burning micturition, BRBPR, chronic headaches. 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    Anxiety     Breast CA ([facility] 12/2009    S/P Lt Mastectomy and Chemo    CAD (coronary artery disease) 8/30/2010    S/P CABG X 4 ([address] to LAD, Sequential SVG to D2, Ramus, OM) 01/2011    Cardiomyopathy ([facility]     LVEF 35-40 % (08/20) 60% (08/14), 40% (2011)    Chemotherapy 4/2010    for 4 months    COPD (chronic obstructive pulmonary disease) ([facility] 8/30/2010    Depression     Diabetes ([facility]     GERD (gastroesophageal reflux disease)     Hyperlipidemia     Hypertension     Schatzki's ring     S/P EGD and Dilation (07/16)    Thyroid disease     Tobacco abuse        Past Surgical History:  Past Surgical History:   Procedure Laterality Date    COLONOSCOPY N/A 5/3/2017    COLON  performed by Tiesha Clements MD at [facility] N/A 2/15/2021    COLONOSCOPY performed by Camila Daugherty MD at [address] [place] [address] HX APPENDECTOMY      HX CHOLECYSTECTOMY      HX GYN      tubal ligation    HX HEART CATHETERIZATION  11/11/09; 10/21/09    HX HEART CATHETERIZATION  10/15/10; 10/06/09    left heart    HX HEART CATHETERIZATION  11/7/2011    left heart cath, no new findings    HX OTHER SURGICAL  4/6/2010    Insertion right internal jugular single lumen MediPort.     HX RADICAL MASTECTOMY Left 2/2010    left, with chemo    ND BREAST SURGERY PROCEDURE UNLISTED  2/22/10    left w/sentinel node bx    ND CABG, ARTERY-VEIN, FOUR  12/2010    ND CARDIAC SURG PROCEDURE UNLIST      stent placement before CABG    ND CHEST SURGERY PROCEDURE UNLISTED Right     Prior port placement       Medications:  Current Outpatient Medications   Medication Sig    albuterol-ipratropium (DUO-NEB) 2.5 mg-0.5 mg/3 ml nebu Take 3 mL by inhalation every six (6) hours as needed.  calcium-cholecalciferol, d3, (CALCIUM 600 + D) 600-125 mg-unit tab Take  by mouth daily.  apixaban (Eliquis) 5 mg tablet Take 1 Tablet by mouth two (2) times a day.  nitroglycerin (NITROSTAT) 0.4 mg SL tablet 1 Tablet by SubLINGual route every five (5) minutes as needed for Chest Pain.  amiodarone (CORDARONE) 200 mg tablet TAKE 1 TABLET EVERY DAY    metFORMIN (GLUCOPHAGE) 1,000 mg tablet Take 500 mg by mouth two (2) times daily (with meals).  albuterol (PROVENTIL HFA, VENTOLIN HFA) 90 mcg/actuation inhaler Take 1 Puff by inhalation every four (4) hours as needed.  levothyroxine (SYNTHROID) 150 mcg tablet Take  by mouth Daily (before breakfast).  ascorbic acid (VITAMIN C) 250 mg tablet Take 250 mg by mouth daily.  rosuvastatin (Crestor) 20 mg tablet Take 1 Tablet by mouth daily. (Patient taking differently: Take 20 mg by mouth nightly.)    bumetanide (BUMEX) 1 mg tablet Take 1 Tablet by mouth every other day. (Patient not taking: Reported on 3/7/2022)    sacubitriL-valsartan (Entresto) 24-26 mg tablet Take 1 Tablet by mouth two (2) times a day. (Patient not taking: Reported on 2/28/2022)    metoprolol succinate (TOPROL-XL) 25 mg XL tablet Take 1 Tablet by mouth two (2) times a day. (Patient taking differently: Take 25 mg by mouth daily.)    esomeprazole (NEXIUM) 20 mg capsule Take 40 mg by mouth daily. No current facility-administered medications for this visit.        Allergies and Sensitivities:  Allergies   Allergen Reactions    Shellfish Derived Hives     Eyes and Throat swelling       Family History:  Family History   Problem Relation Age of Onset    Hypertension Mother     Diabetes Mother     Breast Problems Mother         fiber cystic    Hypertension Other        Social History:  Social History     Tobacco Use    Smoking status: Current Every Day Smoker     Packs/day: 1.00    Smokeless tobacco: Never Used   Vaping Use    Vaping Use: Never used   Substance Use Topics    Alcohol use: No    Drug use: No     She  reports that she has been smoking. She has been smoking about 1.00 pack per day. She has never used smokeless tobacco.  She  reports no history of alcohol use. Physical Exam:  BP Readings from Last 3 Encounters:   03/07/22 (!) 150/62   02/28/22 (!) 147/66   02/03/22 (!) 151/71         Pulse Readings from Last 3 Encounters:   03/07/22 (!) 53   02/28/22 (!) 46   02/03/22 63          Wt Readings from Last 3 Encounters:   03/07/22 105.7 kg (233 lb)   02/28/22 103.5 kg (228 lb 2.8 oz)   02/01/22 102.1 kg (225 lb)       Constitutional: Oriented to person, place, and time. HENT: Head: Normocephalic and atraumatic. Eyes: Conjunctivae and extraocular motions are normal.   Neck: No JVD present. Carotid bruit is not appreciated. Cardiovascular: Regular rhythm. No murmur, gallop or rubs appreciated  Lung: Breath sounds normal. No respiratory distress. No ronchi or rales appreciated  Abdominal: No tenderness. No rebound and no guarding. Musculoskeletal: There is no lower extremity edema. No cynosis  Lymphadenopathy:  No cervical or supraclavicular adenopathy appriciated. Neurological: No gross motor deficit noted. Skin: No visible skin rash noted. No Ear discharge noted  Psychiatric: Normal mood and affect.      LABS:     Lab Results   Component Value Date/Time    WBC 9.0 11/02/2011 03:15 PM    HGB 12.8 11/02/2011 03:15 PM    HCT 39.1 11/02/2011 03:15 PM    PLATELET 240 16/03/3583 03:15 PM    MCV 91.4 11/02/2011 03:15 PM     Lab Results   Component Value Date/Time    Sodium 141 08/17/2020 09:26 AM    Potassium 4.1 08/17/2020 09:26 AM    Chloride 110 08/17/2020 09:26 AM    CO2 24 08/17/2020 09:26 AM    Glucose 135 (H) 08/17/2020 09:26 AM    BUN 20 (H) 08/17/2020 09:26 AM    Creatinine 1.42 (H) 08/17/2020 09:26 AM     Lipids Latest Ref Rng & Units 9/5/2019   Chol, Total <200 MG/DL 84   HDL 40 - 60 MG/DL 26(L)   LDL 0 - 100 MG/DL 28.6   Trig <150 MG/ Chol/HDL Ratio 0 - 5.0   3.2   Some recent data might be hidden     Lab Results   Component Value Date/Time    ALT (SGPT) 32 03/22/2010 11:10 AM     Lab Results   Component Value Date/Time    Hemoglobin A1c 5.7 06/22/2010 03:42 PM     No results found for: TSH, TSH2, TSH3, TSHP, TSHEXT    Cardiovascular Studies:    ECG: Sinus bradycardia with incomplete right bundle branch block and poor R wave progression    ECHO ADULT COMPLETE 02/01/2022 2/1/2022    Interpretation Summary    Left Ventricle: Left ventricle is moderately dilated. Normal wall thickness. Global hypokinesis present. Severely reduced left ventricular systolic function. EF by visual approximation is 30%.   Right Ventricle: Right ventricle is severely dilated. Reduced systolic function. TAPSE is normal.    Mitral Valve: Valve structure is normal. Mild annular dilation. Moderate transvalvular regurgitation.   Tricuspid Valve: Moderate transvalvular regurgitation.   Left Atrium: Left atrium is severely dilated. Left atrial volume index is severely increased (>48 mL/m2).   Pulmonary Arteries: Moderate pulmonary hypertension present. The estimated pulmonary artery systolic pressure is 44 mmHg. Signed by: Chao Solis MD on 2/1/2022  9:14 AM    05/10/19    NUCLEAR CARDIAC STRESS TEST 05/16/2019 5/16/2019    Interpretation Summary  · Baseline ECG: Sinus rhythm, non-specific ST-T wave abnormalities. · Inconclusive stress test.  · Gated SPECT: Left ventricular function post-stress was abnormal. Calculated ejection fraction is 44%. · Inferior/inferolateral hypokinesis  · Left ventricular perfusion is abnormal.  · Myocardial perfusion imaging defect 1: There is a defect that is moderate in size with a moderate reduction in uptake present in the inferior location(s) that is non-reversible. There is abnormal wall motion in the defect area. The defect appears to be infarction.   · There was no convincing evidence of significant reversible defect to suggest on going major ischemia. · Abnormal myocardial perfusion imaging. Fixed defect consistent with prior myocardial infarction. Heart Catheterization 11/2011:  1. LEFT MAIN: Angiographically normal.  2. LAD: Eccentric 70-80% ostial stenosis, followed by mid luminal  irregularities. Please note, that mid to distal LAD is a very small-caliber  vessel, probably about a 2-mm vessel. Competitive feeling from LIMA noted  3. DIAGONAL BRANCH: There is approximately 40-50% ostial and proximal  moderate disease. 4. RAMUS INTERMEDIATE: It is 100% occluded proximally in stent. 5. LEFT CIRCUMFLEX: The second obtuse marginal branch has a mid 100%  occlusion. 6. NATIVE CERVIX: Diffuse luminal irregularities without any obstructive  stenosis. This is a less than 2-mm vessel. 7. RIGHT CORONARY ARTERY: Evidence of a stent in the proximal and  midportion. There was diffuse 20% in-stent restenosis. Otherwise, no  obstructive disease. It gives rise to the PL and PDA branch. There was  evidence of 60- 70% tubular stenosis and to the right posterolateral  branch, which appears unchanged from prior angiogram about a year ago. It is a dominant system. 8. SEQUENTIAL SVG TO THE DIAGONAL/RAMUS, OBTUSE MARGINAL: This graft  appears to be patent without any significant obstructive stenosis. There  appears to be venous valve system distally into the graft. Native vessel  which includes diagonal, ramus and obtuse marginal beyond graft appears to  be patent and a very small-caliber vessel, less than 2-mm. 9. LIMA TO LAD: Described as widely patent. Anastomosis appeared to be  patent. Distally, LAD has no significant stenosis. This appears to be a  less than 2-mm vessel.     Atrial fibrillation CHADSVASC2 score stroke risk:   63 y.o.                                        <65        + 0   female                                         Female +1  CHF hx                                           Yes    +1  HTN hx Yes    +1  Stroke/TIA/Thromboembolism       No    +0  Vascular disease hx                       Yes    +1  Diabetes Mellitus                            Yes    +1   CHADSVASC2 score                    5      Annual Stroke Risk 7.2% - moderate-high                                            HAS-BLED Bleeding Risk Score              Each element counts for 1 point. Total possible score of 9 points. 1 History of Hypertension (uncontrolled, >968 mmHg systolic BP   1 Renal Disease (Dialysis, kidney transplant, SCr >2.26 mg/dL or CrCl < 50 ml/min) or Weight, serum creatinine to calculate creatinine clearance by the method of Cockcroft-Gault[Pisters R, et al. Chest. 2010; 138(5):7093-9815.   i] (Ccr = (140  age) × (wt kg)/72 × Scr(mg/100ml) ×  0.85 if female; to establish level of renal function for HAS-BLED)     0 Liver Disease (Cirrhosis, Bilirubin >2x Normal, AST/ALT/AP >3x Normal   0 History of Stroke   1 Prior Major Bleeding or Predisposition to Bleeding (any bleeding requiring hospitalization and/or causing a decrease in hemoglobin level > 2g/L and/or requiring blood transfusion that was not due to a hemorrhagic stroke or anemia)     0 Labile INR (Unstable/high INRs, <60% time in therapeutic range)   0 Age ? 65 years   1 Medication Usage Predisposing to Bleeding (Antiplatelet agents, NSAIDs)   0 Alcohol Usage History  alcohol excess (> 8 units alcoholic consumption per week)    Score ? 3 (high risk), 1-2 (moderate risk) and 0 (low risk   4 Patient's HAS-BLED score        0- 1 points risk is 0.9% to 3.4%  anticoagulation should be considered patient has a relatively low risk for major bleeding. 2 points risk is 4.1% anticoagulation can be considered however patient does have a moderate risk for major bleeding. 3- 5 points risk is 5.8% to 9.1% alternatives to anticoagulation should be considered patient at high risk for major bleeding.     Scores greater than 5 were to rare to determine risk but are likely over 10% alternatives to anticoagulation should be considered patient is at high risk for major bleeding. Assessment:  Ms. France Hartmann is a 65-year-old female with past medical history of hypertension, hyperlipidemia, coronary artery disease status post coronary artery bypass grafting with 4 bypass grafts in 2011, ischemic cardiomyopathy with last EF about 30 to 35%, prior left breast cancer status postmastectomy and chemo therapy, no radiation, now in remission, paroxysmal atrial fibrillation on Eliquis, iron deficiency anemia and recent admission for acute on chronic anemia without clear source needing blood transfusion who is referred here by her outpatient cardiologist for evaluation for possible left atrial appendage closure with watchman device. IMPRESSION & PLAN:    -We discussed at length her risk of stroke annually versus her risk for bleeding based off of the risk was noted above.  -We discussed the option of a left atrial appendage closure being a reasonable option in her case given her history of anemia chronically needing recurrent blood transfusions at times along with iron infusions. The watchman procedure was discussed in detail with the patient along with the risk and benefits.  -For now we will work on obtaining recent EGD and colonoscopy results from her gastroenterologist, Dr. Matthew Bryant.  -We provided patient with further information regarding left atrial appendage closure device watchman's with plan to follow-up with her virtually via phone call in 2 weeks to answer any questions that may have come up and discuss next steps. This plan was discussed with patient who is in agreement. Shared decision making was utilized between the physician/provider and patient/family in regards to left atrial appendage closure. Thank you for allowing me to participate in patient care. Please feel free to call me if you have any question or concern.      Sam Stovall MD, [facility], FSCAI    Please note: This document has been produced using voice recognition software. Unrecognized errors in transcription may be present.

## 2022-03-07 NOTE — PATIENT INSTRUCTIONS
Learning About Atrial Fibrillation  What is atrial fibrillation? Atrial fibrillation (say \"AY-tree-nneka pbt-docd-MKJ-ceciliaun\") is a common type of irregular heartbeat (arrhythmia). Normally, the heart beats in a strong, steady rhythm. In atrial fibrillation, a problem with the heart's electrical system causes the two upper chambers of the heart (called the atria) to quiver, or fibrillate. Atrial fibrillation can be dangerous. This is because if the heartbeat isn't strong and steady, blood can collect, or pool, in the atria. And pooled blood is more likely to form clots. Clots can travel to the brain, block blood flow, and cause a stroke. Atrial fibrillation can also lead to heart failure. This condition also upsets the normal rhythm between the atria and the lower chambers of the heart. (These chambers are called the ventricles.) The ventricles may beat fast and without a regular rhythm. What are the symptoms? Some people feel symptoms when they have episodes of atrial fibrillation. But other people don't notice any symptoms. If you have symptoms, you may feel:  · A fluttering, racing, or pounding feeling in your chest called palpitations. · Weak or tired. · Dizzy or lightheaded. · Short of breath. · Chest pain. · Confused. You may notice signs of atrial fibrillation when you check your pulse. Your pulse may seem uneven or fast.  What can you expect when you have atrial fibrillation? At first, spells of atrial fibrillation may come on suddenly and last a short time. They may go away on their own or with treatment. Over time, the spells may last longer and occur more often. They often don't go away on their own. How is it treated? Treatments can help you feel better and prevent future problems, especially stroke and heart failure. Your treatment will depend on the cause of your atrial fibrillation, your symptoms, and your risk for stroke. Types of treatment include:  · Heart rate treatment. Medicine may be used to slow your heart rate. Your heartbeat may still be irregular. But these medicines keep your heart from beating too fast. They may also help relieve symptoms. · Heart rhythm treatment. Different treatments may be used to try to stop atrial fibrillation and keep it from returning. They can also relieve symptoms. These treatments include medicine, electrical cardioversion to shock the heart back to a normal rhythm, a procedure called catheter ablation, and heart surgery. · Stroke prevention. You and your doctor can decide how to lower your risk. You may decide to take a blood-thinning medicine called an anticoagulant. What is a heart-healthy lifestyle for atrial fibrillation? You can live well and help manage atrial fibrillation by having a heart-healthy lifestyle. This lifestyle may help reduce how often you have episodes of atrial fibrillation. Don't smoke. Avoid secondhand smoke too. Quitting smoking is the best thing you can do for your heart. Be active. Talk to your doctor about what type and level of exercise is safe for you. Eat a heart-healthy diet. These foods include vegetables, fruits, nuts, beans, lean meat, fish, and whole grains. Limit sodium, alcohol, and sugar. Avoid alcohol if it triggers symptoms. Stay at a healthy weight. Lose weight if you need to. Losing weight can help relieve symptoms. Manage other health problems. These problems include diabetes, high blood pressure, and high cholesterol. If you think you may have a problem with alcohol or drug use, talk to your doctor. Manage stress. Options like yoga, biofeedback, and meditation may help. Where can you learn more? Go to http://www.gray.com/  Enter L274 in the search box to learn more about \"Learning About Atrial Fibrillation. \"  Current as of: April 29, 2021               Content Version: 13.0  © 0771-3817 Healthwise, [employer].    Care instructions adapted under license by [employer] (which disclaims liability or warranty for this information). If you have questions about a medical condition or this instruction, always ask your healthcare professional. [address] any warranty or liability for your use of this information. Left Atrial Appendage Closure: Before Your Procedure  What is a left atrial appendage closure? The left atrial appendage (KENA) is a small sac or pouch in the left atrium. The atrium is an upper chamber of the heart. When you have atrial fibrillation (say \"AY-tree-nneka uta-klit-XXK-shun\"), a type of irregular heartbeat, the heart's upper chambers quiver, or fibrillate. This can lead to blood clots in the left atrium. Most of these clots form in the KENA, where the blood pools. If a clot moves out of the heart and travels to the brain, it may cause a stroke. A left atrial appendage closure is a procedure to close off the KENA. The procedure can help prevent a blood clot from moving out of the KENA. But it can't prevent a clot from forming in other areas of the left atrium. Closing the KENA won't affect how your heart works. Your heart will still pump blood normally. You may be asleep for the procedure, or you may get a sedative to help you relax. Your doctor makes a small cut in your groin. A thin flexible tube (catheter) with tools inside it is put inside your blood vessel and carefully guided to your heart. Your doctor moves the tip of the catheter to the KENA and places a small device inside it. The device expands and closes off the opening. It stays inside your heart. The catheter is then removed. In time, your heart will heal around the device. A layer of heart tissue will help seal off the KENA. You may stay in the hospital for at least 1 night. How do you prepare for the procedure? Procedures can be stressful. This information will help you understand what you can expect.  And it will help you safely prepare for your procedure. Preparing for the procedure    · Be sure you have someone to take you home. Anesthesia and pain medicine will make it unsafe for you to drive or get home on your own.     · Understand exactly what procedure is planned, along with the risks, benefits, and other options.     · Tell your doctor ALL the medicines, vitamins, supplements, and herbal remedies you take. Some may increase the risk of problems during your procedure. Your doctor will tell you if you should stop taking any of them before the procedure and how soon to do it.     · If you take a blood thinner (other than aspirin), ask your doctor if you should stop taking it before your procedure. Make sure that you understand exactly what your doctor wants you to do. These medicines increase the risk of bleeding.     · You may have a test before the procedure. This may be a CT scan.     · Make sure your doctor and the hospital have a copy of your advance directive. If you don't have one, you may want to prepare one. It lets others know your health care wishes. It's a good thing to have before any type of surgery or procedure. What happens on the day of the procedure?    · Follow the instructions exactly about when to stop eating and drinking. If you don't, your procedure may be canceled. If your doctor told you to take your medicines on the day of the procedure, take them with only a sip of water.     · Follow your doctor's instructions about when to bathe or shower before your procedure. Do not apply lotions, perfumes, deodorants, or nail polish.     · Do not shave the surgical site yourself.     · Take off all jewelry and piercings. And take out contact lenses, if you wear them. At the hospital or surgery center    · Bring a picture ID.     · You will be kept comfortable and safe by your anesthesia provider. The anesthesia may make you sleep.  Or it may just numb the area being worked on.     · The procedure will take about 1 hour.   After the procedure    · Pressure will be applied to the groin area where the catheter was put in your blood vessel. Then the area may be covered with a bandage or a compression device. This will prevent bleeding.     · Your care team will check your heart rate and blood pressure. They will also check the catheter site for bleeding.     · You will need to lie still and keep your leg straight for several hours. A weighted bag may be put on your leg.     · You may have a bruise or a small lump where the catheter was put in your blood vessel. This is normal and will go away. When should you call your doctor? · You have questions or concerns.     · You don't understand how to prepare for your procedure.     · You become ill before the procedure (such as fever, flu, or a cold).     · You need to reschedule or have changed your mind about having the procedure. Current as of: April 29, 2021               Content Version: 13.0  © 2006-2021 Healthwise, [employer]. Care instructions adapted under license by [employer] (which disclaims liability or warranty for this information). If you have questions about a medical condition or this instruction, always ask your healthcare professional. [place] [address] any warranty or liability for your use of this information.

## 2022-03-21 ENCOUNTER — VIRTUAL VISIT (OUTPATIENT)
Dept: CARDIOTHORACIC SURGERY | Age: 62
End: 2022-03-21
Payer: MEDICARE

## 2022-03-21 DIAGNOSIS — I48.0 PAROXYSMAL ATRIAL FIBRILLATION (HCC): Primary | ICD-10-CM

## 2022-03-21 PROCEDURE — G2012 BRIEF CHECK IN BY MD/QHP: HCPCS | Performed by: STUDENT IN AN ORGANIZED HEALTH CARE EDUCATION/TRAINING PROGRAM

## 2022-03-21 RX ORDER — TRAZODONE HYDROCHLORIDE 100 MG/1
100 TABLET ORAL
COMMUNITY
Start: 2022-03-15 | End: 2022-08-15

## 2022-03-21 RX ORDER — ZOLPIDEM TARTRATE 5 MG/1
5 TABLET ORAL
COMMUNITY
Start: 2022-03-15 | End: 2022-08-15

## 2022-03-21 NOTE — PROGRESS NOTES
Chief Complaint   Patient presents with    Follow-up     Systolic CHF     1. Have you been to the ER, urgent care clinic since your last visit? Hospitalized since your last visit? No    2. Have you seen or consulted any other health care providers outside of the [address] [place] [address] since your last visit? Include any pap smears or colon screening.  No    Chris Decker LPN

## 2022-03-31 ENCOUNTER — OFFICE VISIT (OUTPATIENT)
Dept: CARDIOLOGY CLINIC | Age: 62
End: 2022-03-31
Payer: MEDICARE

## 2022-03-31 VITALS
WEIGHT: 219 LBS | BODY MASS INDEX: 33.3 KG/M2 | DIASTOLIC BLOOD PRESSURE: 72 MMHG | TEMPERATURE: 97.4 F | OXYGEN SATURATION: 98 % | SYSTOLIC BLOOD PRESSURE: 134 MMHG | HEART RATE: 50 BPM

## 2022-03-31 DIAGNOSIS — I50.23 SYSTOLIC CHF, ACUTE ON CHRONIC (HCC): Primary | ICD-10-CM

## 2022-03-31 PROCEDURE — 99214 OFFICE O/P EST MOD 30 MIN: CPT | Performed by: INTERNAL MEDICINE

## 2022-03-31 PROCEDURE — G8510 SCR DEP NEG, NO PLAN REQD: HCPCS | Performed by: INTERNAL MEDICINE

## 2022-03-31 PROCEDURE — G9899 SCRN MAM PERF RSLTS DOC: HCPCS | Performed by: INTERNAL MEDICINE

## 2022-03-31 PROCEDURE — G8754 DIAS BP LESS 90: HCPCS | Performed by: INTERNAL MEDICINE

## 2022-03-31 PROCEDURE — G8428 CUR MEDS NOT DOCUMENT: HCPCS | Performed by: INTERNAL MEDICINE

## 2022-03-31 PROCEDURE — G8752 SYS BP LESS 140: HCPCS | Performed by: INTERNAL MEDICINE

## 2022-03-31 PROCEDURE — 3017F COLORECTAL CA SCREEN DOC REV: CPT | Performed by: INTERNAL MEDICINE

## 2022-03-31 PROCEDURE — G8417 CALC BMI ABV UP PARAM F/U: HCPCS | Performed by: INTERNAL MEDICINE

## 2022-03-31 NOTE — PROGRESS NOTES
is 63 y. o.female with Coronary artery disease status post CABG in November 2010. Cardiomyopathy, hypertension, paroxysmal atrial fibrillation, hyperlipidemia, diabetes, breast cancer status post mastectomy and chemotherapy. She also has anxiety and depression disorder. She has  tobacco abuse disorder. Ms. Shey Santillan is here today for follow-up appointment. Denies any hospital admission or ER visits since last time. She had and fusion last week  She was seen by structural heart clinic for possible watchman device  She is taking her medication regularly. 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    Anxiety     Breast CA ([facility] 12/2009    S/P Lt Mastectomy and Chemo    CAD (coronary artery disease) 8/30/2010    S/P CABG X 4 ([address] to LAD, Sequential SVG to D2, Ramus, OM) 01/2011    Cardiomyopathy ([facility]     LVEF 35-40 % (08/20) 60% (08/14), 40% (2011)    Chemotherapy 4/2010    for 4 months    COPD (chronic obstructive pulmonary disease) ([facility] 8/30/2010    Depression     Diabetes (HCC)     GERD (gastroesophageal reflux disease)     Hyperlipidemia     Hypertension     Schatzki's ring     S/P EGD and Dilation (07/16)    Thyroid disease     Tobacco abuse      PSH:  Past Surgical History:   Procedure Laterality Date    COLONOSCOPY N/A 5/3/2017    COLON  performed by Klever Lowe [facility] at [facility] N/A 2/15/2021    [facility]ONOS[facility] ENDOSCOPY    HX APPENDECTOMY      HX CHOLECYSTECTOMY      HX GYN      tubal ligation    HX HEART CATHETERIZATION 11/11/09; 10/21/09    HX HEART CATHETERIZATION  10/15/10; 10/06/09    left heart    HX HEART CATHETERIZATION  11/7/2011    left heart cath, no new findings    HX OTHER SURGICAL  4/6/2010    Insertion right internal jugular single lumen MediPort.  HX RADICAL MASTECTOMY Left 2/2010    left, with chemo    ME BREAST SURGERY PROCEDURE UNLISTED  2/22/10    left w/sentinel node bx    ME CABG, ARTERY-VEIN, FOUR  12/2010    ME CARDIAC SURG PROCEDURE UNLIST      stent placement before CABG    ME CHEST SURGERY PROCEDURE UNLISTED Right     Prior port placement     MEDS:  Current Outpatient Medications on File Prior to Visit   Medication Sig Dispense Refill    rosuvastatin (Crestor) 20 mg tablet Take 1 Tablet by mouth daily. (Patient taking differently: Take 20 mg by mouth nightly.) 90 Tablet 3    bumetanide (BUMEX) 1 mg tablet Take 1 Tablet by mouth every other day. 90 Tablet 3    apixaban (Eliquis) 5 mg tablet Take 1 Tablet by mouth two (2) times a day. 60 Tablet 5    metoprolol succinate (TOPROL-XL) 25 mg XL tablet Take 1 Tablet by mouth two (2) times a day. (Patient taking differently: Take 25 mg by mouth daily.) 60 Tablet 5    nitroglycerin (NITROSTAT) 0.4 mg SL tablet 1 Tablet by SubLINGual route every five (5) minutes as needed for Chest Pain. 25 Tablet 5    amiodarone (CORDARONE) 200 mg tablet TAKE 1 TABLET EVERY DAY 90 Tablet 3    esomeprazole (NEXIUM) 20 mg capsule Take 40 mg by mouth daily.  traZODone (DESYREL) 100 mg tablet Take 100 mg by mouth. Take 2 tablets by mouth Every Night      zolpidem (AMBIEN) 5 mg tablet Take 5 mg by mouth nightly.  albuterol-ipratropium (DUO-NEB) 2.5 mg-0.5 mg/3 ml nebu Take 3 mL by inhalation every six (6) hours as needed.  calcium-cholecalciferol, d3, (CALCIUM 600 + D) 600-125 mg-unit tab Take  by mouth daily.  sacubitriL-valsartan (Entresto) 24-26 mg tablet Take 1 Tablet by mouth two (2) times a day.  (Patient not taking: Reported on 2/28/2022) 60 Tablet 5    metFORMIN (GLUCOPHAGE) 1,000 mg tablet Take 500 mg by mouth two (2) times daily (with meals).  albuterol (PROVENTIL HFA, VENTOLIN HFA) 90 mcg/actuation inhaler Take 1 Puff by inhalation every four (4) hours as needed.  levothyroxine (SYNTHROID) 150 mcg tablet Take  by mouth Daily (before breakfast).  ascorbic acid (VITAMIN C) 250 mg tablet Take 250 mg by mouth daily. No current facility-administered medications on file prior to visit. Alleres and Sensitivities:  Allergies   Allergen Reactions    Shellfish Derived Hives     Eyes and Throat swelling       Family History:  Family History   Problem Relation Age of Onset    Hypertension Mother     Diabetes Mother     Breast Problems Mother         fiber cystic    Hypertension Other      Social History:  Social History     Tobacco Use    Smoking status: Current Every Day Smoker     Packs/day: 1.00    Smokeless tobacco: Never Used   Vaping Use    Vaping Use: Never used   Substance Use Topics    Alcohol use: No    Drug use: No     Physical Exam:  BP Readings from Last 3 Encounters:   03/31/22 134/72   03/07/22 (!) 150/62   02/28/22 (!) 147/66     Pulse Readings from Last 3 Encounters:   03/31/22 (!) 50   03/07/22 (!) 53   02/28/22 (!) 46     Wt Readings from Last 3 Encounters:   03/31/22 99.3 kg (219 lb)   03/07/22 105.7 kg (233 lb)   02/28/22 103.5 kg (228 lb 2.8 oz)     Constitutional: Oriented to person, place, and time. HENT: Head: Normocephalic and atraumatic. Neck: No JVD present. Cardiovascular: Regular rhythm. No, gallop or rubs appreciated. OLIVIA 2/6 base of heart, carotid radiation  Lung[de-identified] Breath sounds normal. No respiratory distress. No ronchi or rales appreciated  Abdominal: No tenderness. No rebound and no guarding. Musculoskeletal: Trace LE edema     Review of Data:  EKG: (06/2011)Sinus rhythm at 73 bpm. No Dynamic ST changes of ischemia. No Significant Q waves. EKG (6/1/12) :  Sinus rhythm at 66 beats per minute. No dynamic ST changes of ischemia. Some nonspecific T wave inversion in precordial leads. Unchanged from prior EKG. LABS:   Lab Results   Component Value Date/Time    Sodium 141 08/17/2020 09:26 AM    Potassium 4.1 08/17/2020 09:26 AM    Chloride 110 08/17/2020 09:26 AM    CO2 24 08/17/2020 09:26 AM    Glucose 135 (H) 08/17/2020 09:26 AM    BUN 20 (H) 08/17/2020 09:26 AM    Creatinine 1.42 (H) 08/17/2020 09:26 AM     Lab Results   Component Value Date/Time    Cholesterol, total 84 09/05/2019 09:07 AM    HDL Cholesterol 26 (L) 09/05/2019 09:07 AM    LDL, calculated 28.6 09/05/2019 09:07 AM    Triglyceride 147 09/05/2019 09:07 AM    CHOL/HDL Ratio 3.2 09/05/2019 09:07 AM       No components found for: GPT    Lab Results   Component Value Date/Time    Hemoglobin A1c 5.7 06/22/2010 03:42 PM     FLP: (2/2012) , , LDL 51, HDL 22    ECHO:(04/2011)  LVEF 40%, Global hypokinesis    HOLTER (11/19)  Ivan Leiva was in Normal Sinus. The average heart rate, excluding ectopy, was 54 BPM with a minimum of 48 BPM at 04:54 D3 and a maximum of 75 BPM at 05:14 D2. Heart beats, including ectopy, totaled 955527 beats. VENTRICULAR ECTOPICS totaled 225 averaging 4.8 per hour with 222 single, 0 paired, 0 trigeminy and 0 R on T. There was 1 VENTRICULAR TACHYCARDIA with 3 beats 03:34 D3 at a rate of 66 BPM.  SUPRAVENTRICULAR ECTOPICS totaled 308 averaging 6.6 per hour ,with 265 single and 24 paired beats. Patient diary was submitted symptoms noted, no patient triggered events noted. Reported \"Shortness of breath\". Rhythm was sinus and HR less than 100 bpm    ECHO (08/20)  Left Ventricle  Normal cavity size. Mild septal wall hypertrophy. Wall motion: normal. Moderate systolic dysfunction. The estimated ejection fraction is 35 - 40%. Visually measured ejection fraction. There is severe (grade 3) left ventricular diastolic dysfunction.     Wall Scoring  The left ventricular wall motion is globally hypokinetic. Left Atrium  Dilated left atrium. Left Atrium volume index is 40.34 mL/m2. Right Ventricle  Normal cavity size and global systolic function. Right Atrium  Mildly dilated right atrium. Aortic Valve  Trileaflet valve structure, no stenosis and no regurgitation. Mitral Valve  No stenosis. Mitral valve non-specific thickening. Mild to moderate regurgitation. Tricuspid Valve  Normal valve structure and no stenosis. Mild regurgitation. Pulmonic Valve  Pulmonic valve not well visualized. No stenosis. Trace regurgitation. Aorta  Normal aortic root. Pulmonary Artery  Pulmonary arterial systolic pressure (PASP) is 56 mmHg. Pulmonary hypertension found to be moderate. Nuclear stress test 05/10/19  · Baseline ECG: Sinus rhythm, non-specific ST-T wave abnormalities. · Inconclusive stress test.  · Gated SPECT: Left ventricular function post-stress was abnormal. Calculated ejection fraction is 44%. · Inferior/inferolateral hypokinesis  · Left ventricular perfusion is abnormal.  · Myocardial perfusion imaging defect 1: There is a defect that is moderate in size with a moderate reduction in uptake present in the inferior location(s) that is non-reversible. There is abnormal wall motion in the defect area. The defect appears to be infarction. · There was no convincing evidence of significant reversible defect to suggest on going major ischemia. · Abnormal myocardial perfusion imaging. Fixed defect consistent with prior myocardial infarction. CARDIAC CATH(11/2011)  1. LM: Normal.  2. LAD: Eccentric ostial 70-80%, mid to distal LAD is a very small-caliber vessel, probably about a 2-mm vessel. Competitive filing from [facility]   3. DIAGONAL: Ostial 40-50% proximal moderate disease. 4. RAMUS: Proximal 100% in stent. 5. LCx/OM: OM2 mid 100%, . Native LCx diffuse luminal irregularities without any obstructive stenosis. less than 2-mm vessel.   7. RCA: Prox and Mid stent diffuse 20% in-stent restenosis. Otherwise, no obstructive disease. RPLS 60-70% tubular stenosis which appears unchanged from prior angiogram about a year ago. 8. SEQUENTIAL SVG TO THE DIAGONA, RAMUS, OM:  patent without any significant obstructive stenosis. There appears to be venous valve system distally into the graft. Native vessel which includes diagonal, ramus and obtuse marginal beyond graft appears to be patent and a very small-caliber vessel, less than 2-mm. 9. LIMA TO LAD: widely patent. Distally, LAD has no significant stenosis. This appears to be a less than 2-mm vessel. Impression / Plan:  Ms. Cande Aquino is a pleasant 63 y.o. female who is here for the follow up appointment. Coronary artery disease:    Four vessel CABG in 2010. On  Statin, BB. Aspirin was discontinued in 01/2022 after recurrent anemia. She is on Eliquis for stroke prophylaxis  Nuclear stress test May 2019 which showed inferior scar, no ischemia. Continue with medical management. We will proceed with nuclear stress test because of worsening EF to rule out any worsening of underlying CAD   Suboptimal candidate for invasive management at this time because of recurrent anemia requiring blood transfusion    Cardiomyopathy:  Remote history of ejection fraction of 45%. Ejection fraction 50-55% in December 2018 . Last LVEF 40% in October 2019 in setting of A. Fib  LVEF 35-40 percent in August 2020. LVEF 30% by echo in 01/2022  We will proceed with nuclear stress test to evaluate for any ischemia. If none, may need to consider for AICD for primary prevention. Patient was on lisinopril however this was discontinued in 10/2021 because of elevated creatinine up to 1.8. Patient has enough Entresto at home for 4 months however she did not start taking it as she is not sure if she will qualify for assistant program after that. I have asked her to start taking Entresto.   BMP will be checked again in 2 weeks    Atrial fibrillation:  Diagnosed in October 2018. On sinus rhythm on exam.  She is on amiodarone and beta-blocker. She is also on Eliquis. Patient had recurrent anemia requiring blood transfusion in 2021 and again in 01/2022. According to patient she had EGD and colonoscopy last year which were unremarkable. She is moderate risk for stroke because of moderate chads 2 vascular score. She was seen by Dr. Adolfo Botello for [facility] device consideration. I will reach out to Dr. Adolfo Botello regarding final decision regarding the device. Patient is leaning towards getting it done. Hypertension: Stable. Currently on Toprol  Starting Entresto as mentioned above    Hyperlipidemia:  Continue Crestor 20 mg daily. Last LDL 28.    DM Goal A1C less than 7 from CV standpoint. Defer management to PCP. Goal hemoglobin A1c less than 7 is recommended from cardiology standpoint    This plan was discussed with Ms. Stef Mendez in detail, who is in agreement. Please do not hesitate to call me if you have any questions or concerns.  is 63 y. o.female with Coronary artery disease status post CABG in November 2010. Cardiomyopathy, hypertension, paroxysmal atrial fibrillation, hyperlipidemia, diabetes, breast cancer status post mastectomy and chemotherapy. She also has anxiety and depression disorder. She has  tobacco abuse disorder. Ms. Stef Mendez is here today for follow-up appointment. Patient was admitted to the hospital last week with extreme fatigue and tiredness and was found to have a significant anemia. Patient was sent to hospital and she was in the hospital for 3 or 4 days. She required blood transfusion for anemia with hemoglobin down to 6.5. She was restarted back on Eliquis but her aspirin was discontinued  Patient's main concern is fatigue tiredness and shortness of breath. She denies any use of nitroglycerin. She denies presyncope or syncope.   She denies any significant palpitation  Patient has not started [employer] since last visit.  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    Anxiety     Breast CA ([facility] 12/2009    S/P Lt Mastectomy and Chemo    CAD (coronary artery disease) 8/30/2010    S/P CABG X 4 ([address] to LAD, Sequential SVG to D2, Ramus, OM) 01/2011    Cardiomyopathy ([facility] [facility]     LVEF 35-40 % (08/20) 60% (08/14), 40% (2011)    Chemotherapy 4/2010    for 4 months    COPD (chronic obstructive pulmonary disease) ([facility] [facility] 8/30/2010    Depression     Diabetes ([facility]     GERD (gastroesophageal reflux disease)     Hyperlipidemia     Hypertension     Schatzki's ring     S/P EGD and Dilation (07/16)    Thyroid disease     Tobacco abuse      PSH:  Past Surgical History:   Procedure Laterality Date    COLONOSCOPY N/A 5/3/2017    COLON  performed by Mer Benjamin MD at [address] N/A 2/15/2021    COLONOSCOPY performed by Georgie Fofana MD at [address] HX APPENDECTOMY      HX CHOLECYSTECTOMY      HX GYN      tubal ligation    HX HEART CATHETERIZATION  11/11/09; 10/21/09    HX HEART CATHETERIZATION  10/15/10; 10/06/09    left heart    HX HEART CATHETERIZATION  11/7/2011    left heart cath, no new findings    HX OTHER SURGICAL  4/6/2010    Insertion right internal jugular single lumen MediPort.     HX RADICAL MASTECTOMY Left 2/2010    left, with chemo    [facility] BREAST SURGERY PROCEDURE UNLISTED  2/22/10    left w/sentinel node bx    NH CABG, ARTERY-VEIN, FOUR  12/2010    NH CARDIAC SURG PROCEDURE UNLIST      stent placement before CABG    NH CHEST SURGERY PROCEDURE UNLISTED Right     Prior port placement     MEDS:  Current Outpatient Medications on File Prior to Visit   Medication Sig Dispense Refill    rosuvastatin (Crestor) 20 mg tablet Take 1 Tablet by mouth daily. (Patient taking differently: Take 20 mg by mouth nightly.) 90 Tablet 3    bumetanide (BUMEX) 1 mg tablet Take 1 Tablet by mouth every other day. 90 Tablet 3    apixaban (Eliquis) 5 mg tablet Take 1 Tablet by mouth two (2) times a day. 60 Tablet 5    metoprolol succinate (TOPROL-XL) 25 mg XL tablet Take 1 Tablet by mouth two (2) times a day. (Patient taking differently: Take 25 mg by mouth daily.) 60 Tablet 5    nitroglycerin (NITROSTAT) 0.4 mg SL tablet 1 Tablet by SubLINGual route every five (5) minutes as needed for Chest Pain. 25 Tablet 5    amiodarone (CORDARONE) 200 mg tablet TAKE 1 TABLET EVERY DAY 90 Tablet 3    esomeprazole (NEXIUM) 20 mg capsule Take 40 mg by mouth daily.  traZODone (DESYREL) 100 mg tablet Take 100 mg by mouth. Take 2 tablets by mouth Every Night      zolpidem (AMBIEN) 5 mg tablet Take 5 mg by mouth nightly.  albuterol-ipratropium (DUO-NEB) 2.5 mg-0.5 mg/3 ml nebu Take 3 mL by inhalation every six (6) hours as needed.  calcium-cholecalciferol, d3, (CALCIUM 600 + D) 600-125 mg-unit tab Take  by mouth daily.  sacubitriL-valsartan (Entresto) 24-26 mg tablet Take 1 Tablet by mouth two (2) times a day. (Patient not taking: Reported on 2/28/2022) 60 Tablet 5    metFORMIN (GLUCOPHAGE) 1,000 mg tablet Take 500 mg by mouth two (2) times daily (with meals).  albuterol (PROVENTIL HFA, VENTOLIN HFA) 90 mcg/actuation inhaler Take 1 Puff by inhalation every four (4) hours as needed.  levothyroxine (SYNTHROID) 150 mcg tablet Take  by mouth Daily (before breakfast).  ascorbic acid (VITAMIN C) 250 mg tablet Take 250 mg by mouth daily. No current facility-administered medications on file prior to visit.      Alleres and Sensitivities:  Allergies   Allergen Reactions    Shellfish Derived Hives     Eyes and Throat swelling       Family History:  Family History   Problem Relation Age of Onset    Hypertension Mother     Diabetes Mother     Breast Problems Mother         fiber cystic    Hypertension Other      Social History:  Social History     Tobacco Use    Smoking status: Current Every Day Smoker     Packs/day: 1.00    Smokeless tobacco: Never Used   Vaping Use    Vaping Use: Never used   Substance Use Topics    Alcohol use: No    Drug use: No     Physical Exam:  BP Readings from Last 3 Encounters:   03/31/22 134/72   03/07/22 (!) 150/62   02/28/22 (!) 147/66     Pulse Readings from Last 3 Encounters:   03/31/22 (!) 50   03/07/22 (!) 53   02/28/22 (!) 46     Wt Readings from Last 3 Encounters:   03/31/22 99.3 kg (219 lb)   03/07/22 105.7 kg (233 lb)   02/28/22 103.5 kg (228 lb 2.8 oz)     Constitutional: Oriented to person, place, and time. HENT: Head: Normocephalic and atraumatic. Neck: No JVD present. Cardiovascular: Regular rhythm. No, gallop or rubs appreciated. OLIVIA 2/6 base of heart, carotid radiation  Lung[de-identified] Breath sounds normal. No respiratory distress. No ronchi or rales appreciated  Abdominal: No tenderness. No rebound and no guarding. Musculoskeletal: t 1++ LE edema     Review of Data:  EKG: (06/2011)Sinus rhythm at 73 bpm. No Dynamic ST changes of ischemia. No Significant Q waves. EKG (6/1/12) : Sinus rhythm at 66 beats per minute. No dynamic ST changes of ischemia. Some nonspecific T wave inversion in precordial leads. Unchanged from prior EKG.     LABS:   Lab Results   Component Value Date/Time    Sodium 141 08/17/2020 09:26 AM    Potassium 4.1 08/17/2020 09:26 AM    Chloride 110 08/17/2020 09:26 AM    CO2 24 08/17/2020 09:26 AM    Glucose 135 (H) 08/17/2020 09:26 AM    BUN 20 (H) 08/17/2020 09:26 AM    Creatinine 1.42 (H) 08/17/2020 09:26 AM     Lab Results   Component Value Date/Time    Cholesterol, total 84 09/05/2019 09:07 AM    HDL Cholesterol 26 (L) 09/05/2019 09:07 AM    LDL, calculated 28.6 09/05/2019 09:07 AM    Triglyceride 147 09/05/2019 09:07 AM    CHOL/HDL Ratio 3.2 09/05/2019 09:07 AM       No components found for: GPT    Lab Results   Component Value Date/Time    Hemoglobin A1c 5.7 06/22/2010 03:42 PM     FLP: (2/2012) , , LDL 51, HDL 22    ECHO:(04/2011)  LVEF 40%, Global hypokinesis    HOLTER (11/19)  Jocelyn Leiva was in Normal Sinus. The average heart rate, excluding ectopy, was 54 BPM with a minimum of 48 BPM at 04:54 D3 and a maximum of 75 BPM at 05:14 D2. Heart beats, including ectopy, totaled 035009 beats. VENTRICULAR ECTOPICS totaled 225 averaging 4.8 per hour with 222 single, 0 paired, 0 trigeminy and 0 R on T. There was 1 VENTRICULAR TACHYCARDIA with 3 beats 03:34 D3 at a rate of 66 BPM.  SUPRAVENTRICULAR ECTOPICS totaled 308 averaging 6.6 per hour ,with 265 single and 24 paired beats. Patient diary was submitted symptoms noted, no patient triggered events noted. Reported \"Shortness of breath\". Rhythm was sinus and HR less than 100 bpm    ECHO (08/20)  Left Ventricle  Normal cavity size. Mild septal wall hypertrophy. Wall motion: normal. Moderate systolic dysfunction. The estimated ejection fraction is 35 - 40%. Visually measured ejection fraction. There is severe (grade 3) left ventricular diastolic dysfunction. Wall Scoring  The left ventricular wall motion is globally hypokinetic. Left Atrium  Dilated left atrium. Left Atrium volume index is 40.34 mL/m2. Right Ventricle  Normal cavity size and global systolic function. Right Atrium  Mildly dilated right atrium. Aortic Valve  Trileaflet valve structure, no stenosis and no regurgitation. Mitral Valve  No stenosis. Mitral valve non-specific thickening. Mild to moderate regurgitation. Tricuspid Valve  Normal valve structure and no stenosis. Mild regurgitation. Pulmonic Valve  Pulmonic valve not well visualized. No stenosis. Trace regurgitation. Aorta  Normal aortic root.     Pulmonary Artery  Pulmonary arterial systolic pressure (PASP) is 56 mmHg. Pulmonary hypertension found to be moderate. Nuclear stress test 05/10/19  · Baseline ECG: Sinus rhythm, non-specific ST-T wave abnormalities. · Inconclusive stress test.  · Gated SPECT: Left ventricular function post-stress was abnormal. Calculated ejection fraction is 44%. · Inferior/inferolateral hypokinesis  · Left ventricular perfusion is abnormal.  · Myocardial perfusion imaging defect 1: There is a defect that is moderate in size with a moderate reduction in uptake present in the inferior location(s) that is non-reversible. There is abnormal wall motion in the defect area. The defect appears to be infarction. · There was no convincing evidence of significant reversible defect to suggest on going major ischemia. · Abnormal myocardial perfusion imaging. Fixed defect consistent with prior myocardial infarction. CARDIAC CATH(11/2011)  1. LM: Normal.  2. LAD: Eccentric ostial 70-80%, mid to distal LAD is a very small-caliber vessel, probably about a 2-mm vessel. Competitive filing from [facility]   3. DIAGONAL: Ostial 40-50% proximal moderate disease. 4. RAMUS: Proximal 100% in stent. 5. LCx/OM: OM2 mid 100%, . Native LCx diffuse luminal irregularities without any obstructive stenosis. less than 2-mm vessel. 7. RCA: Prox and Mid stent diffuse 20% in-stent restenosis. Otherwise, no obstructive disease. RPLS 60-70% tubular stenosis which appears unchanged from prior angiogram about a year ago. 8. SEQUENTIAL SVG TO THE DIAGONA, RAMUS, OM:  patent without any significant obstructive stenosis. There appears to be venous valve system distally into the graft. Native vessel which includes diagonal, ramus and obtuse marginal beyond graft appears to be patent and a very small-caliber vessel, less than 2-mm. 9. LIMA TO LAD: widely patent. Distally, LAD has no significant stenosis. This appears to be a less than 2-mm vessel.     Impression / Plan:  Ms. Jeronimo Cervantes is a pleasant 63 y.o. female who is here for the follow up appointment. Coronary artery disease:    Four vessel CABG in 2010. On  Statin, BB. Aspirin was discontinued in 01/2022 after recurrent anemia. She is on Eliquis for stroke prophylaxis  Nuclear stress test May 2019 which showed inferior scar, no ischemia. Continue with medical management. Suboptimal candidate for invasive management at this time because of recurrent anemia requiring blood transfusion    Cardiomyopathy:  Remote history of ejection fraction of 45%. Ejection fraction 50-55% in December 2018 . Last LVEF 40% in October 2019 in setting of A. Fib  LVEF 35-40 percent in August 2020. LVEF 30% by echo in 01/2022  Patient was on lisinopril however this was discontinued in 10/2021 because of elevated creatinine up to 1.8. Repeat creatinine was 1.52 weeks ago. I will start patient on Entresto because of LV dysfunction and NYHA class II symptoms. Side effect discussed. Will check BMP 2 weeks after starting Entresto. I have asked patient to use Bumex every other day as patient has significant lower extremity swelling and slight decrease in the breath sound    Atrial fibrillation:  Diagnosed in October 2018. On sinus rhythm on exam.  She is on amiodarone and beta-blocker. She is also on Eliquis. Patient had recurrent anemia requiring blood transfusion in 2021 and again in 01/2022. According to patient she had EGD and colonoscopy last year which were unremarkable. She is moderate risk for stroke because of moderate chads 2 vascular score. I will refer her to my partner Dr. Juana Ling for consideration for watchman device as patient has recurrent anemia being on Eliquis. Hypertension: Stable. Currently on Toprol and as mentioned above decreasing the dose of Toprol. Starting Cite Lavelle Wong. Hyperlipidemia:  Continue Crestor 20 mg daily. Last LDL 28.    DM Goal A1C less than 7 from CV standpoint. Defer management to PCP.   Goal hemoglobin A1c less than 7 is recommended from cardiology standpoint    This plan was discussed with Ms. Bong Anderson in detail, who is in agreement. Please do not hesitate to call me if you have any questions or concerns.

## 2022-03-31 NOTE — PROGRESS NOTES
Identified pt with two pt identifiers(name and ). Reviewed record in preparation for visit and have obtained necessary documentation. Luis Boyce presents today for   Chief Complaint   Patient presents with    Follow-up       Pt denies DIZZINESS, SOB, CHEST PAIN/ PRESSURE, FATIGUE/WEAKNESS, HEADACHES, SWELLING. Ana Paula Leiva preferred language for health care discussion is english/other. Personal Protective Equipment:   Personal Protective Equipment was used including: mask-surgical and hands-gloves. Patient was placed on no precaution(s). Patient was masked. Precautions:   Patient currently on None  Patient currently roomed with door closed. Is someone accompanying this pt? No     Is the patient using any DME equipment during OV? No     Depression Screening:  3 most recent PHQ Screens 3/7/2022   Little interest or pleasure in doing things Several days   Feeling down, depressed, irritable, or hopeless Several days   Total Score PHQ 2 2       Learning Assessment:  Learning Assessment 10/16/2014   PRIMARY LEARNER Patient   HIGHEST LEVEL OF EDUCATION - PRIMARY LEARNER  GRADUATED HIGH SCHOOL OR GED   BARRIERS PRIMARY LEARNER NONE   CO-LEARNER CAREGIVER No   PRIMARY LANGUAGE ENGLISH   LEARNER PREFERENCE PRIMARY VIDEOS   ANSWERED BY Lisbeth Leiva   RELATIONSHIP SELF       Abuse Screening:  Abuse Screening Questionnaire 3/7/2022   Do you ever feel afraid of your partner? N   Are you in a relationship with someone who physically or mentally threatens you? N   Is it safe for you to go home? Y       Fall Risk  No flowsheet data found. Pt currently taking Anticoagulant therapy? yes  Pt currently taking Antiplatelet therapy? No     Coordination of Care:  1. Have you been to the ER, urgent care clinic since your last visit? Hospitalized since your last visit? No     2.  Have you seen or consulted any other health care providers outside of the [address] [place] [address] since your last visit? Include any pap smears or colon screening. yes      Please see Red banners under Allergies and Med Rec to remove outside inquires. All correct information has been verified with patient and added to chart.      Medication's patient's would liked removed has been marked not taking to be removed per Verbal order and read back per Pravin Garay MD

## 2022-03-31 NOTE — PATIENT INSTRUCTIONS
Testing    Nuclear Stress  Please call our office at [phone] to schedule testing after Aptil 15, 2022. Nuclear Stress Instructions-Nothing to eat or drink past midnight and no medicaitons the morning of cardiac testing.   **call office 3-5 days after testing is completed for results**     Labs  BMP in 2 weeks after starting entresto    No appointment required for lab work  [address] [facility]  Hours are Mon-Fri 7:00 am-3:30 pm  **closed from 12:30 pm-1pm daily**

## 2022-04-11 ENCOUNTER — VIRTUAL VISIT (OUTPATIENT)
Dept: CARDIOTHORACIC SURGERY | Age: 62
End: 2022-04-11
Payer: MEDICARE

## 2022-04-11 DIAGNOSIS — I48.0 PAROXYSMAL ATRIAL FIBRILLATION (HCC): Primary | ICD-10-CM

## 2022-04-11 PROCEDURE — G2012 BRIEF CHECK IN BY MD/QHP: HCPCS | Performed by: STUDENT IN AN ORGANIZED HEALTH CARE EDUCATION/TRAINING PROGRAM

## 2022-04-11 RX ORDER — OXYCODONE AND ACETAMINOPHEN 5; 325 MG/1; MG/1
1 TABLET ORAL
COMMUNITY
Start: 2022-04-11 | End: 2022-08-15

## 2022-04-11 RX ORDER — CEPHALEXIN 500 MG/1
500 CAPSULE ORAL 2 TIMES DAILY
COMMUNITY
Start: 2022-04-11 | End: 2022-04-18

## 2022-04-11 NOTE — PROGRESS NOTES
1. Have you been to the ER, urgent care clinic since your last visit? Hospitalized since your last visit? Yes Where: [facility]     2. Have you seen or consulted any other health care providers outside of the [address] [place] [address] since your last visit? Include any pap smears or colon screening. No    3. Since your last visit, have you had any of the following symptoms? No.     4.  Have you had any blood work, X-rays or cardiac testing? Yes Where: [facility]     Requested: NO     In [facility]: YES    5. Where do you normally have your labs drawn? [facility]    6. Do you need any refills today?    No

## 2022-04-13 NOTE — PROGRESS NOTES
Discussed with patient at length again [facility] porcedure, risks and benefits, she continues to remain very interested however would like to pursue this in June. We discussed having her reach out when she is ready to undergo CT per watchman protocol for pre-procedural planning and then we would work with her to schedule the procedure. In the meantime, she will continue full AC.      Aliza Ybarra MD, Nir Rice

## 2022-04-13 NOTE — PROGRESS NOTES
Spoke to patient at length pros and cons about proceeding with watchman procedure along with discussing the procedure in detail again. Patient continues to remain very interested however given personal family issues would like us to reach out again next month to further planning. Otherwise she seems to be doing well with no major ocmplaints.      Yarely Tee MD, Margorie Litten

## 2022-04-27 ENCOUNTER — DOCUMENTATION ONLY (OUTPATIENT)
Dept: CARDIOLOGY CLINIC | Age: 62
End: 2022-04-27

## 2022-04-27 NOTE — PROGRESS NOTES
Your PA has been faxed to the plan as a paper copy. Please contact the plan directly if you haven't received a determination in a typical timeframe. You will be notified of the determination electronically and via fax.   Cover my meds tier exceptions for eliquis through [facility]

## 2022-06-22 ENCOUNTER — HOSPITAL ENCOUNTER (OUTPATIENT)
Dept: LAB | Age: 62
Discharge: HOME | End: 2022-06-22
Payer: MEDICARE

## 2022-06-22 DIAGNOSIS — I50.23 SYSTOLIC CHF, ACUTE ON CHRONIC (HCC): ICD-10-CM

## 2022-06-22 LAB
ANION GAP SERPL CALC-SCNC: 7 MMOL/L (ref 3–18)
BUN SERPL-MCNC: 18 MG/DL (ref 7–18)
BUN/CREAT SERPL: 16 (ref 12–20)
CALCIUM SERPL-MCNC: 8.6 MG/DL (ref 8.5–10.1)
CHLORIDE SERPL-SCNC: 109 MMOL/L (ref 100–111)
CO2 SERPL-SCNC: 24 MMOL/L (ref 21–32)
CREAT SERPL-MCNC: 1.16 MG/DL (ref 0.6–1.3)
GLUCOSE SERPL-MCNC: 110 MG/DL (ref 74–99)
POTASSIUM SERPL-SCNC: 4.1 MMOL/L (ref 3.5–5.5)
SODIUM SERPL-SCNC: 140 MMOL/L (ref 136–145)

## 2022-06-22 PROCEDURE — 36415 COLL VENOUS BLD VENIPUNCTURE: CPT

## 2022-06-22 PROCEDURE — 80048 BASIC METABOLIC PNL TOTAL CA: CPT

## 2022-06-30 ENCOUNTER — DOCUMENTATION ONLY (OUTPATIENT)
Dept: CARDIOLOGY CLINIC | Age: 62
End: 2022-06-30

## 2022-06-30 ENCOUNTER — OFFICE VISIT (OUTPATIENT)
Dept: CARDIOLOGY CLINIC | Age: 62
End: 2022-06-30
Payer: MEDICARE

## 2022-06-30 ENCOUNTER — TELEPHONE (OUTPATIENT)
Dept: CARDIOLOGY CLINIC | Age: 62
End: 2022-06-30

## 2022-06-30 VITALS
WEIGHT: 231 LBS | OXYGEN SATURATION: 99 % | SYSTOLIC BLOOD PRESSURE: 150 MMHG | BODY MASS INDEX: 35.12 KG/M2 | TEMPERATURE: 97.7 F | DIASTOLIC BLOOD PRESSURE: 78 MMHG | HEART RATE: 48 BPM | RESPIRATION RATE: 16 BRPM

## 2022-06-30 DIAGNOSIS — I10 ESSENTIAL HYPERTENSION WITH GOAL BLOOD PRESSURE LESS THAN 140/90: ICD-10-CM

## 2022-06-30 DIAGNOSIS — I25.10 CORONARY ARTERY DISEASE DUE TO LIPID RICH PLAQUE: Primary | ICD-10-CM

## 2022-06-30 DIAGNOSIS — I48.0 PAF (PAROXYSMAL ATRIAL FIBRILLATION) (HCC): Primary | ICD-10-CM

## 2022-06-30 DIAGNOSIS — E78.00 PURE HYPERCHOLESTEROLEMIA: ICD-10-CM

## 2022-06-30 DIAGNOSIS — I48.0 PAF (PAROXYSMAL ATRIAL FIBRILLATION) (HCC): ICD-10-CM

## 2022-06-30 DIAGNOSIS — I25.83 CORONARY ARTERY DISEASE DUE TO LIPID RICH PLAQUE: Primary | ICD-10-CM

## 2022-06-30 PROCEDURE — G8754 DIAS BP LESS 90: HCPCS | Performed by: INTERNAL MEDICINE

## 2022-06-30 PROCEDURE — 3017F COLORECTAL CA SCREEN DOC REV: CPT | Performed by: INTERNAL MEDICINE

## 2022-06-30 PROCEDURE — G8753 SYS BP > OR = 140: HCPCS | Performed by: INTERNAL MEDICINE

## 2022-06-30 PROCEDURE — G8417 CALC BMI ABV UP PARAM F/U: HCPCS | Performed by: INTERNAL MEDICINE

## 2022-06-30 PROCEDURE — G8510 SCR DEP NEG, NO PLAN REQD: HCPCS | Performed by: INTERNAL MEDICINE

## 2022-06-30 PROCEDURE — 99214 OFFICE O/P EST MOD 30 MIN: CPT | Performed by: INTERNAL MEDICINE

## 2022-06-30 PROCEDURE — G9899 SCRN MAM PERF RSLTS DOC: HCPCS | Performed by: INTERNAL MEDICINE

## 2022-06-30 PROCEDURE — G8428 CUR MEDS NOT DOCUMENT: HCPCS | Performed by: INTERNAL MEDICINE

## 2022-06-30 NOTE — PROGRESS NOTES
is 63 y. o.female with Coronary artery disease status post CABG in November 2010. Cardiomyopathy, hypertension, paroxysmal atrial fibrillation, hyperlipidemia, diabetes, breast cancer status post mastectomy and chemotherapy. She also has anxiety and depression disorder. She has  tobacco abuse disorder. Ms. Charisse Hernandez is here today for follow-up appointment for her CAD, cardiomyopathy, atrial fibrillation. Patient had episode of back pain which responded very well to pain management approximately 2 weeks ago. She is feeling well without any symptoms to suggest angina or heart failure. She denies any use of nitroglycerin since last visit. She is very pleasant today and she tells me that she is ready to have her watchman device done. She denies any ongoing bleeding problem. She has no side effect from the medication. Denies any significant lower extremity swelling.   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    Anxiety     Breast CA ([facility] 12/2009    S/P Lt Mastectomy and Chemo    CAD (coronary artery disease) 8/30/2010    S/P CABG X 4 ([address] to LAD, Sequential SVG to D2, Ramus, OM) 01/2011    Cardiomyopathy ([address]     LVEF 35-40 % (08/20) 60% (08/14), 40% (2011)    Chemotherapy 4/2010    for 4 months    COPD (chronic obstructive pulmonary disease) ([facility] 8/30/2010    Depression     Diabetes ([address]     GERD (gastroesophageal reflux disease)     Hyperlipidemia     Hypertension     Schatzki's ring     S/P EGD and Dilation (07/16)    Thyroid disease     Tobacco abuse      PSH:  Past Surgical History:   Procedure Laterality Date    COLONOSCOPY N/A 5/3/2017    COLON  performed by Naz Luo MD at [address] [place] [address] 2/15/2021    COLONOSCOPY performed by Vero Hardy MD at [facility] ENDOSCOPY    HX APPENDECTOMY      HX CHOLECYSTECTOMY      HX GYN      tubal ligation    HX HEART CATHETERIZATION  11/11/09; 10/21/09    HX HEART CATHETERIZATION  10/15/10; 10/06/09    left heart    HX HEART CATHETERIZATION  11/7/2011    left heart cath, no new findings    HX OTHER SURGICAL  4/6/2010    Insertion right internal jugular single lumen MediPort.  HX RADICAL MASTECTOMY Left 2/2010    left, with chemo    ME BREAST SURGERY PROCEDURE UNLISTED  2/22/10    left w/sentinel node bx    ME CABG, ARTERY-VEIN, FOUR  12/2010    ME CARDIAC SURG PROCEDURE UNLIST      stent placement before CABG    ME CHEST SURGERY PROCEDURE UNLISTED Right     Prior port placement     MEDS:  Current Outpatient Medications on File Prior to Visit   Medication Sig Dispense Refill    oxyCODONE-acetaminophen (PERCOCET) 5-325 mg per tablet Take 1 Tablet by mouth every four (4) hours as needed.  traZODone (DESYREL) 100 mg tablet Take 100 mg by mouth. Take 2 tablets by mouth Every Night      zolpidem (AMBIEN) 5 mg tablet Take 5 mg by mouth nightly.  albuterol-ipratropium (DUO-NEB) 2.5 mg-0.5 mg/3 ml nebu Take 3 mL by inhalation every six (6) hours as needed.  calcium-cholecalciferol, d3, (CALCIUM 600 + D) 600-125 mg-unit tab Take  by mouth daily.  rosuvastatin (Crestor) 20 mg tablet Take 1 Tablet by mouth daily. (Patient taking differently: Take 20 mg by mouth nightly.) 90 Tablet 3    bumetanide (BUMEX) 1 mg tablet Take 1 Tablet by mouth every other day. (Patient taking differently: Take 1 mg by mouth as needed.) 90 Tablet 3    sacubitriL-valsartan (Entresto) 24-26 mg tablet Take 1 Tablet by mouth two (2) times a day. 60 Tablet 5    apixaban (Eliquis) 5 mg tablet Take 1 Tablet by mouth two (2) times a day. 60 Tablet 5    metoprolol succinate (TOPROL-XL) 25 mg XL tablet Take 1 Tablet by mouth two (2) times a day.  (Patient taking differently: Take 25 mg by mouth daily.) 60 Tablet 5    nitroglycerin (NITROSTAT) 0.4 mg SL tablet 1 Tablet by SubLINGual route every five (5) minutes as needed for Chest Pain. 25 Tablet 5    amiodarone (CORDARONE) 200 mg tablet TAKE 1 TABLET EVERY DAY 90 Tablet 3    esomeprazole (NEXIUM) 20 mg capsule Take 40 mg by mouth daily.  metFORMIN (GLUCOPHAGE) 1,000 mg tablet Take 500 mg by mouth two (2) times daily (with meals).  albuterol (PROVENTIL HFA, VENTOLIN HFA) 90 mcg/actuation inhaler Take 1 Puff by inhalation every four (4) hours as needed.  levothyroxine (SYNTHROID) 150 mcg tablet Take  by mouth Daily (before breakfast).  ascorbic acid (VITAMIN C) 250 mg tablet Take 250 mg by mouth daily. No current facility-administered medications on file prior to visit. Alleres and Sensitivities:  Allergies   Allergen Reactions    Shellfish Derived Hives     Eyes and Throat swelling       Family History:  Family History   Problem Relation Age of Onset    Hypertension Mother     Diabetes Mother     Breast Problems Mother         fiber cystic    Hypertension Other      Social History:  Social History     Tobacco Use    Smoking status: Current Every Day Smoker     Packs/day: 1.00    Smokeless tobacco: Never Used   Vaping Use    Vaping Use: Never used   Substance Use Topics    Alcohol use: No    Drug use: No     Physical Exam:  BP Readings from Last 3 Encounters:   06/30/22 (!) 143/75   03/31/22 134/72   03/07/22 (!) 150/62     Pulse Readings from Last 3 Encounters:   06/30/22 (!) 46   03/31/22 (!) 50   03/07/22 (!) 53     Wt Readings from Last 3 Encounters:   06/30/22 104.8 kg (231 lb)   03/31/22 99.3 kg (219 lb)   03/07/22 105.7 kg (233 lb)     Constitutional: Oriented to person, place, and time. HENT: Head: Normocephalic and atraumatic. Neck: No JVD present. Cardiovascular: Regular rhythm. No, gallop or rubs appreciated. OLIVIA 2/6 base of heart, carotid radiation  Lung[de-identified] Breath sounds normal. No respiratory distress.  No ronchi or rales appreciated  Abdominal: No tenderness. No rebound and no guarding. Musculoskeletal: Trace LE edema     Review of Data:  EKG: (06/2011)Sinus rhythm at 73 bpm. No Dynamic ST changes of ischemia. No Significant Q waves. EKG (6/1/12) : Sinus rhythm at 66 beats per minute. No dynamic ST changes of ischemia. Some nonspecific T wave inversion in precordial leads. Unchanged from prior EKG. LABS:   Lab Results   Component Value Date/Time    Sodium 140 06/22/2022 08:25 AM    Potassium 4.1 06/22/2022 08:25 AM    Chloride 109 06/22/2022 08:25 AM    CO2 24 06/22/2022 08:25 AM    Glucose 110 (H) 06/22/2022 08:25 AM    BUN 18 06/22/2022 08:25 AM    Creatinine 1.16 06/22/2022 08:25 AM     Lab Results   Component Value Date/Time    Cholesterol, total 84 09/05/2019 09:07 AM    HDL Cholesterol 26 (L) 09/05/2019 09:07 AM    LDL, calculated 28.6 09/05/2019 09:07 AM    Triglyceride 147 09/05/2019 09:07 AM    CHOL/HDL Ratio 3.2 09/05/2019 09:07 AM       No components found for: GPT    Lab Results   Component Value Date/Time    Hemoglobin A1c 5.7 06/22/2010 03:42 PM     FLP: (2/2012) , , LDL 51, HDL 22    ECHO:(04/2011)  LVEF 40%, Global hypokinesis    HOLTER (11/19)  Zackery Leiva was in Normal Sinus. The average heart rate, excluding ectopy, was 54 BPM with a minimum of 48 BPM at 04:54 D3 and a maximum of 75 BPM at 05:14 D2. Heart beats, including ectopy, totaled [id number] beats. VENTRICULAR ECTOPICS totaled 225 averaging 4.8 per hour with 222 single, 0 paired, 0 trigeminy and 0 R on T. There was 1 VENTRICULAR TACHYCARDIA with 3 beats 03:34 D3 at a rate of 66 BPM.  SUPRAVENTRICULAR ECTOPICS totaled 308 averaging 6.6 per hour ,with 265 single and 24 paired beats. Patient diary was submitted symptoms noted, no patient triggered events noted. Reported \"Shortness of breath\". Rhythm was sinus and HR less than 100 bpm    ECHO (08/20)  Left Ventricle  Normal cavity size. Mild septal wall hypertrophy.     Cleon Holiday motion: normal. Moderate systolic dysfunction. The estimated ejection fraction is 35 - 40%. Visually measured ejection fraction. There is severe (grade 3) left ventricular diastolic dysfunction. Wall Scoring  The left ventricular wall motion is globally hypokinetic. Left Atrium  Dilated left atrium. Left Atrium volume index is 40.34 mL/m2. Right Ventricle  Normal cavity size and global systolic function. Right Atrium  Mildly dilated right atrium. Aortic Valve  Trileaflet valve structure, no stenosis and no regurgitation. Mitral Valve  No stenosis. Mitral valve non-specific thickening. Mild to moderate regurgitation. Tricuspid Valve  Normal valve structure and no stenosis. Mild regurgitation. Pulmonic Valve  Pulmonic valve not well visualized. No stenosis. Trace regurgitation. Aorta  Normal aortic root. Pulmonary Artery  Pulmonary arterial systolic pressure (PASP) is 56 mmHg. Pulmonary hypertension found to be moderate. Nuclear stress test 05/10/19  · Baseline ECG: Sinus rhythm, non-specific ST-T wave abnormalities. · Inconclusive stress test.  · Gated SPECT: Left ventricular function post-stress was abnormal. Calculated ejection fraction is 44%. · Inferior/inferolateral hypokinesis  · Left ventricular perfusion is abnormal.  · Myocardial perfusion imaging defect 1: There is a defect that is moderate in size with a moderate reduction in uptake present in the inferior location(s) that is non-reversible. There is abnormal wall motion in the defect area. The defect appears to be infarction. · There was no convincing evidence of significant reversible defect to suggest on going major ischemia. · Abnormal myocardial perfusion imaging. Fixed defect consistent with prior myocardial infarction. CARDIAC CATH(11/2011)  1. LM: Normal.  2. LAD: Eccentric ostial 70-80%, mid to distal LAD is a very small-caliber vessel, probably about a 2-mm vessel.  Competitive filing from [facility] 3. DIAGONAL: Ostial 40-50% proximal moderate disease. 4. RAMUS: Proximal 100% in stent. 5. LCx/OM: OM2 mid 100%, . Native LCx diffuse luminal irregularities without any obstructive stenosis. less than 2-mm vessel. 7. RCA: Prox and Mid stent diffuse 20% in-stent restenosis. Otherwise, no obstructive disease. RPLS 60-70% tubular stenosis which appears unchanged from prior angiogram about a year ago. 8. SEQUENTIAL SVG TO THE DIAGONA, RAMUS, OM:  patent without any significant obstructive stenosis. There appears to be venous valve system distally into the graft. Native vessel which includes diagonal, ramus and obtuse marginal beyond graft appears to be patent and a very small-caliber vessel, less than 2-mm. 9. [place] TO LAD: widely patent. Distally, LAD has no significant stenosis. This appears to be a less than 2-mm vessel. Impression / Plan:  Ms. Deb Gonzalez is a pleasant 63 y.o. female who is here for the follow up appointment. Coronary artery disease:    Four vessel CABG in 2010. On  Statin, BB. Aspirin was discontinued in 01/2022 after recurrent anemia. She is on Eliquis for stroke prophylaxis  Nuclear stress test May 2019 which showed inferior scar, no ischemia. Continue with medical management. Suboptimal candidate for invasive management at this time because of recurrent anemia requiring blood transfusion    Cardiomyopathy:  Remote history of ejection fraction of 45%. Ejection fraction 50-55% in December 2018 . Last LVEF 40% in October 2019 in setting of A. Fib  LVEF 35-40 percent in August 2020. LVEF 30% by echo in 01/2022  Patient was on lisinopril however this was discontinued in 10/2021 because of elevated creatinine up to 1.8. Currently patient is taking Entresto, Bumex, metoprolol. We will repeat echocardiogram after being on Entresto. If no improvement in EF, coronary evaluation should be considered considering AICD implantation    Atrial fibrillation:  Diagnosed in October 2018.     On sinus rhythm on exam.  She is on amiodarone and beta-blocker. She is also on Eliquis. Patient had recurrent anemia requiring blood transfusion in 2021 and again in 01/2022. According to patient she had EGD and colonoscopy last year which were unremarkable. She is moderate risk for stroke because of moderate chads 2 vascular score. She was seen by Dr. Nura Mesa for [facility] device consideration. Patient is now agreeable to proceed with watchman device. Risk-benefit alternative and complication discussed. Will arrange for watchman device implantation with one of our partner    Hypertension: Stable. Currently on Toprol and Entresto. /75. Continue same    Hyperlipidemia:  Continue Crestor 20 mg daily. Last LDL 28.    DM Goal A1C less than 7 from CV standpoint. Defer management to PCP. Goal hemoglobin A1c less than 7 is recommended from cardiology standpoint    This plan was discussed with Ms. Devendra Arguello in detail, who is in agreement. Please do not hesitate to call me if you have any questions or concerns.

## 2022-06-30 NOTE — TELEPHONE ENCOUNTER
Contacted pt at cell. Two patient Identifiers confirmed. Advised pt per Dr Lavinia Johnson. Spoke with pt about scheduled Echo on 10/4. Pt verbalized understanding.

## 2022-06-30 NOTE — PROGRESS NOTES
Identified pt with two pt identifiers(name and ). Reviewed record in preparation for visit and have obtained necessary documentation. Ronit Puente presents today for   Chief Complaint   Patient presents with    Follow-up       Pt c/o SOB,SWELLING. Dionicia Shone Longtin preferred language for health care discussion is english/other. Personal Protective Equipment:   Personal Protective Equipment was used including: mask-surgical and hands-gloves. Patient was placed on no precaution(s). Patient was masked. Precautions:   Patient currently on None  Patient currently roomed with door closed. Is someone accompanying this pt? no    Is the patient using any DME equipment during [address]? no    Depression Screening:  3 most recent PHQ Screens 3/31/2022   Little interest or pleasure in doing things Not at all   Feeling down, depressed, irritable, or hopeless Not at all   Total Score PHQ 2 0       Learning Assessment:  Learning Assessment 10/16/2014   PRIMARY LEARNER Patient   HIGHEST LEVEL OF EDUCATION - PRIMARY LEARNER  GRADUATED HIGH SCHOOL OR GED   BARRIERS PRIMARY LEARNER NONE   CO-LEARNER CAREGIVER No   PRIMARY LANGUAGE ENGLISH   LEARNER PREFERENCE PRIMARY VIDEOS   ANSWERED BY Patrick Leiva   RELATIONSHIP SELF       Abuse Screening:  Abuse Screening Questionnaire 3/7/2022   Do you ever feel afraid of your partner? N   Are you in a relationship with someone who physically or mentally threatens you? N   Is it safe for you to go home? Y       Fall Risk  No flowsheet data found. Pt currently taking Anticoagulant therapy? no  Pt currently taking Antiplatelet therapy? no    Coordination of Care:  1. Have you been to the ER, urgent care clinic since your last visit? Hospitalized since your last visit? yes    2. Have you seen or consulted any other health care providers outside of the [address] [place] [address] since your last visit? Include any pap smears or colon screening.  no      Please see Red banners under Allergies and Med Rec to remove outside inquires. All correct information has been verified with patient and added to chart.      Medication's patient's would liked removed has been marked not taking to be removed per Verbal order and read back per Cande Leong MD

## 2022-06-30 NOTE — TELEPHONE ENCOUNTER
[facility]  [facility]   [address]   (f) [phone]    Appointment Confirmation     Pt. Called and Appointment  confirmed for 7/6/2022 at 1400 with Mira Gutierrez MD. Pt. Instructed to arrive at [facility] [place] office and check in at the  located inside the [facility] [facility], [address]. Pt. Instructed to bring Insurance cards, a picture ID, payment method, and all medications.

## 2022-07-06 ENCOUNTER — TELEPHONE (OUTPATIENT)
Dept: CARDIOLOGY CLINIC | Age: 62
End: 2022-07-06

## 2022-07-06 ENCOUNTER — OFFICE VISIT (OUTPATIENT)
Dept: CARDIOLOGY CLINIC | Age: 62
End: 2022-07-06
Payer: MEDICARE

## 2022-07-06 VITALS
OXYGEN SATURATION: 97 % | BODY MASS INDEX: 33.19 KG/M2 | WEIGHT: 219 LBS | DIASTOLIC BLOOD PRESSURE: 78 MMHG | HEIGHT: 68 IN | HEART RATE: 48 BPM | SYSTOLIC BLOOD PRESSURE: 130 MMHG

## 2022-07-06 DIAGNOSIS — I10 ESSENTIAL HYPERTENSION WITH GOAL BLOOD PRESSURE LESS THAN 140/90: ICD-10-CM

## 2022-07-06 DIAGNOSIS — I25.10 CORONARY ARTERY DISEASE DUE TO LIPID RICH PLAQUE: ICD-10-CM

## 2022-07-06 DIAGNOSIS — Z79.899 LONG TERM CURRENT USE OF AMIODARONE: ICD-10-CM

## 2022-07-06 DIAGNOSIS — Z91.041 CONTRAST MEDIA ALLERGY: Primary | ICD-10-CM

## 2022-07-06 DIAGNOSIS — I50.22 CHRONIC SYSTOLIC CONGESTIVE HEART FAILURE (HCC): ICD-10-CM

## 2022-07-06 DIAGNOSIS — Q20.8 ABNORMALITY OF LEFT ATRIAL APPENDAGE: Primary | ICD-10-CM

## 2022-07-06 DIAGNOSIS — E78.00 PURE HYPERCHOLESTEROLEMIA: ICD-10-CM

## 2022-07-06 DIAGNOSIS — I49.5 SICK SINUS SYNDROME (HCC): ICD-10-CM

## 2022-07-06 DIAGNOSIS — I48.19 ATRIAL FIBRILLATION, PERSISTENT (HCC): ICD-10-CM

## 2022-07-06 DIAGNOSIS — Z87.19 HISTORY OF GI BLEED: ICD-10-CM

## 2022-07-06 DIAGNOSIS — I48.0 PAROXYSMAL ATRIAL FIBRILLATION (HCC): ICD-10-CM

## 2022-07-06 DIAGNOSIS — I25.83 CORONARY ARTERY DISEASE DUE TO LIPID RICH PLAQUE: ICD-10-CM

## 2022-07-06 DIAGNOSIS — Z95.1 HX OF CABG: ICD-10-CM

## 2022-07-06 DIAGNOSIS — Z79.01 ON APIXABAN THERAPY: ICD-10-CM

## 2022-07-06 DIAGNOSIS — I48.0 PAF (PAROXYSMAL ATRIAL FIBRILLATION) (HCC): Primary | ICD-10-CM

## 2022-07-06 PROCEDURE — G8754 DIAS BP LESS 90: HCPCS | Performed by: INTERNAL MEDICINE

## 2022-07-06 PROCEDURE — 93000 ELECTROCARDIOGRAM COMPLETE: CPT | Performed by: INTERNAL MEDICINE

## 2022-07-06 PROCEDURE — G8417 CALC BMI ABV UP PARAM F/U: HCPCS | Performed by: INTERNAL MEDICINE

## 2022-07-06 PROCEDURE — G8752 SYS BP LESS 140: HCPCS | Performed by: INTERNAL MEDICINE

## 2022-07-06 PROCEDURE — G9899 SCRN MAM PERF RSLTS DOC: HCPCS | Performed by: INTERNAL MEDICINE

## 2022-07-06 PROCEDURE — G8432 DEP SCR NOT DOC, RNG: HCPCS | Performed by: INTERNAL MEDICINE

## 2022-07-06 PROCEDURE — 3017F COLORECTAL CA SCREEN DOC REV: CPT | Performed by: INTERNAL MEDICINE

## 2022-07-06 PROCEDURE — 99205 OFFICE O/P NEW HI 60 MIN: CPT | Performed by: INTERNAL MEDICINE

## 2022-07-06 PROCEDURE — G8427 DOCREV CUR MEDS BY ELIG CLIN: HCPCS | Performed by: INTERNAL MEDICINE

## 2022-07-06 RX ORDER — PREDNISONE 20 MG/1
40 TABLET ORAL EVERY 12 HOURS
Qty: 4 TABLET | Refills: 0 | Status: CANCELLED | OUTPATIENT
Start: 2022-07-06 | End: 2022-07-07

## 2022-07-06 NOTE — TELEPHONE ENCOUNTER
Structural Heart CT Scan Patient Instructions   [facility]   Cardiovascular & Thoracic Surgery  Structural Heart Program     Patient: Amado Farrar    Appointment Date: 7/19/22    Heart Center Arrival Time: 10:45 AM    Scan Time: 1:00 PM    Location:     The [address] [place] [address]  [id number] (k) [phone]     PREPARING FOR YOUR CT SCAN       If you are unable to keep your appointment or have questions, please call [phone] to reschedule your scan. · Your povider has placed a lab order  for you to get your labs drawn at a [place] [facility] lab facility within 72 hours prior to arrival for your CT scan. You do not have to be fasting for the labs to be drawn. The [place] [employer] lab locations and facility hours are listed at the bottom of this message.  No caffeine, nicotine, or stimulant medication for 12 hours before the scan.  Do not eat or drink anything for 2 hours before the study.  It is acceptable to take approved AM medications as instructed.  A nurse will call you within the week prior to your exam to review your medications and instructions. If you have not received a call 24 hours prior to your procedure, call [phone] or [phone] for instructions.  Before scheduling your test, please tell your provider if you are:     o If you are pregnant, think you may be pregnant, or breastfeeding.      o If you have a history of asthma.      o Have diabetes. Your provider will instruct you about taking your diabetes medications. Some medications need to be   stopped before and after the test. You will stop taking your Metformin 24 hours prior to your CT scan. This can be resumed the day after your CT scan.     o Have an allergy to contrast, shellfish, iodine, betablockers, calcium  channel blockers.      Your provider has ordered pretreatment medication due to your allergy to contrast medication. Oral Prednisone & Diphenhydramine (Benadryl®) have been ordered for your pretreatment medications. You will take a total of 2 doses of prednisone, a steroid, before your procedure. Take the first dose at 6:00 PM the evening before your procedure and take the second dose at 6:00 AM, the morning of your procedure. Take 40 milligrams (mg) of prednisone by mouth with a small sip of water at:    6:00 PM on 7/18/22 (date). 6:00 AM on 7/19/22 (date). Take 25 milligrams (mg) of diphenhydramine (Benadryl®) with a small sip of water at:     6:00 PM on 7/18/22 (date). 6:00 AM on 7/19/22 (date). If you have any questions about when to take your medication, call the our  office at [phone] between 8:30 AM and 4:30 PM Monday - Friday. Your medication has been ordered and can be picked up from your pharmacy: [address] [place] [address]     DAY OF YOUR CT SCAN      Please check in at the [address] [place] [address] fifteen minutes prior to your interventional holding arrival time. You check in with the  located to the right of the sliding glass doors. Visitor parking is located in the parking lot [address] [place] [address].  You will come to the heart center 2 hours prior to your CT scan time. Your heart rate needs to be in a certain range to get clear images. You might need a medication called a beta blocker to help your heart beat safely before your scan. If you have an allergy to this medication please notify your provider.  Take your regular medications unless your provider gives you other instructions       The nurse will place two intravenous catheters (IVs).  You will receive an injection of radioactive material (contrast media) to help make the images clear during you scan.  If you have an allergy to contrast medication, please notify your provider before your exam.      Kelvin Tesfaye will lie on your back on the imaging table and images will be taken of your body with a special camera. The scan normally takes about 20- 30 minutes. You will need to lie still during the images. AFTER YOUR CT SCAN      You will return to the interventional holding area and stay for about 30 minutes.  Your nurse will get you something to eat and drink and review your discharge instructions.  You can return to your normal daily activities.  You should be able to drive yourself home or to work.  Drink plenty of water to help flush the dye from your system.  Resume your normal medication routine. If you take metformin, you can resume the medication the day after your CT scan. [facility] Laboratory Locations    Sites open Monday to Friday. ? indicates the location is open Saturdays. Call your preferred location for test preparation, business hours and other information you need. ? [facility] at [address] [place] [address]. [place] [address]    [phone]  Monday - Friday,  7:00 a.m. - 5:00 p.m. Saturday,   No appointment needed  [place], [address]  [phone]  Monday-Friday, 6:30 a.m. - 6:00 p.m. No appointment needed     ? [facility]  [address], [address]  Monday - Friday, 7:00 a.m. - 6:00 p.m. Saturday, 9:00 a.m. - noon    [address]  [address] [facility]  [phone]  Monday-Friday, 7:00 a.m. - 6:00  p.m. No appointment needed    ? [facility]    [facility] [address] [facility]  [address]  [phone]  Monday - Friday, 7:00 a.m. - 3:30 p.m.     Saturday, 7:00 a.m. to noon  No appointment needed

## 2022-07-06 NOTE — PROGRESS NOTES
History of Present Illness:  63 YOF referred by Dr. Cristopher So for evaluation for Watchman device. She has history of atrial fibrillation that is paroxysmal, as well as increased CHADSVASc2 score and HAS-BLED score. Currently she continues to smoke about a PPD with some mild dyspnea at times. No chest pain. She has lower extremity edema, worse on the left than the right, and has recently gotten worse despite diuretics. This has been extensively evaluated. Her EF has been in the 30s. She is here to discuss options. Impression:  1. Paroxysmal symptomatic a-fib with CHADSVASc2 score of 5.  2. History of recurrent GI bleeding, requiring transfusion and HAS-BLED score of 4.  3. Chronic Amiodarone use. 4. Chronic Eliquis use, currently tolerating. 5. Chronic stage 2 kidney disease. 6. Chronic systolic heart failure, on Bumex. 7. Ischemic cardiomyopathy with EF 30% February 2022.  8. DJD with back pain. 9. Diabetes. 10. Remote breast cancer about ten years ago with mastectomy and chemotherapy, but no chest radiation. 11. History of thyroid disorder. 12. History of waking up previously with anesthesia. Plan:  I had a lengthy discussion about risks, benefits and alternatives to Watchman device and she is willing to consider proceeding. She will need to be set up with CT scan or MALIK. Shared decision making was utilized with myself and patient. Will plan to make arrangements.       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    Anxiety     Breast CA ([facility] [address] 12/2009    S/P Lt Mastectomy and Chemo    CAD (coronary artery disease) 8/30/2010    S/P CABG X 4 ([address] to LAD, Sequential SVG to D2, Ramus, OM) 01/2011    Cardiomyopathy (HCC)     LVEF 35-40 % (08/20) 60% (08/14), 40% (2011)    Chemotherapy 4/2010    for 4 months    COPD (chronic obstructive pulmonary disease) ([address] 8/30/2010    Depression     Diabetes ([address]     GERD (gastroesophageal reflux disease)     Hyperlipidemia     Hypertension     Schatzki's ring     S/P EGD and Dilation (07/16)    Thyroid disease     Tobacco abuse        Current Outpatient Medications   Medication Sig Dispense Refill    oxyCODONE-acetaminophen (PERCOCET) 5-325 mg per tablet Take 1 Tablet by mouth every four (4) hours as needed.  traZODone (DESYREL) 100 mg tablet Take 100 mg by mouth. Take 2 tablets by mouth Every Night      zolpidem (AMBIEN) 5 mg tablet Take 5 mg by mouth nightly.  albuterol-ipratropium (DUO-NEB) 2.5 mg-0.5 mg/3 ml nebu Take 3 mL by inhalation every six (6) hours as needed.  calcium-cholecalciferol, d3, (CALCIUM 600 + D) 600-125 mg-unit tab Take  by mouth daily.  rosuvastatin (Crestor) 20 mg tablet Take 1 Tablet by mouth daily. (Patient taking differently: Take 20 mg by mouth nightly.) 90 Tablet 3    bumetanide (BUMEX) 1 mg tablet Take 1 Tablet by mouth every other day. (Patient taking differently: Take 1 mg by mouth as needed.) 90 Tablet 3    sacubitriL-valsartan (Entresto) 24-26 mg tablet Take 1 Tablet by mouth two (2) times a day. 60 Tablet 5    apixaban (Eliquis) 5 mg tablet Take 1 Tablet by mouth two (2) times a day. 60 Tablet 5    metoprolol succinate (TOPROL-XL) 25 mg XL tablet Take 1 Tablet by mouth two (2) times a day. (Patient taking differently: Take 25 mg by mouth daily.) 60 Tablet 5    nitroglycerin (NITROSTAT) 0.4 mg SL tablet 1 Tablet by SubLINGual route every five (5) minutes as needed for Chest Pain. 25 Tablet 5    amiodarone (CORDARONE) 200 mg tablet TAKE 1 TABLET EVERY DAY 90 Tablet 3    esomeprazole (NEXIUM) 20 mg capsule Take 40 mg by mouth daily.  metFORMIN (GLUCOPHAGE) 1,000 mg tablet Take 500 mg by mouth two (2) times daily (with meals).       albuterol (PROVENTIL HFA, VENTOLIN HFA) 90 mcg/actuation inhaler Take 1 Puff by inhalation every four (4) hours as needed.  levothyroxine (SYNTHROID) 150 mcg tablet Take  by mouth Daily (before breakfast).  ascorbic acid (VITAMIN C) 250 mg tablet Take 250 mg by mouth daily. Social History   reports that she has been smoking. She has been smoking about 1.00 pack per day. She has never used smokeless tobacco.   reports no history of alcohol use. Family History  family history includes Breast Problems in her mother; Diabetes in her mother; Hypertension in her mother and another family member. Review of Systems  Except as stated above include:  Constitutional: Negative for fever, chills and malaise/fatigue. HEENT: No congestion or recent URI. Gastrointestinal: No nausea, vomiting, abdominal pain, bloody stools. Pulmonary:  Negative except as stated above. Cardiac:  Negative except as stated above. Musculoskeletal: Negative except as stated above. Neurological:  No localized symptoms. Skin:  Negative except as stated above. Psych:  Negative except as stated above. Endocrine:  Negative except as stated above. PHYSICAL EXAM  BP Readings from Last 3 Encounters:   07/06/22 130/78   06/30/22 (!) 150/78   03/31/22 134/72     Pulse Readings from Last 3 Encounters:   07/06/22 (!) 48   06/30/22 (!) 48   03/31/22 (!) 50     Wt Readings from Last 3 Encounters:   07/06/22 99.3 kg (219 lb)   06/30/22 104.8 kg (231 lb)   03/31/22 99.3 kg (219 lb)     General:   Well developed, well groomed. Head/Neck:   No obvious jugular venous distention     No obvious carotid pulsations. No evidence of xanthelasma. Lungs:   No respiratory distress. Clear bilaterally. Heart:  Bearl Hoist, regular. Normal S1/S2. Palpation grossly normal.    No significant murmurs, rubs or gallops. Abdomen:   Non-acute abdomen. No obvious pulsations. Extremities:   Intact peripheral pulses. LE edema. Neurological:   Alert and oriented to person, place, time. No focal neurological deficit visually.   Skin: No obvious rash    Blood Pressure Metric:  Monitor recommended and adjustments stated if needed.

## 2022-07-06 NOTE — PROGRESS NOTES
Cain Black presents today for   Chief Complaint   Patient presents with    Follow-up     per KAMINI Baker to evaluate for watchman        Gentry Leiva preferred language for health care discussion is english/other. Is someone accompanying this pt? no    Is the patient using any DME equipment during [address]? no    Depression Screening:  3 most recent PHQ Screens 6/30/2022   Little interest or pleasure in doing things Not at all   Feeling down, depressed, irritable, or hopeless Not at all   Total Score PHQ 2 0       Learning Assessment:  Learning Assessment 10/16/2014   PRIMARY LEARNER Patient   HIGHEST LEVEL OF EDUCATION - PRIMARY LEARNER  GRADUATED HIGH SCHOOL OR GED   BARRIERS PRIMARY LEARNER NONE   CO-LEARNER CAREGIVER No   PRIMARY LANGUAGE ENGLISH   LEARNER PREFERENCE PRIMARY VIDEOS   ANSWERED BY Tiki Leiva   RELATIONSHIP SELF       Abuse Screening:  Abuse Screening Questionnaire 3/7/2022   Do you ever feel afraid of your partner? N   Are you in a relationship with someone who physically or mentally threatens you? N   Is it safe for you to go home? Y     Pt currently taking Anticoagulant therapy? Eliquis     Coordination of Care:  1. Have you been to the ER, urgent care clinic since your last visit? Hospitalized since your last visit? no    2. Have you seen or consulted any other health care providers outside of the [address] [place] [address] since your last visit? Include any pap smears or colon screening.  no

## 2022-07-07 DIAGNOSIS — Z91.041 CONTRAST MEDIA ALLERGY: ICD-10-CM

## 2022-07-07 RX ORDER — PREDNISONE 20 MG/1
TABLET ORAL
Qty: 4 TABLET | Refills: 0 | Status: SHIPPED | OUTPATIENT
Start: 2022-07-07 | End: 2022-08-15

## 2022-07-07 RX ORDER — DIPHENHYDRAMINE HCL 25 MG
25 CAPSULE ORAL EVERY 12 HOURS
Qty: 2 CAPSULE | Refills: 0 | Status: SHIPPED | OUTPATIENT
Start: 2022-07-18 | End: 2022-07-07 | Stop reason: SDUPTHER

## 2022-07-07 RX ORDER — PREDNISONE 20 MG/1
40 TABLET ORAL EVERY 12 HOURS
Qty: 4 TABLET | Refills: 0 | Status: SHIPPED | OUTPATIENT
Start: 2022-07-18 | End: 2022-07-07 | Stop reason: SDUPTHER

## 2022-07-07 RX ORDER — DIPHENHYDRAMINE HCL 25 MG
CAPSULE ORAL
Qty: 2 CAPSULE | Refills: 0 | Status: SHIPPED | OUTPATIENT
Start: 2022-07-07 | End: 2022-08-15

## 2022-07-08 ENCOUNTER — TELEPHONE (OUTPATIENT)
Dept: CARDIOLOGY CLINIC | Age: 62
End: 2022-07-08

## 2022-07-08 RX ORDER — ROSUVASTATIN CALCIUM 20 MG/1
20 TABLET, COATED ORAL DAILY
Qty: 90 TABLET | Refills: 3 | Status: CANCELLED | OUTPATIENT
Start: 2022-07-08

## 2022-07-08 NOTE — TELEPHONE ENCOUNTER
Attempted to contact pt at  number, no answer. Lvm for pt to return call to office at [phone]. Will continue to try to contact pt. Re: Pt has refills left for Crestor.

## 2022-07-08 NOTE — TELEPHONE ENCOUNTER
Requested Prescriptions     Pending Prescriptions Disp Refills    rosuvastatin (Crestor) 20 mg tablet 90 Tablet 3     Sig: Take 1 Tablet by mouth daily. Patient has no primary care provider and needs a refill on her meds. Patient also has additional med questions.  Please reach out when you get the chance

## 2022-07-11 ENCOUNTER — TELEPHONE (OUTPATIENT)
Dept: CARDIOTHORACIC SURGERY | Age: 62
End: 2022-07-11

## 2022-07-11 NOTE — TELEPHONE ENCOUNTER
Instructions given on 7/6/22 reviewed and reinforced for CT scan on 7/19/22. Pt. Provided opportunity for questions. All questions answered at this time.

## 2022-07-12 NOTE — TELEPHONE ENCOUNTER
Pt did not mean to request a refill on Crestor, needs refill on Synthroid. Advised pt that medication needs to be refilled by her PCP. Pt is in between providers currently d/t insurance coverage. Pt verbalized understanding.

## 2022-07-14 ENCOUNTER — HOSPITAL ENCOUNTER (OUTPATIENT)
Dept: LAB | Age: 62
Discharge: HOME | End: 2022-07-14
Payer: MEDICARE

## 2022-07-14 DIAGNOSIS — I48.19 ATRIAL FIBRILLATION, PERSISTENT (HCC): ICD-10-CM

## 2022-07-14 LAB
ANION GAP SERPL CALC-SCNC: 6 MMOL/L (ref 3–18)
BUN SERPL-MCNC: 24 MG/DL (ref 7–18)
BUN/CREAT SERPL: 20 (ref 12–20)
CALCIUM SERPL-MCNC: 8.5 MG/DL (ref 8.5–10.1)
CHLORIDE SERPL-SCNC: 107 MMOL/L (ref 100–111)
CO2 SERPL-SCNC: 25 MMOL/L (ref 21–32)
CREAT SERPL-MCNC: 1.2 MG/DL (ref 0.6–1.3)
GLUCOSE SERPL-MCNC: 128 MG/DL (ref 74–99)
POTASSIUM SERPL-SCNC: 4.2 MMOL/L (ref 3.5–5.5)
SODIUM SERPL-SCNC: 138 MMOL/L (ref 136–145)

## 2022-07-14 PROCEDURE — 80048 BASIC METABOLIC PNL TOTAL CA: CPT

## 2022-07-14 PROCEDURE — 36415 COLL VENOUS BLD VENIPUNCTURE: CPT

## 2022-07-14 NOTE — TELEPHONE ENCOUNTER
----- Message from Paul Burr RN sent at 7/11/2022  2:08 PM EDT -----  Regarding: Increased Coughing  Pt. Stated she has noticed an increase in coughing and shortness of breath since starting Entresto. She said she otherwise is feeling great without chest pain. No other changes in medications and she is not currently being followed by a pulmonologist. She admits she has not checking weights daily. Daily weight encouraged. Told her I would notify you as well. Please advise.

## 2022-07-14 NOTE — TELEPHONE ENCOUNTER
Contacted pt at [facility] number. Two patient Identifiers confirmed. Advised pt I was following up in regards to s/s. Pt stated she has had a wet cough but has a CTA coming up and will review afterwards with Dr Demetrio Michel. Pt stated she did see a pulmonologist previously but stopped going because they were located [place]. Pt stated she is scheduled for Wednesday for testing. Pt stated she does feel better after taking entresto. Will advise provider.

## 2022-07-15 ENCOUNTER — TELEPHONE (OUTPATIENT)
Dept: CARDIOLOGY CLINIC | Age: 62
End: 2022-07-15

## 2022-07-15 NOTE — TELEPHONE ENCOUNTER
Attempted to contact pt at  number, no answer. Lvm for pt to return call to office at [phone]. Will continue to try to contact pt.      RE: defer to PCP

## 2022-07-15 NOTE — TELEPHONE ENCOUNTER
Contacted pt at [facility]. Two patient Identifiers confirmed. Advised pt that our office cannot provide Jury excuse letters. Defer to PCP. Pt verbalized understanding.

## 2022-07-15 NOTE — TELEPHONE ENCOUNTER
Patient needs a jury exemption letter.  Patient scheduled for jury duty in august.     [address] [facility]: [phone] fax for jury exemption    Please mail copy to patients house as well

## 2022-07-19 ENCOUNTER — HOSPITAL ENCOUNTER (OUTPATIENT)
Dept: CT IMAGING | Age: 62
Discharge: HOME | End: 2022-07-19
Attending: INTERNAL MEDICINE | Admitting: INTERNAL MEDICINE
Payer: MEDICARE

## 2022-07-19 VITALS
HEIGHT: 69 IN | BODY MASS INDEX: 33.33 KG/M2 | HEART RATE: 54 BPM | RESPIRATION RATE: 16 BRPM | DIASTOLIC BLOOD PRESSURE: 88 MMHG | OXYGEN SATURATION: 99 % | WEIGHT: 225 LBS | SYSTOLIC BLOOD PRESSURE: 168 MMHG

## 2022-07-19 DIAGNOSIS — Q20.8 ABNORMALITY OF LEFT ATRIAL APPENDAGE: ICD-10-CM

## 2022-07-19 DIAGNOSIS — I48.19 ATRIAL FIBRILLATION, PERSISTENT (HCC): ICD-10-CM

## 2022-07-19 PROCEDURE — 74011000636 HC RX REV CODE- 636: Performed by: INTERNAL MEDICINE

## 2022-07-19 PROCEDURE — 71275 CT ANGIOGRAPHY CHEST: CPT

## 2022-07-19 RX ADMIN — IOPAMIDOL 100 ML: 755 INJECTION, SOLUTION INTRAVENOUS at 11:39

## 2022-07-19 NOTE — PROGRESS NOTES
[facility]  Cardiovascular & Thoracic Surgery  Structural Heart Program  [address]  [id number]   (M) [phone] (U) [phone]     Coronary and Structural Gated Cardiac CT        PRE-PROCEDURE     Patient escorted to interventional holding from waiting area {Ambulatory. Pt is oriented to time, place, person and situation, voicing no complaints. Changed into gown and placed on monitor. Medication reconciliation performed. Lab results, code status, H&P, and allergies reviewed. PIV x 1 inserted without difficulty. she is accompanied by herself     Emergency Contact     Confirmed with patient. confirmed as the the primary point of contact for procedural updates on 07/19/22. RN Confirmed:   Pt has allergy to Shellfish, Has had contrast in past with no issues. Pt was given rx for prednisone and benadryl for pretreatment. Pt states she did not take the benadryl as she has had some bad experiences with benadryl. CT Techs made aware. Allergy to contrast dye? If yes, was pretreatment given and taken per order? NO : Allergy to beta-blockers?   /NO: Allergy to calcium-channel blockers? NO: Did pt. avoid caffeine for 12 hours before procedure? NO  : History of asthma? {YES: Labs performed in last 30 days? Lab Results   Component Value Date/Time    Creatinine 1.20 07/14/2022 08:41 AM       1115: Provider called and notified of first HR 56 reading on arrival to interventional holding unit for           1125 : Heart rate goal of 60-65 bpm achieved. CT (ext. 2464) called and  CT Tech notified. Pulse: 56   TRANSFER - OUT REPORT    Verbal report given to CT Tech  on Manjit Barrientos  being transferred to CT for ordered procedure       Report consisted of patients Situation, Background, Assessment and   Recommendations(SBAR). Information from the following report(s) SBAR, Procedure Summary and MAR was reviewed with the receiving nurse.     Lines: Opportunity for questions and clarification was provided. Patient transported with:     Radiology nurse     Pulse: 56        ): DISCHARGE Home    AVS Discharge instructions reviewed with patient and copy given to patient. All questions answered. Patient verbalized understanding to all discharge instructions. PIV removed. No hematoma or bleeding noted PIV site. No pain noted at discharge. Patient discharged with support person in stable condition. Escorted out to vehicle for transport home. normal...

## 2022-07-19 NOTE — PROGRESS NOTES
.Cath holding summary    Patient escorted to cath holding from waiting area ambulatory, alert and oriented x 4, voicing no complaints. Changed into gown and placed on monitor. NPO since [place]. Lab results, med rec and H&P reviewed on chart. PIV x 1 inserted without difficulty. Family to bedside.

## 2022-07-21 ENCOUNTER — TELEPHONE (OUTPATIENT)
Dept: CARDIOLOGY CLINIC | Age: 62
End: 2022-07-21

## 2022-07-21 DIAGNOSIS — Q25.40 ABNORMALITY OF AORTIC ARCH AND DESCENDING AORTA: Primary | ICD-10-CM

## 2022-07-21 NOTE — TELEPHONE ENCOUNTER
Pt. Called and results explained per Dr. Nia Joyner. Pt. To be evaluated by Dr. Rianna Petty prior to moving forward with Watchman procedure. Will place referral to vascular surgery. Pt. Provided opportunity for questions and had none at this time. Pt. Verbalized understanding.     ----- Message from Anali Scott MD sent at 7/21/2022 10:33 AM EDT -----  I reviewed her CT and her appendage looks okay. Unfortunately she has a aortic ulcer that was noted as an incidental finding. Can you please call the patient and let her know that were worried about a aorta. For now there is nothing urgent to do but lets place a consult to Dr. Rianna Petty from vascular surgery to evaluate.   Thank you  ----- Message -----  From: Kit Cowan Results In  Sent: 7/21/2022  10:25 AM EDT  To: Anali Scott MD

## 2022-08-03 ENCOUNTER — TELEPHONE (OUTPATIENT)
Dept: CARDIOLOGY CLINIC | Age: 62
End: 2022-08-03

## 2022-08-04 ENCOUNTER — TELEPHONE (OUTPATIENT)
Dept: CARDIOLOGY CLINIC | Age: 62
End: 2022-08-04

## 2022-08-04 NOTE — TELEPHONE ENCOUNTER
Contacted pt at [facility]. Two patient Identifiers confirmed. Advised pt that staff is checking on status of pt assistance. Pt has already used [employer] copay card. Discussed alternatives to pt assistance such as changing medications. Will advise. Pt verbalized understanding.

## 2022-08-04 NOTE — TELEPHONE ENCOUNTER
Contacted pt at [facility]. Two patient Identifiers confirmed. Advised pt will call when [employer] opens tomorrow. Pt verbalized understanding.

## 2022-08-04 NOTE — TELEPHONE ENCOUNTER
Patient would like to speak with someone regarding changing her entresto and her eliquis. Preferably to a different brand. Patient states the copay for both is too high.  Patient does not qualify for any copay cards with her insurance

## 2022-08-05 NOTE — TELEPHONE ENCOUNTER
PCP: None    Last appt: 6/30/2022  Future Appointments   Date Time Provider Jonh Chávez   8/25/2022 11:30 AM [facility] RIKI STEREO BX RM [address] [facility]   10/4/2022 10:00 AM [facility] ECHO 1 [facility] BS AMB   12/8/2022  8:45 AM Sharan Wilcox MD [facility] BS AMB       Requested Prescriptions     Pending Prescriptions Disp Refills    valsartan (DIOVAN) 40 mg tablet 30 Tablet 5     Sig: Take 1 Tablet by mouth in the morning.            Other Comments:  Verbal order and read back per MD Alessia Mueller in lue of entresto PAP being approved

## 2022-08-05 NOTE — TELEPHONE ENCOUNTER
Contacted pt at [facility] number. Two patient Identifiers confirmed. Advised pt per Dr Patty Clay. . Pt verbalized understanding.

## 2022-08-05 NOTE — TELEPHONE ENCOUNTER
Contacted pt at [address] [place] [address] number. Two patient Identifiers confirmed. Contacted christian was advised that pt needed second page dated. Will reprint paperwork and refax. Pt verbalized understanding.

## 2022-08-08 RX ORDER — VALSARTAN 40 MG/1
40 TABLET ORAL DAILY
Qty: 30 TABLET | Refills: 5 | OUTPATIENT
Start: 2022-08-08

## 2022-08-09 RX ORDER — VALSARTAN 40 MG/1
40 TABLET ORAL DAILY
Qty: 30 TABLET | Refills: 5 | Status: ON HOLD | OUTPATIENT
Start: 2022-08-09 | End: 2022-08-18

## 2022-08-09 NOTE — TELEPHONE ENCOUNTER
PCP: None    Last appt: 6/30/2022  Future Appointments   Date Time Provider Jonh Chávez   8/25/2022 11:30 AM [facility] RIKI STEREO BX RM [address] [facility]   10/4/2022 10:00 AM [facility] ECHO 1 [facility] BS AMB   12/8/2022  8:45 AM Sulaiman Matthews MD [facility] BS AMB       Requested Prescriptions     Pending Prescriptions Disp Refills    valsartan (DIOVAN) 40 mg tablet 30 Tablet 5     Sig: Take 1 Tablet by mouth in the morning.            Other Comments:  Pending approval for PAP for entresto

## 2022-08-15 RX ORDER — METFORMIN HYDROCHLORIDE 500 MG/1
500 TABLET, FILM COATED, EXTENDED RELEASE ORAL 2 TIMES DAILY
COMMUNITY

## 2022-08-15 RX ORDER — ESOMEPRAZOLE MAGNESIUM 40 MG/1
40 CAPSULE, DELAYED RELEASE ORAL DAILY
COMMUNITY

## 2022-08-15 RX ORDER — METFORMIN HYDROCHLORIDE 500 MG/1
TABLET, FILM COATED, EXTENDED RELEASE ORAL 2 TIMES DAILY
COMMUNITY
End: 2022-08-15

## 2022-08-15 NOTE — PERIOP NOTES
PRE-SURGICAL INSTRUCTIONS        Patient's Name:  Trudy Leiva      Today's Date:  8/15/2022            Covid Testing Date and Time: vaccinated    Surgery Date:  8/18/2022                Do NOT eat or drink anything, including candy, gum, or ice chips after midnight on 8/18, unless you have specific instructions from your surgeon or anesthesia provider to do so. You may brush your teeth before coming to the hospital.  No smoking 24 hours prior to the day of surgery. No alcohol 24 hours prior to the day of surgery. No recreational drugs for one week prior to the day of surgery. Leave all valuables, including money/purse, at home. Remove all jewelry, nail polish, acrylic nails, and makeup (including mascara); no lotions powders, deodorant, or perfume/cologne/after shave on the skin. Follow instruction for Hibiclens washes and CHG wipes from surgeon's office. Glasses/contact lenses and dentures may be worn to the hospital.  They will be removed prior to surgery. Call your doctor if symptoms of a cold or illness develop within 24-48 hours prior to your surgery. 11.  If you are having an outpatient procedure, please make arrangements for a responsible ADULT TO [address] [place] [address] and stay with you for 24 hours after your surgery. 12. ONE VISITOR in the hospital at this time for outpatient procedures. Exceptions may be made for surgical admissions, per nursing unit guidelines      Special Instructions:      Bring list of CURRENT medications. Bring inhaler  Bring any pertinent legal medical records. Take these medications the morning of surgery with a sip of water:  as directed by MD    Follow physician instructions about stopping anticoagulants. On the day of surgery, come in the main entrance of [facility]. Let the  at the desk know you are there for surgery. A staff member will come escort you to the surgical area on the second floor.     If you have any questions or concerns, please do not hesitate to call:     (Prior to the day of surgery) [facility] department:  [phone]   (Day of surgery) Pre-Op department:  [phone]    These surgical instructions were reviewed with Laura Lai during the [facility] phone call.

## 2022-08-17 ENCOUNTER — ANESTHESIA EVENT (OUTPATIENT)
Dept: CARDIOTHORACIC SURGERY | Age: 62
DRG: 219 | End: 2022-08-17
Payer: MEDICARE

## 2022-08-18 ENCOUNTER — HOSPITAL ENCOUNTER (INPATIENT)
Age: 62
LOS: 18 days | Discharge: HOME HEALTH | DRG: 219 | End: 2022-09-05
Attending: SURGERY | Admitting: SURGERY
Payer: MEDICARE

## 2022-08-18 ENCOUNTER — APPOINTMENT (OUTPATIENT)
Dept: GENERAL RADIOLOGY | Age: 62
DRG: 219 | End: 2022-08-18
Payer: MEDICARE

## 2022-08-18 ENCOUNTER — ANESTHESIA (OUTPATIENT)
Dept: CARDIOTHORACIC SURGERY | Age: 62
DRG: 219 | End: 2022-08-18
Payer: MEDICARE

## 2022-08-18 ENCOUNTER — APPOINTMENT (OUTPATIENT)
Dept: GENERAL RADIOLOGY | Age: 62
DRG: 219 | End: 2022-08-18
Attending: SURGERY
Payer: MEDICARE

## 2022-08-18 DIAGNOSIS — J98.09 BRONCHOMALACIA: ICD-10-CM

## 2022-08-18 DIAGNOSIS — I72.9 ANEURYSM (HCC): ICD-10-CM

## 2022-08-18 DIAGNOSIS — G93.40 ACUTE ENCEPHALOPATHY: ICD-10-CM

## 2022-08-18 DIAGNOSIS — J96.02 ACUTE RESPIRATORY FAILURE WITH HYPOXIA AND HYPERCAPNIA (HCC): Primary | ICD-10-CM

## 2022-08-18 DIAGNOSIS — J96.01 ACUTE RESPIRATORY FAILURE WITH HYPOXIA AND HYPERCAPNIA (HCC): Primary | ICD-10-CM

## 2022-08-18 DIAGNOSIS — I67.1 SACCULAR ANEURYSM: ICD-10-CM

## 2022-08-18 DIAGNOSIS — J43.8 OTHER EMPHYSEMA (HCC): ICD-10-CM

## 2022-08-18 DIAGNOSIS — I46.9 CARDIAC ARREST (HCC): ICD-10-CM

## 2022-08-18 LAB
ALBUMIN SERPL-MCNC: 3.1 G/DL (ref 3.4–5)
ALBUMIN/GLOB SERPL: 1 {RATIO} (ref 0.8–1.7)
ALP SERPL-CCNC: 79 U/L (ref 45–117)
ALT SERPL-CCNC: 11 U/L (ref 13–56)
ANION GAP BLD CALC-SCNC: 12 MMOL/L (ref 10–20)
ANION GAP SERPL CALC-SCNC: 4 MMOL/L (ref 3–18)
APTT PPP: 32.9 SEC (ref 23–36.4)
ARTERIAL PATENCY WRIST A: ABNORMAL
AST SERPL-CCNC: 14 U/L (ref 10–38)
BASE DEFICIT BLD-SCNC: 4 MMOL/L
BASE DEFICIT BLD-SCNC: 6.3 MMOL/L
BDY SITE: ABNORMAL
BILIRUB SERPL-MCNC: 0.8 MG/DL (ref 0.2–1)
BUN SERPL-MCNC: 24 MG/DL (ref 7–18)
BUN/CREAT SERPL: 18 (ref 12–20)
CA-I BLD-MCNC: 1.1 MMOL/L (ref 1.12–1.32)
CALCIUM SERPL-MCNC: 8.2 MG/DL (ref 8.5–10.1)
CHLORIDE BLD-SCNC: 108 MMOL/L (ref 98–107)
CHLORIDE SERPL-SCNC: 110 MMOL/L (ref 100–111)
CO2 BLD-SCNC: 23 MMOL/L (ref 19–24)
CO2 SERPL-SCNC: 24 MMOL/L (ref 21–32)
CREAT BLD-MCNC: 1.23 MG/DL (ref 0.6–1.3)
CREAT SERPL-MCNC: 1.35 MG/DL (ref 0.6–1.3)
FIO2 ON VENT: 100 %
GAS FLOW.O2 O2 DELIVERY SYS: ABNORMAL L/MIN
GLOBULIN SER CALC-MCNC: 3 G/DL (ref 2–4)
GLUCOSE BLD STRIP.AUTO-MCNC: 171 MG/DL (ref 70–110)
GLUCOSE BLD STRIP.AUTO-MCNC: 173 MG/DL (ref 70–110)
GLUCOSE BLD-MCNC: 144 MG/DL (ref 65–100)
GLUCOSE SERPL-MCNC: 167 MG/DL (ref 74–99)
HBA1C MFR BLD: 6.1 % (ref 4.2–5.6)
HCO3 BLD-SCNC: 21.7 MMOL/L (ref 22–26)
HCO3 BLD-SCNC: 23.9 MMOL/L (ref 22–26)
HCT VFR BLD AUTO: 28.3 % (ref 35–45)
HCT VFR BLD AUTO: 28.8 % (ref 35–45)
HGB BLD-MCNC: 8.3 G/DL (ref 12–16)
HGB BLD-MCNC: 8.5 G/DL (ref 12–16)
INR PPP: 1.2 (ref 0.8–1.2)
LACTATE BLD-SCNC: 0.73 MMOL/L (ref 0.4–2)
LACTATE BLD-SCNC: 0.92 MMOL/L (ref 0.4–2)
PCO2 BLD: 56.7 MMHG (ref 35–45)
PCO2 BLD: 57.6 MMHG (ref 35–45)
PEEP RESPIRATORY: 8 CM[H2O]
PH BLD: 7.19 [PH] (ref 7.35–7.45)
PH BLD: 7.23 [PH] (ref 7.35–7.45)
PO2 BLD: 142 MMHG (ref 80–100)
PO2 BLD: 56 MMHG (ref 80–100)
POTASSIUM BLD-SCNC: 3.6 MMOL/L (ref 3.5–5.1)
POTASSIUM SERPL-SCNC: 4.4 MMOL/L (ref 3.5–5.5)
PROT SERPL-MCNC: 6.1 G/DL (ref 6.4–8.2)
PROTHROMBIN TIME: 15.6 SEC (ref 11.5–15.2)
SAO2 % BLD: 81.6 % (ref 92–97)
SAO2 % BLD: 99 %
SERVICE CMNT-IMP: ABNORMAL
SODIUM BLD-SCNC: 141 MMOL/L (ref 136–145)
SODIUM SERPL-SCNC: 138 MMOL/L (ref 136–145)
SPECIMEN SITE: ABNORMAL
SPECIMEN TYPE: ABNORMAL
TOTAL RESP. RATE, ITRR: 18
VENTILATION MODE VENT: ABNORMAL
VT SETTING VENT: 400 ML

## 2022-08-18 PROCEDURE — 02VW3DZ RESTRICTION OF THORACIC AORTA, DESCENDING WITH INTRALUMINAL DEVICE, PERCUTANEOUS APPROACH: ICD-10-PCS | Performed by: SURGERY

## 2022-08-18 PROCEDURE — B24BZZ3 ULTRASONOGRAPHY OF HEART WITH AORTA, INTRAVASCULAR: ICD-10-PCS | Performed by: SURGERY

## 2022-08-18 PROCEDURE — 74011250636 HC RX REV CODE- 250/636: Performed by: NURSE ANESTHETIST, CERTIFIED REGISTERED

## 2022-08-18 PROCEDURE — 74011000250 HC RX REV CODE- 250: Performed by: ANESTHESIOLOGY

## 2022-08-18 PROCEDURE — 01926 ANES IVNTL RAD ICR ICAR/AORT: CPT | Performed by: NURSE ANESTHETIST, CERTIFIED REGISTERED

## 2022-08-18 PROCEDURE — 77030013797 HC KT TRNSDUC PRSSR EDWD -A: Performed by: SURGERY

## 2022-08-18 PROCEDURE — 77030002933 HC SUT MCRYL J&J -A: Performed by: SURGERY

## 2022-08-18 PROCEDURE — 82962 GLUCOSE BLOOD TEST: CPT

## 2022-08-18 PROCEDURE — 80053 COMPREHEN METABOLIC PANEL: CPT

## 2022-08-18 PROCEDURE — 74011250637 HC RX REV CODE- 250/637: Performed by: SURGERY

## 2022-08-18 PROCEDURE — 86900 BLOOD TYPING SEROLOGIC ABO: CPT

## 2022-08-18 PROCEDURE — 82947 ASSAY GLUCOSE BLOOD QUANT: CPT

## 2022-08-18 PROCEDURE — 74011000250 HC RX REV CODE- 250

## 2022-08-18 PROCEDURE — 74011250636 HC RX REV CODE- 250/636: Performed by: SURGERY

## 2022-08-18 PROCEDURE — 74011250636 HC RX REV CODE- 250/636: Performed by: ANESTHESIOLOGY

## 2022-08-18 PROCEDURE — 77030002996 HC SUT SLK J&J -A: Performed by: ANESTHESIOLOGY

## 2022-08-18 PROCEDURE — 77030013079 HC BLNKT BAIR HGGR 3M -A: Performed by: ANESTHESIOLOGY

## 2022-08-18 PROCEDURE — 2709999900 HC NON-CHARGEABLE SUPPLY: Performed by: SURGERY

## 2022-08-18 PROCEDURE — 77030020061 HC IV BLD WRMR ADMIN SET 3M -B: Performed by: ANESTHESIOLOGY

## 2022-08-18 PROCEDURE — C1769 GUIDE WIRE: HCPCS | Performed by: SURGERY

## 2022-08-18 PROCEDURE — C1768 GRAFT, VASCULAR: HCPCS | Performed by: SURGERY

## 2022-08-18 PROCEDURE — 74011000636 HC RX REV CODE- 636: Performed by: SURGERY

## 2022-08-18 PROCEDURE — 77030013058 HC DEV INFL ANGIO BSC -B: Performed by: SURGERY

## 2022-08-18 PROCEDURE — 94002 VENT MGMT INPAT INIT DAY: CPT

## 2022-08-18 PROCEDURE — 77030038692 HC WND DEB SYS IRMX -B: Performed by: SURGERY

## 2022-08-18 PROCEDURE — 94762 N-INVAS EAR/PLS OXIMTRY CONT: CPT

## 2022-08-18 PROCEDURE — 77030016441 HC APPL CLP LIG1 J&J -B: Performed by: SURGERY

## 2022-08-18 PROCEDURE — 76060000036 HC ANESTHESIA 2.5 TO 3 HR: Performed by: SURGERY

## 2022-08-18 PROCEDURE — 71045 X-RAY EXAM CHEST 1 VIEW: CPT

## 2022-08-18 PROCEDURE — C1887 CATHETER, GUIDING: HCPCS | Performed by: SURGERY

## 2022-08-18 PROCEDURE — 74011250636 HC RX REV CODE- 250/636

## 2022-08-18 PROCEDURE — 77030003390 HC NDL ANGI MRTM -A: Performed by: SURGERY

## 2022-08-18 PROCEDURE — 74011000250 HC RX REV CODE- 250: Performed by: SURGERY

## 2022-08-18 PROCEDURE — 77030005401 HC CATH RAD ARRO -A: Performed by: ANESTHESIOLOGY

## 2022-08-18 PROCEDURE — 74011000258 HC RX REV CODE- 258: Performed by: PHYSICIAN ASSISTANT

## 2022-08-18 PROCEDURE — 77030002986 HC SUT PROL J&J -A: Performed by: SURGERY

## 2022-08-18 PROCEDURE — 74011000258 HC RX REV CODE- 258: Performed by: SURGERY

## 2022-08-18 PROCEDURE — 77030008500 HC TBNG CONN PRSS BD -A: Performed by: ANESTHESIOLOGY

## 2022-08-18 PROCEDURE — 77030018719 HC DRSG PTCH ANTIMIC J&J -A: Performed by: ANESTHESIOLOGY

## 2022-08-18 PROCEDURE — C1760 CLOSURE DEV, VASC: HCPCS | Performed by: SURGERY

## 2022-08-18 PROCEDURE — 83605 ASSAY OF LACTIC ACID: CPT

## 2022-08-18 PROCEDURE — 85018 HEMOGLOBIN: CPT

## 2022-08-18 PROCEDURE — 92950 HEART/LUNG RESUSCITATION CPR: CPT

## 2022-08-18 PROCEDURE — 76010000207 HC CV SURG 2.5 TO 3 HR INTENSV-TIER 1: Performed by: SURGERY

## 2022-08-18 PROCEDURE — 74011250637 HC RX REV CODE- 250/637: Performed by: NURSE ANESTHETIST, CERTIFIED REGISTERED

## 2022-08-18 PROCEDURE — C1894 INTRO/SHEATH, NON-LASER: HCPCS | Performed by: SURGERY

## 2022-08-18 PROCEDURE — 77030019896 HC KT ARTERIAL LN TELE -B: Performed by: SURGERY

## 2022-08-18 PROCEDURE — 77030013140 HC MSK NEB VYRM -A: Performed by: ANESTHESIOLOGY

## 2022-08-18 PROCEDURE — 83036 HEMOGLOBIN GLYCOSYLATED A1C: CPT

## 2022-08-18 PROCEDURE — 86923 COMPATIBILITY TEST ELECTRIC: CPT

## 2022-08-18 PROCEDURE — 77030008683 HC TU ET CUF COVD -A: Performed by: ANESTHESIOLOGY

## 2022-08-18 PROCEDURE — 77010033711 HC HIGH FLOW OXYGEN

## 2022-08-18 PROCEDURE — 65620000000 HC RM CCU GENERAL

## 2022-08-18 PROCEDURE — 77030013797 HC KT TRNSDUC PRSSR EDWD -A: Performed by: ANESTHESIOLOGY

## 2022-08-18 PROCEDURE — 77030018673: Performed by: SURGERY

## 2022-08-18 PROCEDURE — 01926 ANES IVNTL RAD ICR ICAR/AORT: CPT | Performed by: ANESTHESIOLOGY

## 2022-08-18 PROCEDURE — 36600 WITHDRAWAL OF ARTERIAL BLOOD: CPT

## 2022-08-18 PROCEDURE — C1751 CATH, INF, PER/CENT/MIDLINE: HCPCS | Performed by: ANESTHESIOLOGY

## 2022-08-18 PROCEDURE — 74011000250 HC RX REV CODE- 250: Performed by: PHYSICIAN ASSISTANT

## 2022-08-18 PROCEDURE — 77030010507 HC ADH SKN DERMBND J&J -B: Performed by: SURGERY

## 2022-08-18 PROCEDURE — 31500 INSERT EMERGENCY AIRWAY: CPT

## 2022-08-18 PROCEDURE — 74011636637 HC RX REV CODE- 636/637: Performed by: NURSE ANESTHETIST, CERTIFIED REGISTERED

## 2022-08-18 PROCEDURE — 77030004827 HC CATH CSF LUMBR MEDT -C: Performed by: SURGERY

## 2022-08-18 PROCEDURE — 85610 PROTHROMBIN TIME: CPT

## 2022-08-18 PROCEDURE — 77030040830 HC CATH URETH FOL MDII -A: Performed by: SURGERY

## 2022-08-18 PROCEDURE — 77030026918 HC ADMN ST IV BLD BD -A: Performed by: ANESTHESIOLOGY

## 2022-08-18 PROCEDURE — 85730 THROMBOPLASTIN TIME PARTIAL: CPT

## 2022-08-18 PROCEDURE — C1725 CATH, TRANSLUMIN NON-LASER: HCPCS | Performed by: SURGERY

## 2022-08-18 PROCEDURE — C1753 CATH, INTRAVAS ULTRASOUND: HCPCS | Performed by: SURGERY

## 2022-08-18 PROCEDURE — 36620 INSERTION CATHETER ARTERY: CPT | Performed by: ANESTHESIOLOGY

## 2022-08-18 PROCEDURE — 77030004530 HC CATH ANGI DX IMGR BSC -A: Performed by: SURGERY

## 2022-08-18 DEVICE — IMPLANTABLE DEVICE: Type: IMPLANTABLE DEVICE | Site: AORTA | Status: FUNCTIONAL

## 2022-08-18 RX ORDER — AMIODARONE HYDROCHLORIDE 200 MG/1
200 TABLET ORAL DAILY
Status: DISCONTINUED | OUTPATIENT
Start: 2022-08-19 | End: 2022-09-04

## 2022-08-18 RX ORDER — HEPARIN SODIUM 1000 [USP'U]/ML
INJECTION, SOLUTION INTRAVENOUS; SUBCUTANEOUS
Status: COMPLETED
Start: 2022-08-18 | End: 2022-08-18

## 2022-08-18 RX ORDER — SUCCINYLCHOLINE CHLORIDE 20 MG/ML
INJECTION INTRAMUSCULAR; INTRAVENOUS AS NEEDED
Status: DISCONTINUED | OUTPATIENT
Start: 2022-08-18 | End: 2022-08-18 | Stop reason: HOSPADM

## 2022-08-18 RX ORDER — FAMOTIDINE 20 MG/1
20 TABLET, FILM COATED ORAL ONCE
Status: COMPLETED | OUTPATIENT
Start: 2022-08-18 | End: 2022-08-18

## 2022-08-18 RX ORDER — GLYCOPYRROLATE 0.2 MG/ML
INJECTION INTRAMUSCULAR; INTRAVENOUS AS NEEDED
Status: DISCONTINUED | OUTPATIENT
Start: 2022-08-18 | End: 2022-08-18 | Stop reason: HOSPADM

## 2022-08-18 RX ORDER — ROSUVASTATIN CALCIUM 20 MG/1
20 TABLET, COATED ORAL
Status: DISCONTINUED | OUTPATIENT
Start: 2022-08-18 | End: 2022-09-05 | Stop reason: HOSPADM

## 2022-08-18 RX ORDER — HYDRALAZINE HYDROCHLORIDE 20 MG/ML
10 INJECTION INTRAMUSCULAR; INTRAVENOUS
Status: DISCONTINUED | OUTPATIENT
Start: 2022-08-18 | End: 2022-08-27

## 2022-08-18 RX ORDER — NITROGLYCERIN 0.4 MG/1
0.4 TABLET SUBLINGUAL
Status: DISCONTINUED | OUTPATIENT
Start: 2022-08-18 | End: 2022-08-27

## 2022-08-18 RX ORDER — SODIUM CHLORIDE, SODIUM LACTATE, POTASSIUM CHLORIDE, CALCIUM CHLORIDE 600; 310; 30; 20 MG/100ML; MG/100ML; MG/100ML; MG/100ML
25 INJECTION, SOLUTION INTRAVENOUS CONTINUOUS
Status: DISCONTINUED | OUTPATIENT
Start: 2022-08-18 | End: 2022-08-18 | Stop reason: HOSPADM

## 2022-08-18 RX ORDER — ATROPINE SULFATE 0.1 MG/ML
1 INJECTION INTRAVENOUS
Status: DISCONTINUED | OUTPATIENT
Start: 2022-08-18 | End: 2022-09-05 | Stop reason: HOSPADM

## 2022-08-18 RX ORDER — METOPROLOL SUCCINATE 25 MG/1
25 TABLET, EXTENDED RELEASE ORAL 2 TIMES DAILY
Status: DISCONTINUED | OUTPATIENT
Start: 2022-08-18 | End: 2022-08-18

## 2022-08-18 RX ORDER — VALSARTAN 40 MG/1
40 TABLET ORAL DAILY
Status: DISCONTINUED | OUTPATIENT
Start: 2022-08-19 | End: 2022-08-18

## 2022-08-18 RX ORDER — HYDRALAZINE HYDROCHLORIDE 20 MG/ML
10-20 INJECTION INTRAMUSCULAR; INTRAVENOUS
Status: DISCONTINUED | OUTPATIENT
Start: 2022-08-18 | End: 2022-09-05 | Stop reason: HOSPADM

## 2022-08-18 RX ORDER — INSULIN LISPRO 100 [IU]/ML
INJECTION, SOLUTION INTRAVENOUS; SUBCUTANEOUS ONCE
Status: ACTIVE | OUTPATIENT
Start: 2022-08-18 | End: 2022-08-18

## 2022-08-18 RX ORDER — HYDROMORPHONE HYDROCHLORIDE 1 MG/ML
1 INJECTION, SOLUTION INTRAMUSCULAR; INTRAVENOUS; SUBCUTANEOUS
Status: DISCONTINUED | OUTPATIENT
Start: 2022-08-18 | End: 2022-08-31

## 2022-08-18 RX ORDER — SODIUM CHLORIDE 0.9 % (FLUSH) 0.9 %
5-40 SYRINGE (ML) INJECTION AS NEEDED
Status: DISCONTINUED | OUTPATIENT
Start: 2022-08-18 | End: 2022-08-18

## 2022-08-18 RX ORDER — PROPOFOL 10 MG/ML
INJECTION, EMULSION INTRAVENOUS
Status: DISCONTINUED
Start: 2022-08-18 | End: 2022-08-19

## 2022-08-18 RX ORDER — HYDROMORPHONE HYDROCHLORIDE 1 MG/ML
0.5 INJECTION, SOLUTION INTRAMUSCULAR; INTRAVENOUS; SUBCUTANEOUS
Status: DISCONTINUED | OUTPATIENT
Start: 2022-08-18 | End: 2022-08-19

## 2022-08-18 RX ORDER — SODIUM CHLORIDE 0.9 % (FLUSH) 0.9 %
5-40 SYRINGE (ML) INJECTION AS NEEDED
Status: DISCONTINUED | OUTPATIENT
Start: 2022-08-18 | End: 2022-08-19

## 2022-08-18 RX ORDER — MAGNESIUM SULFATE 100 %
4 CRYSTALS MISCELLANEOUS AS NEEDED
Status: DISCONTINUED | OUTPATIENT
Start: 2022-08-18 | End: 2022-08-19

## 2022-08-18 RX ORDER — HEPARIN SODIUM 1000 [USP'U]/ML
INJECTION, SOLUTION INTRAVENOUS; SUBCUTANEOUS AS NEEDED
Status: DISCONTINUED | OUTPATIENT
Start: 2022-08-18 | End: 2022-08-18 | Stop reason: HOSPADM

## 2022-08-18 RX ORDER — BUMETANIDE 1 MG/1
1 TABLET ORAL EVERY OTHER DAY
Status: DISCONTINUED | OUTPATIENT
Start: 2022-08-18 | End: 2022-08-18

## 2022-08-18 RX ORDER — EPHEDRINE SULFATE/0.9% NACL/PF 50 MG/5 ML
SYRINGE (ML) INTRAVENOUS AS NEEDED
Status: DISCONTINUED | OUTPATIENT
Start: 2022-08-18 | End: 2022-08-18

## 2022-08-18 RX ORDER — BUMETANIDE 1 MG/1
1 TABLET ORAL
Status: DISCONTINUED | OUTPATIENT
Start: 2022-08-18 | End: 2022-09-05 | Stop reason: HOSPADM

## 2022-08-18 RX ORDER — IODIXANOL 320 MG/ML
INJECTION, SOLUTION INTRAVASCULAR
Status: DISCONTINUED
Start: 2022-08-18 | End: 2022-08-18

## 2022-08-18 RX ORDER — LIDOCAINE HYDROCHLORIDE 10 MG/ML
INJECTION, SOLUTION EPIDURAL; INFILTRATION; INTRACAUDAL; PERINEURAL
Status: DISCONTINUED
Start: 2022-08-18 | End: 2022-08-18

## 2022-08-18 RX ORDER — SODIUM CHLORIDE 9 MG/ML
500 INJECTION, SOLUTION INTRAVENOUS ONCE
Status: COMPLETED | OUTPATIENT
Start: 2022-08-18 | End: 2022-08-19

## 2022-08-18 RX ORDER — ALBUTEROL SULFATE 0.83 MG/ML
2.5 SOLUTION RESPIRATORY (INHALATION) AS NEEDED
Status: DISCONTINUED | OUTPATIENT
Start: 2022-08-18 | End: 2022-08-19

## 2022-08-18 RX ORDER — MIDAZOLAM HYDROCHLORIDE 1 MG/ML
INJECTION, SOLUTION INTRAMUSCULAR; INTRAVENOUS AS NEEDED
Status: DISCONTINUED | OUTPATIENT
Start: 2022-08-18 | End: 2022-08-18 | Stop reason: HOSPADM

## 2022-08-18 RX ORDER — NOREPINEPHRINE BITARTRATE/D5W 8 MG/250ML
.5-5 PLASTIC BAG, INJECTION (ML) INTRAVENOUS
Status: DISCONTINUED | OUTPATIENT
Start: 2022-08-19 | End: 2022-08-21

## 2022-08-18 RX ORDER — SODIUM CHLORIDE 0.9 % (FLUSH) 0.9 %
5-40 SYRINGE (ML) INJECTION EVERY 8 HOURS
Status: DISCONTINUED | OUTPATIENT
Start: 2022-08-18 | End: 2022-08-18 | Stop reason: HOSPADM

## 2022-08-18 RX ORDER — IPRATROPIUM BROMIDE AND ALBUTEROL SULFATE 2.5; .5 MG/3ML; MG/3ML
3 SOLUTION RESPIRATORY (INHALATION)
Status: DISCONTINUED | OUTPATIENT
Start: 2022-08-18 | End: 2022-08-18

## 2022-08-18 RX ORDER — FENTANYL CITRATE 50 UG/ML
INJECTION, SOLUTION INTRAMUSCULAR; INTRAVENOUS AS NEEDED
Status: DISCONTINUED | OUTPATIENT
Start: 2022-08-18 | End: 2022-08-18 | Stop reason: HOSPADM

## 2022-08-18 RX ORDER — FENTANYL CITRATE 50 UG/ML
25 INJECTION, SOLUTION INTRAMUSCULAR; INTRAVENOUS AS NEEDED
Status: DISCONTINUED | OUTPATIENT
Start: 2022-08-18 | End: 2022-08-19

## 2022-08-18 RX ORDER — METOPROLOL SUCCINATE 25 MG/1
25 TABLET, EXTENDED RELEASE ORAL
Status: DISCONTINUED | OUTPATIENT
Start: 2022-08-18 | End: 2022-09-03

## 2022-08-18 RX ORDER — MAGNESIUM SULFATE 100 %
4 CRYSTALS MISCELLANEOUS AS NEEDED
Status: DISCONTINUED | OUTPATIENT
Start: 2022-08-18 | End: 2022-08-18 | Stop reason: HOSPADM

## 2022-08-18 RX ORDER — LIDOCAINE HYDROCHLORIDE 10 MG/ML
0.1 INJECTION, SOLUTION EPIDURAL; INFILTRATION; INTRACAUDAL; PERINEURAL AS NEEDED
Status: DISCONTINUED | OUTPATIENT
Start: 2022-08-18 | End: 2022-08-18 | Stop reason: HOSPADM

## 2022-08-18 RX ORDER — LEVOTHYROXINE SODIUM 150 UG/1
150 TABLET ORAL
Status: DISCONTINUED | OUTPATIENT
Start: 2022-08-19 | End: 2022-09-05 | Stop reason: HOSPADM

## 2022-08-18 RX ORDER — ONDANSETRON 2 MG/ML
4 INJECTION INTRAMUSCULAR; INTRAVENOUS ONCE
Status: ACTIVE | OUTPATIENT
Start: 2022-08-18 | End: 2022-08-18

## 2022-08-18 RX ORDER — NOREPINEPHRINE BITARTRATE/D5W 8 MG/250ML
PLASTIC BAG, INJECTION (ML) INTRAVENOUS
Status: DISPENSED
Start: 2022-08-18 | End: 2022-08-19

## 2022-08-18 RX ORDER — PROPOFOL 10 MG/ML
25-75 VIAL (ML) INTRAVENOUS
Status: DISCONTINUED | OUTPATIENT
Start: 2022-08-19 | End: 2022-08-18

## 2022-08-18 RX ORDER — OXYCODONE AND ACETAMINOPHEN 5; 325 MG/1; MG/1
1 TABLET ORAL
Status: DISCONTINUED | OUTPATIENT
Start: 2022-08-18 | End: 2022-09-05 | Stop reason: HOSPADM

## 2022-08-18 RX ORDER — LIDOCAINE HYDROCHLORIDE 20 MG/ML
INJECTION, SOLUTION EPIDURAL; INFILTRATION; INTRACAUDAL; PERINEURAL AS NEEDED
Status: DISCONTINUED | OUTPATIENT
Start: 2022-08-18 | End: 2022-08-18 | Stop reason: HOSPADM

## 2022-08-18 RX ORDER — SODIUM CHLORIDE 0.9 % (FLUSH) 0.9 %
5-40 SYRINGE (ML) INJECTION AS NEEDED
Status: DISCONTINUED | OUTPATIENT
Start: 2022-08-18 | End: 2022-08-18 | Stop reason: HOSPADM

## 2022-08-18 RX ORDER — PROPOFOL 10 MG/ML
10-75 VIAL (ML) INTRAVENOUS
Status: DISCONTINUED | OUTPATIENT
Start: 2022-08-19 | End: 2022-08-19

## 2022-08-18 RX ORDER — DEXTROSE MONOHYDRATE AND SODIUM CHLORIDE 5; .45 G/100ML; G/100ML
50 INJECTION, SOLUTION INTRAVENOUS CONTINUOUS
Status: DISPENSED | OUTPATIENT
Start: 2022-08-18 | End: 2022-08-19

## 2022-08-18 RX ORDER — IODIXANOL 320 MG/ML
INJECTION, SOLUTION INTRAVASCULAR AS NEEDED
Status: DISCONTINUED | OUTPATIENT
Start: 2022-08-18 | End: 2022-08-18 | Stop reason: HOSPADM

## 2022-08-18 RX ORDER — NEOSTIGMINE METHYLSULFATE 1 MG/ML
INJECTION, SOLUTION INTRAVENOUS AS NEEDED
Status: DISCONTINUED | OUTPATIENT
Start: 2022-08-18 | End: 2022-08-18 | Stop reason: HOSPADM

## 2022-08-18 RX ORDER — DEXTROSE MONOHYDRATE 100 MG/ML
0-250 INJECTION, SOLUTION INTRAVENOUS AS NEEDED
Status: DISCONTINUED | OUTPATIENT
Start: 2022-08-18 | End: 2022-08-19

## 2022-08-18 RX ORDER — DEXTROSE MONOHYDRATE 100 MG/ML
0-250 INJECTION, SOLUTION INTRAVENOUS AS NEEDED
Status: DISCONTINUED | OUTPATIENT
Start: 2022-08-18 | End: 2022-08-18 | Stop reason: HOSPADM

## 2022-08-18 RX ORDER — NALOXONE HYDROCHLORIDE 0.4 MG/ML
0.1 INJECTION, SOLUTION INTRAMUSCULAR; INTRAVENOUS; SUBCUTANEOUS AS NEEDED
Status: DISCONTINUED | OUTPATIENT
Start: 2022-08-18 | End: 2022-08-19

## 2022-08-18 RX ORDER — EPHEDRINE SULFATE/0.9% NACL/PF 50 MG/5 ML
SYRINGE (ML) INTRAVENOUS AS NEEDED
Status: DISCONTINUED | OUTPATIENT
Start: 2022-08-18 | End: 2022-08-18 | Stop reason: HOSPADM

## 2022-08-18 RX ORDER — SODIUM CHLORIDE 0.9 % (FLUSH) 0.9 %
5-40 SYRINGE (ML) INJECTION EVERY 8 HOURS
Status: DISCONTINUED | OUTPATIENT
Start: 2022-08-18 | End: 2022-08-18

## 2022-08-18 RX ORDER — HYDRALAZINE HYDROCHLORIDE 20 MG/ML
INJECTION INTRAMUSCULAR; INTRAVENOUS
Status: DISPENSED
Start: 2022-08-18 | End: 2022-08-18

## 2022-08-18 RX ORDER — SODIUM CHLORIDE 0.9 % (FLUSH) 0.9 %
5-40 SYRINGE (ML) INJECTION EVERY 8 HOURS
Status: DISCONTINUED | OUTPATIENT
Start: 2022-08-18 | End: 2022-08-19

## 2022-08-18 RX ORDER — INSULIN LISPRO 100 [IU]/ML
INJECTION, SOLUTION INTRAVENOUS; SUBCUTANEOUS ONCE
Status: COMPLETED | OUTPATIENT
Start: 2022-08-18 | End: 2022-08-18

## 2022-08-18 RX ORDER — SODIUM CHLORIDE, SODIUM LACTATE, POTASSIUM CHLORIDE, CALCIUM CHLORIDE 600; 310; 30; 20 MG/100ML; MG/100ML; MG/100ML; MG/100ML
75 INJECTION, SOLUTION INTRAVENOUS CONTINUOUS
Status: DISCONTINUED | OUTPATIENT
Start: 2022-08-18 | End: 2022-08-18

## 2022-08-18 RX ORDER — VALSARTAN 40 MG/1
40 TABLET ORAL DAILY
Status: DISCONTINUED | OUTPATIENT
Start: 2022-08-18 | End: 2022-08-18

## 2022-08-18 RX ORDER — ROCURONIUM BROMIDE 10 MG/ML
INJECTION, SOLUTION INTRAVENOUS AS NEEDED
Status: DISCONTINUED | OUTPATIENT
Start: 2022-08-18 | End: 2022-08-18 | Stop reason: HOSPADM

## 2022-08-18 RX ORDER — PROPOFOL 10 MG/ML
INJECTION, EMULSION INTRAVENOUS AS NEEDED
Status: DISCONTINUED | OUTPATIENT
Start: 2022-08-18 | End: 2022-08-18 | Stop reason: HOSPADM

## 2022-08-18 RX ORDER — HEPARIN SODIUM 200 [USP'U]/100ML
INJECTION, SOLUTION INTRAVENOUS
Status: COMPLETED | OUTPATIENT
Start: 2022-08-18 | End: 2022-08-18

## 2022-08-18 RX ORDER — IPRATROPIUM BROMIDE AND ALBUTEROL SULFATE 2.5; .5 MG/3ML; MG/3ML
3 SOLUTION RESPIRATORY (INHALATION)
Status: DISCONTINUED | OUTPATIENT
Start: 2022-08-18 | End: 2022-09-05 | Stop reason: HOSPADM

## 2022-08-18 RX ORDER — HEPARIN SODIUM 200 [USP'U]/100ML
INJECTION, SOLUTION INTRAVENOUS
Status: DISCONTINUED
Start: 2022-08-18 | End: 2022-08-18

## 2022-08-18 RX ADMIN — CEFAZOLIN SODIUM 2 G: 1 INJECTION, POWDER, FOR SOLUTION INTRAMUSCULAR; INTRAVENOUS at 09:18

## 2022-08-18 RX ADMIN — MIDAZOLAM HYDROCHLORIDE 2 MG: 2 INJECTION, SOLUTION INTRAMUSCULAR; INTRAVENOUS at 08:40

## 2022-08-18 RX ADMIN — HYDRALAZINE HYDROCHLORIDE 10 MG: 20 INJECTION, SOLUTION INTRAMUSCULAR; INTRAVENOUS at 16:12

## 2022-08-18 RX ADMIN — Medication 3 MG: at 10:50

## 2022-08-18 RX ADMIN — SODIUM CHLORIDE, POTASSIUM CHLORIDE, SODIUM LACTATE AND CALCIUM CHLORIDE 25 ML/HR: 600; 310; 30; 20 INJECTION, SOLUTION INTRAVENOUS at 07:26

## 2022-08-18 RX ADMIN — SODIUM CHLORIDE, PRESERVATIVE FREE 10 ML: 5 INJECTION INTRAVENOUS at 14:44

## 2022-08-18 RX ADMIN — LIDOCAINE HYDROCHLORIDE 40 MG: 20 INJECTION, SOLUTION EPIDURAL; INFILTRATION; INTRACAUDAL; PERINEURAL at 08:40

## 2022-08-18 RX ADMIN — Medication 50 MEQ: at 23:01

## 2022-08-18 RX ADMIN — SACUBITRIL AND VALSARTAN 1 TABLET: 24; 26 TABLET, FILM COATED ORAL at 13:21

## 2022-08-18 RX ADMIN — FAMOTIDINE 20 MG: 20 TABLET ORAL at 07:14

## 2022-08-18 RX ADMIN — SODIUM CHLORIDE 500 ML: 9 INJECTION, SOLUTION INTRAVENOUS at 20:30

## 2022-08-18 RX ADMIN — SODIUM CHLORIDE, PRESERVATIVE FREE 10 ML: 5 INJECTION INTRAVENOUS at 22:16

## 2022-08-18 RX ADMIN — FENTANYL CITRATE 50 MCG: 50 INJECTION INTRAMUSCULAR; INTRAVENOUS at 09:58

## 2022-08-18 RX ADMIN — HYDROMORPHONE HYDROCHLORIDE 1 MG: 1 INJECTION, SOLUTION INTRAMUSCULAR; INTRAVENOUS; SUBCUTANEOUS at 13:21

## 2022-08-18 RX ADMIN — FENTANYL CITRATE 50 MCG: 50 INJECTION INTRAMUSCULAR; INTRAVENOUS at 08:40

## 2022-08-18 RX ADMIN — HYDRALAZINE HYDROCHLORIDE 10 MG: 20 INJECTION, SOLUTION INTRAMUSCULAR; INTRAVENOUS at 11:24

## 2022-08-18 RX ADMIN — FENTANYL CITRATE 50 MCG: 50 INJECTION INTRAMUSCULAR; INTRAVENOUS at 10:29

## 2022-08-18 RX ADMIN — HYDRALAZINE HYDROCHLORIDE 20 MG: 20 INJECTION, SOLUTION INTRAMUSCULAR; INTRAVENOUS at 13:23

## 2022-08-18 RX ADMIN — MIDAZOLAM HYDROCHLORIDE 2 MG: 2 INJECTION, SOLUTION INTRAMUSCULAR; INTRAVENOUS at 08:33

## 2022-08-18 RX ADMIN — NOREPINEPHRINE BITARTRATE 8 MCG/MIN: 1 INJECTION, SOLUTION, CONCENTRATE INTRAVENOUS at 23:49

## 2022-08-18 RX ADMIN — Medication 5 MG: at 09:43

## 2022-08-18 RX ADMIN — Medication 1 MG: at 22:59

## 2022-08-18 RX ADMIN — ROCURONIUM BROMIDE 50 MG: 50 INJECTION INTRAVENOUS at 08:40

## 2022-08-18 RX ADMIN — SUCCINYLCHOLINE CHLORIDE 100 MG: 20 INJECTION, SOLUTION INTRAMUSCULAR; INTRAVENOUS at 08:40

## 2022-08-18 RX ADMIN — FENTANYL CITRATE 100 MCG: 50 INJECTION INTRAMUSCULAR; INTRAVENOUS at 09:33

## 2022-08-18 RX ADMIN — PROPOFOL 150 MG: 10 INJECTION, EMULSION INTRAVENOUS at 08:40

## 2022-08-18 RX ADMIN — HYDROMORPHONE HYDROCHLORIDE 0.5 MG: 1 INJECTION, SOLUTION INTRAMUSCULAR; INTRAVENOUS; SUBCUTANEOUS at 15:19

## 2022-08-18 RX ADMIN — INSULIN LISPRO 3 UNITS: 100 INJECTION, SOLUTION INTRAVENOUS; SUBCUTANEOUS at 07:37

## 2022-08-18 RX ADMIN — GLYCOPYRROLATE 0.4 MG: 0.2 INJECTION, SOLUTION INTRAMUSCULAR; INTRAVENOUS at 10:50

## 2022-08-18 RX ADMIN — HEPARIN SODIUM 10000 UNITS: 1000 INJECTION, SOLUTION INTRAVENOUS; SUBCUTANEOUS at 09:53

## 2022-08-18 NOTE — ANESTHESIA PREPROCEDURE EVALUATION
Relevant Problems   No relevant active problems       Anesthetic History   No history of anesthetic complications            Review of Systems / Medical History  Patient summary reviewed and pertinent labs reviewed    Pulmonary    COPD: moderate      Shortness of breath and smoker         Neuro/Psych         Psychiatric history    Comments: anxiiety  Depression  RLS  Chronic pain Cardiovascular    Hypertension  Valvular problems/murmurs: mitral insufficiency    CHF: NYHA Classification II, dyspnea on exertion  Dysrhythmias : atrial fibrillation  Past MI, CAD, PAD, cardiac stents, CABG and hyperlipidemia    Exercise tolerance: <4 METS  Comments: EF 30 - 50%  MILD MR/TI   GI/Hepatic/Renal     GERD           Endo/Other    Diabetes: type 2  Hypothyroidism: well controlled  Obesity, cancer and anemia     Other Findings              Physical Exam    Airway  Mallampati: III  TM Distance: > 6 cm  Neck ROM: normal range of motion   Mouth opening: Normal     Cardiovascular    Rhythm: regular  Rate: abnormal    Murmur: Grade 2, Mitral area     Dental    Dentition: Poor dentition     Pulmonary      Decreased breath sounds: bilateral      Prolonged expiration     Abdominal  GI exam deferred       Other Findings            Anesthetic Plan    ASA: 4  Anesthesia type: general    Monitoring Plan: Arterial line and CVP  Post-op pain plan if not by surgeon: peripheral nerve block single    Induction: Intravenous  Anesthetic plan and risks discussed with: Patient

## 2022-08-18 NOTE — PROGRESS NOTES
1038: TRANSFER - IN REPORT:    Verbal report received from Roberto Carlos Anton RN(name) on Preeti Aquino  being received from CVT/OR(unit) for routine post - op      Report consisted of patients Situation, Background, Assessment and   Recommendations(SBAR). Information from the following report(s) SBAR, OR Summary, and Cardiac Rhythm sinus alda  was reviewed with the receiving nurse. Opportunity for questions and clarification was provided. Assessment completed upon patients arrival to unit and care assumed. 1108: Patient arrived to floor via bed accompanied by OR staff. Patient on O2 at 10LPM via mask with left nare trumpet in place. Artierial line to left wrist, triple lumen right IJ in place, 20g PIV to right forearm. Gonzalez catheter draining straw yellow urine. Wound vac in place to left and right groin, y-sited to wound vac.    1112: Patient remains drowsy with very little response to stimuli, O2 sats 86-88% on 10 lpm via mask, respirations labored. Narcan x 1 dose given Dr Azar Isabel (anesthesiologist). Patient began to oepn eyes and respond, o2 sats 94% on 10 lpm via mask    1115: SBP maintained above 180, received verbal order with readback for 10mg hydralazine IV every 20 minutes for sbp above 180  from Dr Azar Isabel. 1124: 10mg hydralazine IV administered. 1145: Bp recheck 165/77  1320: Hematoma noted to right groin site, pressure being  held. Anjel Deshpande RN in [address] [place] [address] made aware and will notifiy Dr Fatuma Fraga. 1330: Patient asessed br Anjel Deshpande, surgical nurse  5597: right ij dressing changed and quick clot applied due to oozing sanguineous drainage at insertion site. Firmness still noted to right groin, continuing to hold pressure  1416: Patient assessed by Dr Leanne Montoya who instructed this nurse to stop holding pressure and he will reevaluate. Patient's  at bedside.   1430: Patient reevaluated by Dr Leanne Montoya who instructed this nurse to apply ice pack to right groin and to stop holding pressure at this time. Sbp 150 per cuff pressure at this time, patient resting in bed supine with head of bed flat. Instructed to keep bilateral lower extremities straight, has history of restless leg syndrome.  at bedside  65: Dr Arline Smart at bedside to evaluate patient and hematoma to right groin which is growing in size beyond previous outline. 1612: Additional 10mg iv hydralazine dose given per verbal order from Dr Arline Smart at bedside  1635: Dr Arline Smart still at bedside, femstop applied to right groin to pressure of 142. Patient's current SBP in the 150's. Pedal pulse palpable and strong to bilateral lower extremities. 1650: Edwin hugger applied due to low temperature  1720: Pulses remain palpable and strong in bilateral lower extremities. Femstop and edwin hugger remain in place. Spoke with Cassandra Covington () on phone and gave updates. 1750: Patient switched from 4 liters nasal cannula to 6 liters via oxygen mask due to episodes of desaturation and brief periods of apnea while sleeping. Patient also is a heavy mouth breather. 1822: Femstop remains in place to right groin. Right lower extremity remains pink in color, warm to touch, pedal pulses remain strong and palpable in bilateral lower extremities. Hematoma to right groin has not expanded beyond previous outlined area, remains firm to touch. Patient resting. O2 sat 97% on 6 liters via oxygen mask. No desaturations noted. 1852Josukaren Montoya held due to concern for possible hypotension  1900: Bedside shift change report given to Wm. Silvana Underwood RN (oncoming nurse) by this nurse and Tamir Mcleod RN (offgoing nurse). Report included the following information SBAR, Kardex, MAR, and Cardiac Rhythm Sinus Michael Kamala with afib .

## 2022-08-18 NOTE — PROGRESS NOTES
Orienting Eliud Rodriguez RN   1106: Received patient from OR in bed with anesthesia at bedside, patient is not responsive, jaw thrust by Dr Cherie Miller. Patient is on simple face mask with O2 sats 89-91%. Bilateral groin incisions with Prevena's in place, sites C/D/I.     1110: Dr Cherie Miller administered 0.4 mg of IV Narcan. Verbal orders from Dr Cherie Miller for IV hydralazine for goal SBP <180.    1115: Patient is awake but drowsy, nodding appropriately, following commands. 1200: 's via Elizabeth but 170's via non-invasive suff pressure, called Dr Matilde Nieto, administer PRN Hydralazine based on cuff pressure per MD.    1320: R groin hematoma noted, patient c/o pain, pressure held x10 min. 1330: Dr Matilde Nieto at bedside to see patient, hold pressure as needed and keep SBP<180    1410: Dr Matilde Nieto aware of hematoma still present s/p pressure being held for approximately 40 minutes. 1415: Dr Matilde Nieto at bedside to see patient and assess R groin. Per MD, stop holding pressure so he can assess site. 1420: Dr Matilde Nieto back to assess R groin. Continue ice and continue to monitor. 1515: Dr Matilde Nieto aware of enlarging hematoma, instructed this writer to hold pressure again and MD would be back to reassess site. Patient is at high anesthesia risk for returning to the OR per MD. OK to give another dose of 0.5 mg Dilaudid per MD. Pressure held x15 min, hematoma softened, patient tolerated fairly well.     1600: R groin hematoma larger and firmer, held pressure x min. Dr Matilde Nieto aware, telephone orders received for STAT [facility].     1610: Dr Matilde Nieto at bedside, give another dose of hydralazine 10mg now per MD.     1620: Placed Fem-Stop per MD with MD at bedside to assist with placement. Pedal pulses palpable. Will continue to monitor groin site and pulses. 1930: Dr Matilde Nieto aware of patient's mental status and respiratory status, minimally responsive and periods of apnea. Telephone orders received for ABG and labs.  BIANCA Randolph aware.

## 2022-08-18 NOTE — ACP (ADVANCE CARE PLANNING)
Advance Care Planning   Advance Care Planning Inpatient Note  [address] [place] [address]   Spiritual Care Department    Today's Date: 8/18/2022  Unit: [facility] CARDIAC SURGERY    Received request from patient. Upon review of chart and communication with care team, . Patient was/were present in the room during visit. Goals of ACP Conversation:  Discuss Advance Care planning documents    Health Care Decision Makers:    No healthcare decision makers have been documented.    Click here to complete [address] including selection of the Healthcare Decision Maker Relationship (ie \"Primary\")  Summary:  Documented Next of Kin, per patient report    Advance Care Planning Documents (Patient Wishes) on file:  None     Assessment:         Interventions:  Encouraged ongoing ACP conversation with future decision makers and loved ones    Care Preferences Communicated:  No    Outcomes/Plan:      MELIA Uribe on 8/18/2022 at 8:41 AM

## 2022-08-18 NOTE — OP NOTES
Operative Note    Patient: Karli Leiva  YOB: 1960  MRN: [id number]    Date of Procedure: 8/18/2022     Pre-Op Diagnosis: Aortic aneurysm without rupture, unspecified portion of aorta ([address] [I71.9]    Post-Op Diagnosis: Same as preoperative diagnosis. Procedure(s):  [id number] open femoral exposure  50388 catheter sheath placement in the aorta and nonselective  92431 endovascular thoracic aneurysm repair without coverage of the subclavian  7595 7-2 6 radiology S and I endovascular thoracic aneurysm repair  73961 IVUS noncoronary initial vessel  Ultrasound of left femoral artery for percutaneous access with interpretation    Surgeon(s):  Myrna Mathews MD    Surgical Assistant: Surg Asst-1: Vincent Jordan    Anesthesia: General     Estimated Blood Loss (mL):  Minimal    Complications: None    Specimens: * No specimens in log *     Implants:   Implant Name Type Inv. Item Serial No.  Lot No. LRB No. 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 Abdomen and groins were prepped draped in usual standard fashion followed by [facility] compliant guidelines for aseptic technique. Localized and did a cutdown on the right exposing the distal common femoral profunda and proximal SFA. The vessels pretty atherosclerotic but did have some soft workable areas. Did an ultrasound and recorded the picture for the chart of the left common femoral and gain percutaneous access with a single stick.   Placed a wire into the aorta under direct visualization of fluoroscopy and then placed a 6 [employer] Delisa sheath. Anticoagulated with 8000 of heparin. About a Bentson wire and a directional catheter into the thoracic aorta from the sheath and exchanged for a diagnostic catheter. Punctured the distal common femoral on the right placed a wire an 11 Maltese sheath and then exchanged the wire with an exchange catheter for a double-J Amplatz wire. Performed IVUS from the descending aorta to the abdominal and right iliac confirming measurements for stent purposes. Obtained a 32 x 32 x 100 TEVAR stent. Brought this in the position and then performed the angiogram of the aorta. This clearly defined the location of the saccular aneurysm using this is a map I deployed the stent with good coverage of the saccular aneurysm. I was careful not to cover the celiac artery. Follow-up angiogram shows fully deployed stent and resolution of the saccular aneurysm and a patent celiac artery. There was no dissection or complication encountered. We wired the diagnostic catheter and removed it and performed an Angio-Seal to the left common femoral.  There is no complication encountered. Remove the wire and delivery catheter on the right and repaired the femoral artery with interrupted 5-0 Prolene sutures. Irrigated and closed with 2-0 Monocryl interrupted for the femoral fascia. 3-0 Monocryl for the subcutaneous space for Monocryl and Dermabond glue for the skin and a wound management system was applied. I did washout with an antibiotic irrigation.   She was extubated and transferred to recovery in stable condition    Electronically Signed by Miguel Santiago MD on 8/18/2022 at 11:13 AM

## 2022-08-18 NOTE — ANESTHESIA POSTPROCEDURE EVALUATION
Procedure(s):  THORACIC ANEURYSM REPAIR ENDOVASCULAR (TEVAR). general    Anesthesia Post Evaluation      Multimodal analgesia: multimodal analgesia used between 6 hours prior to anesthesia start to PACU discharge  Patient location during evaluation: ICU  Patient participation: complete - patient participated  Level of consciousness: awake  Pain score: 3  Pain management: adequate  Airway patency: patent  Anesthetic complications: no  Cardiovascular status: acceptable  Respiratory status: acceptable  Hydration status: acceptable  Post anesthesia nausea and vomiting:  none  Final Post Anesthesia Temperature Assessment:  Normothermia (36.0-37.5 degrees C)      INITIAL Post-op Vital signs:   Vitals Value Taken Time   /75 08/18/22 1200   Temp 36.6 °C (97.9 °F) 08/18/22 1115   Pulse 45 08/18/22 1234   Resp 20 08/18/22 1234   SpO2 96 % 08/18/22 1234   Vitals shown include unvalidated device data.

## 2022-08-18 NOTE — PROGRESS NOTES
conducted a pre-surgery visit with Amelia Chinchilla, who is a 63 y.o.,female. The  provided the following Interventions:  Initiated a relationship of care and support. Offered prayer and assurance of continued prayers on patient's behalf. Patient requested to void here current document for advance care planning but we did  not have a copy of that document. Patient informed that legal next of kin will now make decisions. Plan:  Chaplains will continue to follow and will provide pastoral care on an as needed/requested basis.  recommends bedside caregivers page  on duty if patient shows signs of acute spiritual or emotional distress.    Reiseñor 3   Board Certified [address] [place] [address]   [phone]

## 2022-08-18 NOTE — PROGRESS NOTES
TRANSFER - OUT REPORT:    Verbal report given to True Crook, RN and Eliud Rodriguez RN on Mesfin Peoples  being transferred to CVT ICU for routine post - op       Report consisted of patients Situation, Background, Assessment and   Recommendations(SBAR). Information from the following report(s) SBAR, Kardex, Procedure Summary, Intake/Output, MAR, and Recent Results was reviewed with the receiving nurse. Lines:   Peripheral IV 08/18/22 Posterior;Right Forearm (Active)   Site Assessment Clean, dry, & intact 08/18/22 0922   Phlebitis Assessment 0 08/18/22 0922   Infiltration Assessment 0 08/18/22 0711   Dressing Status Clean, dry, & intact 08/18/22 0922   Dressing Type Transparent;Tape 08/18/22 0922   Hub Color/Line Status Pink 08/18/22 0922   Alcohol Cap Used No 08/18/22 0711       Arterial Line 08/18/22 Left Radial artery (Active)        Opportunity for questions and clarification was provided.       Patient transported with:   Monitor  Registered Nurse

## 2022-08-18 NOTE — H&P
Surgery History and Physical    Subjective:      Moshe Cardona is a 63 y.o.  female who presents with saccular aneurysm of the descending thoracic aorta. Please see scanned in H&P. Patient has a planned watchman procedure and will require invasive cardiac access. Has chronic anemia requiring frequent transfusions.     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    Anxiety     Breast CA ([address] 12/2009    S/P Lt Mastectomy and Chemo    CAD (coronary artery disease) 08/30/2010    S/P CABG X 4 ([address] to LAD, Sequential SVG to D2, Ramus, OM) 01/2011    Cardiomyopathy ([address]     LVEF 35-40 % (08/20) 60% (08/14), 40% (2011)    Chemotherapy 04/2010    for 4 months    CHF (congestive heart failure) (HCC)     Chronic kidney disease     stage 2 per cardiac note cc    Chronic pain     COPD (chronic obstructive pulmonary disease) ([address] 08/30/2010    Depression     Diabetes (HCC)     GERD (gastroesophageal reflux disease)     GI bleed     History of blood transfusion     2010 and 1/2022    Hx of heart artery stent 2010    x 10  (4, then 4, then 2)    Hyperlipidemia     Hypertension     MI (myocardial infarction) ([address] 2009    x 3    Schatzki's ring     S/P EGD and Dilation (07/16)    Thyroid disease     hypothyroid    Tobacco abuse       Past Surgical History:   Procedure Laterality Date    COLONOSCOPY N/A 5/3/2017    COLON  performed by Dolores Knox MD at [facility] ENDOSCOPY    COLONOSCOPY N/A 2/15/2021 COLONOSCOPY performed by Fernando Hernandez MD at [facility] ENDOSCOPY    HX APPENDECTOMY      HX CHOLECYSTECTOMY      HX GYN      tubal ligation    HX HEART CATHETERIZATION  11/11/09; 10/21/09    HX HEART CATHETERIZATION  10/15/10; 10/06/09    left heart    HX HEART CATHETERIZATION  11/7/2011    left heart cath, no new findings    HX OTHER SURGICAL  4/6/2010    Insertion right internal jugular single lumen MediPort. HX RADICAL MASTECTOMY Left 2/2010    left, with chemo    AR BREAST SURGERY PROCEDURE UNLISTED  2/22/10    left w/sentinel node bx    AR CABG, ARTERY-VEIN, FOUR  12/2010    AR CARDIAC SURG PROCEDURE UNLIST      stent placement before CABG    AR CHEST SURGERY PROCEDURE UNLISTED Right     Prior port placement      Social History     Tobacco Use    Smoking status: Every Day     Packs/day: 1.00     Years: 40.00     Pack years: 40.00     Types: Cigarettes    Smokeless tobacco: Never   Substance Use Topics    Alcohol use: No      Family History   Problem Relation Age of Onset    Hypertension Mother     Diabetes Mother     Breast Problems Mother         fiber cystic    Hypertension Other       Prior to Admission medications    Medication Sig Start Date End Date Taking? Authorizing Provider   esomeprazole (NEXIUM) 40 mg capsule Take  by mouth daily. Yes Provider, Historical   metFORMIN (GLUMETZA ER) 500 mg TG24 24 hour tablet Take  by mouth two (2) times a day. Yes Provider, Historical   albuterol-ipratropium (DUO-NEB) 2.5 mg-0.5 mg/3 ml nebu Take 3 mL by inhalation every six (6) hours as needed. Use every night   Yes Provider, Historical   rosuvastatin (Crestor) 20 mg tablet Take 1 Tablet by mouth daily. Patient taking differently: Take 20 mg by mouth nightly. 2/3/22  Yes Danyel James MD   bumetanide (BUMEX) 1 mg tablet Take 1 Tablet by mouth every other day. Patient taking differently: Take 1 mg by mouth as needed.  2/3/22  Yes Danyel James MD   sacubitriL-valsartan Ludwig Sandoval) 24-26 mg tablet Take 1 Tablet by mouth two (2) times a day. 2/3/22  Yes Misa Hernandez MD   apixaban (Eliquis) 5 mg tablet Take 1 Tablet by mouth two (2) times a day. 22  Yes Misa Hernandez MD   metoprolol succinate (TOPROL-XL) 25 mg XL tablet Take 1 Tablet by mouth two (2) times a day. Patient taking differently: Take 25 mg by mouth nightly. 22  Yes Misa Hernandez MD   amiodarone (CORDARONE) 200 mg tablet TAKE 1 TABLET EVERY DAY 10/5/21  Yes Misa Hernandez MD   albuterol (PROVENTIL HFA, VENTOLIN HFA) 90 mcg/actuation inhaler Take 1 Puff by inhalation every four (4) hours as needed. Yes Provider, Historical   levothyroxine (SYNTHROID) 150 mcg tablet Take  by mouth Daily (before breakfast). Yes Provider, Historical   valsartan (DIOVAN) 40 mg tablet Take 1 Tablet by mouth in the morning. Patient not taking: Reported on 8/15/2022 8/9/22   Nikita PAULINO PA-C   nitroglycerin (NITROSTAT) 0.4 mg SL tablet 1 Tablet by SubLINGual route every five (5) minutes as needed for Chest Pain. 21   Misa Hernandez MD     Allergies   Allergen Reactions    Shellfish Derived Hives     Eyes and Throat swelling         Review of Systems:    A comprehensive review of systems was negative except for that written in the History of Present Illness. Objective:     Patient Vitals for the past 8 hrs:   BP Temp Pulse Resp SpO2 Weight   22 0804 (!) 177/79 -- -- -- 100 % --   22 0754 (!) 173/84 -- -- -- 100 % --   22 0746 (!) 184/94 97.6 °F (36.4 °C) (!) 48 20 98 % 102.5 kg (226 lb)       Temp (24hrs), Av.6 °F (36.4 °C), Min:97.6 °F (36.4 °C), Max:97.6 °F (36.4 °C)      Physical Exam:  LUNG: clear to auscultation bilaterally, HEART: S1, S2 normal, ABDOMEN: soft, non-tender.  Bowel sounds normal. No masses,  no organomegaly    Labs:   Recent Results (from the past 24 hour(s))   HEMOGLOBIN A1C W/O EAG    Collection Time: 22  7:12 AM   Result Value Ref Range    Hemoglobin A1c 6.1 (H) 4.2 - 5.6 %   GLUCOSE, POC Collection Time: 08/18/22  7:23 AM   Result Value Ref Range    Glucose (POC) 173 (H) 70 - 110 mg/dL       Data Review:  CBC:   Lab Results   Component Value Date/Time    WBC 9.0 11/02/2011 03:15 PM    RBC 4.28 11/02/2011 03:15 PM    HGB 12.8 11/02/2011 03:15 PM    HCT 39.1 11/02/2011 03:15 PM    PLATELET 977 34/15/3293 03:15 PM      BMP:   Lab Results   Component Value Date/Time    Glucose 128 (H) 07/14/2022 08:41 AM    Sodium 138 07/14/2022 08:41 AM    Potassium 4.2 07/14/2022 08:41 AM    Chloride 107 07/14/2022 08:41 AM    CO2 25 07/14/2022 08:41 AM    BUN 24 (H) 07/14/2022 08:41 AM    Creatinine 1.20 07/14/2022 08:41 AM    Calcium 8.5 07/14/2022 08:41 AM       Assessment:     Principal Problem:    Saccular aneurysm (8/18/2022)      Plan:     Covered stent procedure for descending thoracic aortic saccular aneurysm    Signed By: Teresita Champagne MD     August 18, 2022

## 2022-08-18 NOTE — ANESTHESIA PROCEDURE NOTES
Arterial Line Placement    Start time: 8/18/2022 8:45 AM  End time: 8/18/2022 8:50 AM  Performed by: Fransisca Goldstein MD  Authorized by: Fransisca Goldstein MD     Pre-Procedure  Indications:  Arterial pressure monitoring and blood sampling  Preanesthetic Checklist: patient identified, risks and benefits discussed, anesthesia consent, site marked, patient being monitored, timeout performed, patient being monitored and fire risk safety assessment completed and verbalized    Timeout Time: 04:05 EDT      Procedure:   Prep:  Chlorhexidine  Seldinger Technique?: Yes    Orientation:  Left  Location:  Radial artery  Catheter size:  20 G  Number of attempts:  1  Cont Cardiac Output Sensor: No      Assessment:   Post-procedure:  Line secured and sterile dressing applied  Patient Tolerance:  Patient tolerated the procedure well with no immediate complications

## 2022-08-18 NOTE — PROGRESS NOTES
Patient has been intermittently hypertensive and coughing  Does respond to medication therapy  Answers questions appropriately  Has developed medium sized hematoma below the incision but soft and not painful, painful at surgical site when pressed on. Direct pressure was responsive. Have been seeing intermittently all day with nursing. Placed a FemoStop to appropriate pressure 20 below systolic has excellent pulse in the right foot. We will see if responds to direct pressure. Hesitant to any repeat OR unless totally necessary. We will send CBC and coags.   She is currently hemodynamically stable with a systolic of 928 by cuff

## 2022-08-19 ENCOUNTER — APPOINTMENT (OUTPATIENT)
Dept: NON INVASIVE DIAGNOSTICS | Age: 62
DRG: 219 | End: 2022-08-19
Attending: PHYSICIAN ASSISTANT
Payer: MEDICARE

## 2022-08-19 ENCOUNTER — ANESTHESIA (OUTPATIENT)
Dept: CARDIOTHORACIC SURGERY | Age: 62
DRG: 219 | End: 2022-08-19
Payer: MEDICARE

## 2022-08-19 ENCOUNTER — ANESTHESIA EVENT (OUTPATIENT)
Dept: CARDIOTHORACIC SURGERY | Age: 62
DRG: 219 | End: 2022-08-19
Payer: MEDICARE

## 2022-08-19 LAB
ALBUMIN SERPL-MCNC: 2.8 G/DL (ref 3.4–5)
ALBUMIN SERPL-MCNC: 3 G/DL (ref 3.4–5)
ALBUMIN/GLOB SERPL: 1 {RATIO} (ref 0.8–1.7)
ALBUMIN/GLOB SERPL: 1.2 {RATIO} (ref 0.8–1.7)
ALP SERPL-CCNC: 61 U/L (ref 45–117)
ALP SERPL-CCNC: 69 U/L (ref 45–117)
ALT SERPL-CCNC: 14 U/L (ref 13–56)
ALT SERPL-CCNC: 16 U/L (ref 13–56)
ANION GAP SERPL CALC-SCNC: 6 MMOL/L (ref 3–18)
ANION GAP SERPL CALC-SCNC: 7 MMOL/L (ref 3–18)
ARTERIAL PATENCY WRIST A: ABNORMAL
AST SERPL-CCNC: 21 U/L (ref 10–38)
AST SERPL-CCNC: 22 U/L (ref 10–38)
BASE DEFICIT BLD-SCNC: 2.7 MMOL/L
BASOPHILS # BLD: 0 K/UL (ref 0–0.1)
BASOPHILS # BLD: 0 K/UL (ref 0–0.1)
BASOPHILS NFR BLD: 0 % (ref 0–2)
BASOPHILS NFR BLD: 0 % (ref 0–2)
BDY SITE: ABNORMAL
BILIRUB DIRECT SERPL-MCNC: 0.5 MG/DL (ref 0–0.2)
BILIRUB SERPL-MCNC: 0.9 MG/DL (ref 0.2–1)
BILIRUB SERPL-MCNC: 0.9 MG/DL (ref 0.2–1)
BNP SERPL-MCNC: ABNORMAL PG/ML (ref 0–900)
BUN SERPL-MCNC: 25 MG/DL (ref 7–18)
BUN SERPL-MCNC: 26 MG/DL (ref 7–18)
BUN/CREAT SERPL: 19 (ref 12–20)
BUN/CREAT SERPL: 21 (ref 12–20)
CALCIUM SERPL-MCNC: 8.1 MG/DL (ref 8.5–10.1)
CALCIUM SERPL-MCNC: 8.3 MG/DL (ref 8.5–10.1)
CALCULATED R AXIS, ECG10: 62 DEGREES
CALCULATED T AXIS, ECG11: 163 DEGREES
CHLORIDE SERPL-SCNC: 109 MMOL/L (ref 100–111)
CHLORIDE SERPL-SCNC: 110 MMOL/L (ref 100–111)
CO2 SERPL-SCNC: 23 MMOL/L (ref 21–32)
CO2 SERPL-SCNC: 25 MMOL/L (ref 21–32)
CREAT SERPL-MCNC: 1.26 MG/DL (ref 0.6–1.3)
CREAT SERPL-MCNC: 1.35 MG/DL (ref 0.6–1.3)
DIAGNOSIS, 93000: NORMAL
DIFFERENTIAL METHOD BLD: ABNORMAL
DIFFERENTIAL METHOD BLD: ABNORMAL
EOSINOPHIL # BLD: 0 K/UL (ref 0–0.4)
EOSINOPHIL # BLD: 0 K/UL (ref 0–0.4)
EOSINOPHIL NFR BLD: 0 % (ref 0–5)
EOSINOPHIL NFR BLD: 0 % (ref 0–5)
ERYTHROCYTE [DISTWIDTH] IN BLOOD BY AUTOMATED COUNT: 17.8 % (ref 11.6–14.5)
ERYTHROCYTE [DISTWIDTH] IN BLOOD BY AUTOMATED COUNT: 17.9 % (ref 11.6–14.5)
GAS FLOW.O2 O2 DELIVERY SYS: ABNORMAL L/MIN
GAS FLOW.O2 SETTING OXYMISER: 20 BPM
GLOBULIN SER CALC-MCNC: 2.6 G/DL (ref 2–4)
GLOBULIN SER CALC-MCNC: 2.8 G/DL (ref 2–4)
GLUCOSE BLD STRIP.AUTO-MCNC: 104 MG/DL (ref 70–110)
GLUCOSE BLD STRIP.AUTO-MCNC: 127 MG/DL (ref 70–110)
GLUCOSE BLD STRIP.AUTO-MCNC: 131 MG/DL (ref 70–110)
GLUCOSE BLD STRIP.AUTO-MCNC: 136 MG/DL (ref 70–110)
GLUCOSE BLD STRIP.AUTO-MCNC: 158 MG/DL (ref 70–110)
GLUCOSE SERPL-MCNC: 104 MG/DL (ref 74–99)
GLUCOSE SERPL-MCNC: 114 MG/DL (ref 74–99)
HCO3 BLD-SCNC: 22.5 MMOL/L (ref 22–26)
HCT VFR BLD AUTO: 22.9 % (ref 35–45)
HCT VFR BLD AUTO: 25.1 % (ref 35–45)
HGB BLD-MCNC: 6.9 G/DL (ref 12–16)
HGB BLD-MCNC: 7.6 G/DL (ref 12–16)
HISTORY CHECKED?,CKHIST: NORMAL
IMM GRANULOCYTES # BLD AUTO: 0 K/UL (ref 0–0.04)
IMM GRANULOCYTES # BLD AUTO: 0.1 K/UL (ref 0–0.04)
IMM GRANULOCYTES NFR BLD AUTO: 0 % (ref 0–0.5)
IMM GRANULOCYTES NFR BLD AUTO: 1 % (ref 0–0.5)
LYMPHOCYTES # BLD: 0.2 K/UL (ref 0.9–3.6)
LYMPHOCYTES # BLD: 0.8 K/UL (ref 0.9–3.6)
LYMPHOCYTES NFR BLD: 1 % (ref 21–52)
LYMPHOCYTES NFR BLD: 6 % (ref 21–52)
MAGNESIUM SERPL-MCNC: 1.8 MG/DL (ref 1.6–2.6)
MCH RBC QN AUTO: 27.7 PG (ref 24–34)
MCH RBC QN AUTO: 27.8 PG (ref 24–34)
MCHC RBC AUTO-ENTMCNC: 30.1 G/DL (ref 31–37)
MCHC RBC AUTO-ENTMCNC: 30.3 G/DL (ref 31–37)
MCV RBC AUTO: 91.9 FL (ref 78–100)
MCV RBC AUTO: 92 FL (ref 78–100)
MONOCYTES # BLD: 0.9 K/UL (ref 0.05–1.2)
MONOCYTES # BLD: 1.4 K/UL (ref 0.05–1.2)
MONOCYTES NFR BLD: 11 % (ref 3–10)
MONOCYTES NFR BLD: 5 % (ref 3–10)
NEUTS BAND NFR BLD MANUAL: 1 %
NEUTS SEG # BLD: 10.2 K/UL (ref 1.8–8)
NEUTS SEG # BLD: 16 K/UL (ref 1.8–8)
NEUTS SEG NFR BLD: 82 % (ref 40–73)
NEUTS SEG NFR BLD: 93 % (ref 40–73)
NRBC # BLD: 0 K/UL (ref 0–0.01)
NRBC # BLD: 0.03 K/UL (ref 0–0.01)
NRBC BLD-RTO: 0 PER 100 WBC
NRBC BLD-RTO: 0.2 PER 100 WBC
O2/TOTAL GAS SETTING VFR VENT: 100 %
PCO2 BLD: 39.7 MMHG (ref 35–45)
PEEP RESPIRATORY: 8 CMH2O
PH BLD: 7.36 [PH] (ref 7.35–7.45)
PHOSPHATE SERPL-MCNC: 4.2 MG/DL (ref 2.5–4.9)
PHOSPHATE SERPL-MCNC: 5 MG/DL (ref 2.5–4.9)
PLATELET # BLD AUTO: 107 K/UL (ref 135–420)
PLATELET # BLD AUTO: 133 K/UL (ref 135–420)
PLATELET COMMENTS,PCOM: ABNORMAL
PMV BLD AUTO: 12.4 FL (ref 9.2–11.8)
PMV BLD AUTO: 12.5 FL (ref 9.2–11.8)
PO2 BLD: 338 MMHG (ref 80–100)
POTASSIUM SERPL-SCNC: 3.9 MMOL/L (ref 3.5–5.5)
POTASSIUM SERPL-SCNC: 4.1 MMOL/L (ref 3.5–5.5)
PROCALCITONIN SERPL-MCNC: 0.11 NG/ML
PROT SERPL-MCNC: 5.6 G/DL (ref 6.4–8.2)
PROT SERPL-MCNC: 5.6 G/DL (ref 6.4–8.2)
Q-T INTERVAL, ECG07: 546 MS
QRS DURATION, ECG06: 120 MS
QTC CALCULATION (BEZET), ECG08: 503 MS
RBC # BLD AUTO: 2.49 M/UL (ref 4.2–5.3)
RBC # BLD AUTO: 2.73 M/UL (ref 4.2–5.3)
RBC MORPH BLD: ABNORMAL
SAO2 % BLD: 99.9 % (ref 92–97)
SERVICE CMNT-IMP: ABNORMAL
SODIUM SERPL-SCNC: 139 MMOL/L (ref 136–145)
SODIUM SERPL-SCNC: 141 MMOL/L (ref 136–145)
SPECIMEN TYPE: ABNORMAL
TOTAL RESP. RATE, ITRR: 25
TROPONIN-HIGH SENSITIVITY: 42 NG/L (ref 0–54)
TROPONIN-HIGH SENSITIVITY: 49 NG/L (ref 0–54)
VENTILATION MODE VENT: ABNORMAL
VENTRICULAR RATE, ECG03: 51 BPM
VT SETTING VENT: 400 ML
WBC # BLD AUTO: 12.5 K/UL (ref 4.6–13.2)
WBC # BLD AUTO: 17.1 K/UL (ref 4.6–13.2)

## 2022-08-19 PROCEDURE — 77030002996 HC SUT SLK J&J -A: Performed by: SURGERY

## 2022-08-19 PROCEDURE — 77030002986 HC SUT PROL J&J -A: Performed by: SURGERY

## 2022-08-19 PROCEDURE — 74011250636 HC RX REV CODE- 250/636: Performed by: PHYSICIAN ASSISTANT

## 2022-08-19 PROCEDURE — 01270 ANES PX ARTERIES UPR LEG NOS: CPT | Performed by: ANESTHESIOLOGY

## 2022-08-19 PROCEDURE — 83880 ASSAY OF NATRIURETIC PEPTIDE: CPT

## 2022-08-19 PROCEDURE — 74011000258 HC RX REV CODE- 258: Performed by: NURSE ANESTHETIST, CERTIFIED REGISTERED

## 2022-08-19 PROCEDURE — 84100 ASSAY OF PHOSPHORUS: CPT

## 2022-08-19 PROCEDURE — 93005 ELECTROCARDIOGRAM TRACING: CPT

## 2022-08-19 PROCEDURE — 83735 ASSAY OF MAGNESIUM: CPT

## 2022-08-19 PROCEDURE — 77030010507 HC ADH SKN DERMBND J&J -B: Performed by: SURGERY

## 2022-08-19 PROCEDURE — 0J9C0ZX DRAINAGE OF PELVIC REGION SUBCUTANEOUS TISSUE AND FASCIA, OPEN APPROACH, DIAGNOSTIC: ICD-10-PCS | Performed by: SURGERY

## 2022-08-19 PROCEDURE — 77030002933 HC SUT MCRYL J&J -A: Performed by: SURGERY

## 2022-08-19 PROCEDURE — 77030002987 HC SUT PROL J&J -B: Performed by: SURGERY

## 2022-08-19 PROCEDURE — 74011000250 HC RX REV CODE- 250: Performed by: PHYSICIAN ASSISTANT

## 2022-08-19 PROCEDURE — 77030013708 HC HNDPC SUC IRR PULS STRY –B: Performed by: SURGERY

## 2022-08-19 PROCEDURE — 82962 GLUCOSE BLOOD TEST: CPT

## 2022-08-19 PROCEDURE — 80053 COMPREHEN METABOLIC PANEL: CPT

## 2022-08-19 PROCEDURE — 74011250636 HC RX REV CODE- 250/636: Performed by: SURGERY

## 2022-08-19 PROCEDURE — 36600 WITHDRAWAL OF ARTERIAL BLOOD: CPT

## 2022-08-19 PROCEDURE — 85025 COMPLETE CBC W/AUTO DIFF WBC: CPT

## 2022-08-19 PROCEDURE — 74011000258 HC RX REV CODE- 258: Performed by: INTERNAL MEDICINE

## 2022-08-19 PROCEDURE — 74011250636 HC RX REV CODE- 250/636: Performed by: NURSE ANESTHETIST, CERTIFIED REGISTERED

## 2022-08-19 PROCEDURE — APPSS30 APP SPLIT SHARED TIME 16-30 MINUTES: Performed by: PHYSICIAN ASSISTANT

## 2022-08-19 PROCEDURE — 94003 VENT MGMT INPAT SUBQ DAY: CPT

## 2022-08-19 PROCEDURE — 76010000113 HC CV SURG 1 TO 1.5 HR: Performed by: SURGERY

## 2022-08-19 PROCEDURE — 30233N1 TRANSFUSION OF NONAUTOLOGOUS RED BLOOD CELLS INTO PERIPHERAL VEIN, PERCUTANEOUS APPROACH: ICD-10-PCS | Performed by: INTERNAL MEDICINE

## 2022-08-19 PROCEDURE — 36430 TRANSFUSION BLD/BLD COMPNT: CPT

## 2022-08-19 PROCEDURE — 82803 BLOOD GASES ANY COMBINATION: CPT

## 2022-08-19 PROCEDURE — 01270 ANES PX ARTERIES UPR LEG NOS: CPT | Performed by: NURSE ANESTHETIST, CERTIFIED REGISTERED

## 2022-08-19 PROCEDURE — P9016 RBC LEUKOCYTES REDUCED: HCPCS

## 2022-08-19 PROCEDURE — 99291 CRITICAL CARE FIRST HOUR: CPT | Performed by: INTERNAL MEDICINE

## 2022-08-19 PROCEDURE — 74011250636 HC RX REV CODE- 250/636: Performed by: INTERNAL MEDICINE

## 2022-08-19 PROCEDURE — 93306 TTE W/DOPPLER COMPLETE: CPT

## 2022-08-19 PROCEDURE — 2709999900 HC NON-CHARGEABLE SUPPLY: Performed by: SURGERY

## 2022-08-19 PROCEDURE — 93308 TTE F-UP OR LMTD: CPT

## 2022-08-19 PROCEDURE — 77030018836 HC SOL IRR NACL ICUM -A: Performed by: SURGERY

## 2022-08-19 PROCEDURE — 74011000258 HC RX REV CODE- 258: Performed by: PHYSICIAN ASSISTANT

## 2022-08-19 PROCEDURE — 94762 N-INVAS EAR/PLS OXIMTRY CONT: CPT

## 2022-08-19 PROCEDURE — 84145 PROCALCITONIN (PCT): CPT

## 2022-08-19 PROCEDURE — 74011636637 HC RX REV CODE- 636/637: Performed by: PHYSICIAN ASSISTANT

## 2022-08-19 PROCEDURE — 77030013079 HC BLNKT BAIR HGGR 3M -A: Performed by: ANESTHESIOLOGY

## 2022-08-19 PROCEDURE — 65620000000 HC RM CCU GENERAL

## 2022-08-19 PROCEDURE — 2709999900 HC NON-CHARGEABLE SUPPLY

## 2022-08-19 PROCEDURE — 84484 ASSAY OF TROPONIN QUANT: CPT

## 2022-08-19 PROCEDURE — 77030011265 HC ELECTRD BLD HEX COVD -A: Performed by: SURGERY

## 2022-08-19 PROCEDURE — 80076 HEPATIC FUNCTION PANEL: CPT

## 2022-08-19 PROCEDURE — 76060000033 HC ANESTHESIA 1 TO 1.5 HR: Performed by: SURGERY

## 2022-08-19 PROCEDURE — 77030038692 HC WND DEB SYS IRMX -B: Performed by: SURGERY

## 2022-08-19 PROCEDURE — 74011000250 HC RX REV CODE- 250: Performed by: NURSE ANESTHETIST, CERTIFIED REGISTERED

## 2022-08-19 RX ORDER — CHLORHEXIDINE GLUCONATE 1.2 MG/ML
10 RINSE ORAL EVERY 12 HOURS
Status: DISCONTINUED | OUTPATIENT
Start: 2022-08-19 | End: 2022-08-31

## 2022-08-19 RX ORDER — CHLORHEXIDINE GLUCONATE 4 G/100ML
SOLUTION TOPICAL
Status: DISPENSED
Start: 2022-08-19 | End: 2022-08-20

## 2022-08-19 RX ORDER — FENTANYL CITRATE 50 UG/ML
INJECTION, SOLUTION INTRAMUSCULAR; INTRAVENOUS AS NEEDED
Status: DISCONTINUED | OUTPATIENT
Start: 2022-08-19 | End: 2022-08-19 | Stop reason: HOSPADM

## 2022-08-19 RX ORDER — DEXAMETHASONE SODIUM PHOSPHATE 4 MG/ML
6 INJECTION, SOLUTION INTRA-ARTICULAR; INTRALESIONAL; INTRAMUSCULAR; INTRAVENOUS; SOFT TISSUE EVERY 6 HOURS
Status: COMPLETED | OUTPATIENT
Start: 2022-08-19 | End: 2022-08-20

## 2022-08-19 RX ORDER — INSULIN LISPRO 100 [IU]/ML
INJECTION, SOLUTION INTRAVENOUS; SUBCUTANEOUS EVERY 6 HOURS
Status: DISCONTINUED | OUTPATIENT
Start: 2022-08-19 | End: 2022-09-02

## 2022-08-19 RX ORDER — MIDAZOLAM IN 0.9 % SOD.CHLORID 1 MG/ML
0-4 PLASTIC BAG, INJECTION (ML) INTRAVENOUS
Status: DISCONTINUED | OUTPATIENT
Start: 2022-08-19 | End: 2022-08-22

## 2022-08-19 RX ORDER — CEFAZOLIN SODIUM 1 G/3ML
INJECTION, POWDER, FOR SOLUTION INTRAMUSCULAR; INTRAVENOUS AS NEEDED
Status: DISCONTINUED | OUTPATIENT
Start: 2022-08-19 | End: 2022-08-19 | Stop reason: HOSPADM

## 2022-08-19 RX ORDER — SODIUM BICARBONATE 1 MEQ/ML
SYRINGE (ML) INTRAVENOUS
Status: COMPLETED | OUTPATIENT
Start: 2022-08-18 | End: 2022-08-18

## 2022-08-19 RX ORDER — FUROSEMIDE 10 MG/ML
40 INJECTION INTRAMUSCULAR; INTRAVENOUS ONCE
Status: COMPLETED | OUTPATIENT
Start: 2022-08-19 | End: 2022-08-19

## 2022-08-19 RX ORDER — LIDOCAINE HYDROCHLORIDE 10 MG/ML
INJECTION, SOLUTION EPIDURAL; INFILTRATION; INTRACAUDAL; PERINEURAL
Status: DISCONTINUED
Start: 2022-08-19 | End: 2022-08-19 | Stop reason: WASHOUT

## 2022-08-19 RX ORDER — SODIUM CHLORIDE 9 MG/ML
250 INJECTION, SOLUTION INTRAVENOUS AS NEEDED
Status: DISCONTINUED | OUTPATIENT
Start: 2022-08-19 | End: 2022-08-22

## 2022-08-19 RX ORDER — MAGNESIUM SULFATE HEPTAHYDRATE 40 MG/ML
2 INJECTION, SOLUTION INTRAVENOUS ONCE
Status: COMPLETED | OUTPATIENT
Start: 2022-08-19 | End: 2022-08-19

## 2022-08-19 RX ORDER — ROCURONIUM BROMIDE 10 MG/ML
INJECTION, SOLUTION INTRAVENOUS AS NEEDED
Status: DISCONTINUED | OUTPATIENT
Start: 2022-08-19 | End: 2022-08-19 | Stop reason: HOSPADM

## 2022-08-19 RX ORDER — EPINEPHRINE 0.1 MG/ML
INJECTION INTRACARDIAC; INTRAVENOUS
Status: COMPLETED | OUTPATIENT
Start: 2022-08-18 | End: 2022-08-18

## 2022-08-19 RX ORDER — HEPARIN SODIUM 200 [USP'U]/100ML
INJECTION, SOLUTION INTRAVENOUS
Status: DISCONTINUED
Start: 2022-08-19 | End: 2022-08-19 | Stop reason: WASHOUT

## 2022-08-19 RX ORDER — PROPOFOL 10 MG/ML
INJECTION, EMULSION INTRAVENOUS AS NEEDED
Status: DISCONTINUED | OUTPATIENT
Start: 2022-08-19 | End: 2022-08-19 | Stop reason: HOSPADM

## 2022-08-19 RX ORDER — SODIUM CHLORIDE 9 MG/ML
INJECTION, SOLUTION INTRAVENOUS
Status: DISCONTINUED | OUTPATIENT
Start: 2022-08-19 | End: 2022-08-19 | Stop reason: HOSPADM

## 2022-08-19 RX ORDER — SODIUM CHLORIDE 9 MG/ML
10 INJECTION, SOLUTION INTRAVENOUS CONTINUOUS
Status: DISCONTINUED | OUTPATIENT
Start: 2022-08-19 | End: 2022-08-23

## 2022-08-19 RX ADMIN — SODIUM CHLORIDE, PRESERVATIVE FREE 20 MG: 5 INJECTION INTRAVENOUS at 21:50

## 2022-08-19 RX ADMIN — PROPOFOL 50 MG: 10 INJECTION, EMULSION INTRAVENOUS at 14:31

## 2022-08-19 RX ADMIN — CHLORHEXIDINE GLUCONATE 0.12% ORAL RINSE 10 ML: 1.2 LIQUID ORAL at 01:07

## 2022-08-19 RX ADMIN — Medication 2 UNITS: at 17:11

## 2022-08-19 RX ADMIN — SODIUM CHLORIDE, PRESERVATIVE FREE 20 MG: 5 INJECTION INTRAVENOUS at 01:07

## 2022-08-19 RX ADMIN — FENTANYL CITRATE 200 MCG/HR: 0.05 INJECTION, SOLUTION INTRAMUSCULAR; INTRAVENOUS at 23:34

## 2022-08-19 RX ADMIN — FUROSEMIDE 40 MG: 10 INJECTION, SOLUTION INTRAMUSCULAR; INTRAVENOUS at 05:54

## 2022-08-19 RX ADMIN — CHLORHEXIDINE GLUCONATE 0.12% ORAL RINSE 10 ML: 1.2 LIQUID ORAL at 21:50

## 2022-08-19 RX ADMIN — SODIUM CHLORIDE 10 ML/HR: 9 INJECTION, SOLUTION INTRAVENOUS at 08:20

## 2022-08-19 RX ADMIN — DEXAMETHASONE SODIUM PHOSPHATE 6 MG: 4 INJECTION, SOLUTION INTRA-ARTICULAR; INTRALESIONAL; INTRAMUSCULAR; INTRAVENOUS; SOFT TISSUE at 21:50

## 2022-08-19 RX ADMIN — MIDAZOLAM 2 MG/HR: 5 INJECTION INTRAMUSCULAR; INTRAVENOUS at 18:21

## 2022-08-19 RX ADMIN — DEXAMETHASONE SODIUM PHOSPHATE 6 MG: 4 INJECTION, SOLUTION INTRA-ARTICULAR; INTRALESIONAL; INTRAMUSCULAR; INTRAVENOUS; SOFT TISSUE at 09:52

## 2022-08-19 RX ADMIN — SODIUM CHLORIDE: 900 INJECTION, SOLUTION INTRAVENOUS at 14:27

## 2022-08-19 RX ADMIN — SODIUM CHLORIDE, PRESERVATIVE FREE 20 MG: 5 INJECTION INTRAVENOUS at 08:51

## 2022-08-19 RX ADMIN — FENTANYL CITRATE 50 MCG: 50 INJECTION INTRAMUSCULAR; INTRAVENOUS at 14:48

## 2022-08-19 RX ADMIN — FENTANYL CITRATE 50 MCG: 50 INJECTION INTRAMUSCULAR; INTRAVENOUS at 14:31

## 2022-08-19 RX ADMIN — FENTANYL CITRATE 200 MCG/HR: 0.05 INJECTION, SOLUTION INTRAMUSCULAR; INTRAVENOUS at 15:48

## 2022-08-19 RX ADMIN — CEFAZOLIN SODIUM 2 G: 1 INJECTION, POWDER, FOR SOLUTION INTRAMUSCULAR; INTRAVENOUS at 14:36

## 2022-08-19 RX ADMIN — CHLORHEXIDINE GLUCONATE 0.12% ORAL RINSE 10 ML: 1.2 LIQUID ORAL at 08:51

## 2022-08-19 RX ADMIN — MAGNESIUM SULFATE HEPTAHYDRATE 2 G: 40 INJECTION, SOLUTION INTRAVENOUS at 04:52

## 2022-08-19 RX ADMIN — HYDRALAZINE HYDROCHLORIDE 10 MG: 20 INJECTION, SOLUTION INTRAMUSCULAR; INTRAVENOUS at 15:45

## 2022-08-19 RX ADMIN — FENTANYL CITRATE 50 MCG/HR: 0.05 INJECTION, SOLUTION INTRAMUSCULAR; INTRAVENOUS at 01:32

## 2022-08-19 RX ADMIN — DEXAMETHASONE SODIUM PHOSPHATE 6 MG: 4 INJECTION, SOLUTION INTRA-ARTICULAR; INTRALESIONAL; INTRAMUSCULAR; INTRAVENOUS; SOFT TISSUE at 15:50

## 2022-08-19 RX ADMIN — ROCURONIUM BROMIDE 25 MG: 50 INJECTION INTRAVENOUS at 14:35

## 2022-08-19 NOTE — CONSULTS
[facility]  Pulmonary, Critical Care, and Sleep Medicine      Name: Matias Bryan MRN: [id number]   : 1960 Hospital: [address] [place] [address]   Date: 2022          Critical Care Initial Patient Consult    Requesting MD: Dr. 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 Anni Hancock. Seizure vs myoclonus - shortly following cardiac arrest x2. No further episodes of tremulousness or twitching noted  R groin hematoma w/ femstop in place  Hx of CAD w/ prior CABG  Hx of HFrEF - most recent EF from  30%      RECOMMENDATIONS:   Neuro: keep off sedation for now to assess neuro status   Pulm: Titrate FiO2 for goal SPO2> 90%,VAP prevention bundle, head of the bed at 30' all times. Daily sedation holiday and assessment for weaning with SBT as tolerated. Follow up post intubation CXR when completed. CVS: Actively titrate vasopressors aim MAP >65mmHg, Check cardiac panel, echo this AM  Fluids: n/a  GI: SUP, Trend LFTs, place OGT  Renal: Trend Renal indices, Strict Is/Os, Gonzalez (+)  Hem/Onc: Monitor for s/o active bleeding. Fem stop in place on R. I/D: Trend WBCs and temperature curve. Endocrine: Q6 glucoses, SSI. Metabolic:  Daily BMP, mag, phos. Trend lytes, replace as needed. Musc/Skin: no acute issues, wound care as needed  Full code     Subjective/History: This patient has been seen and evaluated at the request of Dr. Priyanka Aguilar s/p cardiac arrest.  Patient is a 63 y.o. female w/ pmhx of a fib, CAD, CHF who presented for covered stent procedure for descending thoracic aortic saccular aneurysm. Procedure performed  w/o incident.  Following procedure, patient was drowsy but answering questions appropriately per bedside RN. Per chart review, she began having oxygen desaturations and escalating oxygen requirements, eventually ending up on HFNC. Blood gas performed showed mild hypoxia and mild hypercapnia. Per chart review, patient was found in cardiac arrest and ACLS initiated with approximately 9 min of downtime. EPOC labs following cardiac arrest essentially unremarkable. ABG with mixed respiratory and metabolic acidosis. On my evaluation, patient completely unresponsive to all stimuli. No cough/gag. Breathing above ventilator. Levophed being initiated by PFM for worsening hypotension. Noted to be in sinus bradycardia at rates baseline for the patient. The patient is critically ill and can not provide additional history due to Ventilated.      Past Medical History:   Diagnosis Date    A-fib ([facility] [address]     Adverse effect of anesthesia     woke up once during sx    Anemia     Aneurysm (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 [address] 12/2009    S/P Lt Mastectomy and Chemo    CAD (coronary artery disease) 08/30/2010    S/P CABG X 4 ([address] to LAD, Sequential SVG to D2, Ramus, OM) 01/2011    Cardiomyopathy ([facility] [address]     LVEF 35-40 % (08/20) 60% (08/14), 40% (2011)    Chemotherapy 04/2010    for 4 months    CHF (congestive heart failure) (HCC)     Chronic kidney disease     stage 2 per cardiac note cc    Chronic pain     COPD (chronic obstructive pulmonary disease) ([facility] [address] 08/30/2010    Depression     Diabetes (HCC)     GERD (gastroesophageal reflux disease)     GI bleed     History of blood transfusion     2010 and 1/2022    Hx of heart artery stent 2010    x 10  (4, then 4, then 2)    Hyperlipidemia     Hypertension     MI (myocardial infarction) ([facility] [address] 2009    x 3    Schatzki's ring     S/P EGD and Dilation (07/16)    Thyroid disease     hypothyroid    Tobacco abuse       Past Surgical History:   Procedure Laterality Date    COLONOSCOPY N/A 5/3/2017    COLON  performed by Reta Dalal MD at [facility] ENDOSCOPY    COLONOSCOPY N/A 2/15/2021    COLONOSCOPY performed by Dorn Spatz, MD at [facility] ENDOSCOPY    HX APPENDECTOMY      HX CHOLECYSTECTOMY      HX GYN      tubal ligation    HX HEART CATHETERIZATION  11/11/09; 10/21/09    HX HEART CATHETERIZATION  10/15/10; 10/06/09    left heart    HX HEART CATHETERIZATION  11/7/2011    left heart cath, no new findings    HX OTHER SURGICAL  4/6/2010    Insertion right internal jugular single lumen MediPort. HX RADICAL MASTECTOMY Left 2/2010    left, with chemo    [facility] BREAST SURGERY PROCEDURE UNLISTED  2/22/10    left w/sentinel node bx    [facility] CABG, ARTERY-VEIN, FOUR  12/2010    [facility] CARDIAC SURG PROCEDURE UNLIST      stent placement before CABG    [facility] CHEST SURGERY PROCEDURE UNLISTED Right     Prior port placement      Prior to Admission medications    Medication Sig Start Date End Date Taking? Authorizing Provider   esomeprazole (NEXIUM) 40 mg capsule Take  by mouth daily. Yes Provider, Historical   metFORMIN (GLUMETZA ER) 500 mg TG24 24 hour tablet Take  by mouth two (2) times a day. Yes Provider, Historical   albuterol-ipratropium (DUO-NEB) 2.5 mg-0.5 mg/3 ml nebu Take 3 mL by inhalation every six (6) hours as needed. Use every night   Yes Provider, Historical   rosuvastatin (Crestor) 20 mg tablet Take 1 Tablet by mouth daily. Patient taking differently: Take 20 mg by mouth nightly. 2/3/22  Yes Sharan Wilcox MD   bumetanide (BUMEX) 1 mg tablet Take 1 Tablet by mouth every other day. Patient taking differently: Take 1 mg by mouth as needed. 2/3/22  Yes Sharan Wilcox MD   sacubitriL-valsartan Camron Colbert) 24-26 mg tablet Take 1 Tablet by mouth two (2) times a day. 2/3/22  Yes Sharan Wilcox MD   apixaban (Eliquis) 5 mg tablet Take 1 Tablet by mouth two (2) times a day. 2/1/22  Yes Sharan Wilcox MD   metoprolol succinate (TOPROL-XL) 25 mg XL tablet Take 1 Tablet by mouth two (2) times a day.   Patient taking differently: Take 25 mg by mouth nightly. 1/20/22  Yes Daron Foster MD   amiodarone (CORDARONE) 200 mg tablet TAKE 1 TABLET EVERY DAY 10/5/21  Yes Daron Foster MD   albuterol (PROVENTIL HFA, VENTOLIN HFA) 90 mcg/actuation inhaler Take 1 Puff by inhalation every four (4) hours as needed. Yes Provider, Historical   levothyroxine (SYNTHROID) 150 mcg tablet Take  by mouth Daily (before breakfast). Yes Provider, Historical   nitroglycerin (NITROSTAT) 0.4 mg SL tablet 1 Tablet by SubLINGual route every five (5) minutes as needed for Chest Pain.  11/30/21   Daron Foster MD     Current Facility-Administered Medications   Medication Dose Route Frequency    famotidine (PF) (PEPCID) 20 mg in 0.9% sodium chloride 10 mL injection  20 mg IntraVENous Q12H    chlorhexidine (PERIDEX) 0.12 % mouthwash 10 mL  10 mL Oral Q12H    insulin lispro (HUMALOG) injection   SubCUTAneous Q6H    [Held by provider] amiodarone (CORDARONE) tablet 200 mg  200 mg Oral DAILY    [Held by provider] levothyroxine (SYNTHROID) tablet 150 mcg  150 mcg Oral 6am    [Held by provider] rosuvastatin (CRESTOR) tablet 20 mg  20 mg Oral QHS    dextrose 5 % - 0.45% NaCl infusion  50 mL/hr IntraVENous CONTINUOUS    [Held by provider] sacubitriL-valsartan (ENTRESTO) 24-26 mg tablet 1 Tablet  1 Tablet Oral BID    [Held by provider] metoprolol succinate (TOPROL-XL) XL tablet 25 mg  25 mg Oral QHS    propofoL (DIPRIVAN) 10 mg/mL injection        NOREPINephrine (LEVOPHED) 8 mg/250 mL (32 mcg/mL) in dextrose 5% 250 mL (32 mcg/mL) infusion        NOREPINephrine (LEVOPHED) 8 mg in 5% dextrose 250mL (32 mcg/mL) infusion  0.5-50 mcg/min IntraVENous TITRATE     Allergies   Allergen Reactions    Shellfish Derived Hives     Eyes and Throat swelling      Social History     Tobacco Use    Smoking status: Every Day     Packs/day: 1.00     Years: 40.00     Pack years: 40.00     Types: Cigarettes    Smokeless tobacco: Never   Substance Use Topics    Alcohol use: No      Family History Problem Relation Age of Onset    Hypertension Mother     Diabetes Mother     Breast Problems Mother         fiber cystic    Hypertension Other         Review of Systems:  Review of systems not obtained due to patient factors. Objective:   Vital Signs:    Visit Vitals  BP (!) 112/48   Pulse (!) 58   Temp 96.9 °F (36.1 °C)   Resp 20   Ht 5' 9\" (1.753 m)   Wt 102.5 kg (226 lb)   SpO2 100%   BMI 33.37 kg/m²       O2 Device: Heated, Hi flow nasal cannula   O2 Flow Rate (L/min): 50 l/min   Temp (24hrs), Av.3 °F (36.3 °C), Min:96.5 °F (35.8 °C), Max:97.9 °F (36.6 °C)       Intake/Output:   Last shift:      1901 -  0700  In: 0   Out: 115 [Urine:115]  Last 3 shifts:  0701 -  1900  In: 1020 [I.V.:1020]  Out: 720 [Urine:670]    Intake/Output Summary (Last 24 hours) at 2022 0027  Last data filed at 2022 2200  Gross per 24 hour   Intake 1020 ml   Output 835 ml   Net 185 ml       Physical Exam:    General:  Unresponsive, ventilated   Head:  Normocephalic, without obvious abnormality, atraumatic. Eyes:  Conjunctivae/corneas clear. PERRL, EOMs intact. Nose: Nares normal. Septum midline. Mucosa normal.    Throat: Lips, mucosa, and tongue normal. Teeth and gums normal.   Neck: Supple, symmetrical, trachea midline   Lungs:   Clear to auscultation bilaterally. Chest wall:  +thoracotomy scar. No tenderness or deformity. Heart:  bradycardic rate and regular rhythm, S1, S2 normal, no murmur, click, rub or gallop. Abdomen:   Soft, non-tender. Bowel sounds normal. No masses,  No organomegaly. Extremities: Extremities normal, atraumatic, 2+ pitting edema BLE. Hematoma to right fem   Pulses: 2+ and symmetric all extremities. Skin: Skin color, texture, turgor normal. No rashes or lesions. Normal capillary refill.    Neurologic: Unresponsive to all stimuli, no cough/gag       Data:     Recent Results (from the past 24 hour(s))   HEMOGLOBIN A1C W/O EAG    Collection Time: 22  7:12 AM Result Value Ref Range    Hemoglobin A1c 6.1 (H) 4.2 - 5.6 %   GLUCOSE, POC    Collection Time: 08/18/22  7:23 AM   Result Value Ref Range    Glucose (POC) 173 (H) 70 - 110 mg/dL   TYPE & SCREEN    Collection Time: 08/18/22  8:55 AM   Result Value Ref Range    Crossmatch Expiration 08/21/2022,2359     ABO/Rh(D) Janay Chavezpe NEGATIVE     Antibody screen NEG    HGB & HCT    Collection Time: 08/18/22  4:08 PM   Result Value Ref Range    HGB 8.5 (L) 12.0 - 16.0 g/dL    HCT 28.8 (L) 35.0 - 45.0 %   PROTHROMBIN TIME + INR    Collection Time: 08/18/22  4:08 PM   Result Value Ref Range    Prothrombin time 15.6 (H) 11.5 - 15.2 sec    INR 1.2 0.8 - 1.2     PTT    Collection Time: 08/18/22  4:08 PM   Result Value Ref Range    aPTT 32.9 23.0 - 65.2 SEC   METABOLIC PANEL, COMPREHENSIVE    Collection Time: 08/18/22  7:55 PM   Result Value Ref Range    Sodium 138 136 - 145 mmol/L    Potassium 4.4 3.5 - 5.5 mmol/L    Chloride 110 100 - 111 mmol/L    CO2 24 21 - 32 mmol/L    Anion gap 4 3.0 - 18 mmol/L    Glucose 167 (H) 74 - 99 mg/dL    BUN 24 (H) 7.0 - 18 MG/DL    Creatinine 1.35 (H) 0.6 - 1.3 MG/DL    BUN/Creatinine ratio 18 12 - 20      GFR est AA 48 (L) >60 ml/min/1.73m2    GFR est non-AA 40 (L) >60 ml/min/1.73m2    Calcium 8.2 (L) 8.5 - 10.1 MG/DL    Bilirubin, total 0.8 0.2 - 1.0 MG/DL    ALT (SGPT) 11 (L) 13 - 56 U/L    AST (SGOT) 14 10 - 38 U/L    Alk.  phosphatase 79 45 - 117 U/L    Protein, total 6.1 (L) 6.4 - 8.2 g/dL    Albumin 3.1 (L) 3.4 - 5.0 g/dL    Globulin 3.0 2.0 - 4.0 g/dL    A-G Ratio 1.0 0.8 - 1.7     HGB & HCT    Collection Time: 08/18/22  7:55 PM   Result Value Ref Range    HGB 8.3 (L) 12.0 - 16.0 g/dL    HCT 28.3 (L) 35.0 - 45.0 %   BLOOD GAS,LACTIC ACID, POC    Collection Time: 08/18/22  8:02 PM   Result Value Ref Range    pH (POC) 7.23 (LL) 7.35 - 7.45      pCO2 (POC) 57.6 (H) 35.0 - 45.0 MMHG    pO2 (POC) 56 (L) 80 - 100 MMHG    HCO3 (POC) 23.9 22 - 26 MMOL/L    sO2 (POC) 81.6 (L) 92 - 97 %    Base deficit (POC) 4.0 mmol/L    Specimen type (POC) ARTERIAL      Performed by Laurie Mayo     Lactic Acid (POC) 0.73 0.40 - 2.00 mmol/L   BLOOD GAS,CHEM8,LACTIC ACID POC    Collection Time: 08/18/22 11:35 PM   Result Value Ref Range    pH (POC) 7.19 (LL) 7.35 - 7.45      pCO2 (POC) 56.7 (H) 35.0 - 45.0 MMHG    pO2 (POC) 142 (H) 80 - 100 MMHG    Calcium, ionized (POC) 1.10 (L) 1.12 - 1.32 mmol/L    Base deficit (POC) 6.3 mmol/L    HCO3 (POC) 21.7 (L) 22 - 26 MMOL/L    CO2, POC 23 19 - 24 MMOL/L    O2 SAT 99 %    Sample source ARTERIAL      SITE DRAWN FROM ARTERIAL LINE      SIN'S TEST NOT APPLICABLE      Device: ADULT VENT      MODE ASSIST CONTROL      FIO2 100 %    Tidal volume 400      Total resp. rate 18      PEEP/CPAP 8      Performed by oDe Willis     Sodium (POC) 141 136 - 145 mmol/L    Potassium (POC) 3.6 3.5 - 5.1 mmol/L    Glucose (POC) 144 (H) 65 - 100 mg/dL    Creatinine (POC) 1.23 0.6 - 1.3 mg/dL    Lactic Acid (POC) 0.92 0.40 - 2.00 mmol/L    Chloride (POC) 108 (H) 98 - 107 mmol/L    Anion gap, POC 12 10 - 20      GFRAA, POC 54 (L) >60 ml/min/1.73m2    GFRNA, POC 45 (L) >60 ml/min/1.73m2    Critical value read back POLLO NORMAN    GLUCOSE, POC    Collection Time: 08/18/22 11:37 PM   Result Value Ref Range    Glucose (POC) 171 (H) 70 - 110 mg/dL             Telemetry: sinus bradycarida    Imaging:  CXR Results  (Last 48 hours)      None            CT Results  (Last 48 hours)      None                      JOYA time 30 min  Zamzam Lopez PA-C   08/19/22   Pulmonary, Critical Care Medicine  [facility]    Attending note:   I have independently evaluated patient spending substantive portion of total care performing following activities to include time spent in review of records, tests obtaining history independently and performing medically appropriate physical exam leading to development of evaluation and management plan with high degree of complexity.    Services included the following: Review of nursing notes and critical values, vital sign assessments,direct patient care,medication orders and management,interpreting and reviewing diagnostic studies/labs,re-evaluations and documentation time. In addition communicated care plan with referring and consulting providers as well as other team members  In addition counseled and educated patient/family/caregiver. In addition independently reviewed images and interpreted other test results, coordinating clinical significance with other team members and patient ,family. My findings are as follows:    HPI:  Patient is a 63 y.o. female w/ pmhx of a fib, CAD, CHF who presented for covered stent procedure for descending thoracic aortic saccular aneurysm. Procedure performed 8/18 w/o incident. Following procedure, patient was drowsy but answering questions appropriately per bedside RN. Per chart review, she began having oxygen desaturations and escalating oxygen requirements, eventually ending up on HFNC. Blood gas performed showed mild hypoxia and mild hypercapnia. Per chart review, patient was found in cardiac arrest and ACLS initiated with approximately 9 min of downtime. EPOC labs following cardiac arrest essentially unremarkable. ABG with mixed respiratory and metabolic acidosis. On my evaluation, patient is on mechanical ventilator at 65% Peep 8, Size 8 ETT  On sedation with Fentanyl  Heart rate - bradycardic  ROS:    Cannot be obtained due to patient factors    Physical Exam:    General:  Opens eyes and nods head to commands, ventilated   Head:  Normocephalic, without obvious abnormality, atraumatic. Eyes:  Conjunctivae/corneas clear. PERRL, EOMs intact. Nose: Nares normal. Septum midline. Mucosa normal.    Throat: Lips, mucosa, and tongue normal. Teeth and gums normal.   Neck: Supple, symmetrical, trachea midline   Lungs:   Clear to auscultation bilaterally. Chest wall:  +thoracotomy scar. No tenderness or deformity.    Heart: bradycardic rate and regular rhythm, S1, S2 normal, no murmur, click, rub or gallop. Abdomen:   Soft, non-tender. Bowel sounds normal. No masses,  No organomegaly. Extremities: Extremities normal, atraumatic, 2+ pitting edema BLE. Hematoma to right fem   Pulses: 2+ and symmetric all extremities. Skin: Skin color, texture, turgor normal. No rashes or lesions. Normal capillary refill. Neurologic: Unresponsive to all stimuli, no cough/gag     MEDICAL DECISION MAKING:    Pertinent Labs, Imaging, Tests  XR Results (most recent):  Results from Hospital Encounter encounter on 08/18/22    XR CHEST PORT    Narrative  CHEST PORTABLE    INDICATIONS: Confirm endotracheal and orogastric tube placement. COMPARISON: 12/9/2019    FINDINGS:    Support lines/catheters: Tip of the endotracheal tube projects 1 cm above the  anai. Tip of the enteric tube overlies the proximal stomach. Right jugular  venous catheter tip overlies SVC. Several external cardiac monitoring leads. Lungs: Diffuse hazy lung opacities in the right greater than left lung. Focal  eventration of the right diaphragm, stable. Cardiac silhouette and mediastinal contours: Median sternotomy with numerous  surgical clips overlying the mediastinum. Aortic stent graft. Moderately  enlarged cardiac silhouette. Pleural spaces: Retrocardiac opacity with blunting of the lateral costophrenic  sulcus likely representing small pleural effusion. No visible pneumothorax. Bones and soft tissues: Unremarkable. Impression  1. Endotracheal tube terminates 1 cm above the anai. 2.  Enteric tube ends in the proximal stomach. 3.  Diffuse right greater than left hazy lung opacities may reflect pulmonary  edema. 4.  Likely small left pleural effusion. No pneumothorax. 5.  Cardiomegaly with post cardiac surgery changes.        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 Meet Cox. Seizure vs myoclonus - shortly following cardiac arrest x2. No further episodes of tremulousness or twitching noted  R groin hematoma w/ femstop in place  Hx of CAD w/ prior CABG  Hx of HFrEF - most recent EF from 2/22 30%  Paroxysmal symptomatic a-fib with CHADSVASc2 score of 5. On chronic Amiodorone and anticoagulation  History of recurrent GI bleeding, requiring transfusion and HAS-BLED score of 4. Chronic stage 2 kidney disease. Remote breast cancer about ten years ago with mastectomy and chemotherapy, but no chest radiation. History of thyroid disorder. History of 'waking up 'previously with anesthesia. Plan:  Neuro: WILL TITRATE SEDATION TO RASS GOAL -2  Pulm: Titrate FiO2 for goal SPO2> 90%,VAP prevention bundle, head of the bed at 30' all times. Daily sedation holiday and assessment for weaning with SBT as tolerated. Will not do SBT today as plans to return to [facility] and FiO2 needs still significant. . Will treat with decadron x24 hrs for possible upper airway edema  CVS: Actively titrate vasopressors aim MAP >65mmHg, Check cardiac panel, echo this AM  Fluids: maintainance  GI: SUP, Trend LFTs, OGT  Renal: Trend Renal indices, Strict Is/Os, Gonzalez (+)  Hem/Onc: Monitor for s/o active bleeding. Fem stop in place on R. I/D: Trend WBCs and temperature curve. Endocrine: Q6 glucoses, SSI. Metabolic:  Daily BMP, mag, phos. Trend lytes, replace as needed. Musc/Skin: no acute issues, wound care as needed  Full code    Critical Care Time:  The services I provided to this patient were to treat and/or prevent clinically significant deterioration that could result in the failure of one or more body systems and/or organ systems due to respiratory distress, hypoxia, cardiac dysrhythmia.       Aggregate critical care time was  70 minutes, which includes only time during which I was engaged in work directly related to the patient's care as described above. During this entire length of time I was immediately available to the patient. The reason for providing this level of medical care for this critically ill patient was due a critical illness that impaired one or more vital organ systems such that there was a high probability of imminent or life threatening deterioration in the patients condition.  This care involved high complexity decision making to assess, manipulate, and support vital system functions, to treat this degreee vital organ system failure and to prevent further life threatening deterioration of the patients condition      Kenneth Spencer MD

## 2022-08-19 NOTE — ANESTHESIA POSTPROCEDURE EVALUATION
Procedure(s):  WASHOUT RIGHT GROIN.    general    Anesthesia Post Evaluation      Multimodal analgesia: multimodal analgesia used between 6 hours prior to anesthesia start to PACU discharge  Patient location during evaluation: ICU  Patient participation: complete - patient participated  Level of consciousness: awake  Pain management: adequate  Airway patency: patent  Anesthetic complications: no  Cardiovascular status: stable  Respiratory status: acceptable  Hydration status: acceptable  Post anesthesia nausea and vomiting:  controlled      INITIAL Post-op Vital signs:   Vitals Value Taken Time   /57 08/19/22 1745   Temp 36.5 °C (97.7 °F) 08/19/22 1600   Pulse 55 08/19/22 1757   Resp 17 08/19/22 1757   SpO2 95 % 08/19/22 1757   Vitals shown include unvalidated device data.

## 2022-08-19 NOTE — PROGRESS NOTES
Reason for Admission:  Aortic aneurysm without rupture, unspecified portion of aorta (HCC) [I71.9]  Saccular aneurysm [I67.1]                 RUR Score:    16            Plan for utilizing home health:    to be determined                      Likelihood of Readmission:   Moderate                         Do you (patient/family) have any concerns for transition/discharge?  no    Transition of Care Plan:       Initial assessment completed with patient's  Trudi Stevens. Cognitive status of patient: pt is intubated at this time    Face sheet information confirmed:  yes. The patient's  participate in her discharge plan and to receive any needed information. T  his patient lives in a home with . Patient is able to navigate 3 steps as needed to get in the house. Prior to hospitalization, patient was considered to be independent with ADLs/IADLS : yes . Patient has a current ACP document on file: no      Healthcare Decision Maker:   Primary Decision Maker: Sachin Sagar Walton - [phone]    Click here to complete [address] including selection of the Healthcare Decision Maker Relationship (ie \"Primary\")    The  will be available to transport patient home upon discharge. The patient already has none reported, medical equipment available in the home. Patient is not currently active with home health. Patient has not stayed in a skilled nursing facility or rehab. Was  stay within last 60 days : no. This patient is on dialysis :no  Currently, the discharge plan is to be determined    The patient states that she can obtain her medications from the pharmacy, and take her medications as directed. Patient's current insurance is [facility] [employer] Medicare      Care Management Interventions  PCP Verified by CM: No  Palliative Care Criteria Met (RRAT>21 & CHF Dx)?: No  Mode of Transport at Discharge:  Other (see comment) (family)  Transition of Care Consult (CM Consult): Discharge Planning  Support Systems: Spouse/Significant Other  Confirm Follow Up Transport: Family  Discharge Location  Patient Expects to be Discharged to[de-identified] Unable to determine at this time        AMANDEEP JensenN RN  Care Management  Pager: [phone]

## 2022-08-19 NOTE — PROGRESS NOTES
1915- Change of shift. Report received from Aileen Way, RN and Shay RN. Will assume care of patient. Patient is drowsy and responds to voice. Patient is constantly moving due to restless leg syndrome. Hematoma observed at the right groin site. Swelling in that area, with firmness decreasing in the fields outlined on upper thigh as well as bruising. Femstop in place at a pressure of 100. Pedal pulses are palpable and strong. Patients central line not sutured and bleeding sanguineous drainage at the site, site was redressed with quick clot earlier at 1337. Dr. Renzo Osorio aware of patients condition. SBP above 120 at this moment. Arterial line is dampened and positional. ABG and CBC order in place and need to be collected. Wound Prevention Checklist  Patient: Bernarda Calvert (89 y.o. 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 Renzo Osorio patients BP 89/68 and ABG results; pH 7.226, pCO2 57.6, pO2 55.5, sHCO3 23.9, and cSO2 81.6%. Given telephone order of 500 ml bolus NS and start BiPap, due to patients drowsy condition and position in bed recommended Hi Flow and Dr. Renzo Osorio agreed. Will update with H/H results. Patients pedal pulses still palpable bilaterally. 2100- Dr. Renzo Osorio informed that patients BP increased to 96/51 after bolus, and still awaiting H/H results. Patients pedal pulses still palpable bilaterally. No new orders given at this time, will continue to monitor patient.   2214- Spoke with  Adam, informed him that patients /48 and H/H 8.3. Blood not given at this time. CODE BLUE  8938- Patient began to have agonal breathing. Terese marino called. Dr. Michi Gu notified. 2 associates from CVT stepdown arrived. Ambu bag given. 2254- Patient declining with no pulses, respirations, arterial line at 0. Compressions started. 2256- Compressions ended. Patient has a pulse. 2259- 0.1 mg of Epi given   2301- 1 vial of bicarb given  2302- ROSC  2308- Patient intubated by anesthesia, 26 cm at teeth. 2309- seizure (twitching right side of jaw and right arm) lasting 5 seconds  2310- second seizure noticed lasting 15 seconds  2315- Code ended  2999- Patient started on Levophed  0000- Patient able to respond by nodding to This Writer by voice. Femstop in place. Pedal pulses are palpable and strong. Site of hematoma is edematous with bruising but soft, firmness is still noticed but has decreased in size. Diogenes Toscano asked to contact family upon arrival. , Katie Salomon on his way to see patient. 8836- Patient trying to pull at intubation line and restless. Patient placed in bilateral wrist restraints. , Katie Salomon informed. 56-  at patients bedside. 1451- Patient started on Fentanyl   0200- Levophed stopped. /59.  0400- Patient able to respond by nodding to This Writer by voice. Femstop in place. Pedal pulses are palpable and strong. Site of hematoma is edematous with bruising but soft, firmness is still noticed but has decreased in size. [address] , PA of patients EKG result of a wide QRS rhythm and anterolateral infarct (age undetermined). Troponin- High Sensitivity lab ordered Q6hrs  0500- Patient bathed with CHG, gown, bed pad, O2 sat, and electrodes changed. 1 [place], PA that cuff pressure is 101/51 and arterial line is 154/55. Patients Arterial line has a proper wave form. Questioned if okay to give lasix and was instructed it was okay.    0700- Informed Dr. Charlene Rashid of short summary of events that took place yesterday in code. Also informed that hematoma is edematous with bruising but soft. Firmness from the hematoma can still be palpated but has decreased in size. Awaiting H/H at this time. 0730- Change of shift. Report given to BIANCA Day.

## 2022-08-19 NOTE — PROGRESS NOTES
[facility]  Code Blue Note      Patient: Asa Oar, 63 y.o. female,  1960  Patient MRN: [id number]  Length of Stay:  LOS: 0 days     Admission Date: 2022   Admission Diagnosis: Aortic aneurysm without rupture, unspecified portion of aorta ([facility] [address] [I71.9]  Saccular aneurysm [I67.1]      CODE BLUE    Code Blue called by nursing at 22:54 as found without pulses or respirations     Code was run per ACLS guidelines. Initial Rhythm: asytole    Detailed Account of Code Blue  Code start time: 22:54    Per nursing, patient was on HF via NC when she began desatting. She was found without a pulse soon after. 22:54 Compressions were started   22:59 1 round epi  23:01  bicarb x1  23:05 ROSC  23:06 5ml succinate, 5ml etomidate  23:08 intubated, performed by anesthesia, 26cm at teeth    23:09 seizure lasting 5 seconds (twitching jaw, r arm)  23:10 second seizure noted, lasting 10 seconds, 5 ml propofol, 50 ml bromide     Code end time: 23:15    Pt started on 50 mcg/kg/min propofol drip  Titrated levophed drip started at 25 mcg/kg/min for MAP of 49    Defibrillations: no shocks given for asytole     Labs ordered: ABG, lactate, EKG     Imaging ordered: post intubation CXR,    Lines Placed: none         Assessment/Plan  63 y.o. yo female s/p cardiac arrest.    Result of Code: ROSC    Patient continued on CV ICU floor, ICU consulted. Attending physician, Dr. Guillermo Mitchell, notified. ICU attending Dr. Celestino Barakat notified, ICU PA at bedside.      Next of kin notified: Yes     [facility] senior resident, Dr Menjivar, present during Sajan Wray MD, PGY 1  [facility]  2022 11:16 PM

## 2022-08-19 NOTE — PROGRESS NOTES
Addendum: Consult noted. Please see initial below. Comprehensive Nutrition Assessment    Type and Reason for Visit: Initial, NPO/clear liquid    Nutrition Recommendations/Plan:   Plan to keep NPO for today per MD.  If to initiate TF, rec Jevity 1.5 at 20 mL/hr and advance as tolerated by 10 mL q 6 hours to goal rate of 50 mL/hr with 100 mL q 6 hour water flushes. Goal regimen to provide  1920 kcal, 110g protein, 1312 ml free water, 100% RDIs     Malnutrition Assessment:  Malnutrition Status:  Insufficient data (08/19/22 0859)      Nutrition History and Allergies: PMHx: A fib, CAD, CHF. Wt hx: 228 lb (2/28/22). Allergies: shellfish    Nutrition Assessment:    Pt presented for covered stent procedure for descending thoracic aortic saccular aneurysm. Procedure performed 8/18 w/o incidentPer chart review, patient was found in cardiac arrest and ACLS initiated with approximately 9 min of downtime. Per chart, last BM PTA. Plan to keep NPO today per MD.     Nutrition Related Findings:    Pertinent Meds: decadron, pepcid, humalog (held), NS @ 10 ml/hr, fentanyl gtt  Pertinent Labs: proBNP 40705 H Wound Type: None    Current Nutrition Intake & Therapies:        DIET NPO    Anthropometric Measures:  Height: 5' 9\" (175.3 cm)  Ideal Body Weight (IBW): 145 lbs (66 kg)  Admission Body Weight: 225 lb 15.5 oz  Current Body Wt:  102.5 kg (225 lb 15.5 oz)  155.8 % IBW. Current BMI (kg/m2): 33.4     BMI Category: Obese class 1 (BMI 30.0-34. 9)    Estimated Daily Nutrient Needs:  Energy Requirements Based On: Formula  Weight Used for Energy Requirements: Admission  Energy (kcal/day): 2892-2574 kcals/day ([facility] 2010 x 0.9-1.1)  Weight Used for Protein Requirements: Ideal  Protein (g/day):  g/day (1.5-2 g/day)  Method Used for Fluid Requirements: 1 ml/kcal  Fluid (ml/day): 2848-5865 ml/day    Nutrition Diagnosis:   Swallowing difficulty related to impaired respiratory function as evidenced by NPO or clear liquid status due to medical condition, intubation    Nutrition Interventions:   Food and/or Nutrient Delivery: Continue NPO        Plan of Care discussed with: RN, MD    Goals:     Goals: Meet at least 75% of estimated needs, by next RD assessment       Nutrition Monitoring and Evaluation:      Food/Nutrient Intake Outcomes: Diet advancement/tolerance  Physical Signs/Symptoms Outcomes: Biochemical data, Hemodynamic status, Weight, GI status    Discharge Planning:     Too soon to determine    Kwadwo Zhu Abdulaziz [address], [address] [place] [address]   Contact: [phone]

## 2022-08-19 NOTE — ROUTINE PROCESS
TRANSFER - OUT REPORT:    Verbal report given to [facility] RN(name) on Moshe Cardona  being transferred to CVT ICU(unit) for routine post - op       Report consisted of patients Situation, Background, Assessment and   Recommendations(SBAR). Information from the following report(s) SBAR, Kardex, OR Summary, and Procedure Summary was reviewed with the receiving nurse. Lines:   Triple Lumen 08/18/22 Anterior;Right Internal jugular (Active)   Central Line Being Utilized Yes 08/19/22 0800   Criteria for Appropriate Use Hemodynamically unstable, requiring monitoring lines, vasopressors, or volume resuscitation 08/19/22 0800   Site Assessment Dry;Drainage (comment) 08/19/22 0800   Infiltration Assessment 0 08/19/22 0800   Affected Extremity/Extremities Color distal to insertion site pink (or appropriate for race); Pulses palpable;Range of motion performed 08/19/22 0800   Date of Last Dressing Change 08/18/22 08/19/22 0800   Dressing Status Old drainage; Intact 08/19/22 0800   Dressing Type Transparent;Tape;Other (comments) 08/19/22 0800   Action Taken Dressing reinforced 08/19/22 0400   Proximal Hub Color/Line Status White;Flushed;Patent 08/19/22 0800   Positive Blood Return (Medial Site) Yes 08/19/22 0800   Medial Hub Color/Line Status Blue;Flushed;Patent 08/19/22 0800   Positive Blood Return (Lateral Site) Yes 08/19/22 0800   Distal Hub Color/Line Status Brown;Flushed;Patent 08/19/22 0800   Positive Blood Return (Site #3) Yes 08/19/22 0800   Alcohol Cap Used Yes 08/19/22 0800       Peripheral IV 08/18/22 Posterior;Right Forearm (Active)   Site Assessment Clean, dry, & intact 08/19/22 0800   Phlebitis Assessment 0 08/19/22 0800   Infiltration Assessment 0 08/19/22 0800   Dressing Status Clean, dry, & intact 08/19/22 0800   Dressing Type Transparent;Tape 08/19/22 0800   Hub Color/Line Status Pink;Flushed;Patent 08/19/22 0800   Action Taken Open ports on tubing capped 08/19/22 0400   Alcohol Cap Used Yes 08/19/22 0800       Arterial Line 08/18/22 Left Radial artery (Active)   Site Assessment Clean, dry, & intact 08/19/22 0800   Dressing Status Clean, dry, & intact 08/19/22 0800   Dressing Type Disk with Chlorhexadine gluconate (CHG); Transparent;Tape;Stabilization/securement device 08/19/22 0800   Line Status Intact and in place 08/19/22 0800   Treatment Arm board on;Zeroed or re-zeroed 08/19/22 0800   Affected Extremity/Extremities Color distal to insertion site pink (or appropriate for race); Pulses palpable;Range of motion performed 08/19/22 0800        Opportunity for questions and clarification was provided.       Patient transported with:   Monitor  Registered Nurse

## 2022-08-19 NOTE — ANESTHESIA PREPROCEDURE EVALUATION
Relevant Problems   No relevant active problems       Anesthetic History               Review of Systems / Medical History  Patient summary reviewed and pertinent labs reviewed    Pulmonary    COPD: moderate            Comments: Intubated on vent   Neuro/Psych         Psychiatric history     Cardiovascular    Hypertension: well controlled        Dysrhythmias : atrial fibrillation  CAD    Exercise tolerance: <4 METS     GI/Hepatic/Renal     GERD           Endo/Other    Diabetes: well controlled, type 2  Hypothyroidism  Anemia     Other Findings              Physical Exam    Airway          Intubated   Cardiovascular    Rhythm: regular  Rate: normal         Dental         Pulmonary  Breath sounds clear to auscultation               Abdominal  GI exam deferred       Other Findings            Anesthetic Plan    ASA: 4, emergent  Anesthesia type: general            Anesthetic plan and risks discussed with: Healthcare power of  and Spouse

## 2022-08-19 NOTE — PROGRESS NOTES
0700: Bedside and Verbal shift change report given to This Writer (oncoming nurse) by Rusty Poole RN (offgoing nurse). Report included the following information SBAR, Kardex, Intake/Output, MAR, Recent Results, and Cardiac Rhythm SB c 1AVB . Wound Prevention Checklist    Patient: Mark Simmons (84 y.o. 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 0930: Dr Rianna Petty aware of CBC results. Telephone orders received for 1 unit PRBCs. 1031: Transfusion started. 1235: Dr Rianna Petty at bedside to see patient, removed FemStop. Plan to likely take patient to OR for R groin washout.     1306: Blood transfusion completed, no s/s of reaction. 1404: Anesthesia and OR staff at bedside to take patient to OR, telephone consent provided by . 1420: Handoff given to [address] [place] [address] Willis, RN and CRNA. Patient taken to OR with CRNA and OR staff. 1505: TRANSFER - IN REPORT:    Verbal report received from Jasmin [facility] [address] [facility] [address], RN (name) on Mark Simmons  being received from CVT OR (unit) for routine post - op      Report consisted of patients Situation, Background, Assessment and   Recommendations(SBAR). Information from the following report(s) OR Summary was reviewed with the receiving nurse. Opportunity for questions and clarification was provided. Assessment completed upon patients arrival to unit and care assumed. 1527: Patient back from OR still vented, R groin C/D/I. 1630: Patient is restless, on max dose of Fentanyl, BP elevated. Dr Earmon Oppenheim aware, telephone orders received. 1821: Versed gtt started per orders. 1900: Bedside and Verbal shift change report given to Vanesa Abdalla RN (oncoming nurse) by This Writer (offgoing nurse). Report included the following information SBAR, Kardex, Procedure Summary, Intake/Output, MAR, Recent Results, and Cardiac Rhythm SB c 1AVB .

## 2022-08-19 NOTE — PROGRESS NOTES
2254: RT responded to Code Blue. CPR performed by nursing and patient bagged by RT.      1972: ROSC achieved. 23:08 intubated by anesthesia, size 8 ET Tube, good color change on EtCo2, bilateral BS, good chest rise. ET Tube secured with holister at 26 teeth. Patient placed on vent.

## 2022-08-19 NOTE — PROGRESS NOTES
responded to Code Blue for  [address], who is a 63 y.o.,female,     The  provided the following Interventions:   provided pastoral support. Assurance of prayer was offered on behalf of patient and her spouse. The following outcomes were achieved:  Upon my arrival patient had already been intubated. Per staffing request I contacted her  to inform him of current situation. Assessment:  There are no spiritual or Hinduism issues which require intervention at this time. Plan:  Chaplains will continue to follow and will provide pastoral care on an as needed/requested basis.  recommends bedside caregivers page  on duty if patient shows signs of acute spiritual or emotional distress.        [address]   [phone]

## 2022-08-19 NOTE — PROGRESS NOTES
08/18/22 2315   Patient Observations   Pulse (Heart Rate) 84   Resp Rate 19   O2 Sat (%) 99 %   Respiratory   Respiratory (WDL) X   Patient on Vent Yes - If patient is on vent, add Doc Flowsheet Ventilator (). Respiratory Pattern Regular   Chest/Tracheal Assessment Chest expansion, symmetrical   Breath Sounds Bilateral Diminished   Cough Other(comment)  (No Cough)   Airway Clearance   Suction ET Tube   Sputum Amount Scant   Sputum Color/Odor Yellow; White   Sputum Consistency Thin   Ventilator Initiate/Discontinue   Ventilator Initiate Yes   Vent Settings   FIO2 (%) 100 %   SpO2/FIO2 Ratio 99   CMV Rate Set 12   Back-Up Rate 12   Vt Set (ml) 400 ml   PEEP/VENT (cm H2O) 8 cm H20   Insp Time (sec) 0.9 sec   Insp Rise Time % 50 %   Flow Trigger 3   Ventilator Measurements   Resp Rate Observed 19   Vt Exhaled (Machine Breath) (ml) 411 ml   Ve Observed (l/min) 7.7 l/min   PIP Observed (cm H2O) 20 cm H2O   MAP (cm H2O) 7.7   I:E Ratio Actual 1:2.5   Safety & Alarms   Circuit Temperature 98.6 °F (37 °C)   Backup Mode Checked/Apnea Yes   Pressure Max 40 cm H2O   Pressure Min 14 cm H2O   Ve Min 2   Ve Max 20   Vt Min 200 ml   Vt Max 800 ml   RR Max 40   Ambu Bag Yes   Ambu Mask Yes   Age Specific Ventilator Associated Pneumonia Bundle   Patient Age Group Adult   Adult Ventilator Associated Pneumonia Bundle   Elevation of Head to 30-45 Degrees (Unless Contraindicated) Yes   Oxygen Therapy   Skin Assessment Clean, dry, & intact   Skin Protection for O2 Device Yes   Orientation Anterior   Location Cheek   Vent Method/Mode   Ventilator Mode VC+   $$ Ventilator Initial Initial Vent Day   Pulmonary Toilet   Pulmonary Toilet Suction;H. O.B elevated       Patient placed on vent with above settings Post Code Blue and Intubation.

## 2022-08-19 NOTE — ROUTINE PROCESS
TRANSFER - IN REPORT:    Verbal report received from [facility] RN(name) on Hao Rose  being received from CVT ICU(unit) for ordered procedure      Report consisted of patients Situation, Background, Assessment and   Recommendations(SBAR). Information from the following report(s) SBAR, Kardex, Intake/Output, and MAR was reviewed with the receiving nurse. Opportunity for questions and clarification was provided. Assessment completed upon patients arrival to unit and care assumed.

## 2022-08-19 NOTE — PROGRESS NOTES
Problem: Falls - Risk of  Goal: *Absence of Falls  Description: Document Bereket Olvera Fall Risk and appropriate interventions in the flowsheet.   Outcome: Progressing Towards Goal  Note: Fall Risk Interventions:  Mobility Interventions: Bed/chair exit alarm, Communicate number of staff needed for ambulation/transfer, Strengthening exercises (ROM-active/passive)  Mentation Interventions: Bed/chair exit alarm, Door open when patient unattended, More frequent rounding, Reorient patient, Room close to nurse's station, Toileting rounds, Update white board  Medication Interventions: Bed/chair exit alarm, Evaluate medications/consider consulting pharmacy  Elimination Interventions: Bed/chair exit alarm, Call light in reach, Stay With Me (per policy), Toileting schedule/hourly rounds    Problem: Patient Education: Go to Patient Education Activity  Goal: Patient/Family Education  Outcome: Progressing Towards Goal     Problem: Ventilator Management  Goal: *Adequate oxygenation and ventilation  Outcome: Progressing Towards Goal  Goal: *Patient maintains clear airway/free of aspiration  Outcome: Progressing Towards Goal  Goal: *Absence of infection signs and symptoms  Outcome: Progressing Towards Goal  Goal: *Normal spontaneous ventilation  Outcome: Progressing Towards Goal     Problem: Patient Education: Go to Patient Education Activity  Goal: Patient/Family Education  Outcome: Progressing Towards Goal     Problem: Non-Violent Restraints  Goal: Removal from restraints as soon as assessed to be safe  Outcome: Progressing Towards Goal  Goal: No harm/injury to patient while restraints in use  Outcome: Progressing Towards Goal  Goal: Patient's dignity will be maintained  Outcome: Progressing Towards Goal  Goal: Patient Interventions  Outcome: Progressing Towards Goal

## 2022-08-19 NOTE — PROGRESS NOTES
Spoke with Dr. Esha Lloyd regarding Pulmonary Consult as requested by Dr. Brant Hernández [phone]. Dr. Eleanor Jackman will contact Dr. Karen Patricio.

## 2022-08-19 NOTE — PROGRESS NOTES
Pulm/CC follow up:    Events noted  Went to OR and hematoma evacuated. H/H noted low- 1 unit PRBC has been ordered. Will continue Ventilator support today  Reassess weaning readiness in am.  Continue Decadron. Consider Lasix after PRBc transfusion in view of cardiomyopathy.     Orest Lanes MD

## 2022-08-19 NOTE — OP NOTES
Operative Note    Patient: Angel Leiva  YOB: 1960  MRN: [id number]    Date of Procedure: 8/19/2022     Pre-Op Diagnosis: Aortic aneurysm without rupture, unspecified portion of aorta ([address] [I71.9]    Post-Op Diagnosis: Same as preoperative diagnosis. Procedure(s):  WASHOUT RIGHT GROIN    Surgeon(s):  Monika Rocha MD    Surgical Assistant: Surg Asst-1: Les Hodge    Anesthesia: General     Estimated Blood Loss (mL):  Minimal    Complications: None    Specimens: * No specimens in log *     Implants: * No implants in log *    Drains: * No LDAs found *    Findings: 200 mL hematoma, no active bleeding    Detailed Description of Procedure:   Patient had some hematoma right thigh just below the incision. Had a respiratory compromise requiring intubation last evening. Patient stable today. Right groin soft there is this hematoma. Discussed with the family would like to wash this out while she is intubated to avoid if she has any further bleeding or reintubation especially over the weekend. They are agreeable. She had a appropriate Ancef she was transferred to the OR where her right groin was prepped draped usual standard fashion this incision was intact there was no bleeding. I will reopen the incision and the fascia was intact there was a hematoma going inferiorly about 200 cc of formed thrombus. The vessels were intact. I did pulse lavage and then an antibiotic irrigation. Closed in layers 2-0 Monocryl interrupted then 3-0 Monocryl for the subcu 4 Monocryl and Dermabond glue for the skin applied a wound management system. She was transferred back to the ICU for postop management she will remain remain intubated. Pulmonary much appreciated regarding her ventilation status.     Electronically Signed by Nawaf Winters MD on 8/19/2022 at 3:11 PM

## 2022-08-19 NOTE — ADVANCED PRACTICE NURSE
Intubation Procedure Note    Performed By:  Nicole Celis CRNA     Assistant:   Respiratory    Medications given were: 10mg etomidate and 100mg succinylcholine, followed by 50mg Propofol as pt presented with seizure like symptoms. Direct Laryngoscopy with glidescope 4 . Grade 2 view. A number: 8.0 BEE cuffed   ETT was placed to: 26 cm at the lips. Placement was evaluated by noting: good end-tidal CO2 detector color change . Attempts required: 1. Complications: none. Cricoid pressure was applied. .  The procedure was tolerated well. Disposition: ICU - intubated and hemodynamically stable.             Signed By: Nicole Celis CRNA

## 2022-08-19 NOTE — PROGRESS NOTES
Problem: Falls - Risk of  Goal: *Absence of Falls  Description: Document Jalen De Anda Fall Risk and appropriate interventions in the flowsheet.   Outcome: Progressing Towards Goal  Note: Fall Risk Interventions:  Mobility Interventions: Bed/chair exit alarm, Strengthening exercises (ROM-active/passive)    Mentation Interventions: Adequate sleep, hydration, pain control, Bed/chair exit alarm, Door open when patient unattended, More frequent rounding, Reorient patient, Room close to nurse's station, Update white board    Medication Interventions: Bed/chair exit alarm, Evaluate medications/consider consulting pharmacy    Elimination Interventions: Bed/chair exit alarm, Call light in reach, Toileting schedule/hourly rounds              Problem: Patient Education: Go to Patient Education Activity  Goal: Patient/Family Education  Outcome: Progressing Towards Goal     Problem: Ventilator Management  Goal: *Adequate oxygenation and ventilation  Outcome: Progressing Towards Goal  Goal: *Patient maintains clear airway/free of aspiration  Outcome: Progressing Towards Goal  Goal: *Absence of infection signs and symptoms  Outcome: Progressing Towards Goal  Goal: *Normal spontaneous ventilation  Outcome: Progressing Towards Goal     Problem: Patient Education: Go to Patient Education Activity  Goal: Patient/Family Education  Outcome: Progressing Towards Goal     Problem: Non-Violent Restraints  Goal: Removal from restraints as soon as assessed to be safe  Outcome: Progressing Towards Goal  Goal: No harm/injury to patient while restraints in use  Outcome: Progressing Towards Goal  Goal: Patient's dignity will be maintained  Outcome: Progressing Towards Goal  Goal: Patient Interventions  Outcome: Progressing Towards Goal

## 2022-08-20 ENCOUNTER — APPOINTMENT (OUTPATIENT)
Dept: GENERAL RADIOLOGY | Age: 62
DRG: 219 | End: 2022-08-20
Attending: INTERNAL MEDICINE
Payer: MEDICARE

## 2022-08-20 LAB
ABO + RH BLD: NORMAL
AMORPH CRY URNS QL MICRO: ABNORMAL
ANION GAP SERPL CALC-SCNC: 7 MMOL/L (ref 3–18)
APPEARANCE UR: ABNORMAL
ARTERIAL PATENCY WRIST A: ABNORMAL
BACTERIA URNS QL MICRO: ABNORMAL /HPF
BASE DEFICIT BLD-SCNC: 1.9 MMOL/L
BASOPHILS # BLD: 0 K/UL (ref 0–0.1)
BASOPHILS NFR BLD: 0 % (ref 0–2)
BDY SITE: ABNORMAL
BILIRUB UR QL: NEGATIVE
BLD PROD TYP BPU: NORMAL
BLOOD GROUP ANTIBODIES SERPL: NORMAL
BPU ID: NORMAL
BUN SERPL-MCNC: 29 MG/DL (ref 7–18)
BUN/CREAT SERPL: 24 (ref 12–20)
CALCIUM SERPL-MCNC: 8.2 MG/DL (ref 8.5–10.1)
CALLED TO:,BCALL1: NORMAL
CHLORIDE SERPL-SCNC: 108 MMOL/L (ref 100–111)
CO2 SERPL-SCNC: 24 MMOL/L (ref 21–32)
COLOR UR: ABNORMAL
CREAT SERPL-MCNC: 1.21 MG/DL (ref 0.6–1.3)
CROSSMATCH RESULT,%XM: NORMAL
DIFFERENTIAL METHOD BLD: ABNORMAL
ECHO TV REGURGITANT MAX VELOCITY: 2.86 M/S
ECHO TV REGURGITANT PEAK GRADIENT: 33 MMHG
EOSINOPHIL # BLD: 0 K/UL (ref 0–0.4)
EOSINOPHIL NFR BLD: 0 % (ref 0–5)
EPITH CASTS URNS QL MICRO: NEGATIVE /LPF (ref 0–5)
ERYTHROCYTE [DISTWIDTH] IN BLOOD BY AUTOMATED COUNT: 18.3 % (ref 11.6–14.5)
GAS FLOW.O2 O2 DELIVERY SYS: ABNORMAL L/MIN
GAS FLOW.O2 SETTING OXYMISER: 20 BPM
GLUCOSE BLD STRIP.AUTO-MCNC: 115 MG/DL (ref 70–110)
GLUCOSE BLD STRIP.AUTO-MCNC: 136 MG/DL (ref 70–110)
GLUCOSE BLD STRIP.AUTO-MCNC: 144 MG/DL (ref 70–110)
GLUCOSE BLD STRIP.AUTO-MCNC: 150 MG/DL (ref 70–110)
GLUCOSE SERPL-MCNC: 129 MG/DL (ref 74–99)
GLUCOSE UR STRIP.AUTO-MCNC: NEGATIVE MG/DL
HCO3 BLD-SCNC: 22.7 MMOL/L (ref 22–26)
HCT VFR BLD AUTO: 27.6 % (ref 35–45)
HGB BLD-MCNC: 8.4 G/DL (ref 12–16)
HGB UR QL STRIP: ABNORMAL
IMM GRANULOCYTES # BLD AUTO: 0 K/UL (ref 0–0.04)
IMM GRANULOCYTES NFR BLD AUTO: 0 % (ref 0–0.5)
KETONES UR QL STRIP.AUTO: ABNORMAL MG/DL
LEUKOCYTE ESTERASE UR QL STRIP.AUTO: NEGATIVE
LYMPHOCYTES # BLD: 0.4 K/UL (ref 0.9–3.6)
LYMPHOCYTES NFR BLD: 3 % (ref 21–52)
MAGNESIUM SERPL-MCNC: 2.1 MG/DL (ref 1.6–2.6)
MCH RBC QN AUTO: 27.3 PG (ref 24–34)
MCHC RBC AUTO-ENTMCNC: 30.4 G/DL (ref 31–37)
MCV RBC AUTO: 89.6 FL (ref 78–100)
MONOCYTES # BLD: 0.5 K/UL (ref 0.05–1.2)
MONOCYTES NFR BLD: 5 % (ref 3–10)
NEUTS SEG # BLD: 11 K/UL (ref 1.8–8)
NEUTS SEG NFR BLD: 92 % (ref 40–73)
NITRITE UR QL STRIP.AUTO: NEGATIVE
NRBC # BLD: 0 K/UL (ref 0–0.01)
NRBC BLD-RTO: 0 PER 100 WBC
O2/TOTAL GAS SETTING VFR VENT: 45 %
PCO2 BLD: 37 MMHG (ref 35–45)
PEEP RESPIRATORY: 5 CMH2O
PH BLD: 7.4 [PH] (ref 7.35–7.45)
PH UR STRIP: 5.5 [PH] (ref 5–8)
PHOSPHATE SERPL-MCNC: 3.9 MG/DL (ref 2.5–4.9)
PLATELET # BLD AUTO: 129 K/UL (ref 135–420)
PMV BLD AUTO: 12.5 FL (ref 9.2–11.8)
PO2 BLD: 62 MMHG (ref 80–100)
POTASSIUM SERPL-SCNC: 3.9 MMOL/L (ref 3.5–5.5)
PROT UR STRIP-MCNC: 100 MG/DL
RBC # BLD AUTO: 3.08 M/UL (ref 4.2–5.3)
RBC #/AREA URNS HPF: ABNORMAL /HPF (ref 0–5)
SAO2 % BLD: 91.4 % (ref 92–97)
SERVICE CMNT-IMP: ABNORMAL
SODIUM SERPL-SCNC: 139 MMOL/L (ref 136–145)
SP GR UR REFRACTOMETRY: 1.03 (ref 1–1.03)
SPECIMEN EXP DATE BLD: NORMAL
SPECIMEN TYPE: ABNORMAL
STATUS OF UNIT,%ST: NORMAL
TOTAL RESP. RATE, ITRR: 20
UNIT DIVISION, %UDIV: 0
UROBILINOGEN UR QL STRIP.AUTO: 1 EU/DL (ref 0.2–1)
VENTILATION MODE VENT: ABNORMAL
VT SETTING VENT: 400 ML
WBC # BLD AUTO: 12 K/UL (ref 4.6–13.2)
WBC URNS QL MICRO: ABNORMAL /HPF (ref 0–4)

## 2022-08-20 PROCEDURE — 82803 BLOOD GASES ANY COMBINATION: CPT

## 2022-08-20 PROCEDURE — 83735 ASSAY OF MAGNESIUM: CPT

## 2022-08-20 PROCEDURE — 99223 1ST HOSP IP/OBS HIGH 75: CPT | Performed by: INTERNAL MEDICINE

## 2022-08-20 PROCEDURE — 65620000000 HC RM CCU GENERAL

## 2022-08-20 PROCEDURE — 82962 GLUCOSE BLOOD TEST: CPT

## 2022-08-20 PROCEDURE — 94003 VENT MGMT INPAT SUBQ DAY: CPT

## 2022-08-20 PROCEDURE — 74011250636 HC RX REV CODE- 250/636: Performed by: PHYSICIAN ASSISTANT

## 2022-08-20 PROCEDURE — 74011250636 HC RX REV CODE- 250/636: Performed by: INTERNAL MEDICINE

## 2022-08-20 PROCEDURE — 71045 X-RAY EXAM CHEST 1 VIEW: CPT

## 2022-08-20 PROCEDURE — 74011636637 HC RX REV CODE- 636/637: Performed by: PHYSICIAN ASSISTANT

## 2022-08-20 PROCEDURE — 36600 WITHDRAWAL OF ARTERIAL BLOOD: CPT

## 2022-08-20 PROCEDURE — 99291 CRITICAL CARE FIRST HOUR: CPT | Performed by: INTERNAL MEDICINE

## 2022-08-20 PROCEDURE — 81001 URINALYSIS AUTO W/SCOPE: CPT

## 2022-08-20 PROCEDURE — 80048 BASIC METABOLIC PNL TOTAL CA: CPT

## 2022-08-20 PROCEDURE — 84100 ASSAY OF PHOSPHORUS: CPT

## 2022-08-20 PROCEDURE — 85025 COMPLETE CBC W/AUTO DIFF WBC: CPT

## 2022-08-20 PROCEDURE — 74011000250 HC RX REV CODE- 250: Performed by: PHYSICIAN ASSISTANT

## 2022-08-20 PROCEDURE — 94762 N-INVAS EAR/PLS OXIMTRY CONT: CPT

## 2022-08-20 RX ADMIN — DEXAMETHASONE SODIUM PHOSPHATE 6 MG: 4 INJECTION, SOLUTION INTRA-ARTICULAR; INTRALESIONAL; INTRAMUSCULAR; INTRAVENOUS; SOFT TISSUE at 03:38

## 2022-08-20 RX ADMIN — SODIUM CHLORIDE, PRESERVATIVE FREE 20 MG: 5 INJECTION INTRAVENOUS at 08:35

## 2022-08-20 RX ADMIN — Medication 2 UNITS: at 17:42

## 2022-08-20 RX ADMIN — FENTANYL CITRATE 150 MCG/HR: 0.05 INJECTION, SOLUTION INTRAMUSCULAR; INTRAVENOUS at 19:05

## 2022-08-20 RX ADMIN — FENTANYL CITRATE 200 MCG/HR: 0.05 INJECTION, SOLUTION INTRAMUSCULAR; INTRAVENOUS at 07:11

## 2022-08-20 RX ADMIN — CHLORHEXIDINE GLUCONATE 0.12% ORAL RINSE 10 ML: 1.2 LIQUID ORAL at 20:19

## 2022-08-20 RX ADMIN — SODIUM CHLORIDE, PRESERVATIVE FREE 20 MG: 5 INJECTION INTRAVENOUS at 20:20

## 2022-08-20 RX ADMIN — CHLORHEXIDINE GLUCONATE 0.12% ORAL RINSE 10 ML: 1.2 LIQUID ORAL at 08:35

## 2022-08-20 NOTE — PROGRESS NOTES
Wound Prevention Checklist    Patient: Lang Wheeler (89 y.o. female)  Date: 8/20/2022  Diagnosis: Aortic aneurysm without rupture, unspecified portion of aorta (HCC) [I71.9]  Saccular aneurysm [I67.1] Saccular aneurysm    Precautions:         []  Heel prevention boots placed on patient    [x]  Patient turned q2h during shift    []  Lift team ordered    [x]  Patient on [place] bed/Specialty bed    [x]  Each Wound is documented during shift (Stage, Color, drainage, odor, measurements, and dressings)    [x]  Dual skin checks done at bedside during shift report with Tala Easley RN

## 2022-08-20 NOTE — PROGRESS NOTES
Currently sedated on ventilator, appears stable  Vital signs and labs are stable  Daughter and grandchildren at bedside  Some mild edema present symmetrical  Right groin soft and intact no hematoma  Left groin without hematoma  Peripheral pulses are palpable  Appreciate cardiology and pulmonary input  Extubation as possible

## 2022-08-20 NOTE — PROGRESS NOTES
0700- Change of shift. Bedside report received from Amaris Suero, [address] [facility]. Will assume care of the patient. 0800- On exam patient intubated and sedated. Patient responds to voice, does not follow commands. When awoken patient attempted to reach for ET tube and became asynchronous with the ventilator, resolved without intervention. Restraints in place. Right groin soft with bruising outlined. [facility]- Dr Memo Shankar at bedside. Family updated by provider. 1310- Patient appears less agitated. Resting with eyes closed when not stimulated. Sedation paused for sedation holiday and possible SBT trial. RT made aware. 1435- RT at bedside for SBT. Patient easily aroused to voice. Following commands, moving all extremities. 1- Dr Briana Buchanan updated. Plan discussed to reduce FIO2 requirements. Following update apnea alarm noted. Patient back on previous setting per RT.   1645- Spoke with Stephie Burdick. OK to start tube feeds. Restraint order renewed. 1905- Change of shift. Bedside report given to Melody Klein RN.

## 2022-08-20 NOTE — PROGRESS NOTES
[facility]  Pulmonary, Critical Care, and Sleep Medicine      Name: Joleen Pfeiffer MRN: [id number]   : 1960 Hospital: [address] [place] [address]    Date: 2022          Critical Care follow up    Requesting MD: Dr. 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 Fredy Layne. Seizure vs myoclonus - shortly following cardiac arrest x2. No further episodes of tremulousness or twitching noted  R groin hematoma w/ femstop in place  Hx of CAD w/ prior CABG  Hx of HFrEF - most recent EF from  -30%  Paroxysmal symptomatic a-fib with CHADSVASc2 score of 5. On chronic Amiodorone and anticoagulation  History of recurrent GI bleeding, requiring transfusion and HAS-BLED score of 4. Chronic stage 2 kidney disease. Remote breast cancer about ten years ago with mastectomy and chemotherapy, but no chest radiation. History of thyroid disorder. History of 'waking up 'previously with anesthesia. RECOMMENDATIONS:   Neuro: WILL TITRATE SEDATION TO RASS GOAL -2, daily sedation hold and assess for weaning  Pulm: Titrate FiO2 for goal SPO2> 90%,VAP prevention bundle, head of the bed at 30' all times. Daily sedation holiday and assessment for weaning with SBT as tolerated. Discussed with bedside RN      and RT to proceed this morning with SBT and if tolerated will consider extubation.   CVS: Actively titrate vasopressors aim MAP >65mmHg-currently not needing any, consulted cardiology for assistance in management-patient known to them and has complex cardiac problems cardiomyopathy as well as atrial fibrillation  Fluids: maintainance  GI: SUP, Trend LFTs, OGT  Renal: Trend Renal indices, Strict Is/Os, Gonzalez (+)  Hem/Onc: Monitor for s/o active bleeding  I/D: Trend WBCs and temperature curve. Endocrine: Q6 glucoses, SSI. Metabolic:  Daily BMP, mag, phos. Trend lytes, replace as needed. Musc/Skin: no acute issues, wound care as needed  Full code     Subjective/History: This patient has been seen and evaluated at the request of Dr. Nu Soto s/p cardiac arrest.      08/20/22   Patient return to the OR for washout of groin hematoma done without any complications. Return to ICU on ventilator support  Patient appears comfortable on sedation-Fentanyl and Versed  Awakens to name calling and seems to follow simple commands  Bedside evaluation-no hemodynamic issues  Maintaining saturations 95 to 97% on 45% FiO2  Mechanical ventilator set at 45% FiO2, PEEP 5  ABG this morning pH of 7.40 PCO2 37 PO2 62  Secretions-minimal to moderate  Patient received 1 unit of packed RBC for hemoglobin of 6.9 hematocrit 22.9-post transfusion H&H pending  BMP unremarkable except for elevated NT proBNP  Maintaining urine output-urine is cloudy  Nursing overnight reported some restlessness and waking up with some tremors      HPI  Patient is a 63 y.o. female w/ pmhx of a fib, CAD, CHF who presented for covered stent procedure for descending thoracic aortic saccular aneurysm. Procedure performed 8/18 w/o incident. Following procedure, patient was drowsy but answering questions appropriately per bedside RN. Per chart review, she began having oxygen desaturations and escalating oxygen requirements, eventually ending up on HFNC. Blood gas performed showed mild hypoxia and mild hypercapnia. Per chart review, patient was found in cardiac arrest and ACLS initiated with approximately 9 min of downtime. EPOC labs following cardiac arrest essentially unremarkable.  ABG with mixed respiratory and metabolic acidosis. The patient is critically ill and can not provide additional history due to Ventilated. 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    Anxiety     Breast CA ([facility] 12/2009    S/P Lt Mastectomy and Chemo    CAD (coronary artery disease) 08/30/2010    S/P CABG X 4 ([address] to LAD, Sequential SVG to D2, Ramus, OM) 01/2011    Cardiomyopathy ([facility]     LVEF 35-40 % (08/20) 60% (08/14), 40% (2011)    Chemotherapy 04/2010    for 4 months    CHF (congestive heart failure) (HCC)     Chronic kidney disease     stage 2 per cardiac note cc    Chronic pain     COPD (chronic obstructive pulmonary disease) ([facility] 08/30/2010    Depression     Diabetes (HCC)     GERD (gastroesophageal reflux disease)     GI bleed     History of blood transfusion     2010 and 1/2022    Hx of heart artery stent 2010    x 10  (4, then 4, then 2)    Hyperlipidemia     Hypertension     MI (myocardial infarction) ([facility] 2009    x 3    Schatzki's ring     S/P EGD and Dilation (07/16)    Thyroid disease     hypothyroid    Tobacco abuse       Past Surgical History:   Procedure Laterality Date    COLONOSCOPY N/A 5/3/2017    COLON  performed by Perla Lau MD at [facility] [address] ENDOSCOPY    COLONOSCOPY N/A 2/15/2021    COLONOSCOPY performed by Marlyn Green MD at [facility] [address] ENDOSCOPY    HX APPENDECTOMY      HX CHOLECYSTECTOMY      HX GYN      tubal ligation    HX HEART CATHETERIZATION  11/11/09; 10/21/09    HX HEART CATHETERIZATION  10/15/10; 10/06/09    left heart    HX HEART CATHETERIZATION  11/7/2011    left heart cath, no new findings    HX OTHER SURGICAL  4/6/2010    Insertion right internal jugular single lumen MediPort.     HX RADICAL MASTECTOMY Left 2/2010    left, with chemo    [place] BREAST SURGERY PROCEDURE UNLISTED  2/22/10    left w/sentinel node bx    [place] CABG, ARTERY-VEIN, FOUR  12/2010 [place] CARDIAC SURG PROCEDURE UNLIST      stent placement before CABG    [place] CHEST SURGERY PROCEDURE UNLISTED Right     Prior port placement      Prior to Admission medications    Medication Sig Start Date End Date Taking? Authorizing Provider   esomeprazole (NEXIUM) 40 mg capsule Take  by mouth daily. Yes Provider, Historical   metFORMIN (GLUMETZA ER) 500 mg TG24 24 hour tablet Take  by mouth two (2) times a day. Yes Provider, Historical   albuterol-ipratropium (DUO-NEB) 2.5 mg-0.5 mg/3 ml nebu Take 3 mL by inhalation every six (6) hours as needed. Use every night   Yes Provider, Historical   rosuvastatin (Crestor) 20 mg tablet Take 1 Tablet by mouth daily. Patient taking differently: Take 20 mg by mouth nightly. 2/3/22  Yes Sulaiman Matthews MD   bumetanide (BUMEX) 1 mg tablet Take 1 Tablet by mouth every other day. Patient taking differently: Take 1 mg by mouth as needed. 2/3/22  Yes Sulaiman Matthews MD   sacubitriL-valsartan Ray Duffel) 24-26 mg tablet Take 1 Tablet by mouth two (2) times a day. 2/3/22  Yes Sulaiman Matthews MD   apixaban (Eliquis) 5 mg tablet Take 1 Tablet by mouth two (2) times a day. 2/1/22  Yes Sulaiman Matthews MD   metoprolol succinate (TOPROL-XL) 25 mg XL tablet Take 1 Tablet by mouth two (2) times a day. Patient taking differently: Take 25 mg by mouth nightly. 1/20/22  Yes Sulaiman Matthews MD   amiodarone (CORDARONE) 200 mg tablet TAKE 1 TABLET EVERY DAY 10/5/21  Yes Sulaiman Matthews MD   albuterol (PROVENTIL HFA, VENTOLIN HFA) 90 mcg/actuation inhaler Take 1 Puff by inhalation every four (4) hours as needed. Yes Provider, Historical   levothyroxine (SYNTHROID) 150 mcg tablet Take  by mouth Daily (before breakfast). Yes Provider, Historical   nitroglycerin (NITROSTAT) 0.4 mg SL tablet 1 Tablet by SubLINGual route every five (5) minutes as needed for Chest Pain.  11/30/21   Sulaiman Matthews MD     Current Facility-Administered Medications   Medication Dose Route Frequency    famotidine (PF) (PEPCID) 20 mg in 0.9% sodium chloride 10 mL injection  20 mg IntraVENous Q12H    chlorhexidine (PERIDEX) 0.12 % mouthwash 10 mL  10 mL Oral Q12H    insulin lispro (HUMALOG) injection   SubCUTAneous Q6H    fentaNYL (PF) 1,500 mcg/30 mL (50 mcg/mL) infusion  0-200 mcg/hr IntraVENous TITRATE    0.9% sodium chloride infusion  10 mL/hr IntraVENous CONTINUOUS    midazolam in normal saline (VERSED) 1 mg/mL infusion  0-4 mg/hr IntraVENous TITRATE    [Held by provider] amiodarone (CORDARONE) tablet 200 mg  200 mg Oral DAILY    [Held by provider] levothyroxine (SYNTHROID) tablet 150 mcg  150 mcg Oral 6am    [Held by provider] rosuvastatin (CRESTOR) tablet 20 mg  20 mg Oral QHS    [Held by provider] sacubitriL-valsartan (ENTRESTO) 24-26 mg tablet 1 Tablet  1 Tablet Oral BID    [Held by provider] metoprolol succinate (TOPROL-XL) XL tablet 25 mg  25 mg Oral QHS    NOREPINephrine (LEVOPHED) 8 mg in 5% dextrose 250mL (32 mcg/mL) infusion  0.5-50 mcg/min IntraVENous TITRATE     Allergies   Allergen Reactions    Shellfish Derived Hives     Eyes and Throat swelling      Social History     Tobacco Use    Smoking status: Every Day     Packs/day: 1.00     Years: 40.00     Pack years: 40.00     Types: Cigarettes    Smokeless tobacco: Never   Substance Use Topics    Alcohol use: No      Family History   Problem Relation Age of Onset    Hypertension Mother     Diabetes Mother     Breast Problems Mother         fiber cystic    Hypertension Other         Review of Systems:  Review of systems not obtained due to patient factors.     Objective:   Vital Signs:    Visit Vitals  BP (!) 116/53 (BP 1 Location: Left upper arm, BP Patient Position: At rest)   Pulse (!) 55   Temp 97.9 °F (36.6 °C)   Resp 20   Ht 5' 9\" (1.753 m)   Wt 102.5 kg (226 lb)   SpO2 94%   BMI 33.37 kg/m²       O2 Device: Ventilator   O2 Flow Rate (L/min): 50 l/min   Temp (24hrs), Av.9 °F (33.8 °C), Min:37 °F (2.8 °C), Max:98.4 °F (36.9 °C)       Intake/Output:   Last shift:      08/20 0701 - 08/20 1900  In: -   Out: 45 [Urine:45]  Last 3 shifts: 08/18 1901 - 08/20 0700  In: 769.9 [I.V.:463]  Out: 2940 [Urine:2740]    Intake/Output Summary (Last 24 hours) at 8/20/2022 0856  Last data filed at 8/20/2022 0800  Gross per 24 hour   Intake 739.43 ml   Output 2050 ml   Net -1310.57 ml         Physical Exam:    General:  Sedated, opens eyes to name calling   Head:  Normocephalic, without obvious abnormality, atraumatic. Eyes:  Conjunctivae/corneas clear. PERRL, EOMs intact. Nose: Nares normal. Septum midline. Mucosa normal.  ET tube orally   Throat: Lips, mucosa, and tongue normal. Teeth and gums normal.   Neck: Supple, symmetrical, trachea midline   Lungs:   Clear to auscultation bilaterally. Chest wall:  +thoracotomy scar. No tenderness or deformity. Heart:  bradycardic rate and regular rhythm, S1, S2 normal, no murmur, click, rub or gallop. Abdomen:   Soft, non-tender. Bowel sounds normal. No masses,  No organomegaly. Extremities: Extremities normal, atraumatic, 2+ pitting edema BLE. Right groin dressing   Pulses: 2+ and symmetric all extremities. Skin: Skin color, texture, turgor normal. No rashes or lesions. Normal capillary refill. Neurologic: Sedated-limited exam, nonfocal       Data:     Recent Results (from the past 24 hour(s))   ECHO ADULT FOLLOW-UP OR LIMITED    Collection Time: 08/19/22  9:10 AM   Result Value Ref Range    TR Peak Gradient 33 mmHg    TR Max Velocity 2.86 m/s   RBC, ALLOCATE    Collection Time: 08/19/22  9:45 AM   Result Value Ref Range    HISTORY CHECKED?  Historical check performed    GLUCOSE, POC    Collection Time: 08/19/22 12:08 PM   Result Value Ref Range    Glucose (POC) 131 (H) 70 - 110 mg/dL   GLUCOSE, POC    Collection Time: 08/19/22  5:06 PM   Result Value Ref Range    Glucose (POC) 158 (H) 70 - 110 mg/dL   GLUCOSE, POC    Collection Time: 08/19/22 11:38 PM   Result Value Ref Range    Glucose (POC) 136 (H) 70 - 110 mg/dL   BLOOD GAS, ARTERIAL POC    Collection Time: 08/20/22  3:17 AM   Result Value Ref Range    Device: ADULT VENT      FIO2 (POC) 45 %    pH (POC) 7.40 7.35 - 7.45      pCO2 (POC) 37.0 35.0 - 45.0 MMHG    pO2 (POC) 62 (L) 80 - 100 MMHG    HCO3 (POC) 22.7 22 - 26 MMOL/L    sO2 (POC) 91.4 (L) 92 - 97 %    Base deficit (POC) 1.9 mmol/L    Mode ASSIST CONTROL      Tidal volume 400 ml    Set Rate 20 bpm    PEEP/CPAP (POC) 5 cmH2O    Allens test (POC) NOT APPLICABLE      Total resp. rate 20      Site DRAWN FROM ARTERIAL LINE      Specimen type (POC) ARTERIAL      Performed by Sydney Bowman    METABOLIC PANEL, BASIC    Collection Time: 08/20/22  4:20 AM   Result Value Ref Range    Sodium 139 136 - 145 mmol/L    Potassium 3.9 3.5 - 5.5 mmol/L    Chloride 108 100 - 111 mmol/L    CO2 24 21 - 32 mmol/L    Anion gap 7 3.0 - 18 mmol/L    Glucose 129 (H) 74 - 99 mg/dL    BUN 29 (H) 7.0 - 18 MG/DL    Creatinine 1.21 0.6 - 1.3 MG/DL    BUN/Creatinine ratio 24 (H) 12 - 20      GFR est AA 55 (L) >60 ml/min/1.73m2    GFR est non-AA 45 (L) >60 ml/min/1.73m2    Calcium 8.2 (L) 8.5 - 10.1 MG/DL   MAGNESIUM    Collection Time: 08/20/22  4:20 AM   Result Value Ref Range    Magnesium 2.1 1.6 - 2.6 mg/dL   PHOSPHORUS    Collection Time: 08/20/22  4:20 AM   Result Value Ref Range    Phosphorus 3.9 2.5 - 4.9 MG/DL   GLUCOSE, POC    Collection Time: 08/20/22  5:24 AM   Result Value Ref Range    Glucose (POC) 144 (H) 70 - 110 mg/dL             Telemetry: protect  sinus bradycarida    Imaging:  CXR Results  (Last 48 hours)                 08/20/22 0610  XR CHEST PORT Final result    Impression:      Left lower lobe atelectasis. Satisfactory line and tube placement. Narrative:  CHEST PORTABLE 0604 hours       COMPARISON: 18 August.       INDICATIONS: Intubated. FINDINGS:        Portable single view chest demonstrates:       Lungs: There is dense atelectasis in the left lower lobe. Lungs are otherwise   clear.         Cardiac Silhouette And Mediastinal Contours: Moderate enlargement of the cardiac   contours is unchanged. . Metallic aortic stent noted in the descending thoracic   aorta       Pleural Spaces: No pneumothorax or definite pleural effusion evident. Bones And Soft Tissues: Unremarkable for age. ET tube is present with the tip in the mid trachea. Right IJ CVL is present with the tip overlying the SVC.           08/19/22 0035  XR CHEST PORT Final result    Impression:      1. Endotracheal tube terminates 1 cm above the anai. 2.  Enteric tube ends in the proximal stomach. 3.  Diffuse right greater than left hazy lung opacities may reflect pulmonary   edema. 4.  Likely small left pleural effusion. No pneumothorax. 5.  Cardiomegaly with post cardiac surgery changes. Narrative:  CHEST PORTABLE        INDICATIONS: Confirm endotracheal and orogastric tube placement. COMPARISON: 12/9/2019       FINDINGS:        Support lines/catheters: Tip of the endotracheal tube projects 1 cm above the   anai. Tip of the enteric tube overlies the proximal stomach. Right jugular   venous catheter tip overlies SVC. Several external cardiac monitoring leads. Lungs: Diffuse hazy lung opacities in the right greater than left lung. Focal   eventration of the right diaphragm, stable. Cardiac silhouette and mediastinal contours: Median sternotomy with numerous   surgical clips overlying the mediastinum. Aortic stent graft. Moderately   enlarged cardiac silhouette. Pleural spaces: Retrocardiac opacity with blunting of the lateral costophrenic   sulcus likely representing small pleural effusion. No visible pneumothorax. Bones and soft tissues: Unremarkable.                            CT Results  (Last 48 hours)      None                     Critical Care Time:  The services I provided to this patient were to treat and/or prevent clinically significant deterioration that could result in the failure of one or more body systems and/or organ systems due to respiratory distress, hypoxia, cardiac dysrhythmia. Aggregate critical care time was  45 minutes, which includes only time during which I was engaged in work directly related to the patient's care as described above. During this entire length of time I was immediately available to the patient. The reason for providing this level of medical care for this critically ill patient was due a critical illness that impaired one or more vital organ systems such that there was a high probability of imminent or life threatening deterioration in the patients condition.  This care involved high complexity decision making to assess, manipulate, and support vital system functions, to treat this degreee vital organ system failure and to prevent further life threatening deterioration of the patients condition    Pili Reed MD   08/20/22   Pulmonary, [address] [place] [address],[address] [facility]

## 2022-08-20 NOTE — PROGRESS NOTES
Bedside and Verbal shift change report given to Ty RN (oncoming nurse) by Ministerio Robles RN (offgoing nurse). Report included the following information SBAR, Kardex, OR Summary, Procedure Summary, Intake/Output, MAR, and Recent Results.

## 2022-08-20 NOTE — PROGRESS NOTES
Problem: Falls - Risk of  Goal: *Absence of Falls  Description: Document Howard Fall Risk and appropriate interventions in the flowsheet.   Outcome: Progressing Towards Goal  Note: Fall Risk Interventions:  Mobility Interventions: Assess mobility with egress test, Communicate number of staff needed for ambulation/transfer, OT consult for ADLs, PT Consult for mobility concerns, PT Consult for assist device competence, Utilize walker, cane, or other assistive device    Mentation Interventions: Adequate sleep, hydration, pain control, Door open when patient unattended, More frequent rounding, Update white board, Family/sitter at bedside    Medication Interventions: Assess postural VS orthostatic hypotension, Evaluate medications/consider consulting pharmacy, Patient to call before getting OOB, Teach patient to arise slowly    Elimination Interventions: Call light in reach, Patient to call for help with toileting needs, Toileting schedule/hourly rounds, Stay With Me (per policy), Bed/chair exit alarm              Problem: Ventilator Management  Goal: *Adequate oxygenation and ventilation  Outcome: Progressing Towards Goal  Goal: *Patient maintains clear airway/free of aspiration  Outcome: Progressing Towards Goal  Goal: *Absence of infection signs and symptoms  Outcome: Progressing Towards Goal  Goal: *Normal spontaneous ventilation  Outcome: Progressing Towards Goal     Problem: Patient Education: Go to Patient Education Activity  Goal: Patient/Family Education  Outcome: Progressing Towards Goal

## 2022-08-20 NOTE — CONSULTS
Cardiology Initial Patient Referral Note    Cardiology referral request from Dr. Marc Espinoza for evaluation and management/treatment of cardiomyopathy    Date of  Admission: 8/18/2022  5:50 AM   Primary Care Physician:  None    Attending Cardiologist: Dr. Berry Espinoza:     -S/p asystole cardiac arrest, ROSC after ~9 min ACLS protocol, in setting of desaturation despite increase to HFNC  -Acute hypoxic hypercapnic respiratory failure, intubated 8/18 PM  -Presented for endovascular thoracic aneurysm repair by Dr. Guillermo Mitchell 8/18/2022, which was performed as planned  R groin hematoma, s/p washout by Dr. Guillermo Mitchell 8/19  -Chronic HFrEF/ischemic cardiomyopathy, EF 30% in 02/2022  Echo 8/19/2022 with LVEF 45-50%  -CAD, s/p CABG x 4 in 2010. Nuclear stress test in 2019 showed inferior scar, no ischemia. Last cardiac cath in 2011 with findings as follows:  LM: Normal  LAD: Eccentric ostial 70-80%, mid to distal LAD is a very small-caliber vessel, probably about a 2 mm vessel. Competitive filling from LIMA. Diagonal: Ostial 40-50% proximal moderate disease. Ramus: Proximal 100% in stent. LCx/OM: OM2 mid 100%. Native LCx diffuse luminal irregularities without any obstructive stenosis. Less than 2 mm vessel. RCA: proximal and mid stent diffuse 20% ISR. Otherwise, no obstructive disease. RPLS 60-70% tubular stenosis which appears unchanged form prior angiogram about a year ago. Sequential SVG-diagonal, ramus, OM: patent without any significant obstructive stenosis. There appears to be venous valve system distally into the graft. Native vessel which includes diagonal, ramus and obtuse marginal beyond graft appears to be patent and a very small-caliber vessel, less than 2-mm. LIMA TO LAD: widely patent. Distally, LAD has no significant stenosis.  This appears to be a less than 2-mm vessel.  -Paroxysmal afib, recently referred to Dr. Lluvia Owens for Watchman evaluation given Hx recurrent GI bleeding requiring transfusion and HAS-BLED score of 4. On Amiodarone and Eliquis  -CKD  -HTN  -DM  -HLD  -Hx thyroid disorder  -Hx breast cancer s/p mastectomy and chemotherapy  -Hx tobacco abuse    Primary cardiologist is Dr. Melia Kanner:       I saw, evaluated, and examined. Currently intubated and sedated. Patient underwent endovascular aneurysm repair by Dr. Charlene Rashid. Patient's postoperative patient had increasing oxygen requirement eventually needed to be intubated. Also patient had hematoma for which patient had to go to the OR again. Patient with known history of atrial fibrillation and also GI bleed. On Eliquis cautiously as outpatient. Patient had asystolic PEA arrest likely in the setting of hypoxia and significant blood loss  Currently appears to be in sinus rhythm and bradycardia on telemetry monitor. No significant evidence of fluid overload  Chest x-ray with left lower lobe atelectasis. Pulmonary team following  Pressor support discontinued yesterday. Continue to hold any antihypertensive for now. Currently bradycardia so will hold amiodarone as well. Would recommend to start aspirin once okay by vascular team.  Hold any anticoagulation with bleed and hematoma. Significant time spent in reviewing the case, multiple EMR databases, physician notes, reviewing pertinent labs and imaging studies  I spent significant amount of time for medical decision making and updated history, and other providers assessments as well. I personally agree with the findings as stated and the plan as documented.   I saw, examined, and evaluated this patient and performed the substantive portion of the encounter for > 50% of the time including extensive history, physical exam, assessment and plan    Sam Zarco MD       -Continue supportive care per PCCM team, appreciate assistance.  -Continue post-operative care per vascular surgery team.  -Levophed stopped yesterday morning, agree with continuing to hold [place] Lavelle Wong Toprol.  -Amiodarone on hold in setting of bradycardia.  -Consider starting ASA 81 mg if okay by all teams.  -No Eliquis in setting of hematoma s/p washout. History of Present Illness: This is a 63 y.o. 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 Patient complains of: respiratory failure/cardiac arrest      Joleen Pfeiffer is 63 y.o. female who presented to the hospital for thoracic aortic aneurysm repair. She underwent the procedure as planned on 8/18 and has been reported to have increasing O2 requirements; she had bene placed on HFNC but continued to desaturate. Code Blue was subsequently called with asystole cardiac arrest.  She achieved ROSC after approx. 9  minutes ACLS protocol. She was ultimately intubated. She developed R groin hematoma and underwent R groin washout on 8/19. Cardiology has been consulted given her chronic cardiac conditions as described above. Pt currently intubated/sedated and unable to provide any history at this time. Cardiac risk factors: dyslipidemia, diabetes mellitus, hypertension, known CAD/CMY      Review of Symptoms:      Review of systems not obtained due to patient factors.      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    Anxiety     Breast CA ([facility] 12/2009    S/P Lt Mastectomy and Chemo    CAD (coronary artery disease) 08/30/2010    S/P CABG X 4 ([address] to LAD, Sequential SVG to D2, Ramus, OM) 01/2011    Cardiomyopathy ([address]     LVEF 35-40 % (08/20) 60% (08/14), 40% (2011)    Chemotherapy 04/2010    for 4 months    CHF (congestive heart failure) (HCC)     Chronic kidney disease     stage 2 per cardiac note cc    Chronic pain     COPD (chronic obstructive pulmonary disease) ([address] 08/30/2010 Depression     Diabetes ([facility]     GERD (gastroesophageal reflux disease)     GI bleed     History of blood transfusion     2010 and 1/2022    Hx of heart artery stent 2010    x 10  (4, then 4, then 2)    Hyperlipidemia     Hypertension     MI (myocardial infarction) ([facility] 2009    x 3    Schatzki's ring     S/P EGD and Dilation (07/16)    Thyroid disease     hypothyroid    Tobacco abuse          Social History:     Social History     Socioeconomic History    Marital status:    Tobacco Use    Smoking status: Every Day     Packs/day: 1.00     Years: 40.00     Pack years: 40.00     Types: Cigarettes    Smokeless tobacco: Never   Vaping Use    Vaping Use: Never used   Substance and Sexual Activity    Alcohol use: No    Drug use: No        Family History:     Family History   Problem Relation Age of Onset    Hypertension Mother     Diabetes Mother     Breast Problems Mother         fiber cystic    Hypertension Other         Medications:      Allergies   Allergen Reactions    Shellfish Derived Hives     Eyes and Throat swelling        Current Facility-Administered Medications   Medication Dose Route Frequency    famotidine (PF) (PEPCID) 20 mg in 0.9% sodium chloride 10 mL injection  20 mg IntraVENous Q12H    chlorhexidine (PERIDEX) 0.12 % mouthwash 10 mL  10 mL Oral Q12H    insulin lispro (HUMALOG) injection   SubCUTAneous Q6H    fentaNYL (PF) 1,500 mcg/30 mL (50 mcg/mL) infusion  0-200 mcg/hr IntraVENous TITRATE    0.9% sodium chloride infusion  10 mL/hr IntraVENous CONTINUOUS    0.9% sodium chloride infusion 250 mL  250 mL IntraVENous PRN    midazolam in normal saline (VERSED) 1 mg/mL infusion  0-4 mg/hr IntraVENous TITRATE    albuterol-ipratropium (DUO-NEB) 2.5 MG-0.5 MG/3 ML  3 mL Nebulization Q4H PRN    [Held by provider] amiodarone (CORDARONE) tablet 200 mg  200 mg Oral DAILY    [Held by provider] levothyroxine (SYNTHROID) tablet 150 mcg  150 mcg Oral 6am    nitroglycerin (NITROSTAT) tablet 0.4 mg  0.4 mg SubLINGual Q5MIN PRN    [Held by provider] rosuvastatin (CRESTOR) tablet 20 mg  20 mg Oral QHS    oxyCODONE-acetaminophen (PERCOCET) 5-325 mg per tablet 1 Tablet  1 Tablet Oral Q4H PRN    HYDROmorphone (DILAUDID) injection 1 mg  1 mg IntraVENous Q4H PRN    hydrALAZINE (APRESOLINE) 20 mg/mL injection 10 mg  10 mg IntraVENous Q15MIN PRN    hydrALAZINE (APRESOLINE) 20 mg/mL injection 10-20 mg  10-20 mg IntraVENous Q6H PRN    [Held by provider] sacubitriL-valsartan (ENTRESTO) 24-26 mg tablet 1 Tablet  1 Tablet Oral BID    [Held by provider] metoprolol succinate (TOPROL-XL) XL tablet 25 mg  25 mg Oral QHS    bumetanide (BUMEX) tablet 1 mg  1 mg Oral DAILY PRN    atropine injection 1 mg  1 mg IntraVENous Q5MIN PRN    NOREPINephrine (LEVOPHED) 8 mg in 5% dextrose 250mL (32 mcg/mL) infusion  0.5-50 mcg/min IntraVENous TITRATE         Physical Exam:   Visit Vitals  BP (!) 116/53 (BP 1 Location: Left upper arm, BP Patient Position: At rest)   Pulse (!) 55   Temp 97.9 °F (36.6 °C)   Resp 20   Ht 5' 9\" (1.753 m)   Wt 102.5 kg (226 lb)   SpO2 94%   BMI 33.37 kg/m²       TELE:  sinus bradycardia    BP Readings from Last 3 Encounters:   08/20/22 (!) 116/53   07/19/22 (!) 168/88   07/06/22 130/78     Pulse Readings from Last 3 Encounters:   08/20/22 (!) 55   07/19/22 (!) 54   07/06/22 (!) 48     Wt Readings from Last 3 Encounters:   08/19/22 102.5 kg (226 lb)   07/19/22 102.1 kg (225 lb)   07/06/22 99.3 kg (219 lb)       General:  intubated, sedated, no distress, appears stated age  Neck:  supple  Lungs:  clear to auscultation bilaterally, to anterolateral lung fields  Heart:  bradycardic regular rhythm  Abdomen:  abdomen is soft without significant tenderness, masses, organomegaly or guarding  Extremities:  atraumatic, no edema  Skin: Warm and dry.    Neuro: sedated, no involuntary movements  Psych: unable to adequately assess due to pt condition     Data Review:     Recent Labs     08/19/22  0455 08/19/22  0135 08/18/22 1955 WBC 12.5 17.1*  --    HGB 6.9* 7.6* 8.3*   HCT 22.9* 25.1* 28.3*   * 133*  --      Recent Labs     08/20/22  0420 08/19/22  0455 08/19/22  0135 08/18/22 1955 08/18/22 1955 08/18/22  1608    141 139   < > 138  --    K 3.9 3.9 4.1   < > 4.4  --     110 109   < > 110  --    CO2 24 25 23   < > 24  --    * 104* 114*   < > 167*  --    BUN 29* 26* 25*   < > 24*  --    CREA 1.21 1.26 1.35*   < > 1.35*  --    CA 8.2* 8.3* 8.1*   < > 8.2*  --    MG 2.1  --  1.8  --   --   --    PHOS 3.9 4.2 5.0*  --   --   --    ALB  --  2.8* 3.0*  --  3.1*  --    ALT  --  16 14  --  11*  --    INR  --   --   --   --   --  1.2    < > = values in this interval not displayed. Results for orders placed or performed in visit on 07/06/22   AMB POC EKG ROUTINE W/ 12 LEADS, INTER & REP    Impression    Sinus bradycardia at 48 bpm.  IVCD. Nonspecific T wave abnormality.    Results for orders placed or performed during the hospital encounter of 11/15/19   EKG, 12 LEAD, INITIAL   Result Value Ref Range    Ventricular Rate 55 BPM    Atrial Rate 55 BPM    P-R Interval 148 ms    QRS Duration 100 ms    Q-T Interval 554 ms    QTC Calculation (Bezet) 529 ms    Calculated P Axis 52 degrees    Calculated R Axis 101 degrees    Calculated T Axis -153 degrees    Diagnosis       Sinus bradycardia  Rightward axis  Left ventricular hypertrophy with secondary ST-T wave changes  ST & T wave abnormality, consider lateral ischemia  Prolonged QT  Abnormal ECG  When compared with ECG of 10-MAY-2019 08:59,  Criteria for Anterior infarct are no longer present  Nonspecific T wave abnormality, worse in Inferior leads  T wave inversion more evident in Lateral leads  QT has lengthened  Confirmed by Bebeto Kelly MD, --- ([id number]) on 11/16/2019 10:57:51 AM         Last Lipid:    Lab Results   Component Value Date/Time    Cholesterol, total 84 09/05/2019 09:07 AM    HDL Cholesterol 26 (L) 09/05/2019 09:07 AM    LDL, calculated 28.6 09/05/2019 09:07 AM    Triglyceride 147 09/05/2019 09:07 AM    CHOL/HDL Ratio 3.2 09/05/2019 09:07 AM       Cardiographics:     EKG Results       Procedure 720 Value Units Date/Time    EKG, 12 LEAD, SUBSEQUENT [[id number]] Collected: 08/19/22 0357    Order Status: Completed Updated: 08/19/22 1800     Ventricular Rate 51 BPM      QRS Duration 120 ms      Q-T Interval 546 ms      QTC Calculation (Bezet) 503 ms      Calculated R Axis 62 degrees      Calculated T Axis 163 degrees      Diagnosis --     Wide QRS rhythm  Anterolateral infarct , age undetermined  Abnormal ECG  When compared with ECG of 15-NOV-2019 13:29,  Wide QRS rhythm has replaced Sinus rhythm  Confirmed by Karissa Hernandez M.D., [address] [place] [address] ([id number]) on 8/19/2022 6:00:14 PM      EKG, 12 LEAD, INITIAL [[id number]]     Order Status: Canceled                 Signed By: Kamran Parnell PA-C     August 20, 2022

## 2022-08-21 ENCOUNTER — APPOINTMENT (OUTPATIENT)
Dept: GENERAL RADIOLOGY | Age: 62
DRG: 219 | End: 2022-08-21
Attending: INTERNAL MEDICINE
Payer: MEDICARE

## 2022-08-21 LAB
ANION GAP BLD CALC-SCNC: 11 MMOL/L (ref 10–20)
ANION GAP SERPL CALC-SCNC: 5 MMOL/L (ref 3–18)
ARTERIAL PATENCY WRIST A: ABNORMAL
BASE DEFICIT BLD-SCNC: 0.3 MMOL/L
BASE DEFICIT BLD-SCNC: 0.4 MMOL/L
BASOPHILS # BLD: 0 K/UL (ref 0–0.1)
BASOPHILS NFR BLD: 0 % (ref 0–2)
BDY SITE: ABNORMAL
BDY SITE: ABNORMAL
BODY TEMPERATURE: 97.5
BUN SERPL-MCNC: 33 MG/DL (ref 7–18)
BUN/CREAT SERPL: 29 (ref 12–20)
CA-I BLD-MCNC: 1.18 MMOL/L (ref 1.12–1.32)
CALCIUM SERPL-MCNC: 7.7 MG/DL (ref 8.5–10.1)
CHLORIDE BLD-SCNC: 105 MMOL/L (ref 98–107)
CHLORIDE SERPL-SCNC: 108 MMOL/L (ref 100–111)
CO2 BLD-SCNC: 26 MMOL/L (ref 19–24)
CO2 SERPL-SCNC: 27 MMOL/L (ref 21–32)
CREAT BLD-MCNC: 1.14 MG/DL (ref 0.6–1.3)
CREAT SERPL-MCNC: 1.13 MG/DL (ref 0.6–1.3)
DIFFERENTIAL METHOD BLD: ABNORMAL
EOSINOPHIL # BLD: 0 K/UL (ref 0–0.4)
EOSINOPHIL NFR BLD: 0 % (ref 0–5)
ERYTHROCYTE [DISTWIDTH] IN BLOOD BY AUTOMATED COUNT: 18.1 % (ref 11.6–14.5)
FIO2 ON VENT: 55 %
GAS FLOW.O2 O2 DELIVERY SYS: ABNORMAL L/MIN
GAS FLOW.O2 SETTING OXYMISER: 20 BPM
GLUCOSE BLD STRIP.AUTO-MCNC: 114 MG/DL (ref 70–110)
GLUCOSE BLD STRIP.AUTO-MCNC: 121 MG/DL (ref 70–110)
GLUCOSE BLD STRIP.AUTO-MCNC: 122 MG/DL (ref 70–110)
GLUCOSE BLD-MCNC: 103 MG/DL (ref 65–100)
GLUCOSE SERPL-MCNC: 113 MG/DL (ref 74–99)
HCO3 BLD-SCNC: 25 MMOL/L (ref 22–26)
HCO3 BLD-SCNC: 25.5 MMOL/L (ref 22–26)
HCT VFR BLD AUTO: 25 % (ref 35–45)
HGB BLD-MCNC: 7.7 G/DL (ref 12–16)
IMM GRANULOCYTES # BLD AUTO: 0.1 K/UL (ref 0–0.04)
IMM GRANULOCYTES NFR BLD AUTO: 1 % (ref 0–0.5)
LACTATE BLD-SCNC: 0.48 MMOL/L (ref 0.4–2)
LYMPHOCYTES # BLD: 0.5 K/UL (ref 0.9–3.6)
LYMPHOCYTES NFR BLD: 5 % (ref 21–52)
MAGNESIUM SERPL-MCNC: 2.2 MG/DL (ref 1.6–2.6)
MCH RBC QN AUTO: 28 PG (ref 24–34)
MCHC RBC AUTO-ENTMCNC: 30.8 G/DL (ref 31–37)
MCV RBC AUTO: 90.9 FL (ref 78–100)
MONOCYTES # BLD: 1 K/UL (ref 0.05–1.2)
MONOCYTES NFR BLD: 9 % (ref 3–10)
NEUTS SEG # BLD: 9.6 K/UL (ref 1.8–8)
NEUTS SEG NFR BLD: 86 % (ref 40–73)
NRBC # BLD: 0 K/UL (ref 0–0.01)
NRBC BLD-RTO: 0 PER 100 WBC
O2/TOTAL GAS SETTING VFR VENT: 45 %
PCO2 BLD: 41.8 MMHG (ref 35–45)
PCO2 BLD: 47 MMHG (ref 35–45)
PEEP RESPIRATORY: 5 CMH2O
PEEP RESPIRATORY: 5 CM[H2O]
PH BLD: 7.34 [PH] (ref 7.35–7.45)
PH BLD: 7.38 [PH] (ref 7.35–7.45)
PHOSPHATE SERPL-MCNC: 3.3 MG/DL (ref 2.5–4.9)
PLATELET # BLD AUTO: 121 K/UL (ref 135–420)
PMV BLD AUTO: 11.8 FL (ref 9.2–11.8)
PO2 BLD: 124 MMHG (ref 80–100)
PO2 BLD: 65 MMHG (ref 80–100)
POTASSIUM BLD-SCNC: 3.9 MMOL/L (ref 3.5–5.1)
POTASSIUM SERPL-SCNC: 4.1 MMOL/L (ref 3.5–5.5)
RBC # BLD AUTO: 2.75 M/UL (ref 4.2–5.3)
SAO2 % BLD: 92.6 % (ref 92–97)
SAO2 % BLD: 99 %
SERVICE CMNT-IMP: ABNORMAL
SERVICE CMNT-IMP: ABNORMAL
SODIUM BLD-SCNC: 141 MMOL/L (ref 136–145)
SODIUM SERPL-SCNC: 140 MMOL/L (ref 136–145)
SPECIMEN SITE: ABNORMAL
SPECIMEN TYPE: ABNORMAL
VENTILATION MODE VENT: ABNORMAL
WBC # BLD AUTO: 11.2 K/UL (ref 4.6–13.2)

## 2022-08-21 PROCEDURE — 77030040392 HC DRSG OPTIFOAM MDII -A

## 2022-08-21 PROCEDURE — 74011250636 HC RX REV CODE- 250/636: Performed by: INTERNAL MEDICINE

## 2022-08-21 PROCEDURE — 0BCB8ZZ EXTIRPATION OF MATTER FROM LEFT LOWER LOBE BRONCHUS, VIA NATURAL OR ARTIFICIAL OPENING ENDOSCOPIC: ICD-10-PCS | Performed by: INTERNAL MEDICINE

## 2022-08-21 PROCEDURE — 82803 BLOOD GASES ANY COMBINATION: CPT

## 2022-08-21 PROCEDURE — 0BC68ZZ EXTIRPATION OF MATTER FROM RIGHT LOWER LOBE BRONCHUS, VIA NATURAL OR ARTIFICIAL OPENING ENDOSCOPIC: ICD-10-PCS | Performed by: INTERNAL MEDICINE

## 2022-08-21 PROCEDURE — 2709999900 HC NON-CHARGEABLE SUPPLY

## 2022-08-21 PROCEDURE — 84100 ASSAY OF PHOSPHORUS: CPT

## 2022-08-21 PROCEDURE — 82947 ASSAY GLUCOSE BLOOD QUANT: CPT

## 2022-08-21 PROCEDURE — 74011250636 HC RX REV CODE- 250/636: Performed by: PHYSICIAN ASSISTANT

## 2022-08-21 PROCEDURE — 80048 BASIC METABOLIC PNL TOTAL CA: CPT

## 2022-08-21 PROCEDURE — 0BC78ZZ EXTIRPATION OF MATTER FROM LEFT MAIN BRONCHUS, VIA NATURAL OR ARTIFICIAL OPENING ENDOSCOPIC: ICD-10-PCS | Performed by: INTERNAL MEDICINE

## 2022-08-21 PROCEDURE — 74011000258 HC RX REV CODE- 258: Performed by: INTERNAL MEDICINE

## 2022-08-21 PROCEDURE — 83735 ASSAY OF MAGNESIUM: CPT

## 2022-08-21 PROCEDURE — 82962 GLUCOSE BLOOD TEST: CPT

## 2022-08-21 PROCEDURE — 65610000006 HC RM INTENSIVE CARE

## 2022-08-21 PROCEDURE — 99232 SBSQ HOSP IP/OBS MODERATE 35: CPT | Performed by: INTERNAL MEDICINE

## 2022-08-21 PROCEDURE — 94003 VENT MGMT INPAT SUBQ DAY: CPT

## 2022-08-21 PROCEDURE — 94762 N-INVAS EAR/PLS OXIMTRY CONT: CPT

## 2022-08-21 PROCEDURE — 36600 WITHDRAWAL OF ARTERIAL BLOOD: CPT

## 2022-08-21 PROCEDURE — 85025 COMPLETE CBC W/AUTO DIFF WBC: CPT

## 2022-08-21 PROCEDURE — 71045 X-RAY EXAM CHEST 1 VIEW: CPT

## 2022-08-21 PROCEDURE — 74011000250 HC RX REV CODE- 250: Performed by: PHYSICIAN ASSISTANT

## 2022-08-21 PROCEDURE — 99291 CRITICAL CARE FIRST HOUR: CPT | Performed by: INTERNAL MEDICINE

## 2022-08-21 PROCEDURE — 31624 DX BRONCHOSCOPE/LAVAGE: CPT | Performed by: INTERNAL MEDICINE

## 2022-08-21 RX ORDER — GUAIFENESIN 100 MG/5ML
81 LIQUID (ML) ORAL DAILY
Status: DISCONTINUED | OUTPATIENT
Start: 2022-08-22 | End: 2022-09-05 | Stop reason: HOSPADM

## 2022-08-21 RX ADMIN — FENTANYL CITRATE 200 MCG/HR: 0.05 INJECTION, SOLUTION INTRAMUSCULAR; INTRAVENOUS at 03:28

## 2022-08-21 RX ADMIN — MIDAZOLAM 1.5 MG/HR: 5 INJECTION INTRAMUSCULAR; INTRAVENOUS at 17:23

## 2022-08-21 RX ADMIN — CHLORHEXIDINE GLUCONATE 0.12% ORAL RINSE 10 ML: 1.2 LIQUID ORAL at 20:31

## 2022-08-21 RX ADMIN — FENTANYL CITRATE 150 MCG/HR: 0.05 INJECTION, SOLUTION INTRAMUSCULAR; INTRAVENOUS at 11:52

## 2022-08-21 RX ADMIN — SODIUM CHLORIDE, PRESERVATIVE FREE 20 MG: 5 INJECTION INTRAVENOUS at 10:03

## 2022-08-21 RX ADMIN — CHLORHEXIDINE GLUCONATE 0.12% ORAL RINSE 10 ML: 1.2 LIQUID ORAL at 10:03

## 2022-08-21 RX ADMIN — SODIUM CHLORIDE, PRESERVATIVE FREE 20 MG: 5 INJECTION INTRAVENOUS at 20:32

## 2022-08-21 RX ADMIN — MIDAZOLAM 3 MG/HR: 5 INJECTION INTRAMUSCULAR; INTRAVENOUS at 03:28

## 2022-08-21 NOTE — PROCEDURES
[facility]  Pulmonary, Critical Care, and Sleep Medicine    Name: Matias Bryan MRN: [id number]   : 1960 Hospital: [address] [place] [address]   Date: 2022        Bronchoscopy Report    Procedure: Therapeutic bronchoscopy. Indication: Mucus Plugging    Consent/Treatment: Informed consent was obtained from the  family after risks, benefits and alternatives were explained. Timeout verified the correct patient and correct procedure. Anesthesia:   Patient on ventilator and receiving  Fentanyl 200 mcg and Versed at 3 mg . FiO2 was increased 100% for procedure and returned back to 45% after completion. Procedure Details:   -- The bronchoscope was introduced through an endotracheal tube. --ET tube and trachea had mucous-clear, sticky removed with suctioning  -- The trachea and anai were completely inspected and were found to be normal.  -- The right-sided endobronchial anatomy was completely inspected and was found to be normal.  Mucous plugs were noted in right lower lobe needed suctioning and washings with saline  -- The left-sided endobronchial anatomy was completely inspected and was found to be normal.  Copious mucous plugs removed from left mainstem, left lower lobe. Plugs were clear to beige in color, very tenacious needing multiple attempts at suctioning, lavaging with saline and removing. After several passes and clearing most plugs were cleared with underlying mucosa normal-appearing. Specimens:   Mucus plugs removed , no specimemns collected    Rapid On-Site Evaluation: A preliminary diagnosis of mucous plugging extensive left more than right    Complications: none    Estimated Blood Loss: Minimal    Due to need for extended suctioning procedure was prolonged but patient tolerated. Postprocedure saturations 95 to 96%.     Samy Cannon MD  2022  9:25 AM

## 2022-08-21 NOTE — PROGRESS NOTES
Wound Prevention Checklist    Patient: Vincent Goodell (60 y.o. 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

## 2022-08-21 NOTE — PROGRESS NOTES
Problem: Falls - Risk of  Goal: *Absence of Falls  Description: Document Luis Bolivar Fall Risk and appropriate interventions in the flowsheet.   Outcome: Progressing Towards Goal  Note: Fall Risk Interventions:  Mobility Interventions: Assess mobility with egress test, Communicate number of staff needed for ambulation/transfer, Bed/chair exit alarm    Mentation Interventions: Adequate sleep, hydration, pain control, Bed/chair exit alarm, Door open when patient unattended, Evaluate medications/consider consulting pharmacy, More frequent rounding, Reorient patient, Room close to nurse's station, Update white board    Medication Interventions: Assess postural VS orthostatic hypotension, Evaluate medications/consider consulting pharmacy, Patient to call before getting OOB, Teach patient to arise slowly, Bed/chair exit alarm    Elimination Interventions: Bed/chair exit alarm, Call light in reach, Patient to call for help with toileting needs, Toileting schedule/hourly rounds              Problem: Ventilator Management  Goal: *Adequate oxygenation and ventilation  Outcome: Progressing Towards Goal  Goal: *Patient maintains clear airway/free of aspiration  Outcome: Progressing Towards Goal  Goal: *Absence of infection signs and symptoms  Outcome: Progressing Towards Goal  Goal: *Normal spontaneous ventilation  Outcome: Progressing Towards Goal

## 2022-08-21 NOTE — CONSULTS
Comprehensive Nutrition Assessment    Type and Reason for Visit: Reassess, Consult    Nutrition Recommendations/Plan:   Modify tube feeding:   Formula: Jevity 1.5  Initial Rate: 20 mL/hr  Advancement: 10 q 8 hrs as tolerated  Goal Rate: 50 mL/hr   Water Flushes: 100 mL q 6 hours  Modular(s): none  Goal Regimen Provides (with modulars): 1920 kcal, 110g protein, 1312 ml free water, 100% RDIs      Malnutrition Assessment:  Malnutrition Status:  Insufficient data (08/19/22 0859)      Nutrition Assessment:    Pt remains intubated on mechanical ventilation after cardiac arrest, failed SBT. S/p covered stent procedure for descending thoracic aortic saccular aneurysm on 8/18. Consult received for tube feeding. Pt NPO x 3 days. Nutrition Related Findings:    Last BM was PTA. Pertinent Meds: pepcid, synthroid (held),SSI, NS at 10 mL/hr (KVO) Wound Type: Surgical incision      Current Nutrition Intake & Therapies:        DIET NPO  ADULT TUBE FEEDING Orogastric; Diabetic; Delivery Method: Continuous; Continuous Initial Rate (mL/hr): 20; Continuous Advance Tube Feeding: No; Water Flush Volume (mL): 30; Water Flush Frequency: Q 4 hours    Anthropometric Measures:  Height: 5' 9\" (175.3 cm)  Ideal Body Weight (IBW): 145 lbs (66 kg)  Admission Body Weight: 225 lb 15.5 oz  Current Body Wt:  102.5 kg (225 lb 15.5 oz), 155.8 % IBW. Current BMI (kg/m2): 33.4                          BMI Category: Obese class 1 (BMI 30.0-34. 9)    Estimated Daily Nutrient Needs:  Energy Requirements Based On: Formula  Weight Used for Energy Requirements: Admission  Energy (kcal/day): 6311-6015 kcals/day ([facility] 2010 x 0.9-1.1)  Weight Used for Protein Requirements: Ideal  Protein (g/day):  g/day (1.5-2 g/day)  Method Used for Fluid Requirements: 1 ml/kcal  Fluid (ml/day): 4524-3850 ml/day    Nutrition Diagnosis:   Swallowing difficulty related to impaired respiratory function as evidenced by NPO or clear liquid status due to medical condition, intubation    Nutrition Interventions:   Food and/or Nutrient Delivery: Continue NPO, Start tube feeding, IV fluid delivery     Coordination of Nutrition Care: Continue to monitor while inpatient  Plan of Care discussed with: RN, MD    Goals:  Previous Goal Met: Progressing toward goal(s)  Goals: Meet at least 75% of estimated needs, by next RD assessment       Nutrition Monitoring and Evaluation:      Food/Nutrient Intake Outcomes: Enteral nutrition intake/tolerance, IVF intake  Physical Signs/Symptoms Outcomes: Biochemical data, Fluid status or edema, Hemodynamic status, Weight    Discharge Planning:     Too soon to determine    Ad Noble RD  Contact: [phone]

## 2022-08-21 NOTE — PROGRESS NOTES
0700- Change of shift. Report received from Miguel Angel Stephens, [address] [facility]. Will assume care of the patient. 0830- Dr Sergio Chavez at bedside. Plan to complete bedside bronch.  Khadra Harrington called and telephone consent obtained by provider. 1483- Bronch started. 1- Bronch ended. No samples collected for lab. VSS throughout. Patient tolerated well. 1245- Sedation reduced for sedation holiday and breathing trial.   1330- Change of shift. Report given to Mark Cohen RN who will assume care of the patient.

## 2022-08-21 NOTE — PROGRESS NOTES
Cardiology  Note    Cardiology referral request from Dr. Kenneth Spencer for evaluation and management/treatment of cardiomyopathy    Date of  Admission: 8/18/2022  5:50 AM   Primary Care Physician:  None    Attending Cardiologist: Dr. Esdras Carmona:     -S/p asystole cardiac arrest, ROSC after ~9 min ACLS protocol, in setting of desaturation despite increase to HFNC  -Acute hypoxic hypercapnic respiratory failure, intubated 8/18 PM  -Presented for endovascular thoracic aneurysm repair by Dr. Renzo Osorio 8/18/2022, which was performed as planned  R groin hematoma, s/p washout by Dr. Renzo Osorio 8/19  -Chronic HFrEF/ischemic cardiomyopathy, EF 30% in 02/2022  Echo 8/19/2022 with LVEF 45-50%  -CAD, s/p CABG x 4 in 2010. Nuclear stress test in 2019 showed inferior scar, no ischemia. Last cardiac cath in 2011 with findings as follows:  LM: Normal  LAD: Eccentric ostial 70-80%, mid to distal LAD is a very small-caliber vessel, probably about a 2 mm vessel. Competitive filling from LIMA. Diagonal: Ostial 40-50% proximal moderate disease. Ramus: Proximal 100% in stent. LCx/OM: OM2 mid 100%. Native LCx diffuse luminal irregularities without any obstructive stenosis. Less than 2 mm vessel. RCA: proximal and mid stent diffuse 20% ISR. Otherwise, no obstructive disease. RPLS 60-70% tubular stenosis which appears unchanged form prior angiogram about a year ago. Sequential SVG-diagonal, ramus, OM: patent without any significant obstructive stenosis. There appears to be venous valve system distally into the graft. Native vessel which includes diagonal, ramus and obtuse marginal beyond graft appears to be patent and a very small-caliber vessel, less than 2-mm. LIMA TO LAD: widely patent. Distally, LAD has no significant stenosis.  This appears to be a less than 2-mm vessel.  -Paroxysmal afib, recently referred to Dr. Steve Cam for [facility] evaluation given Hx recurrent GI bleeding requiring transfusion and HAS-BLED score of 4. On Amiodarone and Eliquis  -CKD  -HTN  -DM  -HLD  -Hx thyroid disorder  -Hx breast cancer s/p mastectomy and chemotherapy  -Hx tobacco abuse    Primary cardiologist is Dr. Min Cobos:       Patient underwent endovascular aneurysm repair by Dr. Katherine Roberts. Patient's postoperative patient had increasing oxygen requirement eventually needed to be intubated. Also patient had hematoma for which patient had to go to the OR again. Patient with known history of atrial fibrillation and also GI bleed. On Eliquis cautiously as outpatient. Patient had asystolic PEA arrest likely in the setting of hypoxia and significant blood loss    Currently sinus bradycardia on telemetry monitor. No significant evidence of fluid overload  Chest x-ray with left lower lobe atelectasis. S/p bedside bronchoscopy and excessive mucous plugging removal by pulmonary team.  continue to hold any antihypertensive for now. Currently bradycardia so will hold amiodarone as well. Would recommend to start aspirin once okay by vascular team.  Hold any anticoagulation with bleed and hematoma. Kamila Gonzales MD          History of Present Illness: This is a 63 y.o. 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 Patient complains of: respiratory failure/cardiac arrest      Sunny Sharp is 63 y.o. female who presented to the hospital for thoracic aortic aneurysm repair. She underwent the procedure as planned on 8/18 and has been reported to have increasing O2 requirements; she had bene placed on HFNC but continued to desaturate. Code Blue was subsequently called with asystole cardiac arrest.  She achieved ROSC after approx. 9  minutes ACLS protocol. She was ultimately intubated. She developed R groin hematoma and underwent R groin washout on 8/19.   Cardiology has been consulted given her chronic cardiac conditions as described above. Pt currently intubated/sedated and unable to provide any history at this time. Cardiac risk factors: dyslipidemia, diabetes mellitus, hypertension, known CAD/CMY      Review of Symptoms:      Review of systems not obtained due to patient factors. 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    Anxiety     Breast CA ([facility] 12/2009    S/P Lt Mastectomy and Chemo    CAD (coronary artery disease) 08/30/2010    S/P CABG X 4 ([address] to LAD, Sequential SVG to D2, Ramus, OM) 01/2011    Cardiomyopathy ([facility]     LVEF 35-40 % (08/20) 60% (08/14), 40% (2011)    Chemotherapy 04/2010    for 4 months    CHF (congestive heart failure) (HCC)     Chronic kidney disease     stage 2 per cardiac note cc    Chronic pain     COPD (chronic obstructive pulmonary disease) ([facility] 08/30/2010    Depression     Diabetes (HCC)     GERD (gastroesophageal reflux disease)     GI bleed     History of blood transfusion     2010 and 1/2022    Hx of heart artery stent 2010    x 10  (4, then 4, then 2)    Hyperlipidemia     Hypertension     MI (myocardial infarction) ([facility] 2009    x 3    Schatzki's ring     S/P EGD and Dilation (07/16)    Thyroid disease     hypothyroid    Tobacco abuse          Social History:     Social History     Socioeconomic History    Marital status:    Tobacco Use    Smoking status: Every Day     Packs/day: 1.00     Years: 40.00     Pack years: 40.00     Types: Cigarettes    Smokeless tobacco: Never   Vaping Use    Vaping Use: Never used   Substance and Sexual Activity    Alcohol use: No    Drug use: No        Family History:     Family History   Problem Relation Age of Onset    Hypertension Mother     Diabetes Mother     Breast Problems Mother         fiber cystic    Hypertension Other         Medications:      Allergies Allergen Reactions    Shellfish Derived Hives     Eyes and Throat swelling        Current Facility-Administered Medications   Medication Dose Route Frequency    famotidine (PF) (PEPCID) 20 mg in 0.9% sodium chloride 10 mL injection  20 mg IntraVENous Q12H    chlorhexidine (PERIDEX) 0.12 % mouthwash 10 mL  10 mL Oral Q12H    insulin lispro (HUMALOG) injection   SubCUTAneous Q6H    fentaNYL (PF) 1,500 mcg/30 mL (50 mcg/mL) infusion  0-200 mcg/hr IntraVENous TITRATE    0.9% sodium chloride infusion  10 mL/hr IntraVENous CONTINUOUS    0.9% sodium chloride infusion 250 mL  250 mL IntraVENous PRN    midazolam in normal saline (VERSED) 1 mg/mL infusion  0-4 mg/hr IntraVENous TITRATE    albuterol-ipratropium (DUO-NEB) 2.5 MG-0.5 MG/3 ML  3 mL Nebulization Q4H PRN    [Held by provider] amiodarone (CORDARONE) tablet 200 mg  200 mg Oral DAILY    [Held by provider] levothyroxine (SYNTHROID) tablet 150 mcg  150 mcg Oral 6am    nitroglycerin (NITROSTAT) tablet 0.4 mg  0.4 mg SubLINGual Q5MIN PRN    [Held by provider] rosuvastatin (CRESTOR) tablet 20 mg  20 mg Oral QHS    oxyCODONE-acetaminophen (PERCOCET) 5-325 mg per tablet 1 Tablet  1 Tablet Oral Q4H PRN    HYDROmorphone (DILAUDID) injection 1 mg  1 mg IntraVENous Q4H PRN    hydrALAZINE (APRESOLINE) 20 mg/mL injection 10 mg  10 mg IntraVENous Q15MIN PRN    hydrALAZINE (APRESOLINE) 20 mg/mL injection 10-20 mg  10-20 mg IntraVENous Q6H PRN    [Held by provider] sacubitriL-valsartan (ENTRESTO) 24-26 mg tablet 1 Tablet  1 Tablet Oral BID    [Held by provider] metoprolol succinate (TOPROL-XL) XL tablet 25 mg  25 mg Oral QHS    bumetanide (BUMEX) tablet 1 mg  1 mg Oral DAILY PRN    atropine injection 1 mg  1 mg IntraVENous Q5MIN PRN         Physical Exam:   Visit Vitals  BP (!) (P) 114/54   Pulse (!) 44   Temp 97.3 °F (36.3 °C)   Resp 10   Ht 5' 9\" (1.753 m)   Wt 102.5 kg (226 lb)   SpO2 100%   BMI 33.37 kg/m²       TELE:  sinus bradycardia    BP Readings from Last 3 Encounters: 08/21/22 (!) (P) 114/54   07/19/22 (!) 168/88   07/06/22 130/78     Pulse Readings from Last 3 Encounters:   08/21/22 (!) 44   07/19/22 (!) 54   07/06/22 (!) 48     Wt Readings from Last 3 Encounters:   08/19/22 102.5 kg (226 lb)   07/19/22 102.1 kg (225 lb)   07/06/22 99.3 kg (219 lb)       General:  intubated, sedated, no distress, appears stated age  Neck:  supple  Lungs: Coarse rhonchi  Heart:  bradycardic regular rhythm  Abdomen:  abdomen is soft without significant tenderness, masses, organomegaly or guarding  Extremities:  atraumatic, no edema     Data Review:     Recent Labs     08/21/22  0400 08/20/22  0845 08/19/22  0455   WBC 11.2 12.0 12.5   HGB 7.7* 8.4* 6.9*   HCT 25.0* 27.6* 22.9*   * 129* 107*       Recent Labs     08/21/22  0400 08/20/22  0420 08/19/22  0455 08/19/22  0135 08/18/22 1955 08/18/22 1955 08/18/22  1608    139 141 139   < > 138  --    K 4.1 3.9 3.9 4.1   < > 4.4  --     108 110 109   < > 110  --    CO2 27 24 25 23   < > 24  --    * 129* 104* 114*   < > 167*  --    BUN 33* 29* 26* 25*   < > 24*  --    CREA 1.13 1.21 1.26 1.35*   < > 1.35*  --    CA 7.7* 8.2* 8.3* 8.1*   < > 8.2*  --    MG 2.2 2.1  --  1.8  --   --   --    PHOS 3.3 3.9 4.2 5.0*   < >  --   --    ALB  --   --  2.8* 3.0*  --  3.1*  --    ALT  --   --  16 14  --  11*  --    INR  --   --   --   --   --   --  1.2    < > = values in this interval not displayed. Results for orders placed or performed in visit on 07/06/22   AMB POC EKG ROUTINE W/ 12 LEADS, INTER & REP    Impression    Sinus bradycardia at 48 bpm.  IVCD. Nonspecific T wave abnormality.    Results for orders placed or performed during the hospital encounter of 11/15/19   EKG, 12 LEAD, INITIAL   Result Value Ref Range    Ventricular Rate 55 BPM    Atrial Rate 55 BPM    P-R Interval 148 ms    QRS Duration 100 ms    Q-T Interval 554 ms    QTC Calculation (Bezet) 529 ms    Calculated P Axis 52 degrees    Calculated R Axis 101 degrees    Calculated T Axis -153 degrees    Diagnosis       Sinus bradycardia  Rightward axis  Left ventricular hypertrophy with secondary ST-T wave changes  ST & T wave abnormality, consider lateral ischemia  Prolonged QT  Abnormal ECG  When compared with ECG of 10-MAY-2019 08:59,  Criteria for Anterior infarct are no longer present  Nonspecific T wave abnormality, worse in Inferior leads  T wave inversion more evident in Lateral leads  QT has lengthened  Confirmed by Merrilyn Peabody MD, --- ([id number]) on 11/16/2019 10:57:51 AM         Last Lipid:    Lab Results   Component Value Date/Time    Cholesterol, total 84 09/05/2019 09:07 AM    HDL Cholesterol 26 (L) 09/05/2019 09:07 AM    LDL, calculated 28.6 09/05/2019 09:07 AM    Triglyceride 147 09/05/2019 09:07 AM    CHOL/HDL Ratio 3.2 09/05/2019 09:07 AM       Cardiographics:     EKG Results       Procedure 720 Value Units Date/Time    EKG, 12 LEAD, SUBSEQUENT [[id number]] Collected: 08/19/22 0357    Order Status: Completed Updated: 08/19/22 1800     Ventricular Rate 51 BPM      QRS Duration 120 ms      Q-T Interval 546 ms      QTC Calculation (Bezet) 503 ms      Calculated R Axis 62 degrees      Calculated T Axis 163 degrees      Diagnosis --     Wide QRS rhythm  Anterolateral infarct , age undetermined  Abnormal ECG  When compared with ECG of 15-NOV-2019 13:29,  Wide QRS rhythm has replaced Sinus rhythm  Confirmed by Edwar Velasquez M.D., [facility] ([id number]) on 8/19/2022 6:00:14 PM      EKG, 12 LEAD, INITIAL [[id number]]     Order Status: Canceled                 Signed By: Berry Pandey MD     August 21, 2022

## 2022-08-21 NOTE — PROGRESS NOTES
08/20/22 2012   Patient Observations   Pulse (Heart Rate) (!) 49   Resp Rate 20   O2 Sat (%) 94 %   Airway - Continuous Aspiration of Subglottic Secretions (BEE) Tube 08/18/22 Oral   Placement Date/Time: 08/18/22 2308   Number of Attempts: 1  Inserted By: Anesthesia  Present on Admission/Arrival: No  Location: Oral  Placement Verified: Auscultation;BBS;Chest x-ray;EtCO2  Airway Types: Endotracheal, cuffed  Airway Tube Size: 8 mm   Insertion Depth (cm) 24 cm   Line Dante Lips   Side Secured Left   Site Assessment Clean, dry, & intact   Suction on Yes   Respiratory   Respiratory (WDL) X   Patient on Vent Yes - If patient is on vent, add Doc Flowsheet Ventilator ().    Respiratory Pattern Regular   Breath Sounds Bilateral Coarse   Cough Weak;Productive;Cough with suction   Airway Clearance   Suction ET Tube   Suction Device Inline suction catheter   Sputum Method Obtained Endotracheal   Sputum Amount Small   Sputum Color/Odor White   Sputum Consistency Thin   Ventilator Initiate/Discontinue   Bio-[employer] ID # [id number]   Vent Settings   FIO2 (%) 45 %   SpO2/FIO2 Ratio 208.89   CMV Rate Set 20   Back-Up Rate 20   Vt Set (ml) 400 ml   PEEP/VENT (cm H2O) 5 cm H20   Insp Time (sec) 0.9 sec   Insp Rise Time % 50 %   Flow Trigger 3   Ventilator Measurements   Resp Rate Observed 20   Vt Exhaled (Machine Breath) (ml) 435 ml   Ve Observed (l/min) 8.47 l/min   PIP Observed (cm H2O) 29 cm H2O   MAP (cm H2O) 29   I:E Ratio Actual 1:2.3   Safety & Alarms   Circuit Temperature 98.8 °F (37.1 °C)   Backup Mode Checked/Apnea Yes   Pressure Max 40 cm H2O   Pressure Min 11 cm H2O   Ve Min 2   Ve Max 20   Vt Min 200 ml   Vt Max 800 ml   RR Max 40   Ambu Bag Yes   Ambu Mask Yes   Age Specific Ventilator Associated Pneumonia Bundle   Patient Age Group Adult   Adult Ventilator Associated Pneumonia Bundle   Elevation of Head to 30-45 Degrees (Unless Contraindicated) Yes   Oxygen Therapy   Skin Assessment Clean, dry, & intact   Vent Method/Mode   Ventilation Method Conventional   Ventilator Mode Assist control   Pulmonary Toilet   Pulmonary Toilet Suction;H. O.B elevated

## 2022-08-21 NOTE — PROGRESS NOTES
[facility]  Pulmonary, Critical Care, and Sleep Medicine      Name: Jayashree Merida MRN: [id number]   : 1960 Hospital: [address] [place] [address]   Date: 2022          Critical Care follow up    Requesting MD: Dr. 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 Charly Gilbert. Seizure vs myoclonus - shortly following cardiac arrest x2. No further episodes of tremulousness or twitching noted  R groin hematoma w/ femstop in place  Hx of CAD w/ prior CABG  Hx of HFrEF - most recent EF from  -30%  Paroxysmal symptomatic a-fib with CHADSVASc2 score of 5. On chronic Amiodorone and anticoagulation  History of recurrent GI bleeding, requiring transfusion and HAS-BLED score of 4. Chronic stage 2 kidney disease. Remote breast cancer about ten years ago with mastectomy and chemotherapy, but no chest radiation. History of thyroid disorder. History of 'waking up 'previously with anesthesia. RECOMMENDATIONS:   Neuro: WILL TITRATE SEDATION TO RASS GOAL -2, daily sedation hold and assess for weaning  Pulm: will proceed with diagnostic and therapeutic bronchoscopy for LLL atelectasis- mucus plug suspected. Titrate FiO2 for goal SPO2> 90%,VAP prevention bundle, head of the bed at 30' all times. Daily sedation holiday and assessment for weaning with SBT as tolerated. Discussed with bedside RN      and RT to proceed this morning with SBT and if tolerated will consider extubation.   CVS: Actively titrate vasopressors aim MAP >65mmHg-currently not needing any, consulted cardiology for assistance in management-patient known to them and has complex cardiac problems cardiomyopathy as well as atrial fibrillation  Fluids: maintainance  GI: SUP, Trend LFTs, OGT  Renal: Trend Renal indices, Strict Is/Os, Gonzalez (+)  Hem/Onc: Monitor for s/o active bleeding  I/D: Trend WBCs and temperature curve. Endocrine: Q6 glucoses, SSI. Metabolic:  Daily BMP, mag, phos. Trend lytes, replace as needed. Musc/Skin: no acute issues, wound care as needed  Full code     Subjective/History: This patient has been seen and evaluated at the request of Dr. Jase Coelho s/p cardiac arrest.      08/21/22     Patient tolerated sedation holiday and SBT initially then decompensated with Vent triggering apnea and desaturations needing higher FIO2    Patient appears comfortable on sedation-Fentanyl and Versed-sedation had to be turned up after above event. Currently on 200 mics of fentanyl and 3 mg of Versed  Awakens to name calling and seems to follow simple commands RASS -2  Bedside evaluation-no hemodynamic issues  Maintaining saturations 95 to 97% on 55% FiO2 PEEP +5  Secretions-minimal to moderate via ET tube  Patient received 1 unit of packed RBC -H&H posttransfusion 7.7/25  Maintaining urine output-urine is cloudy  Nursing overnight reported some restlessness and waking up with some tremors  Chest x-ray this a.m. shows progression of left lower lobe atelectasis  Discussed with bedside RN and called patient's spouse Mr. Nathanael Arrington need for bronchoscopic suctioning given difficulty clearing mucus via ET tube and barrier to further weaning. Discussed with patient's spouse- procedure explained with indications,  Alternatives to procedure,risks and expected complications from sedation, procedure including respiratory depression depression, bleeding, pneumothorax and possibility for additional interventions.     Consents to procedure under current sedation with patient intubated. HPI  Patient is a 63 y.o. female w/ pmhx of a fib, CAD, CHF who presented for covered stent procedure for descending thoracic aortic saccular aneurysm. Procedure performed 8/18 w/o incident. Following procedure, patient was drowsy but answering questions appropriately per bedside RN. Per chart review, she began having oxygen desaturations and escalating oxygen requirements, eventually ending up on HFNC. Blood gas performed showed mild hypoxia and mild hypercapnia. Per chart review, patient was found in cardiac arrest and ACLS initiated with approximately 9 min of downtime. EPOC labs following cardiac arrest essentially unremarkable. ABG with mixed respiratory and metabolic acidosis. The patient is critically ill and can not provide additional history due to Ventilated.      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    Anxiety     Breast CA ([address] 12/2009    S/P Lt Mastectomy and Chemo    CAD (coronary artery disease) 08/30/2010    S/P CABG X 4 ([address] to LAD, Sequential SVG to D2, Ramus, OM) 01/2011    Cardiomyopathy ([address]     LVEF 35-40 % (08/20) 60% (08/14), 40% (2011)    Chemotherapy 04/2010    for 4 months    CHF (congestive heart failure) (HCC)     Chronic kidney disease     stage 2 per cardiac note cc    Chronic pain     COPD (chronic obstructive pulmonary disease) ([address] 08/30/2010    Depression     Diabetes (HCC)     GERD (gastroesophageal reflux disease)     GI bleed     History of blood transfusion     2010 and 1/2022    Hx of heart artery stent 2010    x 10  (4, then 4, then 2)    Hyperlipidemia     Hypertension     MI (myocardial infarction) ([address] 2009    x 3    Schatzki's ring     S/P EGD and Dilation (07/16)    Thyroid disease     hypothyroid    Tobacco abuse       Past Surgical History:   Procedure Laterality Date COLONOSCOPY N/A 5/3/2017    COLON  performed by Awa Lujan MD at [facility] ENDOSCOPY    COLONOSCOPY N/A 2/15/2021    COLONOSCOPY performed by Kamla Garrison MD at [facility] ENDOSCOPY    HX APPENDECTOMY      HX CHOLECYSTECTOMY      HX GYN      tubal ligation    HX HEART CATHETERIZATION  11/11/09; 10/21/09    HX HEART CATHETERIZATION  10/15/10; 10/06/09    left heart    HX HEART CATHETERIZATION  11/7/2011    left heart cath, no new findings    HX OTHER SURGICAL  4/6/2010    Insertion right internal jugular single lumen MediPort. HX RADICAL MASTECTOMY Left 2/2010    left, with chemo    MN BREAST SURGERY PROCEDURE UNLISTED  2/22/10    left w/sentinel node bx    MN CABG, ARTERY-VEIN, FOUR  12/2010    MN CARDIAC SURG PROCEDURE UNLIST      stent placement before CABG    MN CHEST SURGERY PROCEDURE UNLISTED Right     Prior port placement      Prior to Admission medications    Medication Sig Start Date End Date Taking? Authorizing Provider   esomeprazole (NEXIUM) 40 mg capsule Take  by mouth daily. Yes Provider, Historical   metFORMIN (GLUMETZA ER) 500 mg TG24 24 hour tablet Take  by mouth two (2) times a day. Yes Provider, Historical   albuterol-ipratropium (DUO-NEB) 2.5 mg-0.5 mg/3 ml nebu Take 3 mL by inhalation every six (6) hours as needed. Use every night   Yes Provider, Historical   rosuvastatin (Crestor) 20 mg tablet Take 1 Tablet by mouth daily. Patient taking differently: Take 20 mg by mouth nightly. 2/3/22  Yes Lacy Ramirez MD   bumetanide (BUMEX) 1 mg tablet Take 1 Tablet by mouth every other day. Patient taking differently: Take 1 mg by mouth as needed. 2/3/22  Yes Lacy Ramirez MD   sacubitriL-valsartan Lorry Au) 24-26 mg tablet Take 1 Tablet by mouth two (2) times a day. 2/3/22  Yes Lacy Ramirez MD   apixaban (Eliquis) 5 mg tablet Take 1 Tablet by mouth two (2) times a day.  2/1/22  Yes Lacy Ramirez MD   metoprolol succinate (TOPROL-XL) 25 mg XL tablet Take 1 Tablet by mouth two (2) times a day. Patient taking differently: Take 25 mg by mouth nightly. 1/20/22  Yes Gretta Hernandez MD   amiodarone (CORDARONE) 200 mg tablet TAKE 1 TABLET EVERY DAY 10/5/21  Yes Gretta Hernandez MD   albuterol (PROVENTIL HFA, VENTOLIN HFA) 90 mcg/actuation inhaler Take 1 Puff by inhalation every four (4) hours as needed. Yes Provider, Historical   levothyroxine (SYNTHROID) 150 mcg tablet Take  by mouth Daily (before breakfast). Yes Provider, Historical   nitroglycerin (NITROSTAT) 0.4 mg SL tablet 1 Tablet by SubLINGual route every five (5) minutes as needed for Chest Pain.  11/30/21   Gretta Hernandez MD     Current Facility-Administered Medications   Medication Dose Route Frequency    famotidine (PF) (PEPCID) 20 mg in 0.9% sodium chloride 10 mL injection  20 mg IntraVENous Q12H    chlorhexidine (PERIDEX) 0.12 % mouthwash 10 mL  10 mL Oral Q12H    insulin lispro (HUMALOG) injection   SubCUTAneous Q6H    fentaNYL (PF) 1,500 mcg/30 mL (50 mcg/mL) infusion  0-200 mcg/hr IntraVENous TITRATE    0.9% sodium chloride infusion  10 mL/hr IntraVENous CONTINUOUS    midazolam in normal saline (VERSED) 1 mg/mL infusion  0-4 mg/hr IntraVENous TITRATE    [Held by provider] amiodarone (CORDARONE) tablet 200 mg  200 mg Oral DAILY    [Held by provider] levothyroxine (SYNTHROID) tablet 150 mcg  150 mcg Oral 6am    [Held by provider] rosuvastatin (CRESTOR) tablet 20 mg  20 mg Oral QHS    [Held by provider] sacubitriL-valsartan (ENTRESTO) 24-26 mg tablet 1 Tablet  1 Tablet Oral BID    [Held by provider] metoprolol succinate (TOPROL-XL) XL tablet 25 mg  25 mg Oral QHS     Allergies   Allergen Reactions    Shellfish Derived Hives     Eyes and Throat swelling      Social History     Tobacco Use    Smoking status: Every Day     Packs/day: 1.00     Years: 40.00     Pack years: 40.00     Types: Cigarettes    Smokeless tobacco: Never   Substance Use Topics    Alcohol use: No      Family History   Problem Relation Age of Onset    Hypertension Mother     Diabetes Mother     Breast Problems Mother         fiber cystic    Hypertension Other         Review of Systems:  Review of systems not obtained due to patient factors. Objective:   Vital Signs:    Visit Vitals  BP (!) (P) 114/54   Pulse (!) 44   Temp 97.3 °F (36.3 °C)   Resp 10   Ht 5' 9\" (1.753 m)   Wt 102.5 kg (226 lb)   SpO2 100%   BMI 33.37 kg/m²       O2 Device: Ventilator   O2 Flow Rate (L/min): 50 l/min   Temp (24hrs), Av.7 °F (36.5 °C), Min:97.3 °F (36.3 °C), Max:98.4 °F (36.9 °C)       Intake/Output:   Last shift:      No intake/output data recorded. Last 3 shifts:  1901 -  0700  In: 469.5 [I.V.:469.5]  Out: 1095 [Urine:1095]    Intake/Output Summary (Last 24 hours) at 2022 0850  Last data filed at 2022 0400  Gross per 24 hour   Intake 246.15 ml   Output 705 ml   Net -458.85 ml         Physical Exam:    General:  Sedated, opens eyes to name calling   Head:  Normocephalic, without obvious abnormality, atraumatic. Eyes:  Conjunctivae/corneas clear. PERRL, EOMs intact. Nose: Nares normal. Septum midline. Mucosa normal.  ET tube orally   Throat: Lips, mucosa, and tongue normal. Teeth and gums normal.   Neck: Supple, symmetrical, trachea midline, right IJ central line   Lungs:   Clear to auscultation bilaterally. Decreased intensity breath sounds left base   Chest wall:  +thoracotomy scar. No tenderness or deformity. Heart:  bradycardic rate and regular rhythm, S1, S2 normal, no murmur, click, rub or gallop. Abdomen:   Soft, non-tender. Bowel sounds normal. No masses,  No organomegaly. Extremities: Extremities normal, atraumatic, 2+ pitting edema BLE. Right groin dressing   Pulses: 2+ and symmetric all extremities. Arterial line   Skin: Skin color, texture, turgor normal. No rashes or lesions. Normal capillary refill.    Neurologic: Sedated-limited exam, nonfocal       Data:     Recent Results (from the past 24 hour(s))   URINALYSIS W/MICROSCOPIC    Collection Time: 08/20/22  9:15 AM   Result Value Ref Range    Color ORANGE      Appearance TURBID      Specific gravity 1.028 1.005 - 1.030      pH (UA) 5.5 5.0 - 8.0      Protein 100 (A) NEG mg/dL    Glucose Negative NEG mg/dL    Ketone TRACE (A) NEG mg/dL    Bilirubin Negative NEG      Blood SMALL (A) NEG      Urobilinogen 1.0 0.2 - 1.0 EU/dL    Nitrites Negative NEG      Leukocyte Esterase Negative NEG      WBC 0 to 2 0 - 4 /hpf    RBC 4 to 8 0 - 5 /hpf    Epithelial cells Negative 0 - 5 /lpf    Bacteria FEW (A) NEG /hpf    Amorphous Crystals 4+ (A) NEG   GLUCOSE, POC    Collection Time: 08/20/22 12:38 PM   Result Value Ref Range    Glucose (POC) 136 (H) 70 - 110 mg/dL   GLUCOSE, POC    Collection Time: 08/20/22  5:35 PM   Result Value Ref Range    Glucose (POC) 150 (H) 70 - 110 mg/dL   GLUCOSE, POC    Collection Time: 08/20/22 11:56 PM   Result Value Ref Range    Glucose (POC) 115 (H) 70 - 110 mg/dL   CBC WITH AUTOMATED DIFF    Collection Time: 08/21/22  4:00 AM   Result Value Ref Range    WBC 11.2 4.6 - 13.2 K/uL    RBC 2.75 (L) 4.20 - 5.30 M/uL    HGB 7.7 (L) 12.0 - 16.0 g/dL    HCT 25.0 (L) 35.0 - 45.0 %    MCV 90.9 78.0 - 100.0 FL    MCH 28.0 24.0 - 34.0 PG    MCHC 30.8 (L) 31.0 - 37.0 g/dL    RDW 18.1 (H) 11.6 - 14.5 %    PLATELET 152 (L) 310 - 420 K/uL    MPV 11.8 9.2 - 11.8 FL    NRBC 0.0 0  WBC    ABSOLUTE NRBC 0.00 0.00 - 0.01 K/uL    NEUTROPHILS 86 (H) 40 - 73 %    LYMPHOCYTES 5 (L) 21 - 52 %    MONOCYTES 9 3 - 10 %    EOSINOPHILS 0 0 - 5 %    BASOPHILS 0 0 - 2 %    IMMATURE GRANULOCYTES 1 (H) 0.0 - 0.5 %    ABS. NEUTROPHILS 9.6 (H) 1.8 - 8.0 K/UL    ABS. LYMPHOCYTES 0.5 (L) 0.9 - 3.6 K/UL    ABS. MONOCYTES 1.0 0.05 - 1.2 K/UL    ABS. EOSINOPHILS 0.0 0.0 - 0.4 K/UL    ABS. BASOPHILS 0.0 0.0 - 0.1 K/UL    ABS. IMM.  GRANS. 0.1 (H) 0.00 - 0.04 K/UL    DF AUTOMATED     METABOLIC PANEL, BASIC    Collection Time: 08/21/22  4:00 AM   Result Value Ref Range    Sodium 140 136 - 145 mmol/L    Potassium 4.1 3.5 - 5.5 mmol/L    Chloride 108 100 - 111 mmol/L    CO2 27 21 - 32 mmol/L    Anion gap 5 3.0 - 18 mmol/L    Glucose 113 (H) 74 - 99 mg/dL    BUN 33 (H) 7.0 - 18 MG/DL    Creatinine 1.13 0.6 - 1.3 MG/DL    BUN/Creatinine ratio 29 (H) 12 - 20      GFR est AA 60 (L) >60 ml/min/1.73m2    GFR est non-AA 49 (L) >60 ml/min/1.73m2    Calcium 7.7 (L) 8.5 - 10.1 MG/DL   MAGNESIUM    Collection Time: 08/21/22  4:00 AM   Result Value Ref Range    Magnesium 2.2 1.6 - 2.6 mg/dL   PHOSPHORUS    Collection Time: 08/21/22  4:00 AM   Result Value Ref Range    Phosphorus 3.3 2.5 - 4.9 MG/DL   BLOOD GAS, ARTERIAL POC    Collection Time: 08/21/22  4:03 AM   Result Value Ref Range    Device: ADULT VENT      FIO2 (POC) 45 %    pH (POC) 7.38 7.35 - 7.45      pCO2 (POC) 41.8 35.0 - 45.0 MMHG    pO2 (POC) 65 (L) 80 - 100 MMHG    HCO3 (POC) 25.0 22 - 26 MMOL/L    sO2 (POC) 92.6 92 - 97 %    Base deficit (POC) 0.3 mmol/L    Mode ASSIST CONTROL      Set Rate 20 bpm    PEEP/CPAP (POC) 5 cmH2O    Allens test (POC) NOT APPLICABLE      Site DRAWN FROM ARTERIAL LINE      Patient temp.  97.5      Specimen type (POC) ARTERIAL      Performed by [address] [place] [address], POC    Collection Time: 08/21/22  6:08 AM   Result Value Ref Range    Glucose (POC) 114 (H) 70 - 110 mg/dL   BLOOD GAS,CHEM8,LACTIC ACID POC    Collection Time: 08/21/22  8:31 AM   Result Value Ref Range    pH (POC) 7.34 (L) 7.35 - 7.45      pCO2 (POC) 47.0 (H) 35.0 - 45.0 MMHG    pO2 (POC) 124 (H) 80 - 100 MMHG    Calcium, ionized (POC) 1.18 1.12 - 1.32 mmol/L    Base deficit (POC) 0.4 mmol/L    HCO3 (POC) 25.5 22 - 26 MMOL/L    CO2, POC 26 (H) 19 - 24 MMOL/L    O2 SAT 99 %    Sample source ARTERIAL      SITE DRAWN FROM ARTERIAL LINE      FIO2 55 %    PEEP/CPAP 5      Performed by Aliya Martin     Sodium (POC) 141 136 - 145 mmol/L    Potassium (POC) 3.9 3.5 - 5.1 mmol/L    Glucose (POC) 103 (H) 65 - 100 mg/dL    Creatinine (POC) 1.14 0.6 - 1.3 mg/dL    Lactic Acid (POC) 0.48 0.40 - 2.00 mmol/L    Chloride (POC) 105 98 - 107 mmol/L    Anion gap, POC 11 10 - 20      GFRAA, POC 59 (L) >60 ml/min/1.73m2    GFRNA, POC 49 (L) >60 ml/min/1.73m2             Telemetry: protect  sinus bradycarida    Imaging:  CXR Results  (Last 48 hours)                 08/20/22 0610  XR CHEST PORT Final result    Impression:      Left lower lobe atelectasis. Satisfactory line and tube placement. Narrative:  CHEST PORTABLE 0604 hours       COMPARISON: 18 August.       INDICATIONS: Intubated. FINDINGS:        Portable single view chest demonstrates:       Lungs: There is dense atelectasis in the left lower lobe. Lungs are otherwise   clear. Cardiac Silhouette And Mediastinal Contours: Moderate enlargement of the cardiac   contours is unchanged. . Metallic aortic stent noted in the descending thoracic   aorta       Pleural Spaces: No pneumothorax or definite pleural effusion evident. Bones And Soft Tissues: Unremarkable for age. ET tube is present with the tip in the mid trachea. Right IJ CVL is present with the tip overlying the SVC. CT Results  (Last 48 hours)      None                     Critical Care Time:  The services I provided to this patient were to treat and/or prevent clinically significant deterioration that could result in the failure of one or more body systems and/or organ systems due to respiratory distress, hypoxia, cardiac dysrhythmia. Aggregate critical care time was  45 minutes, which includes only time during which I was engaged in work directly related to the patient's care as described above. During this entire length of time I was immediately available to the patient. The reason for providing this level of medical care for this critically ill patient was due a critical illness that impaired one or more vital organ systems such that there was a high probability of imminent or life threatening deterioration in the patients condition.  This care involved high complexity decision making to assess, manipulate, and support vital system functions, to treat this degreee vital organ system failure and to prevent further life threatening deterioration of the patients condition    Richard Norris MD   08/21/22   Pulmonary, [address] [place] [address],[facility]

## 2022-08-21 NOTE — PROGRESS NOTES
Chart reviewed, bronchoscopy appreciated  Vital signs are stable patient appears comfortable  No further signs of bleeding groin is soft and intact  Excellent peripheral pulses  Anticipate extubation as tolerated  Okay for aspirin  Will resume anticoagulation after extubation complete.

## 2022-08-22 ENCOUNTER — APPOINTMENT (OUTPATIENT)
Dept: GENERAL RADIOLOGY | Age: 62
DRG: 219 | End: 2022-08-22
Attending: INTERNAL MEDICINE
Payer: MEDICARE

## 2022-08-22 ENCOUNTER — APPOINTMENT (OUTPATIENT)
Dept: GENERAL RADIOLOGY | Age: 62
DRG: 219 | End: 2022-08-22
Attending: PHYSICIAN ASSISTANT
Payer: MEDICARE

## 2022-08-22 LAB
ANION GAP SERPL CALC-SCNC: 5 MMOL/L (ref 3–18)
ARTERIAL PATENCY WRIST A: ABNORMAL
ARTERIAL PATENCY WRIST A: ABNORMAL
BASE EXCESS BLD CALC-SCNC: 0.1 MMOL/L
BASE EXCESS BLD CALC-SCNC: 1.9 MMOL/L
BASOPHILS # BLD: 0 K/UL (ref 0–0.1)
BASOPHILS NFR BLD: 0 % (ref 0–2)
BDY SITE: ABNORMAL
BDY SITE: ABNORMAL
BUN SERPL-MCNC: 30 MG/DL (ref 7–18)
BUN/CREAT SERPL: 28 (ref 12–20)
CALCIUM SERPL-MCNC: 8.5 MG/DL (ref 8.5–10.1)
CHLORIDE SERPL-SCNC: 109 MMOL/L (ref 100–111)
CO2 SERPL-SCNC: 26 MMOL/L (ref 21–32)
CREAT SERPL-MCNC: 1.07 MG/DL (ref 0.6–1.3)
DIFFERENTIAL METHOD BLD: ABNORMAL
EOSINOPHIL # BLD: 0 K/UL (ref 0–0.4)
EOSINOPHIL NFR BLD: 0 % (ref 0–5)
ERYTHROCYTE [DISTWIDTH] IN BLOOD BY AUTOMATED COUNT: 18 % (ref 11.6–14.5)
GAS FLOW.O2 O2 DELIVERY SYS: ABNORMAL L/MIN
GAS FLOW.O2 O2 DELIVERY SYS: ABNORMAL L/MIN
GAS FLOW.O2 SETTING OXYMISER: 14 BPM
GAS FLOW.O2 SETTING OXYMISER: 15 BPM
GLUCOSE BLD STRIP.AUTO-MCNC: 114 MG/DL (ref 70–110)
GLUCOSE BLD STRIP.AUTO-MCNC: 114 MG/DL (ref 70–110)
GLUCOSE BLD STRIP.AUTO-MCNC: 154 MG/DL (ref 70–110)
GLUCOSE BLD STRIP.AUTO-MCNC: 220 MG/DL (ref 70–110)
GLUCOSE SERPL-MCNC: 105 MG/DL (ref 74–99)
HCO3 BLD-SCNC: 24.5 MMOL/L (ref 22–26)
HCO3 BLD-SCNC: 28.5 MMOL/L (ref 22–26)
HCT VFR BLD AUTO: 26.7 % (ref 35–45)
HGB BLD-MCNC: 8.3 G/DL (ref 12–16)
IMM GRANULOCYTES # BLD AUTO: 0.1 K/UL (ref 0–0.04)
IMM GRANULOCYTES NFR BLD AUTO: 1 % (ref 0–0.5)
LYMPHOCYTES # BLD: 1 K/UL (ref 0.9–3.6)
LYMPHOCYTES NFR BLD: 9 % (ref 21–52)
MAGNESIUM SERPL-MCNC: 2.1 MG/DL (ref 1.6–2.6)
MCH RBC QN AUTO: 28.3 PG (ref 24–34)
MCHC RBC AUTO-ENTMCNC: 31.1 G/DL (ref 31–37)
MCV RBC AUTO: 91.1 FL (ref 78–100)
MONOCYTES # BLD: 1.1 K/UL (ref 0.05–1.2)
MONOCYTES NFR BLD: 10 % (ref 3–10)
NEUTS SEG # BLD: 8.4 K/UL (ref 1.8–8)
NEUTS SEG NFR BLD: 80 % (ref 40–73)
NRBC # BLD: 0 K/UL (ref 0–0.01)
NRBC BLD-RTO: 0 PER 100 WBC
O2/TOTAL GAS SETTING VFR VENT: 100 %
O2/TOTAL GAS SETTING VFR VENT: 45 %
PCO2 BLD: 37.8 MMHG (ref 35–45)
PCO2 BLD: 54.2 MMHG (ref 35–45)
PEEP RESPIRATORY: 5 CMH2O
PEEP RESPIRATORY: 5 CMH2O
PH BLD: 7.33 [PH] (ref 7.35–7.45)
PH BLD: 7.42 [PH] (ref 7.35–7.45)
PHOSPHATE SERPL-MCNC: 2.4 MG/DL (ref 2.5–4.9)
PLATELET # BLD AUTO: 127 K/UL (ref 135–420)
PMV BLD AUTO: 12.3 FL (ref 9.2–11.8)
PO2 BLD: 119 MMHG (ref 80–100)
PO2 BLD: 365 MMHG (ref 80–100)
POTASSIUM SERPL-SCNC: 4.2 MMOL/L (ref 3.5–5.5)
RBC # BLD AUTO: 2.93 M/UL (ref 4.2–5.3)
SAO2 % BLD: 98.7 % (ref 92–97)
SAO2 % BLD: 99.9 % (ref 92–97)
SERVICE CMNT-IMP: ABNORMAL
SERVICE CMNT-IMP: ABNORMAL
SODIUM SERPL-SCNC: 140 MMOL/L (ref 136–145)
SPECIMEN TYPE: ABNORMAL
SPECIMEN TYPE: ABNORMAL
TOTAL RESP. RATE, ITRR: 14
TOTAL RESP. RATE, ITRR: 15
VENTILATION MODE VENT: ABNORMAL
VENTILATION MODE VENT: ABNORMAL
VT SETTING VENT: 480 ML
VT SETTING VENT: 550 ML
WBC # BLD AUTO: 10.5 K/UL (ref 4.6–13.2)

## 2022-08-22 PROCEDURE — 85025 COMPLETE CBC W/AUTO DIFF WBC: CPT

## 2022-08-22 PROCEDURE — 71045 X-RAY EXAM CHEST 1 VIEW: CPT

## 2022-08-22 PROCEDURE — 82803 BLOOD GASES ANY COMBINATION: CPT

## 2022-08-22 PROCEDURE — APPSS15 APP SPLIT SHARED TIME 0-15 MINUTES: Performed by: PHYSICIAN ASSISTANT

## 2022-08-22 PROCEDURE — 2709999900 HC NON-CHARGEABLE SUPPLY

## 2022-08-22 PROCEDURE — 74011636637 HC RX REV CODE- 636/637: Performed by: PHYSICIAN ASSISTANT

## 2022-08-22 PROCEDURE — 74011000250 HC RX REV CODE- 250: Performed by: PHYSICIAN ASSISTANT

## 2022-08-22 PROCEDURE — 74011250636 HC RX REV CODE- 250/636: Performed by: ANESTHESIOLOGY

## 2022-08-22 PROCEDURE — 74011250636 HC RX REV CODE- 250/636: Performed by: PHYSICIAN ASSISTANT

## 2022-08-22 PROCEDURE — 36415 COLL VENOUS BLD VENIPUNCTURE: CPT

## 2022-08-22 PROCEDURE — 5A1955Z RESPIRATORY VENTILATION, GREATER THAN 96 CONSECUTIVE HOURS: ICD-10-PCS | Performed by: INTERNAL MEDICINE

## 2022-08-22 PROCEDURE — 84100 ASSAY OF PHOSPHORUS: CPT

## 2022-08-22 PROCEDURE — 74011250637 HC RX REV CODE- 250/637: Performed by: SURGERY

## 2022-08-22 PROCEDURE — 31500 INSERT EMERGENCY AIRWAY: CPT

## 2022-08-22 PROCEDURE — 80048 BASIC METABOLIC PNL TOTAL CA: CPT

## 2022-08-22 PROCEDURE — 99232 SBSQ HOSP IP/OBS MODERATE 35: CPT | Performed by: INTERNAL MEDICINE

## 2022-08-22 PROCEDURE — 82962 GLUCOSE BLOOD TEST: CPT

## 2022-08-22 PROCEDURE — 83735 ASSAY OF MAGNESIUM: CPT

## 2022-08-22 PROCEDURE — 77030029184 HC NEB SOLO AERONEB AEPM -A

## 2022-08-22 PROCEDURE — 99291 CRITICAL CARE FIRST HOUR: CPT | Performed by: INTERNAL MEDICINE

## 2022-08-22 PROCEDURE — 77030037878 HC DRSG MEPILEX >48IN BORD MOLN -B

## 2022-08-22 PROCEDURE — 0BH17EZ INSERTION OF ENDOTRACHEAL AIRWAY INTO TRACHEA, VIA NATURAL OR ARTIFICIAL OPENING: ICD-10-PCS | Performed by: INTERNAL MEDICINE

## 2022-08-22 PROCEDURE — 94003 VENT MGMT INPAT SUBQ DAY: CPT

## 2022-08-22 PROCEDURE — 94762 N-INVAS EAR/PLS OXIMTRY CONT: CPT

## 2022-08-22 PROCEDURE — 36600 WITHDRAWAL OF ARTERIAL BLOOD: CPT

## 2022-08-22 PROCEDURE — 94640 AIRWAY INHALATION TREATMENT: CPT

## 2022-08-22 PROCEDURE — 74011250636 HC RX REV CODE- 250/636: Performed by: SURGERY

## 2022-08-22 PROCEDURE — 65610000006 HC RM INTENSIVE CARE

## 2022-08-22 RX ORDER — FENTANYL CITRATE 50 UG/ML
100 INJECTION, SOLUTION INTRAMUSCULAR; INTRAVENOUS
Status: DISCONTINUED | OUTPATIENT
Start: 2022-08-22 | End: 2022-08-31

## 2022-08-22 RX ORDER — DEXMEDETOMIDINE HYDROCHLORIDE 4 UG/ML
.1-1.5 INJECTION, SOLUTION INTRAVENOUS
Status: DISCONTINUED | OUTPATIENT
Start: 2022-08-22 | End: 2022-08-23

## 2022-08-22 RX ORDER — ALBUTEROL SULFATE 2.5 MG/.5ML
2.5 SOLUTION RESPIRATORY (INHALATION)
Status: DISCONTINUED | OUTPATIENT
Start: 2022-08-22 | End: 2022-08-23

## 2022-08-22 RX ORDER — PREDNISONE 20 MG/1
40 TABLET ORAL 2 TIMES DAILY
Status: DISCONTINUED | OUTPATIENT
Start: 2022-08-22 | End: 2022-08-25

## 2022-08-22 RX ADMIN — SODIUM CHLORIDE, PRESERVATIVE FREE 20 MG: 5 INJECTION INTRAVENOUS at 09:30

## 2022-08-22 RX ADMIN — CHLORHEXIDINE GLUCONATE 0.12% ORAL RINSE: 1.2 LIQUID ORAL at 21:06

## 2022-08-22 RX ADMIN — FENTANYL CITRATE 100 MCG: 50 INJECTION INTRAMUSCULAR; INTRAVENOUS at 23:54

## 2022-08-22 RX ADMIN — FENTANYL CITRATE 100 MCG: 50 INJECTION INTRAMUSCULAR; INTRAVENOUS at 21:41

## 2022-08-22 RX ADMIN — ALBUTEROL SULFATE 5 MG: 2.5 SOLUTION RESPIRATORY (INHALATION) at 17:33

## 2022-08-22 RX ADMIN — SODIUM CHLORIDE, PRESERVATIVE FREE 20 MG: 5 INJECTION INTRAVENOUS at 21:06

## 2022-08-22 RX ADMIN — Medication 2 UNITS: at 12:35

## 2022-08-22 RX ADMIN — ASPIRIN 81 MG CHEWABLE TABLET 81 MG: 81 TABLET CHEWABLE at 09:30

## 2022-08-22 RX ADMIN — Medication 4 UNITS: at 18:37

## 2022-08-22 RX ADMIN — SODIUM CHLORIDE 10 ML/HR: 9 INJECTION, SOLUTION INTRAVENOUS at 18:36

## 2022-08-22 RX ADMIN — HYDRALAZINE HYDROCHLORIDE 10 MG: 20 INJECTION, SOLUTION INTRAMUSCULAR; INTRAVENOUS at 17:00

## 2022-08-22 RX ADMIN — CHLORHEXIDINE GLUCONATE 0.12% ORAL RINSE 10 ML: 1.2 LIQUID ORAL at 09:30

## 2022-08-22 RX ADMIN — RACEPINEPHRINE HYDROCHLORIDE 1 ML: 11.25 SOLUTION RESPIRATORY (INHALATION) at 17:34

## 2022-08-22 RX ADMIN — DEXMEDETOMIDINE HYDROCHLORIDE 0.4 MCG/KG/HR: 4 INJECTION, SOLUTION INTRAVENOUS at 12:46

## 2022-08-22 RX ADMIN — FENTANYL CITRATE 100 MCG/HR: 0.05 INJECTION, SOLUTION INTRAMUSCULAR; INTRAVENOUS at 03:45

## 2022-08-22 RX ADMIN — ALBUTEROL SULFATE 2.5 MG: 2.5 SOLUTION RESPIRATORY (INHALATION) at 21:45

## 2022-08-22 RX ADMIN — PREDNISONE 40 MG: 20 TABLET ORAL at 21:06

## 2022-08-22 RX ADMIN — DEXMEDETOMIDINE HYDROCHLORIDE 0.4 MCG/KG/HR: 4 INJECTION, SOLUTION INTRAVENOUS at 21:41

## 2022-08-22 NOTE — ROUTINE PROCESS
Bedside and Verbal shift change report given to Prudencio Melendrez RN (oncoming nurse) by Smitha Andrea RN (offgoing nurse). Report included the following information SBAR, Kardex, MAR, and Cardiac Rhythm NSR .

## 2022-08-22 NOTE — PROGRESS NOTES
attended the interdisciplinary rounds for Tawanda Olvera, who is a 63 y.o.,female. Patients Primary Language is: [place]. According to the patients EMR Sabianism Affiliation is: [facility].     The reason the Patient came to the hospital is:   Patient Active Problem List    Diagnosis Date Noted    Saccular aneurysm 08/18/2022    SOB (shortness of breath) 06/01/2012    Diabetes ([address]     Hypertension     Hyperlipidemia     Aneurysm ([address]     Bre[facility] CA [facility]     Cancer of breast, left 08/30/2010    COPD (chronic obstructive pulmonary disease) ([facility] 08/30/2010    CAD (coronary artery disease) 08/30/2010        Plan:  Chaplains will continue to follow and will provide pastoral care on an as needed/requested basis.  recommends bedside caregivers page  on duty if patient shows signs of acute spiritual or emotional distress.     [address] [facility]  Board Certified [address] [place] [address]   [phone]

## 2022-08-22 NOTE — PROGRESS NOTES
Cardiology Progress Note    Admit Date: 8/18/2022  Attending Cardiologist: Dr. Esau Monroy:     -S/p asystole cardiac arrest, ROSC after ~9 min ACLS protocol, in setting of desaturation despite increase to HFNC  -Acute hypoxic hypercapnic respiratory failure, intubated 8/18 PM  S/p therapeutic bronchoscopy 8/21/2022 with removal of mucus plugs.  -Presented for endovascular thoracic aneurysm repair by Dr. Stacie Howard 8/18/2022, which was performed as planned  R groin hematoma, s/p washout by Dr. Stacie Howard 8/19  -Chronic HFrEF/ischemic cardiomyopathy, EF 30% in 02/2022  Echo 8/19/2022 with LVEF 45-50%  -CAD, s/p CABG x 4 in 2010. Nuclear stress test in 2019 showed inferior scar, no ischemia. Last cardiac cath in 2011 with findings as follows:  LM: Normal  LAD: Eccentric ostial 70-80%, mid to distal LAD is a very small-caliber vessel, probably about a 2 mm vessel. Competitive filling from LIMA. Diagonal: Ostial 40-50% proximal moderate disease. Ramus: Proximal 100% in stent. LCx/OM: OM2 mid 100%. Native LCx diffuse luminal irregularities without any obstructive stenosis. Less than 2 mm vessel. RCA: proximal and mid stent diffuse 20% ISR. Otherwise, no obstructive disease. RPLS 60-70% tubular stenosis which appears unchanged form prior angiogram about a year ago. Sequential SVG-diagonal, ramus, OM: patent without any significant obstructive stenosis. There appears to be venous valve system distally into the graft. Native vessel which includes diagonal, ramus and obtuse marginal beyond graft appears to be patent and a very small-caliber vessel, less than 2-mm. LIMA TO LAD: widely patent. Distally, LAD has no significant stenosis. This appears to be a less than 2-mm vessel.  -Paroxysmal afib, recently referred to Dr. Bonifacio Greer for [employer] evaluation given Hx recurrent GI bleeding requiring transfusion and HAS-BLED score of 4.   On Amiodarone and Eliquis  -CKD  -HTN  -DM  -HLD  -Hx thyroid disorder  -Hx breast cancer s/p mastectomy and chemotherapy  -Hx tobacco abuse     Primary cardiologist is Dr. Sridevi Loomis:       I saw, evaluated, interviewed and examined the patient personally. Patient is opening eyes but no meaningful conversation. Still intubated. Exam without any gross volume overload. Labs reviewed  Patient on sinus bradycardia on telemetry monitor. Continue to hold antihypertensive and amiodarone for now  Continue aspirin  Continue supportive care. Appreciate pulmonary critical care input and management  We will follow along with you peripherally. Call with question    Significant time spent in reviewing the case, multiple EMR databases, physician notes, reviewing pertinent labs and imaging studies  I spent significant amount of time for medical decision making and updated history, and other providers assessments as well. I personally agree with the findings as stated and the plan as documented. I saw, examined, and evaluated this patient and performed the substantive portion of the encounter for > 50% of the time including extensive history, physical exam, assessment and plan    Sam Zarco MD         -Amiodarone on hold due to bradycardia.  -Pt started on ASA 81 mg this AM.  Charlsie Mantel and Toprol continue to be held, BP soft but not requiring pressor support. Pt also bradycardic which limits BB use.  -Continue supportive care per PCCM team, appreciate assistance. -Vascular surgery following, appreciate assistance. Plan to resume anticoagulation once extubated per vascular surgery. Subjective:     Remains intubated, sedated.     Objective:      Patient Vitals for the past 8 hrs:   Temp Pulse Resp BP SpO2   08/22/22 0900 -- (!) 49 15 (!) 103/49 100 %   08/22/22 0800 98.3 °F (36.8 °C) (!) 53 12 (!) 112/54 98 %   08/22/22 0700 -- (!) 50 17 (!) 95/47 100 %   08/22/22 0600 -- (!) 58 14 134/61 100 %   08/22/22 0500 -- (!) 46 13 (!) 108/58 100 %   08/22/22 0400 98.7 °F (37.1 °C) (!) 55 14 (!) 128/59 100 %   08/22/22 0300 -- (!) 52 (!) 4 123/63 100 %         Patient Vitals for the past 96 hrs:   Weight   08/22/22 0000 103.4 kg (227 lb 15.3 oz)   08/19/22 0821 102.5 kg (226 lb)                  Current Facility-Administered Medications   Medication Dose Route Frequency Last Admin    aspirin chewable tablet 81 mg  81 mg Oral DAILY 81 mg at 08/22/22 0930    famotidine (PF) (PEPCID) 20 mg in 0.9% sodium chloride 10 mL injection  20 mg IntraVENous Q12H 20 mg at 08/22/22 0930    chlorhexidine (PERIDEX) 0.12 % mouthwash 10 mL  10 mL Oral Q12H 10 mL at 08/22/22 0930    insulin lispro (HUMALOG) injection   SubCUTAneous Q6H 2 Units at 08/20/22 1742    fentaNYL (PF) 1,500 mcg/30 mL (50 mcg/mL) infusion  0-200 mcg/hr IntraVENous TITRATE 100 mcg/hr at 08/22/22 0345    0.9% sodium chloride infusion  10 mL/hr IntraVENous CONTINUOUS 10 mL/hr at 08/19/22 1527    midazolam in normal saline (VERSED) 1 mg/mL infusion  0-4 mg/hr IntraVENous TITRATE 1 mg/hr at 08/22/22 0939    albuterol-ipratropium (DUO-NEB) 2.5 MG-0.5 MG/3 ML  3 mL Nebulization Q4H PRN      [Held by provider] amiodarone (CORDARONE) tablet 200 mg  200 mg Oral DAILY      [Held by provider] levothyroxine (SYNTHROID) tablet 150 mcg  150 mcg Oral 6am      nitroglycerin (NITROSTAT) tablet 0.4 mg  0.4 mg SubLINGual Q5MIN PRN      [Held by provider] rosuvastatin (CRESTOR) tablet 20 mg  20 mg Oral QHS      oxyCODONE-acetaminophen (PERCOCET) 5-325 mg per tablet 1 Tablet  1 Tablet Oral Q4H PRN      HYDROmorphone (DILAUDID) injection 1 mg  1 mg IntraVENous Q4H PRN 1 mg at 08/18/22 1321    hydrALAZINE (APRESOLINE) 20 mg/mL injection 10 mg  10 mg IntraVENous Q15MIN PRN 10 mg at 08/18/22 1124    hydrALAZINE (APRESOLINE) 20 mg/mL injection 10-20 mg  10-20 mg IntraVENous Q6H PRN 10 mg at 08/19/22 1545    [Held by provider] sacubitriL-valsartan (ENTRESTO) 24-26 mg tablet 1 Tablet  1 Tablet Oral BID 1 Tablet at 08/18/22 1321    [Held by provider] metoprolol succinate (TOPROL-XL) XL tablet 25 mg  25 mg Oral QHS      bumetanide (BUMEX) tablet 1 mg  1 mg Oral DAILY PRN      atropine injection 1 mg  1 mg IntraVENous Q5MIN PRN           Intake/Output Summary (Last 24 hours) at 8/22/2022 1054  Last data filed at 8/22/2022 0939  Gross per 24 hour   Intake 738.76 ml   Output 1100 ml   Net -361.24 ml       Physical Exam:  General:  intubated, sedated, no distress  Neck:  supple  Lungs:  clear to auscultation bilaterally, on ventilator  Heart: bradycardic regular rhythm  Abdomen:  abdomen is soft without significant tenderness, masses, organomegaly or guarding  Extremities:  atraumatic, no edema    Visit Vitals  BP (!) 103/49   Pulse (!) 49   Temp 98.3 °F (36.8 °C)   Resp 15   Ht 5' 9\" (1.753 m)   Wt 103.4 kg (227 lb 15.3 oz)   SpO2 100%   BMI 33.66 kg/m²       Data Review:     Labs: Results:       Chemistry Recent Labs     08/22/22  0356 08/21/22  0400 08/20/22  0420   * 113* 129*    140 139   K 4.2 4.1 3.9    108 108   CO2 26 27 24   BUN 30* 33* 29*   CREA 1.07 1.13 1.21   CA 8.5 7.7* 8.2*   MG 2.1 2.2 2.1   PHOS 2.4* 3.3 3.9   AGAP 5 5 7   BUCR 28* 29* 24*      CBC w/Diff Recent Labs     08/22/22  0356 08/21/22  0400 08/20/22  0845   WBC 10.5 11.2 12.0   RBC 2.93* 2.75* 3.08*   HGB 8.3* 7.7* 8.4*   HCT 26.7* 25.0* 27.6*   * 121* 129*   GRANS 80* 86* 92*   LYMPH 9* 5* 3*   EOS 0 0 0      Lipid Panel Lab Results   Component Value Date/Time    Cholesterol, total 84 09/05/2019 09:07 AM    HDL Cholesterol 26 (L) 09/05/2019 09:07 AM    LDL, calculated 28.6 09/05/2019 09:07 AM    VLDL, calculated 29.4 09/05/2019 09:07 AM    Triglyceride 147 09/05/2019 09:07 AM    CHOL/HDL Ratio 3.2 09/05/2019 09:07 AM        Signed By: Michelle Marina PA-C     August 22, 2022

## 2022-08-22 NOTE — ROUTINE PROCESS
0730  Bedside and Verbal shift change report given to Nadeen Elias RN (oncoming nurse) by Raad Yancey. Bethany Salas RN (offgoing nurse). Report included the following information SBAR, Kardex, MAR, and Recent Results.

## 2022-08-22 NOTE — PROGRESS NOTES
Pt extubated to 4 LPM humidified NC per verbal order. Positive cuff- leak (loss of volumes noted on vent) performed and airway suctioned prior to and post extubation. Pt immediately developed stridor with very limited air movement, with significant accessory muscle use and distress. Racemic epinephrine x 2 and Albuterol x 2 given via mask neb with no improvement in condition. ESTER keating anesthesia STAT. Pt re intubated at 17:26 with 7.5 ETT secured at 24 cm at upper R lip. Vitals now stable, pt re connected to vent AC/VC+ 15, 480, +5, FiO2 100%.

## 2022-08-22 NOTE — PROGRESS NOTES
SBT initiated, PS 12, PEEP 5, FiO2 40%. Pt drowsy but wakes and follows commands. 08/22/22 1255   Weaning Parameters   Spontaneous Breathing Trial Complete Yes   Resp Rate Observed 14   Ve 5.5      RSBI 36     Tolerating with PS 12. Will monitor for signs of apnea or distress, and for weaning PS as tolerated.

## 2022-08-22 NOTE — PROGRESS NOTES
[facility]  Pulmonary, Critical Care, and Sleep Medicine      Name: Moris Gonzalez MRN: [id number]   : 1960 Hospital: [address] [place] [address] Dr   Date: 2022          Critical Care follow up    Requesting MD: Dr. 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 Radha Pastor. Seizure vs myoclonus - shortly following cardiac arrest x2. No further episodes of tremulousness or twitching noted  R groin hematoma w/ femstop in place  Hx of CAD w/ prior CABG  Hx of HFrEF - most recent EF from  -30%  Paroxysmal symptomatic a-fib with CHADSVASc2 score of 5. On chronic Amiodorone and anticoagulation  History of recurrent GI bleeding, requiring transfusion and HAS-BLED score of 4. Chronic stage 2 kidney disease. Remote breast cancer about ten years ago with mastectomy and chemotherapy, but no chest radiation. History of thyroid disorder. History of 'waking up 'previously with anesthesia. RECOMMENDATIONS:   Neuro: Change sedation - Add Precedex gtt, wean off all other sedation. Goal to minimize sedation in order to extubate. Pt is very sensitive to sedation. RASS 0 to -1. PRN Fentanyl/ Versed for breakthrough sedation. Pulm: Titrate FiO2 for goal SPO2> 90%. SBT this AM - hopeful for possible extubation. Daily CXR and ABG while intubated. S/p Bronch for mucus plugs (). PRN duo-nebs q4. Optimize bronchial hygiene. Add metanebs. CVS: HD stable. Goal MAP >65mmHg.  Cardiology following for assistance in management-patient known to them and has complex cardiac problems - cardiomyopathy as well as afib. Con't PRN Hydralazine for SBP >180mmHg. Vascular following. Fluids: maintainance  GI: SUP(pepcid), Trend LFTs, OGT. Hold TF while on SBT. Renal: Trend Renal indices, Strict Is/Os, Gonzalez (+). Con't diuresis - Bumex 1mg PRN. Hem/Onc: Monitor for s/o active bleeding. Daily CBC. I/D: Trend WBCs and temperature curve. No ABX. Endocrine: Q6 glucoses, SSI. Metabolic:  Daily BMP, mag, phos. Trend lytes, replace as needed. Musc/Skin: no acute issues, wound care as needed  Full code  Discussed during IDRs     Subjective/History: This patient has been seen and evaluated at the request of Dr. Mick Lorenzo s/p cardiac arrest.      08/22/22   - No new acute overnight events  - HD stable  - Adequate UOP  - Currently on SBT after sedation was changed      HPI  Patient is a 63 y.o. female w/ pmhx of a fib, CAD, CHF who presented for covered stent procedure for descending thoracic aortic saccular aneurysm. Procedure performed 8/18 w/o incident. Following procedure, patient was drowsy but answering questions appropriately per bedside RN. Per chart review, she began having oxygen desaturations and escalating oxygen requirements, eventually ending up on HFNC. Blood gas performed showed mild hypoxia and mild hypercapnia. Per chart review, patient was found in cardiac arrest and ACLS initiated with approximately 9 min of downtime. EPOC labs following cardiac arrest essentially unremarkable. ABG with mixed respiratory and metabolic acidosis. The patient is critically ill and can not provide additional history due to Ventilated.      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     LVEF 35-40 % (08/20) 60% (08/14), 40% (2011)    Chemotherapy 04/2010    for 4 months    CHF (congestive heart failure) ([facility]     Chronic kidney disease     stage 2 per cardiac note cc    Chronic pain     COPD [facility]chronic obstructive pulmonary disease) [facility] 08/30/2010    Depression     Diabetes (HCC)     GERD (gastroesophageal reflux disease)     GI bleed     History of blood transfusion     2010 and 1/2022    Hx of heart artery stent 2010    x 10  (4, then 4, then 2)    Hyperlipidemia     Hypertension     MI (myocardial infarction) ([facility] 2009    x 3    Schatzki's ring     S/P EGD and Dilation (07/16)    Thyroid disease     hypothyroid    Tobacco abuse       Past Surgical History:   Procedure Laterality Date    COLONOSCOPY N/A 5/3/2017    COLON  performed by Alex Simeon MD at [address] [place] [address] Drive ENDOSCOPY    COLONOSCOPY N/A 2/15/2021    COLONOSCOPY performed by Lyly Ríos MD at [address] [place] [address] ENDOSCOPY    HX APPENDECTOMY      HX CHOLECYSTECTOMY      HX GYN      tubal ligation    HX HEART CATHETERIZATION  11/11/09; 10/21/09    HX HEART CATHETERIZATION  10/15/10; 10/06/09    left heart    HX HEART CATHETERIZATION  11/7/2011    left heart cath, no new findings    HX OTHER SURGICAL  4/6/2010    Insertion right internal jugular single lumen MediPort. HX RADICAL MASTECTOMY Left 2/2010    left, with chemo    [place] BREAST SURGERY PROCEDURE UNLISTED  2/22/10    left w/sentinel node bx    [place] CABG, ARTERY-VEIN, FOUR  12/2010    [place] CARDIAC SURG PROCEDURE UNLIST      stent placement before CABG    IL CHEST SURGERY PROCEDURE UNLISTED Right     Prior port placement      Prior to Admission medications    Medication Sig Start Date End Date Taking? Authorizing Provider   esomeprazole (NEXIUM) 40 mg capsule Take  by mouth daily.    Yes Provider, Historical   metFORMIN (GLUMETZA ER) 500 mg TG24 24 hour tablet Take  by mouth two (2) times a day. Yes Provider, Historical   albuterol-ipratropium (DUO-NEB) 2.5 mg-0.5 mg/3 ml nebu Take 3 mL by inhalation every six (6) hours as needed. Use every night   Yes Provider, Historical   rosuvastatin (Crestor) 20 mg tablet Take 1 Tablet by mouth daily. Patient taking differently: Take 20 mg by mouth nightly. 2/3/22  Yes Milli Yeun MD   bumetanide (BUMEX) 1 mg tablet Take 1 Tablet by mouth every other day. Patient taking differently: Take 1 mg by mouth as needed. 2/3/22  Yes Milli Yuen MD   sacubitriL-valsartan Dorcus Loan) 24-26 mg tablet Take 1 Tablet by mouth two (2) times a day. 2/3/22  Yes Milli Yuen MD   apixaban (Eliquis) 5 mg tablet Take 1 Tablet by mouth two (2) times a day. 2/1/22  Yes Milli Yuen MD   metoprolol succinate (TOPROL-XL) 25 mg XL tablet Take 1 Tablet by mouth two (2) times a day. Patient taking differently: Take 25 mg by mouth nightly. 1/20/22  Yes Milli Yuen MD   amiodarone (CORDARONE) 200 mg tablet TAKE 1 TABLET EVERY DAY 10/5/21  Yes Milli Yuen MD   albuterol (PROVENTIL HFA, VENTOLIN HFA) 90 mcg/actuation inhaler Take 1 Puff by inhalation every four (4) hours as needed. Yes Provider, Historical   levothyroxine (SYNTHROID) 150 mcg tablet Take  by mouth Daily (before breakfast). Yes Provider, Historical   nitroglycerin (NITROSTAT) 0.4 mg SL tablet 1 Tablet by SubLINGual route every five (5) minutes as needed for Chest Pain.  11/30/21   Milli Yuen MD     Current Facility-Administered Medications   Medication Dose Route Frequency    dexmedeTOMidine in 0.9 % NaCl (PRECEDEX) 400 mcg/100 mL (4 mcg/mL) infusion soln  0.1-1.5 mcg/kg/hr IntraVENous TITRATE    aspirin chewable tablet 81 mg  81 mg Oral DAILY    famotidine (PF) (PEPCID) 20 mg in 0.9% sodium chloride 10 mL injection  20 mg IntraVENous Q12H    chlorhexidine (PERIDEX) 0.12 % mouthwash 10 mL  10 mL Oral Q12H    insulin lispro (HUMALOG) injection   SubCUTAneous Q6H    fentaNYL (PF) 1,500 mcg/30 mL (50 mcg/mL) infusion  0-200 mcg/hr IntraVENous TITRATE    0.9% sodium chloride infusion  10 mL/hr IntraVENous CONTINUOUS    midazolam in normal saline (VERSED) 1 mg/mL infusion  0-4 mg/hr IntraVENous TITRATE    [Held by provider] amiodarone (CORDARONE) tablet 200 mg  200 mg Oral DAILY    [Held by provider] levothyroxine (SYNTHROID) tablet 150 mcg  150 mcg Oral 6am    [Held by provider] rosuvastatin (CRESTOR) tablet 20 mg  20 mg Oral QHS    [Held by provider] sacubitriL-valsartan (ENTRESTO) 24-26 mg tablet 1 Tablet  1 Tablet Oral BID    [Held by provider] metoprolol succinate (TOPROL-XL) XL tablet 25 mg  25 mg Oral QHS     Allergies   Allergen Reactions    Shellfish Derived Hives     Eyes and Throat swelling      Social History     Tobacco Use    Smoking status: Every Day     Packs/day: 1.00     Years: 40.00     Pack years: 40.00     Types: Cigarettes    Smokeless tobacco: Never   Substance Use Topics    Alcohol use: No      Family History   Problem Relation Age of Onset    Hypertension Mother     Diabetes Mother     Breast Problems Mother         fiber cystic    Hypertension Other         Review of Systems:  Review of systems not obtained due to patient factors.     Objective:   Vital Signs:    Visit Vitals  /62   Pulse 65   Temp 98 °F (36.7 °C)   Resp 14   Ht 5' 9\" (1.753 m)   Wt 103.4 kg (227 lb 15.3 oz)   SpO2 99%   BMI 33.66 kg/m²       O2 Device: Ventilator, Endotracheal tube, Heated, Humidifier   O2 Flow Rate (L/min): 50 l/min   Temp (24hrs), Av.3 °F (36.8 °C), Min:98 °F (36.7 °C), Max:98.9 °F (37.2 °C)       Intake/Output:   Last shift:       07 - 1900  In: 200   Out: 250 [Urine:250]  Last 3 shifts: 1901 -  0700  In: 1346.7 [I.V.:476.7]  Out: 1425 [Urine:1425]    Intake/Output Summary (Last 24 hours) at 2022 1421  Last data filed at 2022 1255  Gross per 24 hour   Intake 720.27 ml   Output 1085 ml   Net -364.73 ml         Physical Exam:    General: Sedated, opens eyes to name calling   Head:  Normocephalic, without obvious abnormality, atraumatic. Eyes:  Conjunctivae/corneas clear. PERRL, EOMs intact. Nose: Nares normal. Septum midline. Mucosa normal.  ET tube orally   Throat: Lips, mucosa, and tongue normal. Teeth and gums normal.   Neck: Supple, symmetrical, trachea midline, right IJ central line   Lungs:   Clear to auscultation bilaterally. Decreased intensity breath sounds left base. + thick secretions from ETT tube. Chest wall:  +thoracotomy scar. No tenderness or deformity. Heart:  bradycardic rate and regular rhythm, S1, S2 normal, no murmur, click, rub or gallop. Abdomen:   Soft, non-tender. Bowel sounds normal. No masses,  No organomegaly. Extremities: Extremities normal, atraumatic, 2+ pitting edema BLE. Right groin dressing   Pulses: 2+ and symmetric all extremities. Arterial line   Skin: Skin color, texture, turgor normal. No rashes or lesions. Normal capillary refill. Neurologic: Sedated-limited exam, nonfocal       Data:     Recent Results (from the past 24 hour(s))   GLUCOSE, POC    Collection Time: 08/21/22  5:13 PM   Result Value Ref Range    Glucose (POC) 121 (H) 70 - 110 mg/dL   GLUCOSE, POC    Collection Time: 08/22/22 12:39 AM   Result Value Ref Range    Glucose (POC) 114 (H) 70 - 110 mg/dL   BLOOD GAS, ARTERIAL POC    Collection Time: 08/22/22  2:58 AM   Result Value Ref Range    Device: ADULT VENT      FIO2 (POC) 45 %    pH (POC) 7.42 7.35 - 7.45      pCO2 (POC) 37.8 35.0 - 45.0 MMHG    pO2 (POC) 119 (H) 80 - 100 MMHG    HCO3 (POC) 24.5 22 - 26 MMOL/L    sO2 (POC) 98.7 (H) 92 - 97 %    Base excess (POC) 0.1 mmol/L    Mode ASSIST CONTROL      Tidal volume 550 ml    Set Rate 14 bpm    PEEP/CPAP (POC) 5 cmH2O    Allens test (POC) NOT APPLICABLE      Total resp.  rate 14      Site DRAWN FROM ARTERIAL LINE      Specimen type (POC) ARTERIAL      Performed by Madhavi Wan    MAGNESIUM    Collection Time: 08/22/22  3:56 AM Result Value Ref Range    Magnesium 2.1 1.6 - 2.6 mg/dL   PHOSPHORUS    Collection Time: 08/22/22  3:56 AM   Result Value Ref Range    Phosphorus 2.4 (L) 2.5 - 4.9 MG/DL   CBC WITH AUTOMATED DIFF    Collection Time: 08/22/22  3:56 AM   Result Value Ref Range    WBC 10.5 4.6 - 13.2 K/uL    RBC 2.93 (L) 4.20 - 5.30 M/uL    HGB 8.3 (L) 12.0 - 16.0 g/dL    HCT 26.7 (L) 35.0 - 45.0 %    MCV 91.1 78.0 - 100.0 FL    MCH 28.3 24.0 - 34.0 PG    MCHC 31.1 31.0 - 37.0 g/dL    RDW 18.0 (H) 11.6 - 14.5 %    PLATELET 935 (L) 599 - 420 K/uL    MPV 12.3 (H) 9.2 - 11.8 FL    NRBC 0.0 0  WBC    ABSOLUTE NRBC 0.00 0.00 - 0.01 K/uL    NEUTROPHILS 80 (H) 40 - 73 %    LYMPHOCYTES 9 (L) 21 - 52 %    MONOCYTES 10 3 - 10 %    EOSINOPHILS 0 0 - 5 %    BASOPHILS 0 0 - 2 %    IMMATURE GRANULOCYTES 1 (H) 0.0 - 0.5 %    ABS. NEUTROPHILS 8.4 (H) 1.8 - 8.0 K/UL    ABS. LYMPHOCYTES 1.0 0.9 - 3.6 K/UL    ABS. MONOCYTES 1.1 0.05 - 1.2 K/UL    ABS. EOSINOPHILS 0.0 0.0 - 0.4 K/UL    ABS. BASOPHILS 0.0 0.0 - 0.1 K/UL    ABS. IMM.  GRANS. 0.1 (H) 0.00 - 0.04 K/UL    DF AUTOMATED     METABOLIC PANEL, BASIC    Collection Time: 08/22/22  3:56 AM   Result Value Ref Range    Sodium 140 136 - 145 mmol/L    Potassium 4.2 3.5 - 5.5 mmol/L    Chloride 109 100 - 111 mmol/L    CO2 26 21 - 32 mmol/L    Anion gap 5 3.0 - 18 mmol/L    Glucose 105 (H) 74 - 99 mg/dL    BUN 30 (H) 7.0 - 18 MG/DL    Creatinine 1.07 0.6 - 1.3 MG/DL    BUN/Creatinine ratio 28 (H) 12 - 20      GFR est AA >60 >60 ml/min/1.73m2    GFR est non-AA 52 (L) >60 ml/min/1.73m2    Calcium 8.5 8.5 - 10.1 MG/DL   GLUCOSE, POC    Collection Time: 08/22/22  6:01 AM   Result Value Ref Range    Glucose (POC) 114 (H) 70 - 110 mg/dL   GLUCOSE, POC    Collection Time: 08/22/22 11:36 AM   Result Value Ref Range    Glucose (POC) 154 (H) 70 - 110 mg/dL             Telemetry: protect  sinus bradycarida    Imaging:  CXR Results  (Last 48 hours)                 08/22/22 0608  XR CHEST PORT Final result Impression:  No gross interval change in basilar pleural-parenchymal opacities and vascular   congestion. Enlarged cardiac silhouette stable. Support devices as discussed       Narrative:  EXAM: XR CHEST PORT       INDICATION: left LL atelectasis       COMPARISON: Recent prior       FINDINGS: A portable AP radiograph of the chest was obtained at 0509 hours. The   patient is on a cardiac monitor. Basilar pleural-parenchymal opacity similar to   prior exam. Vascular congestion grossly unchanged. . Stable cardiac silhouette. Postoperative changes to the mediastinum, stable. .  No acute bone findings. Clips overlying the left chest. Endotracheal tube stable. Presumed enteric tube   poorly delineated. Correlate clinically. Aortic stent noted. Right IJ CVL   grossly similar given differences in technique. [facility] 08/21/22 0538  XR CHEST PORT Final result    Impression:  1. Stable appropriate position of support devices, as described. 2. Unchanged left basilar atelectasis and mild pulmonary vascular congestion. Lungs are hypoinflated. Thank you for enabling us to participate in the care of this patient. Narrative:  EXAM: CHEST PORTABLE        CLINICAL HISTORY/INDICATION: Respiratory failure, intubated. COMPARISON: 8/20/2022. TECHNIQUE: Portable AP view was obtained. FINDINGS:        LINES/DEVICES:    -Endotracheal tube tip terminates 3.9 cm above the anai.   -Enteric tube tip terminates at the body of the stomach. -Right IJ central venous catheter tip terminates at the mid SVC. -Thoracic aorta endovascular stent graft, median sternotomy wires, and   mediastinal surgical clips noted. Additional surgical clips project at the left   axilla. EKG leads overlie the patient. HEART/MEDIASTINUM: Unchanged enlarged cardiac silhouette. LUNGS: Hypoinflation. Redemonstration of bandlike retrocardiac opacity in the   left lung base. Mild pulmonary vascular congestion. No significant pleural   effusion or evidence of pneumothorax. SOFT TISSUES: Unremarkable. BONES: Unremarkable for age. CT Results  (Last 48 hours)      None                     Critical Care Time:  The services I provided to this patient were to treat and/or prevent clinically significant deterioration that could result in the failure of one or more body systems and/or organ systems due to respiratory distress, hypoxia, cardiac dysrhythmia. Aggregate critical care time was  45 minutes, which includes only time during which I was engaged in work directly related to the patient's care as described above. During this entire length of time I was immediately available to the patient. The reason for providing this level of medical care for this critically ill patient was due a critical illness that impaired one or more vital organ systems such that there was a high probability of imminent or life threatening deterioration in the patients condition.  This care involved high complexity decision making to assess, manipulate, and support vital system functions, to treat this degreee vital organ system failure and to prevent further life threatening deterioration of the patients condition    JOYA SPLIT/SHARE TIME: 15 mins    Ragini Herndon PA-C   08/22/22   Pulmonary, [address] [place] [address] Pul[facility] Specialists

## 2022-08-22 NOTE — DIABETES MGMT
Diabetes/ Glycemic Control Plan of Care  Recommendations:   Blood glucose currently controlled  TF at goal.  Continue corrective insulin coverage as needed  Will continue inpatient monitoring. Assessment:   DX:   1. Acute respiratory failure with hypoxia and hypercapnia (HCC) [J96.01, J96.02 (ICD-10-CM)]        2. Cardiac arrest ([facility] [I46.9 (ICD-10-CM)]        3. Aneurysm ([facility] [I72.9 (ICD-10-CM)]        4. Saccular aneurysm [I67.1 (ICD-10-CM)]           Fasting/ Morning blood glucose:   Lab Results   Component Value Date/Time    Glucose 105 (H) 08/22/2022 03:56 AM    Glucose (POC) 114 (H) 08/22/2022 06:01 AM    Glucose (POC) 148 (H) 02/28/2022 11:00 AM     IV Fluids containing dextrose:   Steroids:  none. Discontinued 8/20/2022    Blood glucose values:        Latest Reference Range & Units 8/21/22 08:31 8/21/22 12:44 8/21/22 17:13 8/22/22 00:39 8/22/22 06:01   GLUCOSE,FAST - POC 70 - 110 mg/dL 103 (H) 122 (H) 121 (H) 114 (H) 114 (H)   (H): Data is abnormally high  Within target range (70-180mg/dL):  yes   Current insulin orders:   Lispro corrective insulin coverage ever 6 hours as needed. Total Daily Dose previous 24 hours = none   Current A1c:   Lab Results   Component Value Date/Time    Hemoglobin A1c 6.1 (H) 08/18/2022 07:12 AM      Equivalent  to an average Blood Glucose of 129 mg/dl for 2-3 months prior to admission  Adequate glycemic control PTA: YES  Nutrition/Diet: DIET NPO  ADULT TUBE FEEDING Orogastric; Standard without Fiber; Delivery Method: Continuous; Continuous Initial Rate (mL/hr): 20; Continuous Advance Tube Feeding: Yes; Advancement Volume (mL/hr): 10; Advancement Frequency: Q 8 hours; Continuous Goal Rate (... Active Orders   Diet    DIET NPO      Meal Intake:  Patient Vitals for the past 168 hrs:   % Diet Eaten   08/19/22 0800 0%     Supplement Intake:  No data found.     Home diabetes medications:   Key Antihyperglycemic Medications               metFORMIN (GLUMETZA ER) 500 mg TG24 24 hour tablet (Taking) Take  by mouth two (2) times a day. Plan/Goals:   Blood glucose will be within target of 70 - 180 mg/dl within 72 hours  Reinforce dietary and medication compliance at home.           Education:  [] Refer to Diabetes Education Record                       [x] Education not indicated at this time     Jak Chavez [facility] CDE  Ext [phone]

## 2022-08-22 NOTE — PROGRESS NOTES
Physician Progress Note      Jennifer Pelayo  CSN #:                  [id number]  :                       1960  ADMIT DATE:       2022 5:50 AM  DISCH DATE:  RESPONDING  PROVIDER #:        Joshua Go MD          QUERY TEXT:    Pt admitted with saccular aneurysm. Pt noted to have cardiac arrest and started on vasopressors. If possible, please document in the progress notes and discharge summary if you are evaluating and/or treating any of the following: The medical record reflects the following:  Risk Factors: Patient is a 63 y.o. female w/ pmhx of a fib, CAD, CHF who presented for covered stent procedure for descending thoracic aortic saccular aneurysm. Procedure performed  w/o incident. Clinical Indicators: Per  critical care note - patient was found in cardiac arrest and ACLS initiated with approximately 9 min of downtime. EPOC labs following cardiac arrest essentially unremarkable. BP  @ 2000 - 89/68, @ 2100 - 96/51, @ 2200 - 112/48  Treatment: Actively titrate vasopressors aim MAP >65mmHg, Check cardiac panel, echo, mechanical ventilation    Thank you,  Kyle Gaston RN, [email or web address]  .  Options provided:  -- Cardiogenic Shock  -- Post procedural Cardiogenic Shock  -- Other Shock, please specify type of shock. -- Hypotension without Shock  -- Other - I will add my own diagnosis  -- Disagree - Not applicable / Not valid  -- Disagree - Clinically unable to determine / Unknown  -- Refer to Clinical Documentation Reviewer    PROVIDER RESPONSE TEXT:    This patient has hypotension without shock.     Query created by: Felicia Mai on 2022 10:27 AM      Electronically signed by:  Joshua Go MD 2022 12:10 PM

## 2022-08-22 NOTE — PROGRESS NOTES
[facility] UPDATE:    ~17:15: Patient was extubated and almost immediately developed increased work of breathing. RT at bedside. Patient began tripoding in bed and remain encephalopathic. She then pulled out her A-line. Upon auscultation of the lungs, patient was not moving much air and had diminished lung sounds throughout; mild expiratory wheezes in the right lower lobe. SPO2 remained at 98% while on NC. Gave 1 nebulized rack epi for development of stridor, with minimal response. WOB worsened, decision was made to reintubate the patient. Anesthesia was called to bedside and reintubated the patient without difficulty. See anesthesia intubation note for further details. Patient was given propofol as part of RSI medications, will restart Precedex for sedation overnight. ABG, CXR and vent orders placed. Dr. Darnell Stewart Dr. Memo Shankar were updated.      Zaheer Anderson PA-C  08/22/22    Pulmonary Critical Care Medicine  [facility]

## 2022-08-22 NOTE — PROGRESS NOTES
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 Pt noted to have HTN hx with elevated BPs post op  . If possible, please document in progress notes and discharge summary if you are evaluating and/or treating any of the following: The medical record reflects the following:    Risk Factors: 63 y.o.  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 S/s may or may not be present, but can include severe headache, SOB, epistaxis, severe anxiety. Tx: adjustment of oral BP medication; IV meds not usually required. Hypertensive Emergency: SBP of >180 OR DBP >120 w/ associated organ damage (CVA, MI, acute CHF, DEAN, encephalopathy, pulmonary edema, and unstable angina). Documentation should include specific organ affected. Requires immediate treatment, usually with IV meds. Hypertensive Crisis, unspecified: SBP of > 180 OR DBP > 120 and includes damage to blood vessels, including inflammation, leakage of fluid or blood and can cause stroke, headache, heart failure and eclampsia.   Source: [employer] and Quick Reference Guide    Thank you  Lynsey Valladares RN CRCR CDI , [facility]  [email or web address]  Options provided:  -- Hypertensive Crisis  -- Hypertensive Emergency  -- Hypertensive Urgency  -- Other - I will add my own diagnosis  -- Disagree - Not applicable / Not valid  -- Disagree - Clinically unable to determine / Unknown  -- Refer to Clinical Documentation Reviewer    PROVIDER RESPONSE TEXT:    Hypertension related to noncompliance with meds    Query created by:  Nataly Jamison on 8/18/2022 5:52 PM      Electronically signed by:  Raoul Carranza DO 8/22/2022 1:23 PM

## 2022-08-22 NOTE — PROGRESS NOTES
Problem: Falls - Risk of  Goal: *Absence of Falls  Description: Document Hal Escalona Fall Risk and appropriate interventions in the flowsheet.   Outcome: Progressing Towards Goal  Note: Fall Risk Interventions:  Mobility Interventions: Bed/chair exit alarm    Mentation Interventions: Bed/chair exit alarm, Door open when patient unattended, More frequent rounding, Reorient patient, Toileting rounds, Update white board    Medication Interventions: Bed/chair exit alarm    Elimination Interventions: Bed/chair exit alarm, Call light in reach, Toileting schedule/hourly rounds              Problem: Ventilator Management  Goal: *Adequate oxygenation and ventilation  Outcome: Progressing Towards Goal  Goal: *Patient maintains clear airway/free of aspiration  Outcome: Progressing Towards Goal  Goal: *Absence of infection signs and symptoms  Outcome: Progressing Towards Goal  Goal: *Normal spontaneous ventilation  Outcome: Progressing Towards Goal     Problem: Non-Violent Restraints  Goal: No harm/injury to patient while restraints in use  Outcome: Progressing Towards Goal  Goal: Patient's dignity will be maintained  Outcome: Progressing Towards Goal     Problem: Nutrition Deficit  Goal: *Optimize nutritional status  Outcome: Progressing Towards Goal

## 2022-08-22 NOTE — PROGRESS NOTES
08/21/22 2117   Patient Observations   Pulse (Heart Rate) 67   Resp Rate 14   O2 Sat (%) 100 %   Airway - Continuous Aspiration of Subglottic Secretions (BEE) Tube 08/18/22 Oral   Placement Date/Time: 08/18/22 2308   Number of Attempts: 1  Inserted By: Anesthesia  Present on Admission/Arrival: No  Location: Oral  Placement Verified: Auscultation;BBS;Chest x-ray;EtCO2  Airway Types: Endotracheal, cuffed  Airway Tube Size: 8 mm   Insertion Depth (cm) 24 cm   Line Dante Lips   Side Secured Device; Centered   Cuff Pressure   (MLT)   Site Assessment Clean, dry, & intact   Suction on Yes   Respiratory   Respiratory (WDL) X   Patient on Vent Yes - If patient is on vent, add Doc Flowsheet Ventilator (). Respiratory Pattern Regular   Upper Airway Sounds Coarse   Chest/Tracheal Assessment Chest expansion, symmetrical   Breath Sounds Bilateral Coarse;Diminished   Cough Productive;Cough with suction   Airway Clearance   Suction ET Tube   Suction Device Inline suction catheter   Sputum Method Obtained Endotracheal   Sputum Amount Moderate   Sputum Color/Odor White   Sputum Consistency Thick; Thin   Ventilator Initiate/Discontinue   Bio-Med ID # [id number]   Vent Settings   FIO2 (%) 45 %   SpO2/FIO2 Ratio 222.22   CMV Rate Set 14   Back-Up Rate 14   Vt Set (ml) 550 ml   PEEP/VENT (cm H2O) 5 cm H20   Insp Time (sec) 1 sec   Insp Rise Time % 50 %   Flow Trigger 3   Ventilator Measurements   Resp Rate Observed 14   Vt Exhaled (Machine Breath) (ml) 552 ml   Ve Observed (l/min) 8.09 l/min   PIP Observed (cm H2O) 30 cm H2O   MAP (cm H2O) 11   I:E Ratio Actual 1:3.2   Safety & Alarms   Circuit Temperature 98.6 °F (37 °C)   Backup Mode Checked/Apnea Yes   Pressure Max 40 cm H2O   Pressure Min 11 cm H2O   Ve Min 2   Ve Max 20   Vt Min 200 ml   Vt Max 800 ml   RR Max 40   Ambu Bag Yes   Ambu Mask Yes   Age Specific Ventilator Associated Pneumonia Bundle   Patient Age Group Adult   Adult Ventilator Associated Pneumonia Bundle Elevation of Head to 30-45 Degrees (Unless Contraindicated) Yes   Oxygen Therapy   Skin Assessment Clean, dry, & intact   Vent Method/Mode   Ventilation Method Conventional   Ventilator Mode Assist control   Pulmonary Toilet   Pulmonary Toilet Suction;H. O.B elevated

## 2022-08-22 NOTE — PROGRESS NOTES
Labs and vital signs are stable  Groins are stable  Pulses peripherally  Discussed with pulmonary critical care team, on sedation holiday in hopes of extubation

## 2022-08-22 NOTE — PROGRESS NOTES
TRANSFER - OUT REPORT:    Verbal report given to BIANCA Mitchell(name) on Vincent Goodell  being transferred to MICU(unit) for routine progression of care       Report consisted of patients Situation, Background, Assessment and   Recommendations(SBAR). Information from the following report(s) SBAR, Kardex, OR Summary, Procedure Summary, Intake/Output, MAR, Recent Results, and Med Rec Status was reviewed with the receiving nurse. Lines:   Triple Lumen 08/18/22 Anterior;Right Internal jugular (Active)   Central Line Being Utilized Yes 08/21/22 2000   Criteria for Appropriate Use Hemodynamically unstable, requiring monitoring lines, vasopressors, or volume resuscitation 08/21/22 2000   Site Assessment Clean, dry, & intact 08/21/22 2000   Infiltration Assessment 0 08/21/22 2000   Affected Extremity/Extremities Color distal to insertion site pink (or appropriate for race); Pulses palpable;Range of motion performed 08/21/22 2000   Date of Last Dressing Change 08/19/22 08/21/22 2000   Dressing Status Clean, dry, & intact 08/21/22 2000   Dressing Type Disk with Chlorhexadine gluconate (CHG); Transparent;Tape 08/21/22 2000   Action Taken Dressing changed 08/21/22 2000   Proximal Hub Color/Line Status White;Flushed;Patent 08/21/22 2000   Positive Blood Return (Medial Site) Yes 08/21/22 2000   Medial Hub Color/Line Status Blue;Flushed;Patent 08/21/22 2000   Positive Blood Return (Lateral Site) Yes 08/21/22 2000   Distal Hub Color/Line Status Brown;Flushed;Patent 08/21/22 2000   Positive Blood Return (Site #3) Yes 08/21/22 2000   Alcohol Cap Used Yes 08/21/22 2000       Arterial Line 08/18/22 Left Radial artery (Active)   Site Assessment Clean, dry, & intact 08/21/22 2000   Dressing Status Clean, dry, & intact 08/21/22 2000   Dressing Type Disk with Chlorhexadine gluconate (CHG); Transparent;Tape;Stabilization/securement device 08/21/22 2000   Line Status Intact and in place 08/21/22 2000   Treatment Arm board on;Zeroed or re-zeroed 08/21/22 2000   Affected Extremity/Extremities Color distal to insertion site pink (or appropriate for race); Pulses palpable;Range of motion performed 08/21/22 2000        Opportunity for questions and clarification was provided.       Patient transported with:   Monitor  Registered Nurse

## 2022-08-23 ENCOUNTER — ANESTHESIA (OUTPATIENT)
Dept: ICU | Age: 62
DRG: 219 | End: 2022-08-23
Payer: MEDICARE

## 2022-08-23 ENCOUNTER — ANESTHESIA EVENT (OUTPATIENT)
Dept: ICU | Age: 62
DRG: 219 | End: 2022-08-23
Payer: MEDICARE

## 2022-08-23 ENCOUNTER — APPOINTMENT (OUTPATIENT)
Dept: GENERAL RADIOLOGY | Age: 62
DRG: 219 | End: 2022-08-23
Attending: PHYSICIAN ASSISTANT
Payer: MEDICARE

## 2022-08-23 LAB
ANION GAP SERPL CALC-SCNC: 6 MMOL/L (ref 3–18)
ARTERIAL PATENCY WRIST A: POSITIVE
BASE EXCESS BLD CALC-SCNC: 1.6 MMOL/L
BASOPHILS # BLD: 0 K/UL (ref 0–0.1)
BASOPHILS NFR BLD: 0 % (ref 0–2)
BDY SITE: ABNORMAL
BUN SERPL-MCNC: 28 MG/DL (ref 7–18)
BUN/CREAT SERPL: 26 (ref 12–20)
CALCIUM SERPL-MCNC: 8.2 MG/DL (ref 8.5–10.1)
CHLORIDE SERPL-SCNC: 106 MMOL/L (ref 100–111)
CO2 SERPL-SCNC: 27 MMOL/L (ref 21–32)
CREAT SERPL-MCNC: 1.09 MG/DL (ref 0.6–1.3)
DIFFERENTIAL METHOD BLD: ABNORMAL
EOSINOPHIL # BLD: 0 K/UL (ref 0–0.4)
EOSINOPHIL NFR BLD: 0 % (ref 0–5)
ERYTHROCYTE [DISTWIDTH] IN BLOOD BY AUTOMATED COUNT: 17.8 % (ref 11.6–14.5)
GAS FLOW.O2 O2 DELIVERY SYS: ABNORMAL L/MIN
GAS FLOW.O2 SETTING OXYMISER: 15 BPM
GLUCOSE BLD STRIP.AUTO-MCNC: 210 MG/DL (ref 70–110)
GLUCOSE BLD STRIP.AUTO-MCNC: 218 MG/DL (ref 70–110)
GLUCOSE BLD STRIP.AUTO-MCNC: 231 MG/DL (ref 70–110)
GLUCOSE BLD STRIP.AUTO-MCNC: 250 MG/DL (ref 70–110)
GLUCOSE BLD STRIP.AUTO-MCNC: 263 MG/DL (ref 70–110)
GLUCOSE SERPL-MCNC: 221 MG/DL (ref 74–99)
HCO3 BLD-SCNC: 26.3 MMOL/L (ref 22–26)
HCT VFR BLD AUTO: 26.5 % (ref 35–45)
HGB BLD-MCNC: 8 G/DL (ref 12–16)
IMM GRANULOCYTES # BLD AUTO: 0 K/UL (ref 0–0.04)
IMM GRANULOCYTES NFR BLD AUTO: 0 % (ref 0–0.5)
LYMPHOCYTES # BLD: 0.2 K/UL (ref 0.9–3.6)
LYMPHOCYTES NFR BLD: 2 % (ref 21–52)
MAGNESIUM SERPL-MCNC: 2 MG/DL (ref 1.6–2.6)
MCH RBC QN AUTO: 27.7 PG (ref 24–34)
MCHC RBC AUTO-ENTMCNC: 30.2 G/DL (ref 31–37)
MCV RBC AUTO: 91.7 FL (ref 78–100)
MONOCYTES # BLD: 0.5 K/UL (ref 0.05–1.2)
MONOCYTES NFR BLD: 5 % (ref 3–10)
NEUTS SEG # BLD: 9.9 K/UL (ref 1.8–8)
NEUTS SEG NFR BLD: 93 % (ref 40–73)
NRBC # BLD: 0 K/UL (ref 0–0.01)
NRBC BLD-RTO: 0 PER 100 WBC
O2/TOTAL GAS SETTING VFR VENT: 50 %
PCO2 BLD: 40.6 MMHG (ref 35–45)
PEEP RESPIRATORY: 5 CMH2O
PH BLD: 7.42 [PH] (ref 7.35–7.45)
PHOSPHATE SERPL-MCNC: 3.1 MG/DL (ref 2.5–4.9)
PLATELET # BLD AUTO: 112 K/UL (ref 135–420)
PMV BLD AUTO: 11.1 FL (ref 9.2–11.8)
PO2 BLD: 111 MMHG (ref 80–100)
POTASSIUM SERPL-SCNC: 4.1 MMOL/L (ref 3.5–5.5)
RBC # BLD AUTO: 2.89 M/UL (ref 4.2–5.3)
SAO2 % BLD: 98.4 % (ref 92–97)
SERVICE CMNT-IMP: ABNORMAL
SODIUM SERPL-SCNC: 139 MMOL/L (ref 136–145)
SPECIMEN TYPE: ABNORMAL
VENTILATION MODE VENT: ABNORMAL
VT SETTING VENT: 480 ML
WBC # BLD AUTO: 10.6 K/UL (ref 4.6–13.2)

## 2022-08-23 PROCEDURE — 99291 CRITICAL CARE FIRST HOUR: CPT | Performed by: INTERNAL MEDICINE

## 2022-08-23 PROCEDURE — 74011250637 HC RX REV CODE- 250/637: Performed by: SURGERY

## 2022-08-23 PROCEDURE — 94640 AIRWAY INHALATION TREATMENT: CPT

## 2022-08-23 PROCEDURE — 74011250636 HC RX REV CODE- 250/636: Performed by: PHYSICIAN ASSISTANT

## 2022-08-23 PROCEDURE — APPSS15 APP SPLIT SHARED TIME 0-15 MINUTES: Performed by: PHYSICIAN ASSISTANT

## 2022-08-23 PROCEDURE — 94762 N-INVAS EAR/PLS OXIMTRY CONT: CPT

## 2022-08-23 PROCEDURE — 83735 ASSAY OF MAGNESIUM: CPT

## 2022-08-23 PROCEDURE — 77030036958 HC BRONCHSCOPE ASCOPE3 FLX DISP AMBU -D

## 2022-08-23 PROCEDURE — 65610000006 HC RM INTENSIVE CARE

## 2022-08-23 PROCEDURE — 71045 X-RAY EXAM CHEST 1 VIEW: CPT

## 2022-08-23 PROCEDURE — 82803 BLOOD GASES ANY COMBINATION: CPT

## 2022-08-23 PROCEDURE — 74011636637 HC RX REV CODE- 636/637: Performed by: PHYSICIAN ASSISTANT

## 2022-08-23 PROCEDURE — 74011000250 HC RX REV CODE- 250: Performed by: PHYSICIAN ASSISTANT

## 2022-08-23 PROCEDURE — 94003 VENT MGMT INPAT SUBQ DAY: CPT

## 2022-08-23 PROCEDURE — 80048 BASIC METABOLIC PNL TOTAL CA: CPT

## 2022-08-23 PROCEDURE — 0BJ08ZZ INSPECTION OF TRACHEOBRONCHIAL TREE, VIA NATURAL OR ARTIFICIAL OPENING ENDOSCOPIC: ICD-10-PCS | Performed by: INTERNAL MEDICINE

## 2022-08-23 PROCEDURE — 31646 BRNCHSC W/THER ASPIR SBSQ: CPT | Performed by: INTERNAL MEDICINE

## 2022-08-23 PROCEDURE — 82962 GLUCOSE BLOOD TEST: CPT

## 2022-08-23 PROCEDURE — 85025 COMPLETE CBC W/AUTO DIFF WBC: CPT

## 2022-08-23 PROCEDURE — 2709999900 HC NON-CHARGEABLE SUPPLY

## 2022-08-23 PROCEDURE — 36600 WITHDRAWAL OF ARTERIAL BLOOD: CPT

## 2022-08-23 PROCEDURE — 84100 ASSAY OF PHOSPHORUS: CPT

## 2022-08-23 PROCEDURE — 74011250637 HC RX REV CODE- 250/637: Performed by: PHYSICIAN ASSISTANT

## 2022-08-23 RX ORDER — POLYETHYLENE GLYCOL 3350 17 G/17G
17 POWDER, FOR SOLUTION ORAL DAILY
Status: DISCONTINUED | OUTPATIENT
Start: 2022-08-23 | End: 2022-09-05 | Stop reason: HOSPADM

## 2022-08-23 RX ORDER — INSULIN GLARGINE 100 [IU]/ML
5 INJECTION, SOLUTION SUBCUTANEOUS DAILY
Status: DISCONTINUED | OUTPATIENT
Start: 2022-08-23 | End: 2022-08-24

## 2022-08-23 RX ORDER — PROPOFOL 10 MG/ML
0-50 VIAL (ML) INTRAVENOUS
Status: DISCONTINUED | OUTPATIENT
Start: 2022-08-23 | End: 2022-08-25

## 2022-08-23 RX ADMIN — PROPOFOL 20 MCG/KG/MIN: 10 INJECTION, EMULSION INTRAVENOUS at 09:42

## 2022-08-23 RX ADMIN — PROPOFOL 20 MCG/KG/MIN: 10 INJECTION, EMULSION INTRAVENOUS at 05:24

## 2022-08-23 RX ADMIN — Medication 4 UNITS: at 18:13

## 2022-08-23 RX ADMIN — SODIUM CHLORIDE, PRESERVATIVE FREE 20 MG: 5 INJECTION INTRAVENOUS at 08:14

## 2022-08-23 RX ADMIN — PREDNISONE 40 MG: 20 TABLET ORAL at 17:15

## 2022-08-23 RX ADMIN — CHLORHEXIDINE GLUCONATE 0.12% ORAL RINSE 10 ML: 1.2 LIQUID ORAL at 21:30

## 2022-08-23 RX ADMIN — ASPIRIN 81 MG CHEWABLE TABLET 81 MG: 81 TABLET CHEWABLE at 08:23

## 2022-08-23 RX ADMIN — ALBUTEROL SULFATE 2.5 MG: 2.5 SOLUTION RESPIRATORY (INHALATION) at 04:37

## 2022-08-23 RX ADMIN — PROPOFOL 20 MCG/KG/MIN: 10 INJECTION, EMULSION INTRAVENOUS at 00:23

## 2022-08-23 RX ADMIN — PREDNISONE 40 MG: 20 TABLET ORAL at 08:23

## 2022-08-23 RX ADMIN — Medication 4 UNITS: at 11:29

## 2022-08-23 RX ADMIN — Medication 5 UNITS: at 10:24

## 2022-08-23 RX ADMIN — PROPOFOL 30 MCG/KG/MIN: 10 INJECTION, EMULSION INTRAVENOUS at 21:10

## 2022-08-23 RX ADMIN — ALBUTEROL SULFATE 2.5 MG: 2.5 SOLUTION RESPIRATORY (INHALATION) at 00:43

## 2022-08-23 RX ADMIN — Medication 6 UNITS: at 05:26

## 2022-08-23 RX ADMIN — CHLORHEXIDINE GLUCONATE 0.12% ORAL RINSE 10 ML: 1.2 LIQUID ORAL at 08:14

## 2022-08-23 RX ADMIN — FENTANYL CITRATE 100 MCG: 50 INJECTION INTRAMUSCULAR; INTRAVENOUS at 15:19

## 2022-08-23 RX ADMIN — SODIUM CHLORIDE, PRESERVATIVE FREE 20 MG: 5 INJECTION INTRAVENOUS at 21:12

## 2022-08-23 RX ADMIN — Medication 4 UNITS: at 00:43

## 2022-08-23 RX ADMIN — FENTANYL CITRATE 100 MCG: 50 INJECTION INTRAMUSCULAR; INTRAVENOUS at 15:08

## 2022-08-23 RX ADMIN — PROPOFOL 20 MCG/KG/MIN: 10 INJECTION, EMULSION INTRAVENOUS at 14:57

## 2022-08-23 RX ADMIN — POLYETHYLENE GLYCOL 3350 17 G: 17 POWDER, FOR SOLUTION ORAL at 10:24

## 2022-08-23 NOTE — PROGRESS NOTES
[facility]  Pulmonary, Critical Care, and Sleep Medicine      Name: Oskar Parker MRN: [id number]   : 1960 Hospital: [facility]   Date: 2022          Critical Care progress note    IMPRESSION:   S/p Cardiac arrest - ROSC after ~9 min of ACLS, no further events  Hypoxic and hypercapnic respiratory failure - now on mechanical vent-weaning support tolerating decreasing FiO2 and PEEP however remains hypoxic with PaO2 of 62 likely related to left lower lobe atelectasis and SIRS, doing well on SBT 22  Saccular Aneurysm of the descending thoracic aorta s/p covered stent procedure on  by Dr. Carlene Lloyd. Seizure vs myoclonus - shortly following cardiac arrest x2. No further episodes of tremulousness or twitching noted  R groin hematoma w/ femstop in place  Hx of CAD w/ prior CABG  Hx of HFrEF - most recent EF from  -30%  Paroxysmal symptomatic a-fib with CHADSVASc2 score of 5. On chronic Amiodorone and anticoagulation  History of recurrent GI bleeding, requiring transfusion and HAS-BLED score of 4. Chronic stage 2 kidney disease. Remote breast cancer about ten years ago with mastectomy and chemotherapy, but no chest radiation. History of thyroid disorder. History of 'waking up 'previously with anesthesia. RECOMMENDATIONS:   Neuro: Change sedation - Add Precedex gtt, wean off all other sedation. Goal to minimize sedation in order to extubate. Pt is very sensitive to sedation. RASS 0 to -1. PRN Fentanyl/ Versed for breakthrough sedation. Pulm: Titrate FiO2 for goal SPO2> 90%. Daily CXR and ABG while intubated. S/p Bronch for mucus plugs (). PRN duo-nebs q4. Optimize bronchial hygiene. Add metanebs. Continue steroids for airway edema  CVS: HD stable. Goal MAP >65mmHg. Cardiology following for assistance in management. Con't PRN Hydralazine for SBP >180mmHg. Vascular following. Fluids: maintainance  GI: SUP(pepcid), Trend LFTs, OGT.  Hold TF while on SBT. Renal: Trend Renal indices, Strict Is/Os, Gonzalez (+). Con't diuresis - Bumex 1mg PRN. Hem/Onc: Monitor for s/o active bleeding. Daily CBC. I/D: Trend WBCs and temperature curve. No ABX. Endocrine: Q6 glucoses, SSI. Metabolic:  Daily BMP, mag, phos. Trend lytes, replace as needed. Musc/Skin: no acute issues, wound care as needed  Full code  Discussed during IDRs     Subjective/History:       HPI  Patient is a 63 y.o. female w/ pmhx of a fib, CAD, CHF who presented for covered stent procedure for descending thoracic aortic saccular aneurysm. Procedure performed 8/18 w/o incident. Following procedure, patient was drowsy but answering questions appropriately per bedside RN. Per chart review, she began having oxygen desaturations and escalating oxygen requirements, eventually ending up on HFNC. Blood gas performed showed mild hypoxia and mild hypercapnia. Per chart review, patient was found in cardiac arrest and ACLS initiated with approximately 9 min of downtime. EPOC labs following cardiac arrest essentially unremarkable. ABG with mixed respiratory and metabolic acidosis. The patient is critically ill and can not provide additional history due to Ventilated.      08/23/22   - extubated and reintubated yesterday for stridor yesterday  - HD stable  - Adequate UOP  -- Sedated on propofol gtt    Past Medical History:   Diagnosis Date    A-fib (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 12/2009    S/P Lt Mastectomy and Chemo    CAD (coronary artery disease) 08/30/2010    S/P CABG X 4 ([address] to LAD, Sequential SVG to D2, Ramus, OM) 01/2011    Cardiomyopathy ([facility]     LVEF 35-40 % (08/20) 60% (08/14), 40% (2011)    Chemotherapy 04/2010    for 4 months    CHF (congestive heart failure) (HCC)     Chronic kidney disease     stage 2 per cardiac note cc    Chronic pain COPD (chronic obstructive pulmonary disease) ([facility] 08/30/2010    Depression     Diabetes (HCC)     GERD (gastroesophageal reflux disease)     GI bleed     History of blood transfusion     2010 and 1/2022    Hx of heart artery stent 2010    x 10  (4, then 4, then 2)    Hyperlipidemia     Hypertension     MI (myocardial infarction) ([facility] 2009    x 3    Schatzki's ring     S/P EGD and Dilation (07/16)    Thyroid disease     hypothyroid    Tobacco abuse       Past Surgical History:   Procedure Laterality Date    COLONOSCOPY N/A 5/3/2017    COLON  performed by Jonh Jin MD at [facility] ENDOSCOPY    COLONOSCOPY N/A 2/15/2021    COLONOSCOPY performed by Ragini Choi MD at [facility] [address] ENDOSCOPY    HX APPENDECTOMY      HX CHOLECYSTECTOMY      HX GYN      tubal ligation    HX HEART CATHETERIZATION  11/11/09; 10/21/09    HX HEART CATHETERIZATION  10/15/10; 10/06/09    left heart    HX HEART CATHETERIZATION  11/7/2011    left heart cath, no new findings    HX OTHER SURGICAL  4/6/2010    Insertion right internal jugular single lumen MediPort. HX RADICAL MASTECTOMY Left 2/2010    left, with chemo    PA BREAST SURGERY PROCEDURE UNLISTED  2/22/10    left w/sentinel node bx    PA CABG, ARTERY-VEIN, FOUR  12/2010    PA CARDIAC SURG PROCEDURE UNLIST      stent placement before CABG    PA CHEST SURGERY PROCEDURE UNLISTED Right     Prior port placement      Prior to Admission medications    Medication Sig Start Date End Date Taking? Authorizing Provider   esomeprazole (NEXIUM) 40 mg capsule Take  by mouth daily. Yes Provider, Historical   metFORMIN (GLUMETZA ER) 500 mg TG24 24 hour tablet Take  by mouth two (2) times a day. Yes Provider, Historical   albuterol-ipratropium (DUO-NEB) 2.5 mg-0.5 mg/3 ml nebu Take 3 mL by inhalation every six (6) hours as needed. Use every night   Yes Provider, Historical   rosuvastatin (Crestor) 20 mg tablet Take 1 Tablet by mouth daily. Patient taking differently: Take 20 mg by mouth nightly. 2/3/22  Yes Jose Elias Ash MD   bumetanide (BUMEX) 1 mg tablet Take 1 Tablet by mouth every other day. Patient taking differently: Take 1 mg by mouth as needed. 2/3/22  Yes Jose Elias Ash MD   sacubitriL-valsartan Tami Parr) 24-26 mg tablet Take 1 Tablet by mouth two (2) times a day. 2/3/22  Yes Jose Elias Ash MD   apixaban (Eliquis) 5 mg tablet Take 1 Tablet by mouth two (2) times a day. 2/1/22  Yes Jose Elias Ash MD   metoprolol succinate (TOPROL-XL) 25 mg XL tablet Take 1 Tablet by mouth two (2) times a day. Patient taking differently: Take 25 mg by mouth nightly. 1/20/22  Yes Jose Elias Ash MD   amiodarone (CORDARONE) 200 mg tablet TAKE 1 TABLET EVERY DAY 10/5/21  Yes Jose Elias Ash MD   albuterol (PROVENTIL HFA, VENTOLIN HFA) 90 mcg/actuation inhaler Take 1 Puff by inhalation every four (4) hours as needed. Yes Provider, Historical   levothyroxine (SYNTHROID) 150 mcg tablet Take  by mouth Daily (before breakfast). Yes Provider, Historical   nitroglycerin (NITROSTAT) 0.4 mg SL tablet 1 Tablet by SubLINGual route every five (5) minutes as needed for Chest Pain.  11/30/21   Jose Elias Ash MD     Current Facility-Administered Medications   Medication Dose Route Frequency    propofol (DIPRIVAN) 10 mg/mL infusion  0-50 mcg/kg/min IntraVENous TITRATE    polyethylene glycol (MIRALAX) packet 17 g  17 g Oral DAILY    insulin glargine (LANTUS) injection 5 Units  5 Units SubCUTAneous DAILY    predniSONE (DELTASONE) tablet 40 mg  40 mg Oral BID    aspirin chewable tablet 81 mg  81 mg Oral DAILY    famotidine (PF) (PEPCID) 20 mg in 0.9% sodium chloride 10 mL injection  20 mg IntraVENous Q12H    chlorhexidine (PERIDEX) 0.12 % mouthwash 10 mL  10 mL Oral Q12H    insulin lispro (HUMALOG) injection   SubCUTAneous Q6H    0.9% sodium chloride infusion  10 mL/hr IntraVENous CONTINUOUS    [Held by provider] amiodarone (CORDARONE) tablet 200 mg  200 mg Oral DAILY    [Held by provider] levothyroxine (SYNTHROID) tablet 150 mcg  150 mcg Oral 6am    [Held by provider] rosuvastatin (CRESTOR) tablet 20 mg  20 mg Oral QHS    [Held by provider] sacubitriL-valsartan (ENTRESTO) 24-26 mg tablet 1 Tablet  1 Tablet Oral BID    [Held by provider] metoprolol succinate (TOPROL-XL) XL tablet 25 mg  25 mg Oral QHS     Allergies   Allergen Reactions    Shellfish Derived Hives     Eyes and Throat swelling      Social History     Tobacco Use    Smoking status: Every Day     Packs/day: 1.00     Years: 40.00     Pack years: 40.00     Types: Cigarettes    Smokeless tobacco: Never   Substance Use Topics    Alcohol use: No      Family History   Problem Relation Age of Onset    Hypertension Mother     Diabetes Mother     Breast Problems Mother         fiber cystic    Hypertension Other         Review of Systems:  Review of systems not obtained due to patient factors. Objective:   Vital Signs:    Visit Vitals  /64   Pulse 61   Temp 97.4 °F (36.3 °C)   Resp 15   Ht 5' 9\" (1.753 m)   Wt 103.8 kg (228 lb 13.4 oz)   SpO2 100%   BMI 33.79 kg/m²       O2 Device: Ventilator, Endotracheal tube   O2 Flow Rate (L/min): 50 l/min   Temp (24hrs), Av.3 °F (36.8 °C), Min:97.4 °F (36.3 °C), Max:98.9 °F (37.2 °C)       Intake/Output:   Last shift:       07 - 1900  In: 0   Out: 100 [Urine:100]  Last 3 shifts: 1901 -  0700  In: 2227.5 [I.V.:367.5]  Out: 1925 [UUSFO:5622]    Intake/Output Summary (Last 24 hours) at 2022 1152  Last data filed at 2022 1024  Gross per 24 hour   Intake 1357.23 ml   Output 1175 ml   Net 182.23 ml       Physical Exam:    General:  Sedated, opens eyes to name calling   Head:  Normocephalic, without obvious abnormality, atraumatic. Eyes:  Conjunctivae/corneas clear. PERRL, EOMs intact. Nose: Nares normal. Septum midline.  Mucosa normal.  ET tube orally   Throat: Lips, mucosa, and tongue normal. Teeth and gums normal.   Neck: Supple, symmetrical, trachea midline, right IJ central line   Lungs: Clear to auscultation bilaterally. Decreased intensity breath sounds left base. + thick secretions from ETT tube. Chest wall:  +thoracotomy scar. No tenderness or deformity. Heart:  bradycardic rate and regular rhythm, S1, S2 normal, no murmur, click, rub or gallop. Abdomen:   Soft, non-tender. Bowel sounds normal. No masses,  No organomegaly. Extremities: Extremities normal, atraumatic, 2+ pitting edema BLE. Right groin dressing   Pulses: 2+ and symmetric all extremities. Arterial line   Skin: Skin color, texture, turgor normal. No rashes or lesions. Normal capillary refill. Neurologic: Sedated-limited exam, nonfocal       Data:     Recent Results (from the past 24 hour(s))   GLUCOSE, POC    Collection Time: 08/22/22  5:31 PM   Result Value Ref Range    Glucose (POC) 220 (H) 70 - 110 mg/dL   BLOOD GAS, ARTERIAL POC    Collection Time: 08/22/22  6:02 PM   Result Value Ref Range    Device: ADULT VENT      FIO2 (POC) 100 %    pH (POC) 7.33 (L) 7.35 - 7.45      pCO2 (POC) 54.2 (H) 35.0 - 45.0 MMHG    pO2 (POC) 365 (H) 80 - 100 MMHG    HCO3 (POC) 28.5 (H) 22 - 26 MMOL/L    sO2 (POC) 99.9 (H) 92 - 97 %    Base excess (POC) 1.9 mmol/L    Mode ASSIST CONTROL      Tidal volume 480 ml    Set Rate 15 bpm    PEEP/CPAP (POC) 5 cmH2O    Allens test (POC) NOT APPLICABLE      Total resp.  rate 15      Site LEFT BRACHIAL      Specimen type (POC) ARTERIAL      Performed by Mily Jean-Baptiste    GLUCOSE, POC    Collection Time: 08/23/22 12:40 AM   Result Value Ref Range    Glucose (POC) 210 (H) 70 - 110 mg/dL   MAGNESIUM    Collection Time: 08/23/22  2:35 AM   Result Value Ref Range    Magnesium 2.0 1.6 - 2.6 mg/dL   PHOSPHORUS    Collection Time: 08/23/22  2:35 AM   Result Value Ref Range    Phosphorus 3.1 2.5 - 4.9 MG/DL   CBC WITH AUTOMATED DIFF    Collection Time: 08/23/22  2:35 AM   Result Value Ref Range    WBC 10.6 4.6 - 13.2 K/uL    RBC 2.89 (L) 4.20 - 5.30 M/uL    HGB 8.0 (L) 12.0 - 16.0 g/dL    HCT 26.5 (L) 35.0 - 45.0 %    MCV 91.7 78.0 - 100.0 FL    MCH 27.7 24.0 - 34.0 PG    MCHC 30.2 (L) 31.0 - 37.0 g/dL    RDW 17.8 (H) 11.6 - 14.5 %    PLATELET 250 (L) 702 - 420 K/uL    MPV 11.1 9.2 - 11.8 FL    NRBC 0.0 0  WBC    ABSOLUTE NRBC 0.00 0.00 - 0.01 K/uL    NEUTROPHILS 93 (H) 40 - 73 %    LYMPHOCYTES 2 (L) 21 - 52 %    MONOCYTES 5 3 - 10 %    EOSINOPHILS 0 0 - 5 %    BASOPHILS 0 0 - 2 %    IMMATURE GRANULOCYTES 0 0.0 - 0.5 %    ABS. NEUTROPHILS 9.9 (H) 1.8 - 8.0 K/UL    ABS. LYMPHOCYTES 0.2 (L) 0.9 - 3.6 K/UL    ABS. MONOCYTES 0.5 0.05 - 1.2 K/UL    ABS. EOSINOPHILS 0.0 0.0 - 0.4 K/UL    ABS. BASOPHILS 0.0 0.0 - 0.1 K/UL    ABS. IMM.  GRANS. 0.0 0.00 - 0.04 K/UL    DF AUTOMATED     METABOLIC PANEL, BASIC    Collection Time: 08/23/22  2:35 AM   Result Value Ref Range    Sodium 139 136 - 145 mmol/L    Potassium 4.1 3.5 - 5.5 mmol/L    Chloride 106 100 - 111 mmol/L    CO2 27 21 - 32 mmol/L    Anion gap 6 3.0 - 18 mmol/L    Glucose 221 (H) 74 - 99 mg/dL    BUN 28 (H) 7.0 - 18 MG/DL    Creatinine 1.09 0.6 - 1.3 MG/DL    BUN/Creatinine ratio 26 (H) 12 - 20      GFR est AA >60 >60 ml/min/1.73m2    GFR est non-AA 51 (L) >60 ml/min/1.73m2    Calcium 8.2 (L) 8.5 - 10.1 MG/DL   BLOOD GAS, ARTERIAL POC    Collection Time: 08/23/22  4:35 AM   Result Value Ref Range    Device: ADULT VENT      FIO2 (POC) 50 %    pH (POC) 7.42 7.35 - 7.45      pCO2 (POC) 40.6 35.0 - 45.0 MMHG    pO2 (POC) 111 (H) 80 - 100 MMHG    HCO3 (POC) 26.3 (H) 22 - 26 MMOL/L    sO2 (POC) 98.4 (H) 92 - 97 %    Base excess (POC) 1.6 mmol/L    Mode ASSIST CONTROL      Tidal volume 480 ml    Set Rate 15 bpm    PEEP/CPAP (POC) 5 cmH2O    Allens test (POC) Positive      Site RIGHT RADIAL      Specimen type (POC) ARTERIAL      Performed by Vijay Irwin    GLUCOSE, POC    Collection Time: 08/23/22  5:20 AM   Result Value Ref Range    Glucose (POC) 263 (H) 70 - 110 mg/dL   GLUCOSE, POC    Collection Time: 08/23/22 11:22 AM   Result Value Ref Range    Glucose (POC) 218 (H) 70 - 110 mg/dL             Telemetry: protect  sinus bradycarida    Imaging:  CXR Results  (Last 48 hours)                 08/23/22 0624  XR CHEST PORT Final result    Impression:  No gross interval change. Narrative:  EXAM: XR CHEST PORT       INDICATION: intubated       COMPARISON: 8/22/2022. FINDINGS: A single view of the chest demonstrates persistent pleural parenchymal   opacities without interval change. Stable pulmonary vascularity. Stable enlarged   cardiac silhouette. . No acute bone findings. Stable postoperative changes. Grossly stable support devices. Blanchie Bevels 08/22/22 1759  XR CHEST PORT Final result    Impression:  Endotracheal tube tip is 2.3 cm above the anai. Narrative:  ONE VIEW CHEST RADIOGRAPH        INDICATION: Verify ETT placement after re-intubation. COMPARISON: Chest radiograph earlier today at 5:15.       TECHNIQUE: AP view of the chest       FINDINGS:   Endotracheal tube tip is 2.3 cm above the anai. Enteric tube tip is in the   stomach. Otherwise, no significant change from prior study earlier today. 08/22/22 0608  XR CHEST PORT Final result    Impression:  No gross interval change in basilar pleural-parenchymal opacities and vascular   congestion. Enlarged cardiac silhouette stable. Support devices as discussed       Narrative:  EXAM: XR CHEST PORT       INDICATION: left LL atelectasis       COMPARISON: Recent prior       FINDINGS: A portable AP radiograph of the chest was obtained at 0509 hours. The   patient is on a cardiac monitor. Basilar pleural-parenchymal opacity similar to   prior exam. Vascular congestion grossly unchanged. . Stable cardiac silhouette. Postoperative changes to the mediastinum, stable. .  No acute bone findings. Clips overlying the left chest. Endotracheal tube stable. Presumed enteric tube   poorly delineated. Correlate clinically. Aortic stent noted.  Right IJ CVL   grossly similar given differences in technique. .                    CT Results  (Last 48 hours)      None                     JOYA SPLIT/SHARE TIME: 12 mins    Yasir Elmore   08/23/22   Pulmonary, Critical Care Medicine  [facility]

## 2022-08-23 NOTE — ROUTINE PROCESS
0715 Bedside and Verbal shift change report given to Rosalind Berumen RN (oncoming nurse) by GREG De Leon RN (offgoing nurse). Report included the following information SBAR, Kardex, MAR, and Recent Results.

## 2022-08-23 NOTE — PROCEDURES
[facility]  Pulmonary, Critical Care, and Sleep Medicine    Name: Preeti Aquino MRN: [id number]   : 1960 Hospital: [address] [place] [address]   Date: 2022        Bronchoscopy Report    Procedure: Therapeutic bronchoscopy. Indication: Abnormal chest imaging    Consent/Treatment: Informed consent was obtained from the  family after risks, benefits and alternatives were explained. Timeout verified the correct patient and correct procedure. Anesthesia:   On propofol gtt, fentanyl 100mcg    Procedure Details:   -- The bronchoscope was introduced through an endotracheal tube. -- The trachea and anai were completely inspected and there was severe tracheomalacia seen. The lumen was almost completely collapsed during expiration. -- The right-sided endobronchial anatomy was completely inspected and was found to be normal in appearance but there was severe bronchomalacia seen with almost complete luminal collapse with expiration. Secretions were clear and scant. -- The left-sided endobronchial anatomy was completely inspected and appeared normal but there was severe bronchomalacia seen with almost complete luminal collapse with expiration. Secretions with clear and scant.      Specimens:   None    Complications: none    Estimated Blood Loss: Minimal    Rekha Broussard MD  2022  3:23 PM

## 2022-08-23 NOTE — PROGRESS NOTES
Comprehensive Nutrition Assessment    Type and Reason for Visit: Reassess, Consult    Nutrition Recommendations/Plan:   Modify tube feeding regimen:   Formula: Vital AF 1.2  Goal Rate: 60 ml/hr  Water Flushes: 100 mL q 6 hours  Goal Regimen Provides (with modulars):  1728 kcal [2055 kcal w/ propofol], 108g protein, 1566 ml free water, 100% RDIs  2. Additional calorie sources: propofol      Malnutrition Assessment:  Malnutrition Status: At risk for malnutrition (specify) (NPO, vent) (08/23/22 0825)    Context:  Acute illness       Nutrition Assessment:    Pt NPO x 3 days, feeds ordered 8/21. Overall intake <50% x 5 days. Pt s/p extubation 8/22, immediately re-intubated 8/22. Per MD, plan for bronch. Per RN, currently remains on propofol, no BM the last two days, to initiate bowel regimen. Plan to switch TF to Vital AF. Nutrition Related Findings:    Pertinent Meds: pepcid, humalog, prednisone, NS @ 10 ml/hr, Propofol @ 12.4 ml/hr (provides 327 kcals)   Pertinent Labs: POC Glucose 210-263 mg/dl x 24 hrs, Glucose 221 mg/dl Wound Type: Surgical incision    Current Nutrition Intake & Therapies:        DIET NPO  ADULT TUBE FEEDING Orogastric; Standard without Fiber; Delivery Method: Continuous; Continuous Initial Rate (mL/hr): 20; Continuous Advance Tube Feeding: Yes; Advancement Volume (mL/hr): 10; Advancement Frequency: Q 8 hours; Continuous Goal Rate (... Anthropometric Measures:  Height: 5' 9\" (175.3 cm)  Ideal Body Weight (IBW): 145 lbs (66 kg)  Admission Body Weight: 225 lb 15.5 oz - lowest wt  Current Body Wt:  103.8 kg (228 lb 13.4 oz)  Pt -1.3 L per I&Os if accurate. Last 3 Recorded Weights in this Encounter    08/19/22 0821 08/22/22 0000 08/23/22 0400   Weight: 102.5 kg (226 lb) 103.4 kg (227 lb 15.3 oz) 103.8 kg (228 lb 13.4 oz)      155.8 % IBW. Current BMI (kg/m2): 33.8     BMI Category: Obese class 1 (BMI 30.0-34. 9)    Estimated Daily Nutrient Needs:  Energy Requirements Based On: Formula  Weight Used for Energy Requirements: Admission  Energy (kcal/day): 2482-2528 kcals/day [facility] 2010 x 0.9-1.1)  Weight Used for Protein Requirements: Ideal  Protein (g/day):  g/day (1.5-2 g/day)  Method Used for Fluid Requirements: 1 ml/kcal  Fluid (ml/day): 5114-5560 ml/day    Nutrition Diagnosis:   Swallowing difficulty related to impaired respiratory function as evidenced by NPO or clear liquid status due to medical condition, intubation, nutrition support-enteral nutrition  Inadequate enteral nutrition infusion related to acute injury/trauma (interruptions in infusion schedule) as evidenced by  (intake <50% x 5 days)    Nutrition Interventions:   Food and/or Nutrient Delivery: Modify tube feeding     Coordination of Nutrition Care: Interdisciplinary rounds  Plan of Care discussed with: interdisciplinary rounds    Goals:  Previous Goal Met: Progressing toward goal(s)  Goals: Meet at least 75% of estimated needs, by next RD assessment       Nutrition Monitoring and Evaluation:      Food/Nutrient Intake Outcomes: Enteral nutrition intake/tolerance  Physical Signs/Symptoms Outcomes: Weight, Biochemical data, Hemodynamic status, GI status, Fluid status or edema    Discharge Planning:     Too soon to determine    Kwadwo Johnson [address], [address]   Contact: [phone]

## 2022-08-23 NOTE — PROGRESS NOTES
Events noted, following throughout  Remains on vent now  Discussed with crtitical care, will try again when able

## 2022-08-23 NOTE — PROGRESS NOTES
PEEP increased following bronch due to tracheal malacia with almost total airway collapse witnessed via bronchoscope

## 2022-08-23 NOTE — PROGRESS NOTES
Problem: Falls - Risk of  Goal: *Absence of Falls  Description: Document Tobias Linn Fall Risk and appropriate interventions in the flowsheet. Outcome: Progressing Towards Goal  Note: Fall Risk Interventions:  Mobility Interventions: Bed/chair exit alarm    Mentation Interventions: Bed/chair exit alarm, Door open when patient unattended, More frequent rounding, Room close to nurse's station, Update white board, Toileting rounds    Medication Interventions: Bed/chair exit alarm    Elimination Interventions: Bed/chair exit alarm, Call light in reach, Toileting schedule/hourly rounds              Problem: Non-Violent Restraints  Goal: Removal from restraints as soon as assessed to be safe  Outcome: Progressing Towards Goal  Goal: No harm/injury to patient while restraints in use  Outcome: Progressing Towards Goal  Goal: Patient's dignity will be maintained  Outcome: Progressing Towards Goal  Goal: Patient Interventions  Outcome: Progressing Towards Goal     Problem: Pressure Injury - Risk of  Goal: *Prevention of pressure injury  Description: Document Alfredo Scale and appropriate interventions in the flowsheet. Outcome: Progressing Towards Goal  Note: Pressure Injury Interventions:  Sensory Interventions: Assess changes in LOC, Keep linens dry and wrinkle-free, Minimize linen layers, Monitor skin under medical devices, Pad between skin to skin, Pressure redistribution bed/mattress (bed type), Turn and reposition approx. every two hours (pillows and wedges if needed), Use 30-degree side-lying position    Moisture Interventions: Apply protective barrier, creams and emollients, Absorbent underpads, Check for incontinence Q2 hours and as needed, Internal/External urinary devices, Minimize layers, Moisture barrier    Activity Interventions: Assess need for specialty bed    Mobility Interventions: Pressure redistribution bed/mattress (bed type), Assess need for specialty bed, Turn and reposition approx.  every two hours(pillow and wedges)    Nutrition Interventions: Document food/fluid/supplement intake    Friction and Shear Interventions: Apply protective barrier, creams and emollients, Foam dressings/transparent film/skin sealants, Lift sheet, Lift team/patient mobility team, Minimize layers, Transferring/repositioning devices      Sammy Nvaas RN

## 2022-08-23 NOTE — DIABETES MGMT
Diabetes/ Glycemic Control Plan of Care  Recommendations:  Recommend 8 units Lantus daily for 4 glucose readings 210-263. Pt has required 22 units corrective lispro. Assessment:   DX: Pt with hx of DM on glumetza at home. 1. Acute respiratory failure with hypoxia and hypercapnia     2. Cardiac arrest ([facility] [I46.9 (ICD-10-CM)]     3. Aneurysm ([facility] [I72.9 (ICD-10-CM)]     4. 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 Other emphysema ([facility]        Fasting/ Morning blood glucose:     (H) 08/23/2022 02:35 AM    GLUCPOC 218 (H) 08/23/2022 11:22 AM    GLUCPOC 263 (H) 08/23/2022 05:20 AM    GLUCPOC 210 (H) 08/23/2022 12:40 AM   IV Fluids containing dextrose: n/a  Steroids:  40 mg prednisone  Blood glucose values: Within target range (70-180mg/dL):  no   Current insulin orders:   Lispro corrective insulin coverage ever 6 hours   Total Daily Dose previous 24 hours = 22 units lispro  Current A1c:   Lab Results   Component Value Date/Time    Hemoglobin A1c 6.1 (H) 08/18/2022 07:12 AM      Equivalent  to an average Blood Glucose of 129 mg/dl for 2-3 months prior to admission  Adequate glycemic control PTA: YES  Nutrition/Diet:  NPO/ TF   Home diabetes medications:    metFORMIN (GLUMETZA ER) 500 mg TG24 24 hour tablet (Taking) Take  by mouth two (2) times a day. Plan/Goals:   Blood glucose will be within target of 70 - 180 mg/dl within 72 hours  Reinforce dietary and medication compliance at home.           Education:  [] Refer to Diabetes Education Record                       [x] Education not indicated at this time - on vent    Angelica Dodd RD BC-ADM

## 2022-08-23 NOTE — ANESTHESIA PROCEDURE NOTES
Emergent Intubation  Performed by: Eva Murray MD  Authorized by: Eva Murray MD     Emergent Intubation:   Location:  ICU  Date/Time:  8/22/2022 4:57 PM  Indications:  Potential airway compromise  Spontaneous Ventilation: present    Level of Consciousness: sedated  Preoxygenated:  Yes      Airway Documentation:   Airway:  ETT - Cuffed  Technique:  Rapid sequence  Insertion Site:  Oral  Blade Type:  Chacorta  Blade Size:  4  ETT size (mm):  7.5  ETT Insertion depth (cm):  24  Placement verified by: auscultation and EtCO2    Attempts:  1  Difficult airway: No

## 2022-08-23 NOTE — INTERDISCIPLINARY ROUNDS
[facility]  Pulmonary, Critical Care, and Sleep Medicine  Interdisciplinary and Ventilator Weaning Rounds    Patient discussed in morning walking rounds and interdisciplinary rounds. ICU day: 2    Overnight events:   Extubated yesterday evening and reintubated for stridor    Assessments and best practice:  Ventilator  Ventilator day intubated 8/18, extubated 8/22, reintubated for stridor 8/22  Vent settings: FiO2 of 50 and PEEP of 5  VAP bundle, aim to keep peak plateau pressure 10-51HK H2O  Weaning assessed and documented   Patient does not meet criteria for SBT. Patient is not on sedation holiday. Plan to wean with PS n/a. Outcome: remain on full vent support  Final plan: remain on full vent support  Sedation  Propofol  Other pertinent drips  N/a  Lines noted  IJ CVL and Radial Arterial Line   Critical labs assessed  Yes  Antibiotics  N/a  Medications reviewed  Yes  Pending imaging  none  Pending send out labs  No  Pending Procedures  None  Glycemic control  Stress ulcer prophylaxis. Pepcid  DVT prophylaxis. contraindicated  Need for Lines, grissom assessed. Yes  Restraint Reevaluation   Yes  I have reevaluated the patient one hour after initiation of intervention. The patient is comfortable, uninjured, but continues to pose an imminent risk of injury to self to themselves and/or serious disruption of medical treatment required to keep patient stable. The patient's current medical and behavioral conditions that warrant the use intervention include danger to self and Interference with medical equipment or treatment. Restraint or seclusion will be discontinued at the earliest possible time, regardless of the scheduled expiration of the order.  Based on my evaluation, restraints will be continued: YES       Family contact/MPOA: spouse - Emi Gilmore [phone]  Family updated - will be updated today     Palliative consult within 3 days of admission to ICU-  Ethics Guidance: 21 days      Daily Plans:   Will plan to bronch  Add lantus 5 units  Will start a bowel regimen     JOYA time 12 minutes        Shey Barriga PA-C  08/23/22  Pulmonary, Critical Care Medicine  [facility]

## 2022-08-23 NOTE — ROUTINE PROCESS
Bedside and Verbal shift change report given to Cezar Erazo (oncoming nurse) by Soniya Sim RN (offgoing nurse). Report included the following information SBAR, Kardex, Procedure Summary, MAR, Recent Results, and Cardiac Rhythm NSR .

## 2022-08-24 ENCOUNTER — APPOINTMENT (OUTPATIENT)
Dept: GENERAL RADIOLOGY | Age: 62
DRG: 219 | End: 2022-08-24
Attending: PHYSICIAN ASSISTANT
Payer: MEDICARE

## 2022-08-24 LAB
ANION GAP SERPL CALC-SCNC: 4 MMOL/L (ref 3–18)
ARTERIAL PATENCY WRIST A: POSITIVE
BASE EXCESS BLD CALC-SCNC: 2.8 MMOL/L
BASOPHILS # BLD: 0 K/UL (ref 0–0.1)
BASOPHILS NFR BLD: 0 % (ref 0–2)
BDY SITE: ABNORMAL
BUN SERPL-MCNC: 25 MG/DL (ref 7–18)
BUN/CREAT SERPL: 28 (ref 12–20)
CALCIUM SERPL-MCNC: 8.5 MG/DL (ref 8.5–10.1)
CHLORIDE SERPL-SCNC: 104 MMOL/L (ref 100–111)
CO2 SERPL-SCNC: 30 MMOL/L (ref 21–32)
CREAT SERPL-MCNC: 0.9 MG/DL (ref 0.6–1.3)
DIFFERENTIAL METHOD BLD: ABNORMAL
EOSINOPHIL # BLD: 0 K/UL (ref 0–0.4)
EOSINOPHIL NFR BLD: 0 % (ref 0–5)
ERYTHROCYTE [DISTWIDTH] IN BLOOD BY AUTOMATED COUNT: 17.8 % (ref 11.6–14.5)
GAS FLOW.O2 O2 DELIVERY SYS: ABNORMAL L/MIN
GAS FLOW.O2 SETTING OXYMISER: 15 BPM
GLUCOSE BLD STRIP.AUTO-MCNC: 171 MG/DL (ref 70–110)
GLUCOSE BLD STRIP.AUTO-MCNC: 172 MG/DL (ref 70–110)
GLUCOSE BLD STRIP.AUTO-MCNC: 200 MG/DL (ref 70–110)
GLUCOSE BLD STRIP.AUTO-MCNC: 203 MG/DL (ref 70–110)
GLUCOSE SERPL-MCNC: 178 MG/DL (ref 74–99)
HCO3 BLD-SCNC: 28 MMOL/L (ref 22–26)
HCT VFR BLD AUTO: 24.9 % (ref 35–45)
HGB BLD-MCNC: 7.6 G/DL (ref 12–16)
IMM GRANULOCYTES # BLD AUTO: 0.1 K/UL (ref 0–0.04)
IMM GRANULOCYTES NFR BLD AUTO: 1 % (ref 0–0.5)
LYMPHOCYTES # BLD: 0.3 K/UL (ref 0.9–3.6)
LYMPHOCYTES NFR BLD: 3 % (ref 21–52)
MAGNESIUM SERPL-MCNC: 2 MG/DL (ref 1.6–2.6)
MCH RBC QN AUTO: 27.8 PG (ref 24–34)
MCHC RBC AUTO-ENTMCNC: 30.5 G/DL (ref 31–37)
MCV RBC AUTO: 91.2 FL (ref 78–100)
MONOCYTES # BLD: 1 K/UL (ref 0.05–1.2)
MONOCYTES NFR BLD: 9 % (ref 3–10)
NEUTS SEG # BLD: 10.6 K/UL (ref 1.8–8)
NEUTS SEG NFR BLD: 88 % (ref 40–73)
NRBC # BLD: 0 K/UL (ref 0–0.01)
NRBC BLD-RTO: 0 PER 100 WBC
O2/TOTAL GAS SETTING VFR VENT: 40 %
PCO2 BLD: 45.8 MMHG (ref 35–45)
PEEP RESPIRATORY: 12 CMH2O
PH BLD: 7.4 [PH] (ref 7.35–7.45)
PHOSPHATE SERPL-MCNC: 3.2 MG/DL (ref 2.5–4.9)
PLATELET # BLD AUTO: 134 K/UL (ref 135–420)
PMV BLD AUTO: 10.8 FL (ref 9.2–11.8)
PO2 BLD: 96 MMHG (ref 80–100)
POTASSIUM SERPL-SCNC: 4.4 MMOL/L (ref 3.5–5.5)
RBC # BLD AUTO: 2.73 M/UL (ref 4.2–5.3)
SAO2 % BLD: 97.3 % (ref 92–97)
SERVICE CMNT-IMP: ABNORMAL
SODIUM SERPL-SCNC: 138 MMOL/L (ref 136–145)
SPECIMEN TYPE: ABNORMAL
TOTAL RESP. RATE, ITRR: 18
VENTILATION MODE VENT: ABNORMAL
VT SETTING VENT: 480 ML
WBC # BLD AUTO: 12 K/UL (ref 4.6–13.2)

## 2022-08-24 PROCEDURE — 82962 GLUCOSE BLOOD TEST: CPT

## 2022-08-24 PROCEDURE — 65610000006 HC RM INTENSIVE CARE

## 2022-08-24 PROCEDURE — 94762 N-INVAS EAR/PLS OXIMTRY CONT: CPT

## 2022-08-24 PROCEDURE — 94003 VENT MGMT INPAT SUBQ DAY: CPT

## 2022-08-24 PROCEDURE — 77030040392 HC DRSG OPTIFOAM MDII -A

## 2022-08-24 PROCEDURE — 80048 BASIC METABOLIC PNL TOTAL CA: CPT

## 2022-08-24 PROCEDURE — 85025 COMPLETE CBC W/AUTO DIFF WBC: CPT

## 2022-08-24 PROCEDURE — 83735 ASSAY OF MAGNESIUM: CPT

## 2022-08-24 PROCEDURE — 74011250637 HC RX REV CODE- 250/637: Performed by: PHYSICIAN ASSISTANT

## 2022-08-24 PROCEDURE — 74011000250 HC RX REV CODE- 250: Performed by: PHYSICIAN ASSISTANT

## 2022-08-24 PROCEDURE — 36600 WITHDRAWAL OF ARTERIAL BLOOD: CPT

## 2022-08-24 PROCEDURE — 2709999900 HC NON-CHARGEABLE SUPPLY

## 2022-08-24 PROCEDURE — 99232 SBSQ HOSP IP/OBS MODERATE 35: CPT | Performed by: INTERNAL MEDICINE

## 2022-08-24 PROCEDURE — 74011250636 HC RX REV CODE- 250/636: Performed by: PHYSICIAN ASSISTANT

## 2022-08-24 PROCEDURE — 82803 BLOOD GASES ANY COMBINATION: CPT

## 2022-08-24 PROCEDURE — APPSS15 APP SPLIT SHARED TIME 0-15 MINUTES: Performed by: PHYSICIAN ASSISTANT

## 2022-08-24 PROCEDURE — 77030037878 HC DRSG MEPILEX >48IN BORD MOLN -B

## 2022-08-24 PROCEDURE — 99291 CRITICAL CARE FIRST HOUR: CPT | Performed by: INTERNAL MEDICINE

## 2022-08-24 PROCEDURE — 74011250637 HC RX REV CODE- 250/637: Performed by: SURGERY

## 2022-08-24 PROCEDURE — 74011636637 HC RX REV CODE- 636/637: Performed by: PHYSICIAN ASSISTANT

## 2022-08-24 PROCEDURE — 71045 X-RAY EXAM CHEST 1 VIEW: CPT

## 2022-08-24 PROCEDURE — 84100 ASSAY OF PHOSPHORUS: CPT

## 2022-08-24 RX ORDER — INSULIN GLARGINE 100 [IU]/ML
8 INJECTION, SOLUTION SUBCUTANEOUS DAILY
Status: DISCONTINUED | OUTPATIENT
Start: 2022-08-25 | End: 2022-08-25

## 2022-08-24 RX ORDER — INSULIN GLARGINE 100 [IU]/ML
3 INJECTION, SOLUTION SUBCUTANEOUS ONCE
Status: COMPLETED | OUTPATIENT
Start: 2022-08-24 | End: 2022-08-24

## 2022-08-24 RX ADMIN — Medication 5 UNITS: at 08:31

## 2022-08-24 RX ADMIN — PROPOFOL 45 MCG/KG/MIN: 10 INJECTION, EMULSION INTRAVENOUS at 17:29

## 2022-08-24 RX ADMIN — CHLORHEXIDINE GLUCONATE 0.12% ORAL RINSE 10 ML: 1.2 LIQUID ORAL at 20:56

## 2022-08-24 RX ADMIN — PREDNISONE 40 MG: 20 TABLET ORAL at 18:31

## 2022-08-24 RX ADMIN — PROPOFOL 45 MCG/KG/MIN: 10 INJECTION, EMULSION INTRAVENOUS at 10:40

## 2022-08-24 RX ADMIN — PREDNISONE 40 MG: 20 TABLET ORAL at 08:31

## 2022-08-24 RX ADMIN — PROPOFOL 45 MCG/KG/MIN: 10 INJECTION, EMULSION INTRAVENOUS at 20:56

## 2022-08-24 RX ADMIN — ASPIRIN 81 MG CHEWABLE TABLET 81 MG: 81 TABLET CHEWABLE at 08:31

## 2022-08-24 RX ADMIN — PROPOFOL 50 MCG/KG/MIN: 10 INJECTION, EMULSION INTRAVENOUS at 07:37

## 2022-08-24 RX ADMIN — Medication 4 UNITS: at 18:30

## 2022-08-24 RX ADMIN — Medication 3 UNITS: at 10:40

## 2022-08-24 RX ADMIN — Medication 2 UNITS: at 06:30

## 2022-08-24 RX ADMIN — SODIUM CHLORIDE, PRESERVATIVE FREE 20 MG: 5 INJECTION INTRAVENOUS at 20:56

## 2022-08-24 RX ADMIN — PROPOFOL 50 MCG/KG/MIN: 10 INJECTION, EMULSION INTRAVENOUS at 01:10

## 2022-08-24 RX ADMIN — Medication 6 UNITS: at 00:18

## 2022-08-24 RX ADMIN — POLYETHYLENE GLYCOL 3350 17 G: 17 POWDER, FOR SOLUTION ORAL at 08:30

## 2022-08-24 RX ADMIN — CHLORHEXIDINE GLUCONATE 0.12% ORAL RINSE 10 ML: 1.2 LIQUID ORAL at 08:31

## 2022-08-24 RX ADMIN — Medication 4 UNITS: at 23:13

## 2022-08-24 RX ADMIN — Medication 2 UNITS: at 12:56

## 2022-08-24 RX ADMIN — PROPOFOL 50 MCG/KG/MIN: 10 INJECTION, EMULSION INTRAVENOUS at 04:27

## 2022-08-24 RX ADMIN — PROPOFOL 45 MCG/KG/MIN: 10 INJECTION, EMULSION INTRAVENOUS at 14:06

## 2022-08-24 RX ADMIN — SODIUM CHLORIDE, PRESERVATIVE FREE 20 MG: 5 INJECTION INTRAVENOUS at 08:31

## 2022-08-24 NOTE — ROUTINE PROCESS
0730 Bedside and Verbal shift change report given to Damita Simmonds, RN (oncoming nurse) by GREG Otero RN (offgoing nurse). Report included the following information SBAR, Kardex, MAR, and Recent Results.

## 2022-08-24 NOTE — INTERDISCIPLINARY ROUNDS
[facility]  Pulmonary, Critical Care, and Sleep Medicine  Interdisciplinary and Ventilator Weaning Rounds    Patient discussed in morning walking rounds and interdisciplinary rounds. ICU day: 3    Overnight events:   No acute overnight events    Assessments and best practice:  Ventilator  Ventilator day intubated 8/18, extubated 8/22, reintubated for stridor 8/22  Vent settings: FiO2 of 50 and PEEP of 12  VAP bundle, aim to keep peak plateau pressure 61-19IM H2O  Weaning assessed and documented   Patient does not meet criteria for SBT. Patient is not on sedation holiday. Plan to wean with PS n/a. Outcome: remain on full vent support  Final plan: remain on full vent support  Sedation  Propofol  Other pertinent drips  N/a  Lines noted  IJ CVL and Radial Arterial Line   Critical labs assessed  Yes  Antibiotics  N/a  Medications reviewed  Yes  Pending imaging  none  Pending send out labs  No  Pending Procedures  None  Glycemic control  Stress ulcer prophylaxis. Pepcid  DVT prophylaxis. contraindicated  Need for Lines, grissom assessed. Yes  Restraint Reevaluation   Yes  I have reevaluated the patient one hour after initiation of intervention. The patient is comfortable, uninjured, but continues to pose an imminent risk of injury to self to themselves and/or serious disruption of medical treatment required to keep patient stable. The patient's current medical and behavioral conditions that warrant the use intervention include danger to self and Interference with medical equipment or treatment. Restraint or seclusion will be discontinued at the earliest possible time, regardless of the scheduled expiration of the order.  Based on my evaluation, restraints will be continued: YES       Family contact/MPOA: spouse - Mariann José [phone]  Family updated - will be updated today     Palliative consult within 3 days of admission to ICU-  Ethics Guidance: 21 days      Daily Plans:  Check triglycerides  increase lantus 8 units    JOYA time 12 minutes        Perfecto Decent  08/24/22  Pulmonary, Critical Care Medicine  [facility]

## 2022-08-24 NOTE — PROGRESS NOTES
attended the interdisciplinary rounds for Lang Wheeler, who is a 63 y.o.,female. Patients Primary Language is: [place]. According to the patients EMR Roman [employer] Affiliation is: [facility].     The reason the Patient came to the hospital is:   Patient Active Problem List    Diagnosis Date Noted    Saccular aneurysm 08/18/2022    SOB (shortness of breath) 06/01/2012    Diabetes ([facility]     Hypertension     Hyperlipidemia     Aneurysm ([facility]     Breast CA ([facility]     Cancer of breast, left 08/30/2010    COPD (chronic obstructive pulmonary disease) ([facility] 08/30/2010    CAD (coronary artery disease) 08/30/2010      Plan:  Chaplains will continue to follow and will provide pastoral care on an as needed/requested basis.  recommends bedside caregivers page  on duty if patient shows signs of acute spiritual or emotional distress.     [address] [facility]  Board Certified [address] [place] [address]   [phone]

## 2022-08-24 NOTE — PROGRESS NOTES
Nutrition Note    Discussed care during interdisciplinary rounds. Currently infusing TF at goal rate. Propofol increased to 27.9 ml/hr (provides 729 kcals). Plan to modify TF regimen.      Estimated Daily Nutrient Needs: 102.2 kg (lowest wt), IBW 66 kg  1656 - 4 kcals/day [facility] 2010 x 0.9-1.1)  Ve Observed (l/min): 7.2 l/min   Temp (24hrs), Av.5 °F (36.9 °C), Min:97.4 °F (36.3 °C), Max:99.1 °F (37.3 °C)     99 - 132 g protein (1.5-2 g/day)  1656 -  ml/day (1 ml/kcal)     Nutrition Recommendations/Plan:   Modify tube feeding regimen:   Formula: Vital AF  Goal Rate: 40 ml/hr  Water Flushes: 100 mL q 6 hours  Modular(s): Prosource TF TID  Goal Regimen Provides (with modulars): provides 1272 kcal [2009 kcals w/ propofol], 105g protein, 1178 ml free water, 77% RDIs)        Electronically signed by Montserrat Green RD on 2022 at 8:21 AM    Contact: [phone]

## 2022-08-24 NOTE — PROGRESS NOTES
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 Klebre Matthews. Seizure vs myoclonus - shortly following cardiac arrest x2. No further episodes of tremulousness or twitching noted  R groin hematoma w/ femstop in place  Hx of CAD w/ prior CABG  Hx of HFrEF - most recent EF from  -30%  Paroxysmal symptomatic a-fib with CHADSVASc2 score of 5. On chronic Amiodorone and anticoagulation  History of recurrent GI bleeding, requiring transfusion and HAS-BLED score of 4. Chronic stage 2 kidney disease. Remote breast cancer about ten years ago with mastectomy and chemotherapy, but no chest radiation. History of thyroid disorder. History of 'waking up 'previously with anesthesia. RECOMMENDATIONS:   Neuro: Change sedation - Add Precedex gtt, wean off all other sedation. Goal to minimize sedation in order to extubate. Pt is very sensitive to sedation. RASS 0 to -1. PRN Fentanyl/ Versed for breakthrough sedation. Pulm: Titrate FiO2 for goal SPO2> 90%. Daily CXR and ABG while intubated. S/p Bronch for mucus plugs (). PRN duo-nebs q4. Optimize bronchial hygiene. Add metanebs. Continue steroids for airway edema  CVS: HD stable. Goal MAP >65mmHg. Cardiology following for assistance in management. Con't PRN Hydralazine for SBP >180mmHg. Vascular following. Fluids: maintainance  GI: SUP(pepcid), Trend LFTs, OGT.  Hold TF while on SBT. Renal: Trend Renal indices, Strict Is/Os, Gonzalez (+). Con't diuresis - Bumex 1mg PRN. Hem/Onc: Monitor for s/o active bleeding. Daily CBC. I/D: Trend WBCs and temperature curve. No ABX. Endocrine: Q6 glucoses, SSI. Metabolic:  Daily BMP, mag, phos. Trend lytes, replace as needed. Musc/Skin: no acute issues, wound care as needed  Full code  Discussed during IDRs     Subjective/History:       HPI  Patient is a 63 y.o. female w/ pmhx of a fib, CAD, CHF who presented for covered stent procedure for descending thoracic aortic saccular aneurysm. Procedure performed 8/18 w/o incident. Following procedure, patient was drowsy but answering questions appropriately per bedside RN. Per chart review, she began having oxygen desaturations and escalating oxygen requirements, eventually ending up on HFNC. Blood gas performed showed mild hypoxia and mild hypercapnia. Per chart review, patient was found in cardiac arrest and ACLS initiated with approximately 9 min of downtime. EPOC labs following cardiac arrest essentially unremarkable. ABG with mixed respiratory and metabolic acidosis. The patient is critically ill and can not provide additional history due to Ventilated.      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    Anxiety     Breast CA ([facility] 12/2009    S/P Lt Mastectomy and Chemo    CAD (coronary artery disease) 08/30/2010    S/P CABG X 4 ([address] to LAD, Sequential SVG to D2, Ramus, OM) 01/2011    Cardiomyopathy ([facility]     LVEF 35-40 % (08/20) 60% (08/14), 40% (2011)    Chemotherapy 04/2010    for 4 months    CHF (congestive heart failure) (HCC)     Chronic kidney disease     stage 2 per cardiac note cc    Chronic pain     COPD (chronic obstructive pulmonary disease) ([facility] 08/30/2010    Depression     Diabetes ([facility]     GERD (gastroesophageal reflux disease)     GI bleed     History of blood transfusion     2010 and 1/2022    Hx of heart artery stent 2010    x 10  (4, then 4, then 2)    Hyperlipidemia     Hypertension     MI (myocardial infarction) ([facility] 2009    x 3    Schatzki's ring     S/P EGD and Dilation (07/16)    Thyroid disease     hypothyroid    Tobacco abuse       Past Surgical History:   Procedure Laterality Date    COLONOSCOPY N/A 5/3/2017    COLON  performed by Huy Conklin MD at [facility] [address] ENDOSCOPY    COLONOSCOPY N/A 2/15/2021    COLONOSCOPY performed by Herrera Viveros MD at [address] [place] [address] Drive ENDOSCOPY    HX APPENDECTOMY      HX CHOLECYSTECTOMY      HX GYN      tubal ligation    HX HEART CATHETERIZATION  11/11/09; 10/21/09    HX HEART CATHETERIZATION  10/15/10; 10/06/09    left heart    HX HEART CATHETERIZATION  11/7/2011    left heart cath, no new findings    HX OTHER SURGICAL  4/6/2010    Insertion right internal jugular single lumen MediPort. HX RADICAL MASTECTOMY Left 2/2010    left, with chemo    MN BREAST SURGERY PROCEDURE UNLISTED  2/22/10    left w/sentinel node bx    MN CABG, ARTERY-VEIN, FOUR  12/2010    MN CARDIAC SURG PROCEDURE UNLIST      stent placement before CABG    MN CHEST SURGERY PROCEDURE UNLISTED Right     Prior port placement      Prior to Admission medications    Medication Sig Start Date End Date Taking? Authorizing Provider   esomeprazole (NEXIUM) 40 mg capsule Take  by mouth daily. Yes Provider, Historical   metFORMIN (GLUMETZA ER) 500 mg TG24 24 hour tablet Take  by mouth two (2) times a day. Yes Provider, Historical   albuterol-ipratropium (DUO-NEB) 2.5 mg-0.5 mg/3 ml nebu Take 3 mL by inhalation every six (6) hours as needed. Use every night   Yes Provider, Historical   rosuvastatin (Crestor) 20 mg tablet Take 1 Tablet by mouth daily. Patient taking differently: Take 20 mg by mouth nightly.  2/3/22  Yes Zaki Sesay MD   bumetanide (BUMEX) 1 mg tablet Take 1 Tablet by mouth every other day. Patient taking differently: Take 1 mg by mouth as needed. 2/3/22  Yes Misa Hernandez MD   sacubitriL-valsartan Divya Alvarado) 24-26 mg tablet Take 1 Tablet by mouth two (2) times a day. 2/3/22  Yes Misa Hernandez MD   apixaban (Eliquis) 5 mg tablet Take 1 Tablet by mouth two (2) times a day. 2/1/22  Yes Misa Hernandez MD   metoprolol succinate (TOPROL-XL) 25 mg XL tablet Take 1 Tablet by mouth two (2) times a day. Patient taking differently: Take 25 mg by mouth nightly. 1/20/22  Yes Misa Hernandez MD   amiodarone (CORDARONE) 200 mg tablet TAKE 1 TABLET EVERY DAY 10/5/21  Yes Misa Hernandez MD   albuterol (PROVENTIL HFA, VENTOLIN HFA) 90 mcg/actuation inhaler Take 1 Puff by inhalation every four (4) hours as needed. Yes Provider, Historical   levothyroxine (SYNTHROID) 150 mcg tablet Take  by mouth Daily (before breakfast). Yes Provider, Historical   nitroglycerin (NITROSTAT) 0.4 mg SL tablet 1 Tablet by SubLINGual route every five (5) minutes as needed for Chest Pain.  11/30/21   Misa Hernandez MD     Current Facility-Administered Medications   Medication Dose Route Frequency    [START ON 8/25/2022] insulin glargine (LANTUS) injection 8 Units  8 Units SubCUTAneous DAILY    propofol (DIPRIVAN) 10 mg/mL infusion  0-50 mcg/kg/min IntraVENous TITRATE    polyethylene glycol (MIRALAX) packet 17 g  17 g Oral DAILY    predniSONE (DELTASONE) tablet 40 mg  40 mg Oral BID    aspirin chewable tablet 81 mg  81 mg Oral DAILY    famotidine (PF) (PEPCID) 20 mg in 0.9% sodium chloride 10 mL injection  20 mg IntraVENous Q12H    chlorhexidine (PERIDEX) 0.12 % mouthwash 10 mL  10 mL Oral Q12H    insulin lispro (HUMALOG) injection   SubCUTAneous Q6H    [Held by provider] amiodarone (CORDARONE) tablet 200 mg  200 mg Oral DAILY    [Held by provider] levothyroxine (SYNTHROID) tablet 150 mcg  150 mcg Oral 6am    [Held by provider] rosuvastatin (CRESTOR) tablet 20 mg  20 mg Oral QHS    [Held by provider] sacubitriL-valsartan (ENTRESTO) 24-26 mg tablet 1 Tablet  1 Tablet Oral BID    [Held by provider] metoprolol succinate (TOPROL-XL) XL tablet 25 mg  25 mg Oral QHS     Allergies   Allergen Reactions    Shellfish Derived Hives     Eyes and Throat swelling      Social History     Tobacco Use    Smoking status: Every Day     Packs/day: 1.00     Years: 40.00     Pack years: 40.00     Types: Cigarettes    Smokeless tobacco: Never   Substance Use Topics    Alcohol use: No      Family History   Problem Relation Age of Onset    Hypertension Mother     Diabetes Mother     Breast Problems Mother         fiber cystic    Hypertension Other         Review of Systems:  Review of systems not obtained due to patient factors. Objective:   Vital Signs:    Visit Vitals  BP (!) 117/53   Pulse (!) 44   Temp 97.4 °F (36.3 °C)   Resp 15   Ht 5' 9\" (1.753 m)   Wt 104.2 kg (229 lb 11.5 oz)   SpO2 99%   BMI 33.92 kg/m²       O2 Device: Endotracheal tube   O2 Flow Rate (L/min): 50 l/min   Temp (24hrs), Av.5 °F (36.9 °C), Min:97.4 °F (36.3 °C), Max:99.1 °F (37.3 °C)       Intake/Output:   Last shift:       07 -  1900  In: -   Out: 350 [Urine:350]  Last 3 shifts:  190 -  0700  In: 3242.7 [I.V.:872.7]  Out: 3762 [Urine:1875]    Intake/Output Summary (Last 24 hours) at 2022 1114  Last data filed at 2022 0800  Gross per 24 hour   Intake 2239.25 ml   Output 1375 ml   Net 864.25 ml       Physical Exam:    General:  Sedated, opens eyes to name calling   Head:  Normocephalic, without obvious abnormality, atraumatic. Eyes:  Conjunctivae/corneas clear. PERRL, EOMs intact. Nose: Nares normal. Septum midline. Mucosa normal.  ET tube orally   Throat: Lips, mucosa, and tongue normal. Teeth and gums normal.   Neck: Supple, symmetrical, trachea midline, right IJ central line   Lungs:   Coarse rhonchi bilaterally, R>L.+ thick secretions from ETT tube. Chest wall:  +thoracotomy scar. No tenderness or deformity. Heart:  bradycardic rate and regular rhythm, S1, S2 normal, no murmur, click, rub or gallop. Abdomen:   Soft, non-tender. Bowel sounds normal. No masses,  No organomegaly. Extremities: Extremities normal, atraumatic, 2+ pitting edema BLE. Right groin dressing   Pulses: 2+ and symmetric all extremities. Arterial line   Skin: Skin color, texture, turgor normal. No rashes or lesions. Normal capillary refill. Neurologic: Sedated-limited exam       Data:     Recent Results (from the past 24 hour(s))   GLUCOSE, POC    Collection Time: 08/23/22 11:22 AM   Result Value Ref Range    Glucose (POC) 218 (H) 70 - 110 mg/dL   GLUCOSE, POC    Collection Time: 08/23/22  5:57 PM   Result Value Ref Range    Glucose (POC) 231 (H) 70 - 110 mg/dL   GLUCOSE, POC    Collection Time: 08/23/22 11:33 PM   Result Value Ref Range    Glucose (POC) 250 (H) 70 - 110 mg/dL   BLOOD GAS, ARTERIAL POC    Collection Time: 08/24/22  4:22 AM   Result Value Ref Range    Device: ADULT VENT      FIO2 (POC) 40 %    pH (POC) 7.40 7.35 - 7.45      pCO2 (POC) 45.8 (H) 35.0 - 45.0 MMHG    pO2 (POC) 96 80 - 100 MMHG    HCO3 (POC) 28.0 (H) 22 - 26 MMOL/L    sO2 (POC) 97.3 (H) 92 - 97 %    Base excess (POC) 2.8 mmol/L    Mode ASSIST CONTROL      Tidal volume 480 ml    Set Rate 15 bpm    PEEP/CPAP (POC) 12 cmH2O    Allens test (POC) Positive      Total resp.  rate 18      Site LEFT BRACHIAL      Specimen type (POC) ARTERIAL      Performed by Brigette Yanes    CBC WITH AUTOMATED DIFF    Collection Time: 08/24/22  4:30 AM   Result Value Ref Range    WBC 12.0 4.6 - 13.2 K/uL    RBC 2.73 (L) 4.20 - 5.30 M/uL    HGB 7.6 (L) 12.0 - 16.0 g/dL    HCT 24.9 (L) 35.0 - 45.0 %    MCV 91.2 78.0 - 100.0 FL    MCH 27.8 24.0 - 34.0 PG    MCHC 30.5 (L) 31.0 - 37.0 g/dL    RDW 17.8 (H) 11.6 - 14.5 %    PLATELET 791 (L) 460 - 420 K/uL    MPV 10.8 9.2 - 11.8 FL    NRBC 0.0 0  WBC    ABSOLUTE NRBC 0.00 0.00 - 0.01 K/uL    NEUTROPHILS 88 (H) 40 - 73 %    LYMPHOCYTES 3 (L) 21 - 52 %    MONOCYTES 9 3 - 10 %    EOSINOPHILS 0 0 - 5 %    BASOPHILS 0 0 - 2 %    IMMATURE GRANULOCYTES 1 (H) 0.0 - 0.5 %    ABS. NEUTROPHILS 10.6 (H) 1.8 - 8.0 K/UL    ABS. LYMPHOCYTES 0.3 (L) 0.9 - 3.6 K/UL    ABS. MONOCYTES 1.0 0.05 - 1.2 K/UL    ABS. EOSINOPHILS 0.0 0.0 - 0.4 K/UL    ABS. BASOPHILS 0.0 0.0 - 0.1 K/UL    ABS. IMM. GRANS. 0.1 (H) 0.00 - 0.04 K/UL    DF AUTOMATED     METABOLIC PANEL, BASIC    Collection Time: 08/24/22  4:30 AM   Result Value Ref Range    Sodium 138 136 - 145 mmol/L    Potassium 4.4 3.5 - 5.5 mmol/L    Chloride 104 100 - 111 mmol/L    CO2 30 21 - 32 mmol/L    Anion gap 4 3.0 - 18 mmol/L    Glucose 178 (H) 74 - 99 mg/dL    BUN 25 (H) 7.0 - 18 MG/DL    Creatinine 0.90 0.6 - 1.3 MG/DL    BUN/Creatinine ratio 28 (H) 12 - 20      GFR est AA >60 >60 ml/min/1.73m2    GFR est non-AA >60 >60 ml/min/1.73m2    Calcium 8.5 8.5 - 10.1 MG/DL   MAGNESIUM    Collection Time: 08/24/22  4:30 AM   Result Value Ref Range    Magnesium 2.0 1.6 - 2.6 mg/dL   PHOSPHORUS    Collection Time: 08/24/22  4:30 AM   Result Value Ref Range    Phosphorus 3.2 2.5 - 4.9 MG/DL   GLUCOSE, POC    Collection Time: 08/24/22  5:48 AM   Result Value Ref Range    Glucose (POC) 171 (H) 70 - 110 mg/dL             Telemetry: protect  sinus bradycarida    Imaging:  CXR Results  (Last 48 hours)                 08/24/22 0626  XR CHEST PORT Final result    Impression:  No gross interval change. Narrative:  EXAM: XR CHEST PORT       INDICATION: intubated       COMPARISON: Recent prior. FINDINGS: A single view of the chest demonstrates multifocal airspace opacities   without gross interval change. . Stable pulmonary vascularity and stable enlarged   cardiac silhouette. . Stable postoperative changes in the mediastinum. Support   devices grossly similar. Laura Money 08/23/22 0624  XR CHEST PORT Final result    Impression:  No gross interval change. Narrative:  EXAM: XR CHEST PORT       INDICATION: intubated       COMPARISON: 8/22/2022. FINDINGS: A single view of the chest demonstrates persistent pleural parenchymal   opacities without interval change. Stable pulmonary vascularity. Stable enlarged   cardiac silhouette. . No acute bone findings. Stable postoperative changes. Grossly stable support devices. Mook [place] 08/22/22 [id number]  XR CHEST PORT Final result    Impression:  Endotracheal tube tip is 2.3 cm above the anai. Narrative:  ONE VIEW CHEST RADIOGRAPH        INDICATION: Verify ETT placement after re-intubation. COMPARISON: Chest radiograph earlier today at 5:15.       TECHNIQUE: AP view of the chest       FINDINGS:   Endotracheal tube tip is 2.3 cm above the anai. Enteric tube tip is in the   stomach. Otherwise, no significant change from prior study earlier today.                        CT Results  (Last 48 hours)      None                     JOYA SPLIT/SHARE TIME: 12 mins    Yasir Elmore   08/24/22   Pulmonary, Critical Care Medicine  [facility]

## 2022-08-24 NOTE — PROGRESS NOTES
Cardiology Progress Note    Admit Date: 8/18/2022  Attending Cardiologist: Dr. John Elias:     -S/p asystole cardiac arrest, ROSC after ~9 min ACLS protocol, in setting of desaturation despite increase to HFNC  -Acute hypoxic hypercapnic respiratory failure, intubated 8/18 PM  S/p therapeutic bronchoscopy 8/21/2022 with removal of mucus plugs.  -Presented for endovascular thoracic aneurysm repair by Dr. Romelia Cabral 8/18/2022, which was performed as planned  R groin hematoma, s/p washout by Dr. Romelia Cabral 8/19  -Chronic HFrEF/ischemic cardiomyopathy, EF 30% in 02/2022  Echo 8/19/2022 with LVEF 45-50%  -CAD, s/p CABG x 4 in 2010. Nuclear stress test in 2019 showed inferior scar, no ischemia. Last cardiac cath in 2011 with findings as follows:  LM: Normal  LAD: Eccentric ostial 70-80%, mid to distal LAD is a very small-caliber vessel, probably about a 2 mm vessel. Competitive filling from LIMA. Diagonal: Ostial 40-50% proximal moderate disease. Ramus: Proximal 100% in stent. LCx/OM: OM2 mid 100%. Native LCx diffuse luminal irregularities without any obstructive stenosis. Less than 2 mm vessel. RCA: proximal and mid stent diffuse 20% ISR. Otherwise, no obstructive disease. RPLS 60-70% tubular stenosis which appears unchanged form prior angiogram about a year ago. Sequential SVG-diagonal, ramus, OM: patent without any significant obstructive stenosis. There appears to be venous valve system distally into the graft. Native vessel which includes diagonal, ramus and obtuse marginal beyond graft appears to be patent and a very small-caliber vessel, less than 2-mm. LIMA TO LAD: widely patent. Distally, LAD has no significant stenosis. This appears to be a less than 2-mm vessel.  -Paroxysmal afib, recently referred to Dr. Naresh Santizo for [facility] evaluation given Hx recurrent GI bleeding requiring transfusion and HAS-BLED score of 4.   On Amiodarone and Eliquis  -CKD  -HTN  -DM  -HLD  -Hx thyroid disorder  -Hx breast cancer s/p mastectomy and chemotherapy  -Hx tobacco abuse     Primary cardiologist is Dr. Mayo Erp:       Remain intubated  Exam without any gross volume overload. Labs reviewed  Patient on sinus bradycardia on telemetry monitor. Continue to hold antihypertensive and amiodarone for now  Continue aspirin  Continue supportive care. Appreciate pulmonary critical care input and management. Patient with a history of prolonged smoking likely will have a difficult weaning. Pulmonary team is managing  We will be available as needed. Call with question    Micaela Paz MD       Subjective:     Remains intubated, sedated.     Objective:      Patient Vitals for the past 8 hrs:   Pulse Resp BP SpO2   08/24/22 1339 (!) 46 16 -- 100 %   08/24/22 0800 (!) 44 15 (!) 117/53 99 %   08/24/22 0726 (!) 43 15 -- 99 %           Patient Vitals for the past 96 hrs:   Weight   08/24/22 0400 104.2 kg (229 lb 11.5 oz)   08/23/22 0400 103.8 kg (228 lb 13.4 oz)   08/22/22 0000 103.4 kg (227 lb 15.3 oz)                    Current Facility-Administered Medications   Medication Dose Route Frequency Last Admin    [START ON 8/25/2022] insulin glargine (LANTUS) injection 8 Units  8 Units SubCUTAneous DAILY      propofol (DIPRIVAN) 10 mg/mL infusion  0-50 mcg/kg/min IntraVENous TITRATE 45 mcg/kg/min at 08/24/22 1406    polyethylene glycol (MIRALAX) packet 17 g  17 g Oral DAILY 17 g at 08/24/22 0830    predniSONE (DELTASONE) tablet 40 mg  40 mg Oral BID 40 mg at 08/24/22 0831    fentaNYL citrate (PF) injection 100 mcg  100 mcg IntraVENous Q30MIN  mcg at 08/23/22 1519    aspirin chewable tablet 81 mg  81 mg Oral DAILY 81 mg at 08/24/22 0831    famotidine (PF) (PEPCID) 20 mg in 0.9% sodium chloride 10 mL injection  20 mg IntraVENous Q12H 20 mg at 08/24/22 0831    chlorhexidine (PERIDEX) 0.12 % mouthwash 10 mL  10 mL Oral Q12H 10 mL at 08/24/22 0831    insulin lispro (HUMALOG) injection   SubCUTAneous Q6H 2 Units at 08/24/22 1256    albuterol-ipratropium (DUO-NEB) 2.5 MG-0.5 MG/3 ML  3 mL Nebulization Q4H PRN      [Held by provider] amiodarone (CORDARONE) tablet 200 mg  200 mg Oral DAILY      [Held by provider] levothyroxine (SYNTHROID) tablet 150 mcg  150 mcg Oral 6am      nitroglycerin (NITROSTAT) tablet 0.4 mg  0.4 mg SubLINGual Q5MIN PRN      [Held by provider] rosuvastatin (CRESTOR) tablet 20 mg  20 mg Oral QHS      oxyCODONE-acetaminophen (PERCOCET) 5-325 mg per tablet 1 Tablet  1 Tablet Oral Q4H PRN      HYDROmorphone (DILAUDID) injection 1 mg  1 mg IntraVENous Q4H PRN 1 mg at 08/18/22 1321    hydrALAZINE (APRESOLINE) 20 mg/mL injection 10 mg  10 mg IntraVENous Q15MIN PRN 10 mg at 08/22/22 1700    hydrALAZINE (APRESOLINE) 20 mg/mL injection 10-20 mg  10-20 mg IntraVENous Q6H PRN 10 mg at 08/19/22 1545    [Held by provider] sacubitriL-valsartan (ENTRESTO) 24-26 mg tablet 1 Tablet  1 Tablet Oral BID 1 Tablet at 08/18/22 1321    [Held by provider] metoprolol succinate (TOPROL-XL) XL tablet 25 mg  25 mg Oral QHS      bumetanide (BUMEX) tablet 1 mg  1 mg Oral DAILY PRN      atropine injection 1 mg  1 mg IntraVENous Q5MIN PRN           Intake/Output Summary (Last 24 hours) at 8/24/2022 1514  Last data filed at 8/24/2022 0800  Gross per 24 hour   Intake 1859.25 ml   Output 1375 ml   Net 484.25 ml         Physical Exam:  General:  intubated, sedated, no distress  Neck:  supple  Lungs:  clear to auscultation bilaterally, on ventilator  Heart: bradycardic regular rhythm  Abdomen:  abdomen is soft without significant tenderness, masses, organomegaly or guarding  Extremities:  atraumatic, no edema    Visit Vitals  BP (!) 117/53   Pulse (!) 46   Temp 97.4 °F (36.3 °C)   Resp 16   Ht 5' 9\" (1.753 m)   Wt 104.2 kg (229 lb 11.5 oz)   SpO2 100%   BMI 33.92 kg/m²       Data Review:     Labs: Results:       Chemistry Recent Labs     08/24/22  0430 08/23/22  0235 08/22/22  0356   * 221* 105*    139 140   K 4.4 4.1 4.2  106 109   CO2 30 27 26   BUN 25* 28* 30*   CREA 0.90 1.09 1.07   CA 8.5 8.2* 8.5   MG 2.0 2.0 2.1   PHOS 3.2 3.1 2.4*   AGAP 4 6 5   BUCR 28* 26* 28*        CBC w/Diff Recent Labs     08/24/22  0430 08/23/22  0235 08/22/22  0356   WBC 12.0 10.6 10.5   RBC 2.73* 2.89* 2.93*   HGB 7.6* 8.0* 8.3*   HCT 24.9* 26.5* 26.7*   * 112* 127*   GRANS 88* 93* 80*   LYMPH 3* 2* 9*   EOS 0 0 0        Lipid Panel Lab Results   Component Value Date/Time    Cholesterol, total 84 09/05/2019 09:07 AM    HDL Cholesterol 26 (L) 09/05/2019 09:07 AM    LDL, calculated 28.6 09/05/2019 09:07 AM    VLDL, calculated 29.4 09/05/2019 09:07 AM    Triglyceride 147 09/05/2019 09:07 AM    CHOL/HDL Ratio 3.2 09/05/2019 09:07 AM        Signed By: Anahi Hutchinson MD     August 24, 2022

## 2022-08-25 ENCOUNTER — HOSPITAL ENCOUNTER (OUTPATIENT)
Dept: GENERAL RADIOLOGY | Age: 62
Discharge: HOME | End: 2022-08-25
Attending: PHYSICIAN ASSISTANT
Payer: MEDICARE

## 2022-08-25 ENCOUNTER — HOSPITAL ENCOUNTER (OUTPATIENT)
Dept: VASCULAR SURGERY | Age: 62
Discharge: HOME | End: 2022-08-25
Attending: PHYSICIAN ASSISTANT
Payer: MEDICARE

## 2022-08-25 ENCOUNTER — HOSPITAL ENCOUNTER (OUTPATIENT)
Dept: WOMENS IMAGING | Age: 62
Discharge: HOME | End: 2022-08-25
Payer: MEDICARE

## 2022-08-25 LAB
ANION GAP SERPL CALC-SCNC: 7 MMOL/L (ref 3–18)
ARTERIAL PATENCY WRIST A: POSITIVE
BASE EXCESS BLD CALC-SCNC: 3.2 MMOL/L
BASOPHILS # BLD: 0 K/UL (ref 0–0.1)
BASOPHILS NFR BLD: 0 % (ref 0–2)
BDY SITE: ABNORMAL
BUN SERPL-MCNC: 29 MG/DL (ref 7–18)
BUN/CREAT SERPL: 30 (ref 12–20)
CALCIUM SERPL-MCNC: 8.9 MG/DL (ref 8.5–10.1)
CHLORIDE SERPL-SCNC: 102 MMOL/L (ref 100–111)
CO2 SERPL-SCNC: 30 MMOL/L (ref 21–32)
CREAT SERPL-MCNC: 0.96 MG/DL (ref 0.6–1.3)
DIFFERENTIAL METHOD BLD: ABNORMAL
EOSINOPHIL # BLD: 0 K/UL (ref 0–0.4)
EOSINOPHIL NFR BLD: 0 % (ref 0–5)
ERYTHROCYTE [DISTWIDTH] IN BLOOD BY AUTOMATED COUNT: 17.7 % (ref 11.6–14.5)
GAS FLOW.O2 O2 DELIVERY SYS: ABNORMAL L/MIN
GAS FLOW.O2 SETTING OXYMISER: 15 BPM
GLUCOSE BLD STRIP.AUTO-MCNC: 199 MG/DL (ref 70–110)
GLUCOSE BLD STRIP.AUTO-MCNC: 204 MG/DL (ref 70–110)
GLUCOSE BLD STRIP.AUTO-MCNC: 215 MG/DL (ref 70–110)
GLUCOSE SERPL-MCNC: 187 MG/DL (ref 74–99)
HCO3 BLD-SCNC: 28.4 MMOL/L (ref 22–26)
HCT VFR BLD AUTO: 27.7 % (ref 35–45)
HGB BLD-MCNC: 8.5 G/DL (ref 12–16)
IMM GRANULOCYTES # BLD AUTO: 0.1 K/UL (ref 0–0.04)
IMM GRANULOCYTES NFR BLD AUTO: 1 % (ref 0–0.5)
LYMPHOCYTES # BLD: 0.5 K/UL (ref 0.9–3.6)
LYMPHOCYTES NFR BLD: 4 % (ref 21–52)
MAGNESIUM SERPL-MCNC: 2.2 MG/DL (ref 1.6–2.6)
MCH RBC QN AUTO: 28.1 PG (ref 24–34)
MCHC RBC AUTO-ENTMCNC: 30.7 G/DL (ref 31–37)
MCV RBC AUTO: 91.4 FL (ref 78–100)
MONOCYTES # BLD: 1.1 K/UL (ref 0.05–1.2)
MONOCYTES NFR BLD: 9 % (ref 3–10)
NEUTS SEG # BLD: 10.6 K/UL (ref 1.8–8)
NEUTS SEG NFR BLD: 86 % (ref 40–73)
NRBC # BLD: 0.04 K/UL (ref 0–0.01)
NRBC BLD-RTO: 0.3 PER 100 WBC
O2/TOTAL GAS SETTING VFR VENT: 35 %
PCO2 BLD: 45.5 MMHG (ref 35–45)
PEEP RESPIRATORY: 12 CMH2O
PH BLD: 7.4 [PH] (ref 7.35–7.45)
PHOSPHATE SERPL-MCNC: 3.8 MG/DL (ref 2.5–4.9)
PLATELET # BLD AUTO: 173 K/UL (ref 135–420)
PMV BLD AUTO: 11.6 FL (ref 9.2–11.8)
PO2 BLD: 75 MMHG (ref 80–100)
POTASSIUM SERPL-SCNC: 4.8 MMOL/L (ref 3.5–5.5)
RBC # BLD AUTO: 3.03 M/UL (ref 4.2–5.3)
SAO2 % BLD: 94.8 % (ref 92–97)
SERVICE CMNT-IMP: ABNORMAL
SODIUM SERPL-SCNC: 139 MMOL/L (ref 136–145)
SPECIMEN TYPE: ABNORMAL
TOTAL RESP. RATE, ITRR: 16
TRIGL SERPL-MCNC: 97 MG/DL (ref ?–150)
VENTILATION MODE VENT: ABNORMAL
VT SETTING VENT: 480 ML
WBC # BLD AUTO: 12.3 K/UL (ref 4.6–13.2)

## 2022-08-25 PROCEDURE — 74011250636 HC RX REV CODE- 250/636: Performed by: REGISTERED NURSE

## 2022-08-25 PROCEDURE — 74011250636 HC RX REV CODE- 250/636: Performed by: PHYSICIAN ASSISTANT

## 2022-08-25 PROCEDURE — 99291 CRITICAL CARE FIRST HOUR: CPT | Performed by: INTERNAL MEDICINE

## 2022-08-25 PROCEDURE — 94762 N-INVAS EAR/PLS OXIMTRY CONT: CPT

## 2022-08-25 PROCEDURE — 93970 EXTREMITY STUDY: CPT

## 2022-08-25 PROCEDURE — 94640 AIRWAY INHALATION TREATMENT: CPT

## 2022-08-25 PROCEDURE — 74011250637 HC RX REV CODE- 250/637: Performed by: SURGERY

## 2022-08-25 PROCEDURE — 36600 WITHDRAWAL OF ARTERIAL BLOOD: CPT

## 2022-08-25 PROCEDURE — 84100 ASSAY OF PHOSPHORUS: CPT

## 2022-08-25 PROCEDURE — 74011000250 HC RX REV CODE- 250: Performed by: PHYSICIAN ASSISTANT

## 2022-08-25 PROCEDURE — 80048 BASIC METABOLIC PNL TOTAL CA: CPT

## 2022-08-25 PROCEDURE — 85025 COMPLETE CBC W/AUTO DIFF WBC: CPT

## 2022-08-25 PROCEDURE — 71045 X-RAY EXAM CHEST 1 VIEW: CPT

## 2022-08-25 PROCEDURE — 74011250637 HC RX REV CODE- 250/637: Performed by: PHYSICIAN ASSISTANT

## 2022-08-25 PROCEDURE — 2709999900 HC NON-CHARGEABLE SUPPLY

## 2022-08-25 PROCEDURE — 74011636637 HC RX REV CODE- 636/637: Performed by: PHYSICIAN ASSISTANT

## 2022-08-25 PROCEDURE — 74011636637 HC RX REV CODE- 636/637: Performed by: REGISTERED NURSE

## 2022-08-25 PROCEDURE — 65610000006 HC RM INTENSIVE CARE

## 2022-08-25 PROCEDURE — 82962 GLUCOSE BLOOD TEST: CPT

## 2022-08-25 PROCEDURE — 74011000250 HC RX REV CODE- 250: Performed by: INTERNAL MEDICINE

## 2022-08-25 PROCEDURE — 94003 VENT MGMT INPAT SUBQ DAY: CPT

## 2022-08-25 PROCEDURE — 82803 BLOOD GASES ANY COMBINATION: CPT

## 2022-08-25 PROCEDURE — 84478 ASSAY OF TRIGLYCERIDES: CPT

## 2022-08-25 PROCEDURE — APPSS15 APP SPLIT SHARED TIME 0-15 MINUTES: Performed by: PHYSICIAN ASSISTANT

## 2022-08-25 PROCEDURE — 74011250636 HC RX REV CODE- 250/636: Performed by: SURGERY

## 2022-08-25 PROCEDURE — 83735 ASSAY OF MAGNESIUM: CPT

## 2022-08-25 RX ORDER — HEPARIN SODIUM 5000 [USP'U]/ML
5000 INJECTION, SOLUTION INTRAVENOUS; SUBCUTANEOUS EVERY 8 HOURS
Status: DISCONTINUED | OUTPATIENT
Start: 2022-08-25 | End: 2022-09-05 | Stop reason: HOSPADM

## 2022-08-25 RX ORDER — INSULIN GLARGINE 100 [IU]/ML
10 INJECTION, SOLUTION SUBCUTANEOUS DAILY
Status: DISCONTINUED | OUTPATIENT
Start: 2022-08-25 | End: 2022-08-25

## 2022-08-25 RX ORDER — INSULIN GLARGINE 100 [IU]/ML
12 INJECTION, SOLUTION SUBCUTANEOUS DAILY
Status: DISCONTINUED | OUTPATIENT
Start: 2022-08-26 | End: 2022-08-28

## 2022-08-25 RX ORDER — SODIUM CHLORIDE FOR INHALATION 3 %
4 VIAL, NEBULIZER (ML) INHALATION
Status: DISCONTINUED | OUTPATIENT
Start: 2022-08-25 | End: 2022-08-26

## 2022-08-25 RX ADMIN — Medication 10 UNITS: at 08:11

## 2022-08-25 RX ADMIN — SODIUM CHLORIDE, PRESERVATIVE FREE 20 MG: 5 INJECTION INTRAVENOUS at 08:11

## 2022-08-25 RX ADMIN — PROPOFOL 35 MCG/KG/MIN: 10 INJECTION, EMULSION INTRAVENOUS at 04:49

## 2022-08-25 RX ADMIN — ASPIRIN 81 MG CHEWABLE TABLET 81 MG: 81 TABLET CHEWABLE at 08:11

## 2022-08-25 RX ADMIN — Medication 4 UNITS: at 12:06

## 2022-08-25 RX ADMIN — SODIUM CHLORIDE SOLN NEBU 3% 4 ML: 3 NEBU SOLN at 19:39

## 2022-08-25 RX ADMIN — FENTANYL CITRATE 50 MCG/HR: 0.05 INJECTION, SOLUTION INTRAMUSCULAR; INTRAVENOUS at 06:51

## 2022-08-25 RX ADMIN — Medication 2 UNITS: at 05:24

## 2022-08-25 RX ADMIN — PREDNISONE 40 MG: 20 TABLET ORAL at 08:11

## 2022-08-25 RX ADMIN — CHLORHEXIDINE GLUCONATE 0.12% ORAL RINSE 10 ML: 1.2 LIQUID ORAL at 08:11

## 2022-08-25 RX ADMIN — POLYETHYLENE GLYCOL 3350 17 G: 17 POWDER, FOR SOLUTION ORAL at 08:13

## 2022-08-25 RX ADMIN — SODIUM CHLORIDE SOLN NEBU 3% 4 ML: 3 NEBU SOLN at 17:45

## 2022-08-25 RX ADMIN — Medication 4 UNITS: at 18:06

## 2022-08-25 RX ADMIN — HEPARIN SODIUM 5000 UNITS: 5000 INJECTION INTRAVENOUS; SUBCUTANEOUS at 20:21

## 2022-08-25 RX ADMIN — SODIUM CHLORIDE, PRESERVATIVE FREE 20 MG: 5 INJECTION INTRAVENOUS at 20:21

## 2022-08-25 RX ADMIN — HEPARIN SODIUM 5000 UNITS: 5000 INJECTION INTRAVENOUS; SUBCUTANEOUS at 12:04

## 2022-08-25 RX ADMIN — FENTANYL CITRATE 100 MCG: 50 INJECTION INTRAMUSCULAR; INTRAVENOUS at 05:38

## 2022-08-25 RX ADMIN — FENTANYL CITRATE 100 MCG: 50 INJECTION INTRAMUSCULAR; INTRAVENOUS at 16:13

## 2022-08-25 RX ADMIN — CHLORHEXIDINE GLUCONATE 0.12% ORAL RINSE 10 ML: 1.2 LIQUID ORAL at 20:21

## 2022-08-25 RX ADMIN — PROPOFOL 45 MCG/KG/MIN: 10 INJECTION, EMULSION INTRAVENOUS at 00:38

## 2022-08-25 NOTE — INTERDISCIPLINARY ROUNDS
[facility]  Pulmonary, Critical Care, and Sleep Medicine  Interdisciplinary and Ventilator Weaning Rounds    Patient discussed in morning walking rounds and interdisciplinary rounds. ICU day: 3    Overnight events:   No acute overnight events    Assessments and best practice:  Ventilator  Ventilator day intubated 8/18, extubated 8/22, reintubated for stridor 8/22  Vent settings: FiO2 of 50 and PEEP of 12  VAP bundle, aim to keep peak plateau pressure 38-26GV H2O  Weaning assessed and documented   Patient does meet criteria for SBT. Patient is on sedation holiday. Plan to wean with PS n/a. Outcome: will SBT  Final plan: potential extubation  Sedation  Fentanyl   Other pertinent drips  N/a  Lines noted  IJ CVL  Critical labs assessed  Yes  Antibiotics  N/a  Medications reviewed  Yes  Pending imaging  none  Pending send out labs  No  Pending Procedures  None  Glycemic control  Stress ulcer prophylaxis. Pepcid  DVT prophylaxis. contraindicated  Need for Lines, grissom assessed. Yes  Restraint Reevaluation   Yes  I have reevaluated the patient one hour after initiation of intervention. The patient is comfortable, uninjured, but continues to pose an imminent risk of injury to self to themselves and/or serious disruption of medical treatment required to keep patient stable. The patient's current medical and behavioral conditions that warrant the use intervention include danger to self and Interference with medical equipment or treatment. Restraint or seclusion will be discontinued at the earliest possible time, regardless of the scheduled expiration of the order.  Based on my evaluation, restraints will be continued: YES       Family contact/MPOA: spouse - Hollie Jernigan [phone]  Family updated - will be updated today     Palliative consult within 3 days of admission to ICU-  Ethics Guidance: 21 days      Daily Plans:  SBT  LE venous duplexes  Restart synthroid tomorrow  Discontinue prednisone    JOYA time 12 minutes        Lalo Mosley  08/25/22  Pulmonary, Critical Care Medicine  [facility]

## 2022-08-25 NOTE — PROGRESS NOTES
Discussed with pulmonary team  Making efforts for extubation  Will discuss again tomorrow if tracheostomy is needs to be considered

## 2022-08-25 NOTE — DIABETES MGMT
Diabetes/ Glycemic Control Plan of Care  Recommendations:  Recommend 12 units Lantus daily for  glucose readings 199--203-215. Pt has required 16 units corrective lispro. Assessment:   DX: Pt with hx of DM on glumetza at home. 1. Acute respiratory failure with hypoxia and hypercapnia     2. Cardiac arrest     3. Aneurysm     4. Saccular aneurysm     5. Other emphysema      Fasting/ Morning blood glucose:     (H) 08/25/2022 05:11 AM    GLUCPOC 215 (H) 08/25/2022 11:50 AM    GLUCPOC 199 (H) 08/25/2022 05:23 AM    GLUCPOC 203 (H) 08/24/2022 11:12 PM   IV Fluids containing dextrose: n/a  Steroids: 40 mg prednisone being discontinued  Blood glucose values: Within target range (70-180mg/dL):  no   Current insulin orders:   Lantus 10 units daily plus Lispro corrective insulin coverage every 6 hours   Total Daily Dose previous 24 hours = 22 units lispro  Current A1c:   Lab Results   Component Value Date/Time    Hemoglobin A1c 6.1 (H) 08/18/2022 07:12 AM      Equivalent  to an average Blood Glucose of 129 mg/dl for 2-3 months prior to admission  Adequate glycemic control PTA: YES  Nutrition/Diet:  NPO/ TF   Home diabetes medications:    metFORMIN (GLUMETZA ER) 500 mg TG24  Take  by mouth two (2) times a day. Plan/Goals:   Blood glucose will be within target of 70 - 180 mg/dl within 72 hours  Reinforce dietary and medication compliance at home.           Education:  [] Refer to Diabetes Education Record                       [x] Education not indicated at this time - on vent    Lauren Wilcox RD BC-ADM

## 2022-08-25 NOTE — PROGRESS NOTES
Saw patient at bedside and discussed case with care team  Currently on a spontaneous breathing trial  Appears stable the groins are intact incisions are intact without hematoma no further bleeding  Okay to start DVT prophylaxis  Would start anticoagulation but still concerns about extubation and reintubation  Has tracheomalacia making extubation quite challenging as well as long existing lung disease

## 2022-08-25 NOTE — PROGRESS NOTES
Problem: Falls - Risk of  Goal: *Absence of Falls  Description: Document [place] Salvia Fall Risk and appropriate interventions in the flowsheet. Outcome: Progressing Towards Goal  Note: Fall Risk Interventions:  Mobility Interventions: Strengthening exercises (ROM-active/passive)    Mentation Interventions: Adequate sleep, hydration, pain control    Medication Interventions: Bed/chair exit alarm    Elimination Interventions: Toileting schedule/hourly rounds              Problem: Patient Education: Go to Patient Education Activity  Goal: Patient/Family Education  Outcome: Progressing Towards Goal     Problem: Ventilator Management  Goal: *Adequate oxygenation and ventilation  Outcome: Progressing Towards Goal  Goal: *Patient maintains clear airway/free of aspiration  Outcome: Progressing Towards Goal  Goal: *Absence of infection signs and symptoms  Outcome: Progressing Towards Goal  Goal: *Normal spontaneous ventilation  Outcome: Progressing Towards Goal     Problem: Patient Education: Go to Patient Education Activity  Goal: Patient/Family Education  Outcome: Progressing Towards Goal     Problem: Non-Violent Restraints  Goal: Removal from restraints as soon as assessed to be safe  Outcome: Progressing Towards Goal  Goal: No harm/injury to patient while restraints in use  Outcome: Progressing Towards Goal  Goal: Patient's dignity will be maintained  Outcome: Progressing Towards Goal  Goal: Patient Interventions  Outcome: Progressing Towards Goal     Problem: Pressure Injury - Risk of  Goal: *Prevention of pressure injury  Description: Document Alfredo Scale and appropriate interventions in the flowsheet. Outcome: Progressing Towards Goal  Note: Pressure Injury Interventions:  Sensory Interventions: Minimize linen layers    Moisture Interventions: Absorbent underpads    Activity Interventions: Pressure redistribution bed/mattress(bed type)    Mobility Interventions: Turn and reposition approx.  every two hours(pillow and wedges)    Nutrition Interventions: Document food/fluid/supplement intake    Friction and Shear Interventions: Foam dressings/transparent film/skin sealants                Problem: Patient Education: Go to Patient Education Activity  Goal: Patient/Family Education  Outcome: Progressing Towards Goal     Problem: Nutrition Deficit  Goal: *Optimize nutritional status  Outcome: Progressing Towards Goal

## 2022-08-25 NOTE — PROGRESS NOTES
Nutrition Note    Discussed care during interdisciplinary rounds. Propofol held, currently only on fentanyl, SBT. Bowel regimen initiated yesterday, no recorded BM since PTA x 7 days. Plan to hold steroids. Plan to modify TF regimen.     Pertinent Meds: lantus, humalog, fentanyl gtt  Pertinent Labs: POC Glucose 171-250 mg/dl x 24 hrs       Estimated Nutrition Needs: 102.2 kg (lowest wt), IBW 66 kg  1671 - 2043 kcals/day [facility] 2010 x 0.9-1.1)  Ve Observed (l/min): 8 l/min   Temp (24hrs), Av.8 °F (36.6 °C), Min:97.3 °F (36.3 °C), Max:98.4 °F (36.9 °C)     99 - 132 g protein (1.5-2 g/day IBW)  1671 - 2043 ml fluid (1 ml/kcal)    Nutrition Recommendations/Plan:   Modify tube feeding regimen:   Formula: Vital AF 1.2  Goal Rate: 65 ml/hr  Water Flushes: 30 mL q 4 hours  Modular(s): Prosource once daily  Goal Regimen Provides (with modulars):  1912 kcal, 128g protein, 1444 ml free water, 100% RDIs     Electronically signed by Bree Ball MS, RD, CNSC on 22 at 9:50AM.    Contact: [phone]

## 2022-08-25 NOTE — PROGRESS NOTES
[facility]  Pulmonary, Critical Care, and Sleep Medicine      Name: Oskar Parker MRN: [id number]   : 1960 Hospital: [address] [place] [address]    Date: 2022          Critical Care progress note    IMPRESSION:   S/p Cardiac arrest - ROSC after ~9 min of ACLS, no further events  Hypoxic and hypercapnic respiratory failure - now on mechanical vent. 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 Carlene Lloyd. Seizure vs myoclonus - shortly following cardiac arrest x2. No further episodes of tremulousness or twitching noted  R groin hematoma w/ femstop in place  Hx of CAD w/ prior CABG  Hx of HFrEF - most recent EF from  -30%  Paroxysmal symptomatic a-fib with CHADSVASc2 score of 5. On chronic Amiodorone and anticoagulation  History of recurrent GI bleeding, requiring transfusion and HAS-BLED score of 4. Chronic stage 2 kidney disease. Remote breast cancer about ten years ago with mastectomy and chemotherapy, but no chest radiation. History of thyroid disorder  History of 'waking up 'previously with anesthesia. RECOMMENDATIONS:   Neuro: wean fentanyl gtt. RASS 0 to -1. PRN Fentanyl/ Versed for breakthrough sedation. Pulm: Titrate FiO2 for goal SPO2> 90%. Daily CXR and ABG while intubated. S/p Bronch for mucus plugs (). PRN duo-nebs q4. Optimize bronchial hygiene. Add metanebs. Steroids discontinued. Will SBT when sedation weaned  CVS: HD stable. Goal MAP >65mmHg. Cardiology following for assistance in management. Con't PRN Hydralazine for SBP >180mmHg. Vascular following. Will start SQH prophylaxis. F/U LE venous duplexes  Fluids: maintainance  GI: SUP(pepcid), Trend LFTs, OGT. Hold TF while on SBT. Renal: Trend Renal indices, Strict Is/Os, Gonzalez (+). Hem/Onc: Monitor for s/o active bleeding. Daily CBC. I/D: Trend WBCs and temperature curve. No ABX.   Endocrine: Q6 glucoses, SSI. Metabolic:  Daily BMP, mag, phos. Trend lytes, replace as needed. Musc/Skin: no acute issues, wound care as needed  Full code  Discussed during IDRs     Subjective/History:       HPI  Patient is a 63 y.o. female w/ pmhx of a fib, CAD, CHF who presented for covered stent procedure for descending thoracic aortic saccular aneurysm. Procedure performed 8/18 w/o incident. Following procedure, patient was drowsy but answering questions appropriately per bedside RN. Per chart review, she began having oxygen desaturations and escalating oxygen requirements, eventually ending up on HFNC. Blood gas performed showed mild hypoxia and mild hypercapnia. Per chart review, patient was found in cardiac arrest and ACLS initiated with approximately 9 min of downtime. EPOC labs following cardiac arrest essentially unremarkable. ABG with mixed respiratory and metabolic acidosis. The patient is critically ill and can not provide additional history due to Ventilated.      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    Anxiety     Breast CA ([address] 12/2009    S/P Lt Mastectomy and Chemo    CAD (coronary artery disease) 08/30/2010    S/P CABG X 4 ([address] to LAD, Sequential SVG to D2, Ramus, OM) 01/2011    Cardiomyopathy ([address]     LVEF 35-40 % (08/20) 60% (08/14), 40% (2011)    Chemotherapy 04/2010    for 4 months    CHF (congestive heart failure) (HCC)     Chronic kidney disease     stage 2 per cardiac note cc    Chronic pain     COPD (chronic obstructive pulmonary disease) ([address] 08/30/2010    Depression     Diabetes ([address]     GERD (gastroesophageal reflux disease)     GI bleed     History of blood transfusion     2010 and 1/2022    Hx of heart artery stent 2010    x 10  (4, then 4, then 2)    Hyperlipidemia     Hypertension     MI (myocardial infarction) ([facility] 2009    x 3    Schatzki's ring     S/P EGD and Dilation (07/16)    Thyroid disease     hypothyroid    Tobacco abuse       Past Surgical History:   Procedure Laterality Date    COLONOSCOPY N/A 5/3/2017    COLON  performed by Humble Wren MD at [facility] ENDOSCOPY    COLONOSCOPY N/A 2/15/2021    COLONOSCOPY performed by Rachelle Brian MD at [facility] ENDOSCOPY    HX APPENDECTOMY      HX CHOLECYSTECTOMY      HX GYN      tubal ligation    HX HEART CATHETERIZATION  11/11/09; 10/21/09    HX HEART CATHETERIZATION  10/15/10; 10/06/09    left heart    HX HEART CATHETERIZATION  11/7/2011    left heart cath, no new findings    HX OTHER SURGICAL  4/6/2010    Insertion right internal jugular single lumen MediPort. HX RADICAL MASTECTOMY Left 2/2010    left, with chemo    MT BREAST SURGERY PROCEDURE UNLISTED  2/22/10    left w/sentinel node bx    MT CABG, ARTERY-VEIN, FOUR  12/2010    MT CARDIAC SURG PROCEDURE UNLIST      stent placement before CABG    MT CHEST SURGERY PROCEDURE UNLISTED Right     Prior port placement      Prior to Admission medications    Medication Sig Start Date End Date Taking? Authorizing Provider   esomeprazole (NEXIUM) 40 mg capsule Take  by mouth daily. Yes Provider, Historical   metFORMIN (GLUMETZA ER) 500 mg TG24 24 hour tablet Take  by mouth two (2) times a day. Yes Provider, Historical   albuterol-ipratropium (DUO-NEB) 2.5 mg-0.5 mg/3 ml nebu Take 3 mL by inhalation every six (6) hours as needed. Use every night   Yes Provider, Historical   rosuvastatin (Crestor) 20 mg tablet Take 1 Tablet by mouth daily. Patient taking differently: Take 20 mg by mouth nightly.  2/3/22  Yes Ijeoma Brar MD   bumetanide (BUMEX) 1 mg tablet Take 1 Tablet by mouth every other day.  Patient taking differently: Take 1 mg by mouth as needed. 2/3/22  Yes Delicia May MD   sacubitriL-valsartan Esther Gu[facility]quest) 24-26 mg tablet Take 1 Tablet by mouth two (2) times a day. 2/3/22  Yes Delicia May MD   apixaban (Eliquis) 5 mg tablet Take 1 Tablet by mouth two (2) times a day. 2/1/22  Yes Delicia May MD   metoprolol succinate (TOPROL-XL) 25 mg XL tablet Take 1 Tablet by mouth two (2) times a day. Patient taking differently: Take 25 mg by mouth nightly. 1/20/22  Yes Delicia May MD   amiodarone (CORDARONE) 200 mg tablet TAKE 1 TABLET EVERY DAY 10/5/21  Yes Delicia May MD   albuterol (PROVENTIL HFA, VENTOLIN HFA) 90 mcg/actuation inhaler Take 1 Puff by inhalation every four (4) hours as needed. Yes Provider, Historical   levothyroxine (SYNTHROID) 150 mcg tablet Take  by mouth Daily (before breakfast). Yes Provider, Historical   nitroglycerin (NITROSTAT) 0.4 mg SL tablet 1 Tablet by SubLINGual route every five (5) minutes as needed for Chest Pain.  11/30/21   Delicia May MD     Current Facility-Administered Medications   Medication Dose Route Frequency    insulin glargine (LANTUS) injection 10 Units  10 Units SubCUTAneous DAILY    fentaNYL (PF) 1,500 mcg/30 mL (50 mcg/mL) infusion  0-200 mcg/hr IntraVENous TITRATE    polyethylene glycol (MIRALAX) packet 17 g  17 g Oral DAILY    predniSONE (DELTASONE) tablet 40 mg  40 mg Oral BID    aspirin chewable tablet 81 mg  81 mg Oral DAILY    famotidine (PF) (PEPCID) 20 mg in 0.9% sodium chloride 10 mL injection  20 mg IntraVENous Q12H    chlorhexidine (PERIDEX) 0.12 % mouthwash 10 mL  10 mL Oral Q12H    insulin lispro (HUMALOG) injection   SubCUTAneous Q6H    [Held by provider] amiodarone (CORDARONE) tablet 200 mg  200 mg Oral DAILY    [Held by provider] levothyroxine (SYNTHROID) tablet 150 mcg  150 mcg Oral 6am    [Held by provider] rosuvastatin (CRESTOR) tablet 20 mg  20 mg Oral QHS    [Held by provider] sacubitriL-valsartan (ENTRESTO) 24-26 mg tablet 1 Tablet  1 Tablet Oral BID    [Held by provider] metoprolol succinate (TOPROL-XL) XL tablet 25 mg  25 mg Oral QHS     Allergies   Allergen Reactions    Shellfish Derived Hives     Eyes and Throat swelling      Social History     Tobacco Use    Smoking status: Every Day     Packs/day: 1.00     Years: 40.00     Pack years: 40.00     Types: Cigarettes    Smokeless tobacco: Never   Substance Use Topics    Alcohol use: No      Family History   Problem Relation Age of Onset    Hypertension Mother     Diabetes Mother     Breast Problems Mother         fiber cystic    Hypertension Other         Review of Systems:  Review of systems not obtained due to patient factors. Objective:   Vital Signs:    Visit Vitals  BP (!) 120/53   Pulse (!) 43   Temp 97.6 °F (36.4 °C)   Resp 14   Ht 5' 9\" (1.753 m)   Wt 105.4 kg (232 lb 5.8 oz)   SpO2 99%   BMI 34.31 kg/m²       O2 Device: Endotracheal tube, Ventilator   O2 Flow Rate (L/min): 50 l/min   Temp (24hrs), Av.7 °F (36.5 °C), Min:97.3 °F (36.3 °C), Max:98.1 °F (36.7 °C)       Intake/Output:   Last shift:      No intake/output data recorded. Last 3 shifts: 1901 -  0700  In: 3163.2 [I.V.:743.2]  Out: 7644 [Urine:2575]    Intake/Output Summary (Last 24 hours) at 2022 0815  Last data filed at 2022 0700  Gross per 24 hour   Intake 1676.35 ml   Output 1625 ml   Net 51.35 ml       Physical Exam:    General:  Sedated, opens eyes to name calling   Head:  Normocephalic, without obvious abnormality, atraumatic. Eyes:  Conjunctivae/corneas clear. PERRL, EOMs intact. Nose: Nares normal. Septum midline. Mucosa normal.  ET tube orally   Throat: Lips, mucosa, and tongue normal. Teeth and gums normal.   Neck: Supple, symmetrical, trachea midline, right IJ central line   Lungs:   Coarse rhonchi bilaterally, R>L.+ thick secretions from ETT tube. Chest wall:  +thoracotomy scar. No tenderness or deformity.    Heart:  bradycardic rate and regular rhythm, S1, S2 normal, no murmur, click, rub or gallop. Abdomen:   Soft, non-tender. Bowel sounds normal. No masses,  No organomegaly. Extremities: Extremities normal, atraumatic, 2+ pitting edema BLE. Right groin dressing   Pulses: 2+ and symmetric all extremities. Arterial line   Skin: Skin color, texture, turgor normal. No rashes or lesions. Normal capillary refill. Neurologic: Sedated-limited exam       Data:     Recent Results (from the past 24 hour(s))   GLUCOSE, POC    Collection Time: 08/24/22 11:25 AM   Result Value Ref Range    Glucose (POC) 172 (H) 70 - 110 mg/dL   GLUCOSE, POC    Collection Time: 08/24/22  6:03 PM   Result Value Ref Range    Glucose (POC) 200 (H) 70 - 110 mg/dL   GLUCOSE, POC    Collection Time: 08/24/22 11:12 PM   Result Value Ref Range    Glucose (POC) 203 (H) 70 - 110 mg/dL   BLOOD GAS, ARTERIAL POC    Collection Time: 08/25/22  3:02 AM   Result Value Ref Range    Device: ADULT VENT      FIO2 (POC) 35 %    pH (POC) 7.40 7.35 - 7.45      pCO2 (POC) 45.5 (H) 35.0 - 45.0 MMHG    pO2 (POC) 75 (L) 80 - 100 MMHG    HCO3 (POC) 28.4 (H) 22 - 26 MMOL/L    sO2 (POC) 94.8 92 - 97 %    Base excess (POC) 3.2 mmol/L    Mode ASSIST CONTROL      Tidal volume 480 ml    Set Rate 15 bpm    PEEP/CPAP (POC) 12 cmH2O    Allens test (POC) Positive      Total resp.  rate 16      Site RIGHT RADIAL      Specimen type (POC) ARTERIAL      Performed by Madhavi Wan    MAGNESIUM    Collection Time: 08/25/22  5:11 AM   Result Value Ref Range    Magnesium 2.2 1.6 - 2.6 mg/dL   PHOSPHORUS    Collection Time: 08/25/22  5:11 AM   Result Value Ref Range    Phosphorus 3.8 2.5 - 4.9 MG/DL   CBC WITH AUTOMATED DIFF    Collection Time: 08/25/22  5:11 AM   Result Value Ref Range    WBC 12.3 4.6 - 13.2 K/uL    RBC 3.03 (L) 4.20 - 5.30 M/uL    HGB 8.5 (L) 12.0 - 16.0 g/dL    HCT 27.7 (L) 35.0 - 45.0 %    MCV 91.4 78.0 - 100.0 FL    MCH 28.1 24.0 - 34.0 PG    MCHC 30.7 (L) 31.0 - 37.0 g/dL    RDW 17.7 (H) 11.6 - 14.5 %    PLATELET 571 562 - 071 K/uL    MPV 11.6 9.2 - 11.8 FL    NRBC 0.3 (H) 0  WBC    ABSOLUTE NRBC 0.04 (H) 0.00 - 0.01 K/uL    NEUTROPHILS 86 (H) 40 - 73 %    LYMPHOCYTES 4 (L) 21 - 52 %    MONOCYTES 9 3 - 10 %    EOSINOPHILS 0 0 - 5 %    BASOPHILS 0 0 - 2 %    IMMATURE GRANULOCYTES 1 (H) 0.0 - 0.5 %    ABS. NEUTROPHILS 10.6 (H) 1.8 - 8.0 K/UL    ABS. LYMPHOCYTES 0.5 (L) 0.9 - 3.6 K/UL    ABS. MONOCYTES 1.1 0.05 - 1.2 K/UL    ABS. EOSINOPHILS 0.0 0.0 - 0.4 K/UL    ABS. BASOPHILS 0.0 0.0 - 0.1 K/UL    ABS. IMM. GRANS. 0.1 (H) 0.00 - 0.04 K/UL    DF AUTOMATED     METABOLIC PANEL, BASIC    Collection Time: 08/25/22  5:11 AM   Result Value Ref Range    Sodium 139 136 - 145 mmol/L    Potassium 4.8 3.5 - 5.5 mmol/L    Chloride 102 100 - 111 mmol/L    CO2 30 21 - 32 mmol/L    Anion gap 7 3.0 - 18 mmol/L    Glucose 187 (H) 74 - 99 mg/dL    BUN 29 (H) 7.0 - 18 MG/DL    Creatinine 0.96 0.6 - 1.3 MG/DL    BUN/Creatinine ratio 30 (H) 12 - 20      GFR est AA >60 >60 ml/min/1.73m2    GFR est non-AA 59 (L) >60 ml/min/1.73m2    Calcium 8.9 8.5 - 10.1 MG/DL   TRIGLYCERIDE    Collection Time: 08/25/22  5:11 AM   Result Value Ref Range    Triglyceride 97 <150 MG/DL   GLUCOSE, POC    Collection Time: 08/25/22  5:23 AM   Result Value Ref Range    Glucose (POC) 199 (H) 70 - 110 mg/dL             Telemetry:normal sinus rhythm    Imaging:  CXR Results  (Last 48 hours)                 08/24/22 0626  XR CHEST PORT Final result    Impression:  No gross interval change. Narrative:  EXAM: XR CHEST PORT       INDICATION: intubated       COMPARISON: Recent prior. FINDINGS: A single view of the chest demonstrates multifocal airspace opacities   without gross interval change. . Stable pulmonary vascularity and stable enlarged   cardiac silhouette. . Stable postoperative changes in the mediastinum. Support   devices grossly similar. .                    CT Results  (Last 48 hours)      None                     JOYA SPLIT/SHARE TIME: 12 mins    Naveen Harry   08/25/22   Pulmonary, Critical Care Medicine  [facility]

## 2022-08-25 NOTE — WOUND CARE
Physical Exam  Room 312: focused wound assess    Right groin wound incision & periwound skin tear, staff report pt has been agitated & has pulled off silicone dressing from this site. Recommend trying again when pt is calmer, or can use adaptic on top, do not secure, if pt pulls it off again, it will not damage her skin. Discussed with primary nurse Yehuda Corral. Will update orders. Will turn over care to nursing staff at this time.   Arturo BERNSTEINN, RN, [facility], [address]

## 2022-08-25 NOTE — PROGRESS NOTES
attended the interdisciplinary rounds for Sly Winston, who is a 63 y.o.,female. Patients Primary Language is: [place]. According to the patients EMR Ep[employer] Affiliation is: [facility].     The reason the Patient came to the hospital is:   Patient Active Problem List    Diagnosis Date Noted    Saccular aneurysm 08/18/2022    SOB (shortness of breath) 06/01/2012    Diabetes ([facility]     Hypertension     Hyperlipidemia     Aneurysm ([facility]     Breast CA ([facility]     Cancer of breast, left 08/30/2010    COPD (chronic obstructive pulmonary disease) ([facility] 08/30/2010    CAD (coronary artery disease) 08/30/2010      Plan:  Chaplains will continue to follow and will provide pastoral care on an as needed/requested basis.  recommends bedside caregivers page  on duty if patient shows signs of acute spiritual or emotional distress.     [address] [facility]  Board Certified [address] [place] [address]   [phone]

## 2022-08-26 ENCOUNTER — HOSPITAL ENCOUNTER (OUTPATIENT)
Dept: GENERAL RADIOLOGY | Age: 62
Discharge: HOME | End: 2022-08-26
Attending: PHYSICIAN ASSISTANT
Payer: MEDICARE

## 2022-08-26 ENCOUNTER — APPOINTMENT (OUTPATIENT)
Dept: GENERAL RADIOLOGY | Age: 62
End: 2022-08-26
Payer: MEDICARE

## 2022-08-26 LAB
ANION GAP SERPL CALC-SCNC: 6 MMOL/L (ref 3–18)
ARTERIAL PATENCY WRIST A: POSITIVE
BASE EXCESS BLD CALC-SCNC: 4.4 MMOL/L
BASOPHILS # BLD: 0 K/UL (ref 0–0.1)
BASOPHILS NFR BLD: 0 % (ref 0–2)
BDY SITE: ABNORMAL
BUN SERPL-MCNC: 31 MG/DL (ref 7–18)
BUN/CREAT SERPL: 32 (ref 12–20)
CALCIUM SERPL-MCNC: 8.5 MG/DL (ref 8.5–10.1)
CHLORIDE SERPL-SCNC: 103 MMOL/L (ref 100–111)
CO2 SERPL-SCNC: 30 MMOL/L (ref 21–32)
CREAT SERPL-MCNC: 0.96 MG/DL (ref 0.6–1.3)
DIFFERENTIAL METHOD BLD: ABNORMAL
EOSINOPHIL # BLD: 0 K/UL (ref 0–0.4)
EOSINOPHIL NFR BLD: 0 % (ref 0–5)
ERYTHROCYTE [DISTWIDTH] IN BLOOD BY AUTOMATED COUNT: 17.7 % (ref 11.6–14.5)
GAS FLOW.O2 O2 DELIVERY SYS: ABNORMAL L/MIN
GAS FLOW.O2 SETTING OXYMISER: 15 BPM
GLUCOSE BLD STRIP.AUTO-MCNC: 177 MG/DL (ref 70–110)
GLUCOSE BLD STRIP.AUTO-MCNC: 186 MG/DL (ref 70–110)
GLUCOSE BLD STRIP.AUTO-MCNC: 195 MG/DL (ref 70–110)
GLUCOSE BLD STRIP.AUTO-MCNC: 212 MG/DL (ref 70–110)
GLUCOSE SERPL-MCNC: 178 MG/DL (ref 74–99)
HCO3 BLD-SCNC: 28.5 MMOL/L (ref 22–26)
HCT VFR BLD AUTO: 26.3 % (ref 35–45)
HGB BLD-MCNC: 8.1 G/DL (ref 12–16)
IMM GRANULOCYTES # BLD AUTO: 0.2 K/UL (ref 0–0.04)
IMM GRANULOCYTES NFR BLD AUTO: 2 % (ref 0–0.5)
LYMPHOCYTES # BLD: 0.8 K/UL (ref 0.9–3.6)
LYMPHOCYTES NFR BLD: 7 % (ref 21–52)
MAGNESIUM SERPL-MCNC: 1.9 MG/DL (ref 1.6–2.6)
MCH RBC QN AUTO: 27.6 PG (ref 24–34)
MCHC RBC AUTO-ENTMCNC: 30.8 G/DL (ref 31–37)
MCV RBC AUTO: 89.5 FL (ref 78–100)
MONOCYTES # BLD: 1.5 K/UL (ref 0.05–1.2)
MONOCYTES NFR BLD: 14 % (ref 3–10)
NEUTS SEG # BLD: 7.9 K/UL (ref 1.8–8)
NEUTS SEG NFR BLD: 77 % (ref 40–73)
NRBC # BLD: 0.04 K/UL (ref 0–0.01)
NRBC BLD-RTO: 0.4 PER 100 WBC
O2/TOTAL GAS SETTING VFR VENT: 40 %
PCO2 BLD: 40 MMHG (ref 35–45)
PH BLD: 7.46 [PH] (ref 7.35–7.45)
PHOSPHATE SERPL-MCNC: 3 MG/DL (ref 2.5–4.9)
PLATELET # BLD AUTO: 195 K/UL (ref 135–420)
PMV BLD AUTO: 10.8 FL (ref 9.2–11.8)
PO2 BLD: 105 MMHG (ref 80–100)
POTASSIUM SERPL-SCNC: 4.2 MMOL/L (ref 3.5–5.5)
RBC # BLD AUTO: 2.94 M/UL (ref 4.2–5.3)
SAO2 % BLD: 98.3 % (ref 92–97)
SERVICE CMNT-IMP: ABNORMAL
SODIUM SERPL-SCNC: 139 MMOL/L (ref 136–145)
SPECIMEN TYPE: ABNORMAL
T4 FREE SERPL-MCNC: 0.8 NG/DL (ref 0.7–1.5)
TSH SERPL DL<=0.05 MIU/L-ACNC: 23.9 UIU/ML (ref 0.36–3.74)
VENTILATION MODE VENT: ABNORMAL
VT SETTING VENT: 480 ML
WBC # BLD AUTO: 10.3 K/UL (ref 4.6–13.2)

## 2022-08-26 PROCEDURE — 74011250637 HC RX REV CODE- 250/637: Performed by: NURSE PRACTITIONER

## 2022-08-26 PROCEDURE — 65610000006 HC RM INTENSIVE CARE

## 2022-08-26 PROCEDURE — 94762 N-INVAS EAR/PLS OXIMTRY CONT: CPT

## 2022-08-26 PROCEDURE — 74011250636 HC RX REV CODE- 250/636: Performed by: PHYSICIAN ASSISTANT

## 2022-08-26 PROCEDURE — 80048 BASIC METABOLIC PNL TOTAL CA: CPT

## 2022-08-26 PROCEDURE — 36600 WITHDRAWAL OF ARTERIAL BLOOD: CPT

## 2022-08-26 PROCEDURE — 94003 VENT MGMT INPAT SUBQ DAY: CPT

## 2022-08-26 PROCEDURE — 84100 ASSAY OF PHOSPHORUS: CPT

## 2022-08-26 PROCEDURE — 85025 COMPLETE CBC W/AUTO DIFF WBC: CPT

## 2022-08-26 PROCEDURE — 74011250636 HC RX REV CODE- 250/636: Performed by: REGISTERED NURSE

## 2022-08-26 PROCEDURE — 82962 GLUCOSE BLOOD TEST: CPT

## 2022-08-26 PROCEDURE — 74011250636 HC RX REV CODE- 250/636: Performed by: SURGERY

## 2022-08-26 PROCEDURE — 74011250637 HC RX REV CODE- 250/637: Performed by: PHYSICIAN ASSISTANT

## 2022-08-26 PROCEDURE — 74011636637 HC RX REV CODE- 636/637: Performed by: PHYSICIAN ASSISTANT

## 2022-08-26 PROCEDURE — 99291 CRITICAL CARE FIRST HOUR: CPT | Performed by: INTERNAL MEDICINE

## 2022-08-26 PROCEDURE — 74011000250 HC RX REV CODE- 250: Performed by: INTERNAL MEDICINE

## 2022-08-26 PROCEDURE — 94640 AIRWAY INHALATION TREATMENT: CPT

## 2022-08-26 PROCEDURE — 74011000250 HC RX REV CODE- 250: Performed by: PHYSICIAN ASSISTANT

## 2022-08-26 PROCEDURE — APPSS15 APP SPLIT SHARED TIME 0-15 MINUTES: Performed by: NURSE PRACTITIONER

## 2022-08-26 PROCEDURE — 74011250637 HC RX REV CODE- 250/637: Performed by: SURGERY

## 2022-08-26 PROCEDURE — 83735 ASSAY OF MAGNESIUM: CPT

## 2022-08-26 PROCEDURE — 71045 X-RAY EXAM CHEST 1 VIEW: CPT

## 2022-08-26 PROCEDURE — 84443 ASSAY THYROID STIM HORMONE: CPT

## 2022-08-26 PROCEDURE — 82803 BLOOD GASES ANY COMBINATION: CPT

## 2022-08-26 PROCEDURE — 36415 COLL VENOUS BLD VENIPUNCTURE: CPT

## 2022-08-26 PROCEDURE — 84439 ASSAY OF FREE THYROXINE: CPT

## 2022-08-26 RX ORDER — ALBUTEROL SULFATE 0.83 MG/ML
1.25 SOLUTION RESPIRATORY (INHALATION)
Status: DISCONTINUED | OUTPATIENT
Start: 2022-08-26 | End: 2022-08-26

## 2022-08-26 RX ORDER — AMOXICILLIN 250 MG
1 CAPSULE ORAL 2 TIMES DAILY
Status: DISCONTINUED | OUTPATIENT
Start: 2022-08-26 | End: 2022-08-26

## 2022-08-26 RX ORDER — SODIUM CHLORIDE FOR INHALATION 3 %
4 VIAL, NEBULIZER (ML) INHALATION
Status: DISCONTINUED | OUTPATIENT
Start: 2022-08-26 | End: 2022-08-26

## 2022-08-26 RX ORDER — ALBUTEROL SULFATE 0.83 MG/ML
1.25 SOLUTION RESPIRATORY (INHALATION)
Status: DISCONTINUED | OUTPATIENT
Start: 2022-08-26 | End: 2022-08-27

## 2022-08-26 RX ORDER — SENNOSIDES 8.8 MG/5ML
5 LIQUID ORAL 2 TIMES DAILY
Status: DISCONTINUED | OUTPATIENT
Start: 2022-08-26 | End: 2022-09-05 | Stop reason: HOSPADM

## 2022-08-26 RX ADMIN — CHLORHEXIDINE GLUCONATE 0.12% ORAL RINSE 10 ML: 1.2 LIQUID ORAL at 09:00

## 2022-08-26 RX ADMIN — Medication 2 UNITS: at 12:45

## 2022-08-26 RX ADMIN — HEPARIN SODIUM 5000 UNITS: 5000 INJECTION INTRAVENOUS; SUBCUTANEOUS at 21:32

## 2022-08-26 RX ADMIN — LEVOTHYROXINE SODIUM ANHYDROUS 100 MCG: 100 INJECTION, POWDER, LYOPHILIZED, FOR SOLUTION INTRAVENOUS at 09:00

## 2022-08-26 RX ADMIN — SODIUM CHLORIDE SOLN NEBU 3% 4 ML: 3 NEBU SOLN at 09:02

## 2022-08-26 RX ADMIN — Medication 2 UNITS: at 06:25

## 2022-08-26 RX ADMIN — HEPARIN SODIUM 5000 UNITS: 5000 INJECTION INTRAVENOUS; SUBCUTANEOUS at 12:45

## 2022-08-26 RX ADMIN — ALBUTEROL SULFATE 1.25 MG: 2.5 SOLUTION RESPIRATORY (INHALATION) at 09:02

## 2022-08-26 RX ADMIN — FENTANYL CITRATE 100 MCG/HR: 0.05 INJECTION, SOLUTION INTRAMUSCULAR; INTRAVENOUS at 21:01

## 2022-08-26 RX ADMIN — FENTANYL CITRATE 75 MCG/HR: 0.05 INJECTION, SOLUTION INTRAMUSCULAR; INTRAVENOUS at 02:01

## 2022-08-26 RX ADMIN — Medication 2 UNITS: at 00:27

## 2022-08-26 RX ADMIN — CHLORHEXIDINE GLUCONATE 0.12% ORAL RINSE 10 ML: 1.2 LIQUID ORAL at 21:32

## 2022-08-26 RX ADMIN — Medication 12 UNITS: at 09:05

## 2022-08-26 RX ADMIN — SODIUM CHLORIDE, PRESERVATIVE FREE 20 MG: 5 INJECTION INTRAVENOUS at 09:00

## 2022-08-26 RX ADMIN — HEPARIN SODIUM 5000 UNITS: 5000 INJECTION INTRAVENOUS; SUBCUTANEOUS at 06:25

## 2022-08-26 RX ADMIN — SODIUM CHLORIDE, PRESERVATIVE FREE 20 MG: 5 INJECTION INTRAVENOUS at 21:32

## 2022-08-26 RX ADMIN — Medication 4 UNITS: at 17:36

## 2022-08-26 RX ADMIN — POLYETHYLENE GLYCOL 3350 17 G: 17 POWDER, FOR SOLUTION ORAL at 09:00

## 2022-08-26 RX ADMIN — SENNOSIDES 8.8 MG: 8.8 LIQUID ORAL at 17:36

## 2022-08-26 RX ADMIN — ASPIRIN 81 MG CHEWABLE TABLET 81 MG: 81 TABLET CHEWABLE at 09:00

## 2022-08-26 RX ADMIN — SENNOSIDES 8.8 MG: 8.8 LIQUID ORAL at 12:45

## 2022-08-26 NOTE — PROGRESS NOTES
Problem: Ventilator Management  Goal: *Adequate oxygenation and ventilation  Outcome: Progressing Towards Goal  Goal: *Patient maintains clear airway/free of aspiration  Outcome: Progressing Towards Goal  Goal: *Absence of infection signs and symptoms  Outcome: Progressing Towards Goal  Goal: *Normal spontaneous ventilation  Outcome: Progressing Towards Goal     Problem: Non-Violent Restraints  Goal: Removal from restraints as soon as assessed to be safe  Outcome: Progressing Towards Goal  Goal: No harm/injury to patient while restraints in use  Outcome: Progressing Towards Goal  Goal: Patient's dignity will be maintained  Outcome: Progressing Towards Goal  Goal: Patient Interventions  Outcome: Progressing Towards Goal

## 2022-08-26 NOTE — PROGRESS NOTES
SBT initiated, PS 7, PEEP 5, FiO2 35%. Pt awake and alert, somewhat agitated, follows commands. 08/26/22 0903   Weaning Parameters   Spontaneous Breathing Trial Complete No (Comments)   Resp Rate Observed 25   Ve 12.2      RSBI 57     Tolerating. Will monitor for signs of apnea or distress.

## 2022-08-26 NOTE — PROGRESS NOTES
0710: Bedside and Verbal shift change report given to [facility], RN (oncoming nurse) by Zach Pierce RN (offgoing nurse). Report included the following information SBAR, ED Summary, Procedure Summary, Intake/Output, MAR, Recent Results, and Med Rec Status. 1915:Bedside and Verbal shift change report given to Zach Pierce RN (oncoming nurse) by [facility], RN (offgoing nurse). Report included the following information SBAR, Intake/Output, MAR, Recent Results, and Med Rec Status.

## 2022-08-26 NOTE — PROGRESS NOTES
Patient has been at bedside  Spoke with ICU team and family  Patient is wide-awake seems comfortable vital signs are stable  Working toward goal of extubation

## 2022-08-26 NOTE — PROGRESS NOTES
Problem: Falls - Risk of  Goal: *Absence of Falls  Description: Document Dany Moody Fall Risk and appropriate interventions in the flowsheet. Outcome: Progressing Towards Goal  Note: Fall Risk Interventions:  Mobility Interventions: Strengthening exercises (ROM-active/passive)    Mentation Interventions: Adequate sleep, hydration, pain control    Medication Interventions: Bed/chair exit alarm    Elimination Interventions: Toileting schedule/hourly rounds              Problem: Patient Education: Go to Patient Education Activity  Goal: Patient/Family Education  Outcome: Progressing Towards Goal     Problem: Non-Violent Restraints  Goal: Removal from restraints as soon as assessed to be safe  Outcome: Progressing Towards Goal     Problem: Non-Violent Restraints  Goal: No harm/injury to patient while restraints in use  Outcome: Progressing Towards Goal     Problem: Non-Violent Restraints  Goal: Patient's dignity will be maintained  Outcome: Progressing Towards Goal     Problem: Pressure Injury - Risk of  Goal: *Prevention of pressure injury  Description: Document Alfredo Scale and appropriate interventions in the flowsheet. Outcome: Progressing Towards Goal  Note: Pressure Injury Interventions:  Sensory Interventions: Minimize linen layers    Moisture Interventions: Absorbent underpads    Activity Interventions: Pressure redistribution bed/mattress(bed type)    Mobility Interventions: Turn and reposition approx.  every two hours(pillow and wedges)    Nutrition Interventions: Document food/fluid/supplement intake    Friction and Shear Interventions: Foam dressings/transparent film/skin sealants

## 2022-08-26 NOTE — INTERDISCIPLINARY ROUNDS
[facility]  Pulmonary, Critical Care, and Sleep Medicine  Interdisciplinary and Ventilator Weaning Rounds    Patient discussed in morning walking rounds and interdisciplinary rounds. ICU day: Admit 8/21     Overnight events:   Still with a lot of secretions     Assessments and best practice:  Ventilator  Ventilator day intubated 8/18, extubated 8/22, reintubated for stridor 8/22  Vent settings: FiO2 of 40 and PEEP of 5  VAP bundle, aim to keep peak plateau pressure 18-78PA H2O  Weaning assessed and documented   Patient does meet criteria for SBT. Patient is on sedation holiday. Plan to wean with PS n/a. Outcome: will SBT  Final plan: potential extubation  Sedation  Fentanyl   Other pertinent drips  N/a  Lines noted  PIV, grissom, OGT, ETT   Critical labs assessed  Yes  Antibiotics  N/a  Medications reviewed  Yes  Pending imaging  none  Pending send out labs  No  Pending Procedures  None  Glycemic control  Stress ulcer prophylaxis. Pepcid  DVT prophylaxis. Heparin   Need for Lines, grissom assessed. Yes  Restraint Reevaluation   Yes  I have reevaluated the patient one hour after initiation of intervention. The patient is comfortable, uninjured, but continues to pose an imminent risk of injury to self to themselves and/or serious disruption of medical treatment required to keep patient stable. The patient's current medical and behavioral conditions that warrant the use intervention include danger to self and Interference with medical equipment or treatment. Restraint or seclusion will be discontinued at the earliest possible time, regardless of the scheduled expiration of the order.  Based on my evaluation, restraints will be continued: YES       Family contact/MPOA: spouse - Mitra Portillo [phone]  Family updated - will be updated today     Palliative consult within 3 days of admission to ICU-  Ethics Guidance: 21 days      Daily Plans:  SBT  Check air leak  Extubate to Bipap or HFNC if possible   Add Senna for bowel regimen     JOYA time 15 minutes        Keeley Hernandez NP  08/26/22  Pulmonary, Critical Care Medicine  [facility]

## 2022-08-26 NOTE — DIABETES MGMT
Diabetes/ Glycemic Control Plan of Care  Recommendations:   Blood glucose this am 195 mg/dl  Noted steroids discontinued yesterday  Currently on SBT. Continue corrective insulin regimen as ordered. May need to decrease Lantus dose tomorrow. Will continue inpatient monitoring. Fasting/ Morning blood glucose:   Lab Results   Component Value Date/Time    Glucose 178 (H) 08/26/2022 07:04 AM    Glucose (POC) 195 (H) 08/26/2022 06:21 AM    Glucose (POC) 148 (H) 02/28/2022 11:00 AM     IV Fluids containing dextrose:   Steroids:  none. Discontinued 8/25/2022    Blood glucose values:        Latest Reference Range & Units 8/25/22 05:23 8/25/22 11:50 8/25/22 17:41 8/26/22 00:17 8/26/22 06:21   GLUCOSE,FAST - POC 70 - 110 mg/dL 199 (H) 215 (H) 204 (H) 177 (H) 195 (H)   (H): Data is abnormally high  Within target range (70-180mg/dL):  yes   Current insulin orders:   Lantus 12 units daily  Lispro corrective insulin coverage ever 6 hours as needed. Total Daily Dose previous 24 hours = 20 units    Current A1c:   Lab Results   Component Value Date/Time    Hemoglobin A1c 6.1 (H) 08/18/2022 07:12 AM      Equivalent  to an average Blood Glucose of 129 mg/dl for 2-3 months prior to admission  Adequate glycemic control PTA: YES  Nutrition/Diet: DIET NPO  ADULT TUBE FEEDING Orogastric; Peptide Based; Delivery Method: Continuous; Continuous Initial Rate (mL/hr): 40; Continuous Advance Tube Feeding: Yes; Advancement Volume (mL/hr): 10; Advancement Frequency: Q 4 hours; Continuous Goal Rate (mL/hr): 6... Active Orders   Diet    DIET NPO      Meal Intake:  Patient Vitals for the past 168 hrs:   % Diet Eaten   08/24/22 0800 0%       Supplement Intake:  Patient Vitals for the past 168 hrs:   Supplement intake %   08/24/22 0800 0%       Home diabetes medications:   Key Antihyperglycemic Medications               metFORMIN (GLUMETZA ER) 500 mg TG24 24 hour tablet (Taking) Take  by mouth two (2) times a day.           Plan/Goals: Blood glucose will be within target of 70 - 180 mg/dl within 72 hours  Reinforce dietary and medication compliance at home.           Education:  [] Refer to Diabetes Education Record                       [x] Education not indicated at this time     Azael Brenner, [facility] CDE  Ext [phone]

## 2022-08-26 NOTE — PROGRESS NOTES
[facility]  Pulmonary, Critical Care, and Sleep Medicine      Name: Moshe Cardona MRN: [id number]   : 1960 Hospital: [address] [place] [address]    Date: 2022          Critical Care progress note    IMPRESSION:   S/p Cardiac arrest - ROSC after ~9 min of ACLS, no further events  Hypoxic and hypercapnic respiratory failure - now on mechanical vent. 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 Julee Omer. Seizure vs myoclonus - shortly following cardiac arrest x2. No further episodes of tremulousness or twitching noted  R groin hematoma w/ femstop in place  Hx of CAD w/ prior CABG  Hx of HFrEF - most recent EF from  -30%  Paroxysmal symptomatic a-fib with CHADSVASc2 score of 5. On chronic Amiodorone and anticoagulation  History of recurrent GI bleeding, requiring transfusion and HAS-BLED score of 4. Chronic stage 2 kidney disease. Remote breast cancer about ten years ago with mastectomy and chemotherapy, but no chest radiation. History of thyroid disorder  History of 'waking up 'previously with anesthesia. RECOMMENDATIONS:   Neuro: wean fentanyl gtt. RASS 0 to -1. PRN Fentanyl/ Versed for breakthrough sedation. Pulm: Titrate FiO2 for goal SPO2> 90%. Daily CXR and ABG while intubated. S/p Bronch for mucus plugs (). PRN duo-nebs q4. Optimize bronchial hygiene. Cont metanebs and hypertonic saline for copious secretions. Steroids discontinued. SBT daily, would like to extubate to Bipap ideally, however, secretions making this a contraindication. Failed SBT today with copious secretions and subsequent desaturation. Will need to check cuff leak prior to extubation    CVS: HD stable. Goal MAP >65mmHg. Cardiology following for assistance in management. Con't PRN Hydralazine for SBP >180mmHg. Vascular following. Cont SQH prophylaxis.  Duplex BLE neg   Fluids: maintainance  GI: SUP(pepcid), Trend LFTs, OGT. Hold TF while on SBT. Add Senna to bowel regimen   Renal: Trend Renal indices, Strict Is/Os, Gonzalez (+). Hem/Onc: Monitor for s/o active bleeding. Daily CBC. I/D: Trend WBCs and temperature curve. No ABX. Endocrine: Q6 glucoses, SSI. Metabolic:  Daily BMP, mag, phos. Trend lytes, replace as needed. Musc/Skin: no acute issues, wound care as needed  Full code  Discussed during IDRs     Subjective/History:       HPI  Patient is a 63 y.o. female w/ pmhx of a fib, CAD, CHF who presented for covered stent procedure for descending thoracic aortic saccular aneurysm. Procedure performed 8/18 w/o incident. Following procedure, patient was drowsy but answering questions appropriately per bedside RN. Per chart review, she began having oxygen desaturations and escalating oxygen requirements, eventually ending up on HFNC. Blood gas performed showed mild hypoxia and mild hypercapnia. Per chart review, patient was found in cardiac arrest and ACLS initiated with approximately 9 min of downtime. EPOC labs following cardiac arrest essentially unremarkable. ABG with mixed respiratory and metabolic acidosis. Patient was extubated 8/22 and reintubated same day for stridor. Now with thick, copious secretions and bronchotracheomalacia preventing liberation from ventilator. Failing SBT's due to above, may need trach. The patient is critically ill and can not provide additional history due to Ventilated.      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    Anxiety     Breast CA ([facility] 12/2009    S/P Lt Mastectomy and Chemo    CAD (coronary artery disease) 08/30/2010    S/P CABG X 4 ([address] to LAD, Sequential SVG to D2, Ramus, OM) 01/2011    Cardiomyopathy ([facility]     LVEF 35-40 % (08/20) 60% (08/14), 40% (2011)    Chemotherapy 04/2010    for 4 months    CHF (congestive heart failure) (HCC)     Chronic kidney disease     stage 2 per cardiac note cc    Chronic pain     COPD ([facility]ronic obstructive pulmonary disease) [facility] 08/30/2010    Depression     Diabetes (HCC)     GERD (gastroesophageal reflux disease)     GI bleed     History of blood transfusion     2010 and 1/2022    Hx of heart artery stent 2010    x 10  (4, then 4, then 2)    Hyperlipidemia     Hypertension     MI (myocardial infarction) ([facility] 2009    x 3    Schatzki's ring     S/P EGD and Dilation (07/16)    Thyroid disease     hypothyroid    Tobacco abuse       Past Surgical History:   Procedure Laterality Date    COLONOSCOPY N/A 5/3/2017    COLON  performed by Mateo Chase MD at [facility] ENDOSCOPY    COLONOSCOPY N/A 2/15/2021    COLONOSCOPY performed by Richard Charles MD at [facility] ENDOSCOPY    HX APPENDECTOMY      HX CHOLECYSTECTOMY      HX GYN      tubal ligation    HX HEART CATHETERIZATION  11/11/09; 10/21/09    HX HEART CATHETERIZATION  10/15/10; 10/06/09    left heart    HX HEART CATHETERIZATION  11/7/2011    left heart cath, no new findings    HX OTHER SURGICAL  4/6/2010    Insertion right internal jugular single lumen MediPort. HX RADICAL MASTECTOMY Left 2/2010    left, with chemo    WI BREAST SURGERY PROCEDURE UNLISTED  2/22/10    left w/sentinel node bx    WI CABG, ARTERY-VEIN, FOUR  12/2010    WI CARDIAC SURG PROCEDURE UNLIST      stent placement before CABG    WI CHEST SURGERY PROCEDURE UNLISTED Right     Prior port placement      Prior to Admission medications    Medication Sig Start Date End Date Taking? Authorizing Provider   esomeprazole (NEXIUM) 40 mg capsule Take  by mouth daily.    Yes Provider, Historical   metFORMIN (GLUMETZA ER) 500 mg TG24 24 hour tablet Take  by mouth two (2) times a day. Yes Provider, Historical   albuterol-ipratropium (DUO-NEB) 2.5 mg-0.5 mg/3 ml nebu Take 3 mL by inhalation every six (6) hours as needed. Use every night   Yes Provider, Historical   rosuvastatin (Crestor) 20 mg tablet Take 1 Tablet by mouth daily. Patient taking differently: Take 20 mg by mouth nightly. 2/3/22  Yes Lincoln Lanza MD   bumetanide (BUMEX) 1 mg tablet Take 1 Tablet by mouth every other day. Patient taking differently: Take 1 mg by mouth as needed. 2/3/22  Yes Lincoln Lanza MD   sacubitriL-valsartan [place]) 24-26 mg tablet Take 1 Tablet by mouth two (2) times a day. 2/3/22  Yes Lincoln Lanza MD   apixaban (Eliquis) 5 mg tablet Take 1 Tablet by mouth two (2) times a day. 2/1/22  Yes Lincoln Lanza MD   metoprolol succinate (TOPROL-XL) 25 mg XL tablet Take 1 Tablet by mouth two (2) times a day. Patient taking differently: Take 25 mg by mouth nightly. 1/20/22  Yes Lincoln Lanza MD   amiodarone (CORDARONE) 200 mg tablet TAKE 1 TABLET EVERY DAY 10/5/21  Yes Lincoln Lanza MD   albuterol (PROVENTIL HFA, VENTOLIN HFA) 90 mcg/actuation inhaler Take 1 Puff by inhalation every four (4) hours as needed. Yes Provider, Historical   levothyroxine (SYNTHROID) 150 mcg tablet Take  by mouth Daily (before breakfast). Yes Provider, Historical   nitroglycerin (NITROSTAT) 0.4 mg SL tablet 1 Tablet by SubLINGual route every five (5) minutes as needed for Chest Pain.  11/30/21   Lincoln Lanza MD     Current Facility-Administered Medications   Medication Dose Route Frequency    albuterol (PROVENTIL VENTOLIN) nebulizer solution 1.25 mg  1.25 mg Nebulization TID RT    fentaNYL (PF) 1,500 mcg/30 mL (50 mcg/mL) infusion  0-200 mcg/hr IntraVENous TITRATE    insulin glargine (LANTUS) injection 12 Units  12 Units SubCUTAneous DAILY    levothyroxine (SYNTHROID) injection 100 mcg  100 mcg IntraVENous DAILY    heparin (porcine) injection 5,000 Units  5,000 Units SubCUTAneous Q8H    sodium chloride 3% hypertonic nebulizer soln  4 mL Nebulization TID RT    polyethylene glycol (MIRALAX) packet 17 g  17 g Oral DAILY    aspirin chewable tablet 81 mg  81 mg Oral DAILY    famotidine (PF) (PEPCID) 20 mg in 0.9% sodium chloride 10 mL injection  20 mg IntraVENous Q12H    chlorhexidine (PERIDEX) 0.12 % mouthwash 10 mL  10 mL Oral Q12H    insulin lispro (HUMALOG) injection   SubCUTAneous Q6H    [Held by provider] amiodarone (CORDARONE) tablet 200 mg  200 mg Oral DAILY    [Held by provider] levothyroxine (SYNTHROID) tablet 150 mcg  150 mcg Oral 6am    [Held by provider] rosuvastatin (CRESTOR) tablet 20 mg  20 mg Oral QHS    [Held by provider] sacubitriL-valsartan (ENTRESTO) 24-26 mg tablet 1 Tablet  1 Tablet Oral BID    [Held by provider] metoprolol succinate (TOPROL-XL) XL tablet 25 mg  25 mg Oral QHS     Allergies   Allergen Reactions    Shellfish Derived Hives     Eyes and Throat swelling      Social History     Tobacco Use    Smoking status: Every Day     Packs/day: 1.00     Years: 40.00     Pack years: 40.00     Types: Cigarettes    Smokeless tobacco: Never   Substance Use Topics    Alcohol use: No      Family History   Problem Relation Age of Onset    Hypertension Mother     Diabetes Mother     Breast Problems Mother         fiber cystic    Hypertension Other         Review of Systems:  Review of systems not obtained due to patient factors. Objective:   Vital Signs:    Visit Vitals  BP (!) 151/70   Pulse 84   Temp 98.8 °F (37.1 °C)   Resp 23   Ht 5' 9\" (1.753 m)   Wt 105.4 kg (232 lb 5.8 oz)   SpO2 100%   BMI 34.31 kg/m²       O2 Device: Ventilator, Endotracheal tube, Heated, Humidifier   O2 Flow Rate (L/min): 50 l/min   Temp (24hrs), Av.4 °F (37.4 °C), Min:98.8 °F (37.1 °C), Max:99.9 °F (37.7 °C)       Intake/Output:   Last shift:      No intake/output data recorded.   Last 3 shifts:  1901 -  0700  In: 2774.5 [I.V.:369.5]  Out: 5189 [Urine:1795]    Intake/Output Summary (Last 24 hours) at 8/26/2022 0926  Last data filed at 8/26/2022 0700  Gross per 24 hour   Intake 1478.14 ml   Output 1020 ml   Net 458.14 ml       Physical Exam:    General:  Sedated, opens eyes to name calling   Head:  Normocephalic, without obvious abnormality, atraumatic. Eyes:  Conjunctivae/corneas clear. PERRL, EOMs intact. Nose: Nares normal. Septum midline. Mucosa normal.  ET tube orally   Throat: Lips, mucosa, and tongue normal. Teeth and gums normal.   Neck: Supple, symmetrical, trachea midline   Lungs:   Coarse rhonchi bilaterally, R>L.+ thick, copious secretions from ETT tube. Chest wall:  +thoracotomy scar. No tenderness or deformity. Heart:   rate and regular rhythm, S1, S2 normal, no murmur, click, rub or gallop. Abdomen:   Soft, non-tender. Bowel sounds normal. No masses,  No organomegaly. Extremities: Extremities normal, atraumatic, 2+ pitting edema BLE. BUE edema +1, non-pitting. Right groin dressing   Pulses: 2+ and symmetric all extremities. Skin: Skin color, texture, turgor normal. No rashes or lesions. Normal capillary refill. Neurologic: Sedated.  But follows commands        Data:     Recent Results (from the past 24 hour(s))   GLUCOSE, POC    Collection Time: 08/25/22 11:50 AM   Result Value Ref Range    Glucose (POC) 215 (H) 70 - 110 mg/dL   GLUCOSE, POC    Collection Time: 08/25/22  5:41 PM   Result Value Ref Range    Glucose (POC) 204 (H) 70 - 110 mg/dL   GLUCOSE, POC    Collection Time: 08/26/22 12:17 AM   Result Value Ref Range    Glucose (POC) 177 (H) 70 - 110 mg/dL   BLOOD GAS, ARTERIAL POC    Collection Time: 08/26/22  4:24 AM   Result Value Ref Range    Device: ADULT VENT      FIO2 (POC) 40 %    pH (POC) 7.46 (H) 7.35 - 7.45      pCO2 (POC) 40.0 35.0 - 45.0 MMHG    pO2 (POC) 105 (H) 80 - 100 MMHG    HCO3 (POC) 28.5 (H) 22 - 26 MMOL/L    sO2 (POC) 98.3 (H) 92 - 97 %    Base excess (POC) 4.4 mmol/L    Mode ASSIST CONTROL      Tidal volume 480 ml    Set Rate 15 bpm    Allens test (POC) Positive      Site RIGHT RADIAL      Specimen type (POC) ARTERIAL      Performed by Rc Musa    GLUCOSE, POC    Collection Time: 08/26/22  6:21 AM   Result Value Ref Range    Glucose (POC) 195 (H) 70 - 110 mg/dL   MAGNESIUM    Collection Time: 08/26/22  7:04 AM   Result Value Ref Range    Magnesium 1.9 1.6 - 2.6 mg/dL   PHOSPHORUS    Collection Time: 08/26/22  7:04 AM   Result Value Ref Range    Phosphorus 3.0 2.5 - 4.9 MG/DL   CBC WITH AUTOMATED DIFF    Collection Time: 08/26/22  7:04 AM   Result Value Ref Range    WBC 10.3 4.6 - 13.2 K/uL    RBC 2.94 (L) 4.20 - 5.30 M/uL    HGB 8.1 (L) 12.0 - 16.0 g/dL    HCT 26.3 (L) 35.0 - 45.0 %    MCV 89.5 78.0 - 100.0 FL    MCH 27.6 24.0 - 34.0 PG    MCHC 30.8 (L) 31.0 - 37.0 g/dL    RDW 17.7 (H) 11.6 - 14.5 %    PLATELET 170 694 - 547 K/uL    MPV 10.8 9.2 - 11.8 FL    NRBC 0.4 (H) 0  WBC    ABSOLUTE NRBC 0.04 (H) 0.00 - 0.01 K/uL    NEUTROPHILS 77 (H) 40 - 73 %    LYMPHOCYTES 7 (L) 21 - 52 %    MONOCYTES 14 (H) 3 - 10 %    EOSINOPHILS 0 0 - 5 %    BASOPHILS 0 0 - 2 %    IMMATURE GRANULOCYTES 2 (H) 0.0 - 0.5 %    ABS. NEUTROPHILS 7.9 1.8 - 8.0 K/UL    ABS. LYMPHOCYTES 0.8 (L) 0.9 - 3.6 K/UL    ABS. MONOCYTES 1.5 (H) 0.05 - 1.2 K/UL    ABS. EOSINOPHILS 0.0 0.0 - 0.4 K/UL    ABS. BASOPHILS 0.0 0.0 - 0.1 K/UL    ABS. IMM.  GRANS. 0.2 (H) 0.00 - 0.04 K/UL    DF AUTOMATED     METABOLIC PANEL, BASIC    Collection Time: 08/26/22  7:04 AM   Result Value Ref Range    Sodium 139 136 - 145 mmol/L    Potassium 4.2 3.5 - 5.5 mmol/L    Chloride 103 100 - 111 mmol/L    CO2 30 21 - 32 mmol/L    Anion gap 6 3.0 - 18 mmol/L    Glucose 178 (H) 74 - 99 mg/dL    BUN 31 (H) 7.0 - 18 MG/DL    Creatinine 0.96 0.6 - 1.3 MG/DL    BUN/Creatinine ratio 32 (H) 12 - 20      GFR est AA >60 >60 ml/min/1.73m2    GFR est non-AA 59 (L) >60 ml/min/1.73m2    Calcium 8.5 8.5 - 10.1 MG/DL   TSH 3RD GENERATION    Collection Time: 08/26/22  7:04 AM   Result Value Ref Range    TSH 23.90 (H) 0.36 - 3.74 uIU/mL   T4, FREE    Collection Time: 08/26/22  7:04 AM   Result Value Ref Range    T4, Free 0.8 0.7 - 1.5 NG/DL             Telemetry:normal sinus rhythm    Imaging:  CXR Results  (Last 48 hours)                 08/26/22 0554  XR CHEST PORT Final result    Impression:  Stable cardiopulmonary findings. Support devices as discussed. Narrative:  EXAM: XR CHEST PORT       INDICATION: intubated       COMPARISON: Recent prior. FINDINGS: A single view of the chest demonstrates no gross interval change. .   Stable central congestion. Stable enlarged cardiac silhouette. Aortic stent   graft stable. Clips and mediastinal wires again noted, grossly similar to prior   exam.. No acute bone findings. Endotracheal tube above the level of the anai. Right IJ CVL appears to have been removed. Enteric tube grossly similar. Gagan Isaacyareli 08/25/22 0607  XR CHEST PORT Final result    Impression:      Stable support tubes. Significant interval improvement in lung aeration but some residual central   vascular congestion. Narrative:  EXAM:  XR CHEST PORT       INDICATION:   intubated, respiratory failure       COMPARISON: 8/24/2022. FINDINGS:   Stable endotracheal tube, enteric tube and right jugular CVL. Improved but enlarged cardiac silhouette. Stable aortic stent grafts. Improved   overall lung aeration with some residual central congestion. No pneumothorax or   significant pleural effusion. Residual infrahilar hazy opacities. Stable sternal   wires and osseous structures.                    CT Results  (Last 48 hours)      None                     JOYA SPLIT/SHARE TIME: 15 mins    Nataly Melendez NP   08/26/22   Pulmonary, Critical Care Medicine  [facility] no neck stiffness/no neck pain

## 2022-08-27 ENCOUNTER — APPOINTMENT (OUTPATIENT)
Dept: GENERAL RADIOLOGY | Age: 62
DRG: 219 | End: 2022-08-27
Attending: PHYSICIAN ASSISTANT
Payer: MEDICARE

## 2022-08-27 LAB
ANION GAP SERPL CALC-SCNC: 5 MMOL/L (ref 3–18)
ARTERIAL PATENCY WRIST A: POSITIVE
BASE EXCESS BLD CALC-SCNC: 3.8 MMOL/L
BASOPHILS # BLD: 0 K/UL (ref 0–0.1)
BASOPHILS NFR BLD: 0 % (ref 0–2)
BDY SITE: ABNORMAL
BUN SERPL-MCNC: 32 MG/DL (ref 7–18)
BUN/CREAT SERPL: 38 (ref 12–20)
CALCIUM SERPL-MCNC: 9 MG/DL (ref 8.5–10.1)
CHLORIDE SERPL-SCNC: 103 MMOL/L (ref 100–111)
CO2 SERPL-SCNC: 29 MMOL/L (ref 21–32)
CREAT SERPL-MCNC: 0.85 MG/DL (ref 0.6–1.3)
DIFFERENTIAL METHOD BLD: ABNORMAL
EOSINOPHIL # BLD: 0 K/UL (ref 0–0.4)
EOSINOPHIL NFR BLD: 0 % (ref 0–5)
ERYTHROCYTE [DISTWIDTH] IN BLOOD BY AUTOMATED COUNT: 17.6 % (ref 11.6–14.5)
GAS FLOW.O2 O2 DELIVERY SYS: ABNORMAL L/MIN
GAS FLOW.O2 SETTING OXYMISER: 15 BPM
GLUCOSE BLD STRIP.AUTO-MCNC: 153 MG/DL (ref 70–110)
GLUCOSE BLD STRIP.AUTO-MCNC: 171 MG/DL (ref 70–110)
GLUCOSE BLD STRIP.AUTO-MCNC: 175 MG/DL (ref 70–110)
GLUCOSE BLD STRIP.AUTO-MCNC: 184 MG/DL (ref 70–110)
GLUCOSE BLD STRIP.AUTO-MCNC: 192 MG/DL (ref 70–110)
GLUCOSE SERPL-MCNC: 161 MG/DL (ref 74–99)
HCO3 BLD-SCNC: 28.3 MMOL/L (ref 22–26)
HCT VFR BLD AUTO: 25.4 % (ref 35–45)
HGB BLD-MCNC: 7.8 G/DL (ref 12–16)
IMM GRANULOCYTES # BLD AUTO: 0 K/UL
IMM GRANULOCYTES NFR BLD AUTO: 0 %
LYMPHOCYTES # BLD: 0.8 K/UL (ref 0.9–3.6)
LYMPHOCYTES NFR BLD: 7 % (ref 21–52)
MAGNESIUM SERPL-MCNC: 1.8 MG/DL (ref 1.6–2.6)
MCH RBC QN AUTO: 27.5 PG (ref 24–34)
MCHC RBC AUTO-ENTMCNC: 30.7 G/DL (ref 31–37)
MCV RBC AUTO: 89.4 FL (ref 78–100)
METAMYELOCYTES NFR BLD MANUAL: 2 %
MONOCYTES # BLD: 0.7 K/UL (ref 0.05–1.2)
MONOCYTES NFR BLD: 6 % (ref 3–10)
NEUTS BAND NFR BLD MANUAL: 8 % (ref 0–5)
NEUTS SEG # BLD: 9.4 K/UL (ref 1.8–8)
NEUTS SEG NFR BLD: 77 % (ref 40–73)
NRBC # BLD: 0.02 K/UL (ref 0–0.01)
NRBC BLD-RTO: 0.2 PER 100 WBC
O2/TOTAL GAS SETTING VFR VENT: 35 %
PCO2 BLD: 41.4 MMHG (ref 35–45)
PEEP RESPIRATORY: 12 CMH2O
PH BLD: 7.44 [PH] (ref 7.35–7.45)
PHOSPHATE SERPL-MCNC: 3.7 MG/DL (ref 2.5–4.9)
PLATELET # BLD AUTO: 193 K/UL (ref 135–420)
PMV BLD AUTO: 10.9 FL (ref 9.2–11.8)
PO2 BLD: 80 MMHG (ref 80–100)
POTASSIUM SERPL-SCNC: 4.1 MMOL/L (ref 3.5–5.5)
RBC # BLD AUTO: 2.84 M/UL (ref 4.2–5.3)
RBC MORPH BLD: ABNORMAL
SAO2 % BLD: 96.2 % (ref 92–97)
SERVICE CMNT-IMP: ABNORMAL
SODIUM SERPL-SCNC: 137 MMOL/L (ref 136–145)
SPECIMEN TYPE: ABNORMAL
VENTILATION MODE VENT: ABNORMAL
VT SETTING VENT: 480 ML
WBC # BLD AUTO: 11.1 K/UL (ref 4.6–13.2)
WBC MORPH BLD: ABNORMAL

## 2022-08-27 PROCEDURE — 74011250636 HC RX REV CODE- 250/636: Performed by: SURGERY

## 2022-08-27 PROCEDURE — 36600 WITHDRAWAL OF ARTERIAL BLOOD: CPT

## 2022-08-27 PROCEDURE — 2709999900 HC NON-CHARGEABLE SUPPLY

## 2022-08-27 PROCEDURE — 74011250636 HC RX REV CODE- 250/636: Performed by: PHYSICIAN ASSISTANT

## 2022-08-27 PROCEDURE — 83735 ASSAY OF MAGNESIUM: CPT

## 2022-08-27 PROCEDURE — 71045 X-RAY EXAM CHEST 1 VIEW: CPT

## 2022-08-27 PROCEDURE — 94640 AIRWAY INHALATION TREATMENT: CPT

## 2022-08-27 PROCEDURE — 87070 CULTURE OTHR SPECIMN AEROBIC: CPT

## 2022-08-27 PROCEDURE — 74011636637 HC RX REV CODE- 636/637: Performed by: PHYSICIAN ASSISTANT

## 2022-08-27 PROCEDURE — 65610000006 HC RM INTENSIVE CARE

## 2022-08-27 PROCEDURE — 80048 BASIC METABOLIC PNL TOTAL CA: CPT

## 2022-08-27 PROCEDURE — 74011250637 HC RX REV CODE- 250/637: Performed by: SURGERY

## 2022-08-27 PROCEDURE — 82803 BLOOD GASES ANY COMBINATION: CPT

## 2022-08-27 PROCEDURE — APPSS15 APP SPLIT SHARED TIME 0-15 MINUTES: Performed by: NURSE PRACTITIONER

## 2022-08-27 PROCEDURE — 82962 GLUCOSE BLOOD TEST: CPT

## 2022-08-27 PROCEDURE — 94003 VENT MGMT INPAT SUBQ DAY: CPT

## 2022-08-27 PROCEDURE — 85025 COMPLETE CBC W/AUTO DIFF WBC: CPT

## 2022-08-27 PROCEDURE — 74011000250 HC RX REV CODE- 250: Performed by: PHYSICIAN ASSISTANT

## 2022-08-27 PROCEDURE — 74011250637 HC RX REV CODE- 250/637: Performed by: NURSE PRACTITIONER

## 2022-08-27 PROCEDURE — 87077 CULTURE AEROBIC IDENTIFY: CPT

## 2022-08-27 PROCEDURE — 74011000250 HC RX REV CODE- 250: Performed by: INTERNAL MEDICINE

## 2022-08-27 PROCEDURE — 94762 N-INVAS EAR/PLS OXIMTRY CONT: CPT

## 2022-08-27 PROCEDURE — 99291 CRITICAL CARE FIRST HOUR: CPT | Performed by: NURSE PRACTITIONER

## 2022-08-27 PROCEDURE — 87185 SC STD ENZYME DETCJ PER NZM: CPT

## 2022-08-27 PROCEDURE — 74011250637 HC RX REV CODE- 250/637: Performed by: PHYSICIAN ASSISTANT

## 2022-08-27 PROCEDURE — 84100 ASSAY OF PHOSPHORUS: CPT

## 2022-08-27 PROCEDURE — 74011250636 HC RX REV CODE- 250/636: Performed by: REGISTERED NURSE

## 2022-08-27 PROCEDURE — 36415 COLL VENOUS BLD VENIPUNCTURE: CPT

## 2022-08-27 RX ORDER — MAGNESIUM SULFATE HEPTAHYDRATE 40 MG/ML
2 INJECTION, SOLUTION INTRAVENOUS ONCE
Status: COMPLETED | OUTPATIENT
Start: 2022-08-27 | End: 2022-08-27

## 2022-08-27 RX ORDER — ALBUTEROL SULFATE 0.83 MG/ML
2.5 SOLUTION RESPIRATORY (INHALATION)
Status: DISCONTINUED | OUTPATIENT
Start: 2022-08-27 | End: 2022-08-30

## 2022-08-27 RX ORDER — SODIUM CHLORIDE FOR INHALATION 3 %
4 VIAL, NEBULIZER (ML) INHALATION
Status: DISCONTINUED | OUTPATIENT
Start: 2022-08-27 | End: 2022-08-30

## 2022-08-27 RX ADMIN — CHLORHEXIDINE GLUCONATE 0.12% ORAL RINSE 10 ML: 1.2 LIQUID ORAL at 09:56

## 2022-08-27 RX ADMIN — SODIUM CHLORIDE SOLN NEBU 3% 4 ML: 3 NEBU SOLN at 23:55

## 2022-08-27 RX ADMIN — ALBUTEROL SULFATE 2.5 MG: 2.5 SOLUTION RESPIRATORY (INHALATION) at 12:47

## 2022-08-27 RX ADMIN — SENNOSIDES 8.8 MG: 8.8 LIQUID ORAL at 09:57

## 2022-08-27 RX ADMIN — FENTANYL CITRATE 150 MCG/HR: 0.05 INJECTION, SOLUTION INTRAMUSCULAR; INTRAVENOUS at 17:20

## 2022-08-27 RX ADMIN — HEPARIN SODIUM 5000 UNITS: 5000 INJECTION INTRAVENOUS; SUBCUTANEOUS at 05:04

## 2022-08-27 RX ADMIN — ALBUTEROL SULFATE 2.5 MG: 2.5 SOLUTION RESPIRATORY (INHALATION) at 19:37

## 2022-08-27 RX ADMIN — SODIUM CHLORIDE SOLN NEBU 3% 4 ML: 3 NEBU SOLN at 19:37

## 2022-08-27 RX ADMIN — Medication 12 UNITS: at 09:57

## 2022-08-27 RX ADMIN — SODIUM CHLORIDE, PRESERVATIVE FREE 20 MG: 5 INJECTION INTRAVENOUS at 09:57

## 2022-08-27 RX ADMIN — POLYETHYLENE GLYCOL 3350 17 G: 17 POWDER, FOR SOLUTION ORAL at 09:57

## 2022-08-27 RX ADMIN — OXYCODONE HYDROCHLORIDE AND ACETAMINOPHEN 1 TABLET: 5; 325 TABLET ORAL at 17:20

## 2022-08-27 RX ADMIN — SENNOSIDES 8.8 MG: 8.8 LIQUID ORAL at 17:19

## 2022-08-27 RX ADMIN — FENTANYL CITRATE 100 MCG: 50 INJECTION INTRAMUSCULAR; INTRAVENOUS at 22:56

## 2022-08-27 RX ADMIN — LEVOTHYROXINE SODIUM ANHYDROUS 100 MCG: 100 INJECTION, POWDER, LYOPHILIZED, FOR SOLUTION INTRAVENOUS at 09:58

## 2022-08-27 RX ADMIN — ALBUTEROL SULFATE 2.5 MG: 2.5 SOLUTION RESPIRATORY (INHALATION) at 16:56

## 2022-08-27 RX ADMIN — ALBUTEROL SULFATE 2.5 MG: 2.5 SOLUTION RESPIRATORY (INHALATION) at 23:55

## 2022-08-27 RX ADMIN — HEPARIN SODIUM 5000 UNITS: 5000 INJECTION INTRAVENOUS; SUBCUTANEOUS at 20:34

## 2022-08-27 RX ADMIN — SODIUM CHLORIDE SOLN NEBU 3% 4 ML: 3 NEBU SOLN at 16:56

## 2022-08-27 RX ADMIN — SODIUM CHLORIDE SOLN NEBU 3% 4 ML: 3 NEBU SOLN at 12:47

## 2022-08-27 RX ADMIN — Medication 2 UNITS: at 06:03

## 2022-08-27 RX ADMIN — SODIUM CHLORIDE, PRESERVATIVE FREE 20 MG: 5 INJECTION INTRAVENOUS at 20:34

## 2022-08-27 RX ADMIN — Medication 2 UNITS: at 00:56

## 2022-08-27 RX ADMIN — Medication 2 UNITS: at 12:02

## 2022-08-27 RX ADMIN — Medication 2 UNITS: at 17:19

## 2022-08-27 RX ADMIN — MAGNESIUM SULFATE HEPTAHYDRATE 2 G: 40 INJECTION, SOLUTION INTRAVENOUS at 03:57

## 2022-08-27 RX ADMIN — CHLORHEXIDINE GLUCONATE 0.12% ORAL RINSE 10 ML: 1.2 LIQUID ORAL at 20:34

## 2022-08-27 RX ADMIN — FENTANYL CITRATE 125 MCG/HR: 0.05 INJECTION, SOLUTION INTRAMUSCULAR; INTRAVENOUS at 07:44

## 2022-08-27 RX ADMIN — HEPARIN SODIUM 5000 UNITS: 5000 INJECTION INTRAVENOUS; SUBCUTANEOUS at 12:15

## 2022-08-27 RX ADMIN — OXYCODONE HYDROCHLORIDE AND ACETAMINOPHEN 1 TABLET: 5; 325 TABLET ORAL at 22:53

## 2022-08-27 RX ADMIN — ASPIRIN 81 MG CHEWABLE TABLET 81 MG: 81 TABLET CHEWABLE at 09:56

## 2022-08-27 NOTE — PROGRESS NOTES
SBT initiated, PS 7, PEEP 12 (for dynamic airway collapse seen on bronchoscopy 08/23), FiO2 30%. Pt awake and alert, agitated on Fentanyl, follows commands. 08/27/22 0742   Weaning Parameters   Spontaneous Breathing Trial Complete Yes   Resp Rate Observed 27   Ve 12.4      RSBI 63     Tachypneic and agitated, but tolerating. Will monitor for signs of apnea or distress.

## 2022-08-27 NOTE — PROGRESS NOTES
Problem: Falls - Risk of  Goal: *Absence of Falls  Description: Document Ame Clark Fall Risk and appropriate interventions in the flowsheet.   Outcome: Progressing Towards Goal  Note: Fall Risk Interventions:  Mobility Interventions: Bed/chair exit alarm, Communicate number of staff needed for ambulation/transfer, Patient to call before getting OOB    Mentation Interventions: Adequate sleep, hydration, pain control, Bed/chair exit alarm, Evaluate medications/consider consulting pharmacy, Door open when patient unattended, Increase mobility, More frequent rounding, Reorient patient, Room close to nurse's station    Medication Interventions: Evaluate medications/consider consulting pharmacy, Bed/chair exit alarm, Patient to call before getting OOB    Elimination Interventions: Bed/chair exit alarm, Stay With Me (per policy), Toileting schedule/hourly rounds              Problem: Ventilator Management  Goal: *Adequate oxygenation and ventilation  Outcome: Progressing Towards Goal

## 2022-08-27 NOTE — INTERDISCIPLINARY ROUNDS
[address] [place] [address] [facility]  Pulmonary, Critical Care, and Sleep Medicine  Interdisciplinary and Ventilator Weaning Rounds    Patient discussed in morning walking rounds and interdisciplinary rounds. ICU day: Admit 8/21     Overnight events:   Still with a lot of secretions     Assessments and best practice:  Ventilator  Ventilator day intubated 8/18, extubated 8/22, reintubated for stridor 8/22  Vent settings: FiO2 of 35 and PEEP of 12  VAP bundle, aim to keep peak plateau pressure 49-85QM H2O  Weaning assessed and documented   Patient is on SBT. Patient is on sedation holiday. Plan to wean with PS n/a. Outcome: will SBT  Final plan: potential extubation  Sedation  Fentanyl   Other pertinent drips  N/a  Lines noted  PIV, grissom, OGT, ETT   Critical labs assessed  Yes  Antibiotics  N/a  Medications reviewed  Yes  Pending imaging  none  Pending send out labs  No  Pending Procedures  None  Glycemic control  Stress ulcer prophylaxis. Pepcid  DVT prophylaxis. Heparin   Need for Lines, grissom assessed. Yes  Restraint Reevaluation   Yes  I have reevaluated the patient one hour after initiation of intervention. The patient is comfortable, uninjured, but continues to pose an imminent risk of injury to self to themselves and/or serious disruption of medical treatment required to keep patient stable. The patient's current medical and behavioral conditions that warrant the use intervention include danger to self and Interference with medical equipment or treatment. Restraint or seclusion will be discontinued at the earliest possible time, regardless of the scheduled expiration of the order.  Based on my evaluation, restraints will be continued: YES       Family contact/MPOA: spouse - Calin Araiza [phone]  Family updated - will be updated today     Palliative consult within 3 days of admission to ICU-  Ethics Guidance: 21 days      Daily Plans:  SBT  Check air leak  Extubate to Bipap or HFNC if possible May potentially bronch, needs good pulmonary toileting     JOYA time 15 minutes        Zuly Hoffman NP  08/27/22  Pulmonary, Critical Care Medicine  [facility]

## 2022-08-27 NOTE — PROGRESS NOTES
[address] [facility]  Pulmonary, Critical Care, and Sleep Medicine      Name: Bernarda Calvert MRN: [id number]   : 1960 Hospital: [address] [place] [address]   Date: 2022          Critical Care progress note    IMPRESSION:   S/p Cardiac arrest - ROSC after ~9 min of ACLS, no further events  Hypoxic and hypercapnic respiratory failure - now on mechanical vent. 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 Bakari Arellano. Seizure vs myoclonus - shortly following cardiac arrest x2. No further episodes of tremulousness or twitching noted  R groin hematoma w/ femstop in place  Hx of CAD w/ prior CABG  Hx of HFrEF - most recent EF from  -30%  Paroxysmal symptomatic a-fib with CHADSVASc2 score of 5. On chronic Amiodorone and anticoagulation  History of recurrent GI bleeding, requiring transfusion and HAS-BLED score of 4. Chronic stage 2 kidney disease. Remote breast cancer about ten years ago with mastectomy and chemotherapy, but no chest radiation. History of thyroid disorder  History of 'waking up 'previously with anesthesia. RECOMMENDATIONS:   Neuro: wean fentanyl gtt. RASS 0 to -1. PRN Fentanyl/ Versed for breakthrough sedation. Pulm:   -Titrate FiO2 for goal SPO2> 90%  -  Daily CXR and ABG while intubated  -S/p Bronch for mucus plugs (); Cont metanebs and hypertonic saline for copious secretions  - PRN duo-nebs q4, , Steroids discontinued. -SBT daily, would like to extubate to Bipap ideally, however, secretions making this a contraindication. Will cont to Optimize bronchial hygiene  - Will need to check cuff leak prior to extubation   -Chest x-ray looks worse on right side. May consider Bronch, but will likely not provide huge benefit, will consider    CVS: HD stable. Goal MAP >65mmHg. Cardiology following for assistance in management. Con't PRN Hydralazine for SBP >180mmHg. Vascular following. Cont SQH prophylaxis. Duplex BLE neg   Fluids:N/A   GI: SUP(pepcid), Trend LFTs, OGT. Cont Senna/Miralax for bowel regimen   Renal: Trend Renal indices, Strict Is/Os, Gonzalez (+). Hem/Onc: Monitor for s/o active bleeding. Daily CBC. I/D: Trend WBCs and temperature curve. No ABX. Endocrine: Q6 glucoses, SSI. Metabolic:  Daily BMP, mag, phos. Trend lytes, replace as needed. Musc/Skin: no acute issues, wound care as needed  Full code  Discussed during IDRs     Subjective/History:       HPI  Patient is a 63 y.o. female w/ pmhx of a fib, CAD, CHF who presented for covered stent procedure for descending thoracic aortic saccular aneurysm. Procedure performed 8/18 w/o incident. Following procedure, patient was drowsy but answering questions appropriately per bedside RN. Per chart review, she began having oxygen desaturations and escalating oxygen requirements, eventually ending up on HFNC. Blood gas performed showed mild hypoxia and mild hypercapnia. Per chart review, patient was found in cardiac arrest and ACLS initiated with approximately 9 min of downtime. EPOC labs following cardiac arrest essentially unremarkable. ABG with mixed respiratory and metabolic acidosis. Patient was extubated 8/22 and reintubated same day for stridor. Now with thick, copious secretions and bronchotracheomalacia preventing liberation from ventilator. Failing SBT's due to above, may need trach. The patient is critically ill and can not provide additional history due to Ventilated.      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    Anxiety     Breast CA ([address] 12/2009    S/P Lt Mastectomy and Chemo    CAD (coronary artery disease) 08/30/2010    S/P CABG X 4 ([address] to LAD, Sequential SVG to D2, Ramus, OM) 01/2011    Cardiomyopathy ([facility]     LVEF 35-40 % (08/20) 60% (08/14), 40% (2011)    Chemotherapy 04/2010    for 4 months    CHF (congestive heart failure) (HCC)     Chronic kidney disease     stage 2 per cardiac note cc    Chronic pain     COPD (chronic obstructive pulmonary disease) ([facility] 08/30/2010    Depression     Diabetes (HCC)     GERD (gastroesophageal reflux disease)     GI bleed     History of blood transfusion     2010 and 1/2022    Hx of heart artery stent 2010    x 10  (4, then 4, then 2)    Hyperlipidemia     Hypertension     MI (myocardial infarction) ([facility] 2009    x 3    Schatzki's ring     S/P EGD and Dilation (07/16)    Thyroid disease     hypothyroid    Tobacco abuse       Past Surgical History:   Procedure Laterality Date    COLONOSCOPY N/A 5/3/2017    COLON  performed by Harry Cho MD at [facility] ENDOSCOPY    COLONOSCOPY N/A 2/15/2021    COLONOSCOPY performed by Jefferson Camejo MD at [facility] ENDOSCOPY    HX APPENDECTOMY      HX CHOLECYSTECTOMY      HX GYN      tubal ligation    HX HEART CATHETERIZATION  11/11/09; 10/21/09    HX HEART CATHETERIZATION  10/15/10; 10/06/09    left heart    HX HEART CATHETERIZATION  11/7/2011    left heart cath, no new findings    HX OTHER SURGICAL  4/6/2010    Insertion right internal jugular single lumen MediPort. HX RADICAL MASTECTOMY Left 2/2010    left, with chemo    NM BREAST SURGERY PROCEDURE UNLISTED  2/22/10    left w/sentinel node bx    NM CABG, ARTERY-VEIN, FOUR  12/2010    NM CARDIAC SURG PROCEDURE UNLIST      stent placement before CABG    NM CHEST SURGERY PROCEDURE UNLISTED Right     Prior port placement      Prior to Admission medications    Medication Sig Start Date End Date Taking? Authorizing Provider   esomeprazole (NEXIUM) 40 mg capsule Take  by mouth daily.    Yes Provider, Historical   metFORMIN [facility] AT [facility]) 500 mg TG24 24 hour tablet Take  by mouth two (2) times a day. Yes Provider, Historical   albuterol-ipratropium (DUO-NEB) 2.5 mg-0.5 mg/3 ml nebu Take 3 mL by inhalation every six (6) hours as needed. Use every night   Yes Provider, Historical   rosuvastatin (Crestor) 20 mg tablet Take 1 Tablet by mouth daily. Patient taking differently: Take 20 mg by mouth nightly. 2/3/22  Yes Carlotta Stevenson MD   bumetanide (BUMEX) 1 mg tablet Take 1 Tablet by mouth every other day. Patient taking differently: Take 1 mg by mouth as needed. 2/3/22  Yes Carlotta Stevenson MD   sacubitriL-valsartan Edgar Contras) 24-26 mg tablet Take 1 Tablet by mouth two (2) times a day. 2/3/22  Yes Carlotta Stevenson MD   apixaban (Eliquis) 5 mg tablet Take 1 Tablet by mouth two (2) times a day. 2/1/22  Yes Carlotta Stevenson MD   metoprolol succinate (TOPROL-XL) 25 mg XL tablet Take 1 Tablet by mouth two (2) times a day. Patient taking differently: Take 25 mg by mouth nightly. 1/20/22  Yes Carlotta Stevenson MD   amiodarone (CORDARONE) 200 mg tablet TAKE 1 TABLET EVERY DAY 10/5/21  Yes Carlotta Stevenson MD   albuterol (PROVENTIL HFA, VENTOLIN HFA) 90 mcg/actuation inhaler Take 1 Puff by inhalation every four (4) hours as needed. Yes Provider, Historical   levothyroxine (SYNTHROID) 150 mcg tablet Take  by mouth Daily (before breakfast). Yes Provider, Historical   nitroglycerin (NITROSTAT) 0.4 mg SL tablet 1 Tablet by SubLINGual route every five (5) minutes as needed for Chest Pain.  11/30/21   Carlotta Stevenson MD     Current Facility-Administered Medications   Medication Dose Route Frequency    sennosides (SENOKOT) 8.8 mg/5 mL syrup 8.8 mg  5 mL Per G Tube BID    fentaNYL (PF) 1,500 mcg/30 mL (50 mcg/mL) infusion  0-200 mcg/hr IntraVENous TITRATE    insulin glargine (LANTUS) injection 12 Units  12 Units SubCUTAneous DAILY    levothyroxine (SYNTHROID) injection 100 mcg  100 mcg IntraVENous DAILY    heparin (porcine) injection 5,000 Units 5,000 Units SubCUTAneous Q8H    polyethylene glycol (MIRALAX) packet 17 g  17 g Oral DAILY    aspirin chewable tablet 81 mg  81 mg Oral DAILY    famotidine (PF) (PEPCID) 20 mg in 0.9% sodium chloride 10 mL injection  20 mg IntraVENous Q12H    chlorhexidine (PERIDEX) 0.12 % mouthwash 10 mL  10 mL Oral Q12H    insulin lispro (HUMALOG) injection   SubCUTAneous Q6H    [Held by provider] amiodarone (CORDARONE) tablet 200 mg  200 mg Oral DAILY    [Held by provider] levothyroxine (SYNTHROID) tablet 150 mcg  150 mcg Oral 6am    [Held by provider] rosuvastatin (CRESTOR) tablet 20 mg  20 mg Oral QHS    [Held by provider] sacubitriL-valsartan (ENTRESTO) 24-26 mg tablet 1 Tablet  1 Tablet Oral BID    [Held by provider] metoprolol succinate (TOPROL-XL) XL tablet 25 mg  25 mg Oral QHS     Allergies   Allergen Reactions    Shellfish Derived Hives     Eyes and Throat swelling      Social History     Tobacco Use    Smoking status: Every Day     Packs/day: 1.00     Years: 40.00     Pack years: 40.00     Types: Cigarettes    Smokeless tobacco: Never   Substance Use Topics    Alcohol use: No      Family History   Problem Relation Age of Onset    Hypertension Mother     Diabetes Mother     Breast Problems Mother         fiber cystic    Hypertension Other         Review of Systems:  Review of systems not obtained due to patient factors. Objective:   Vital Signs:    Visit Vitals  BP (!) 141/60   Pulse 83   Temp 98.1 °F (36.7 °C)   Resp 23   Ht 5' 9\" (1.753 m)   Wt 106.1 kg (233 lb 14.5 oz)   SpO2 98%   BMI 34.54 kg/m²       O2 Device: Ventilator, Endotracheal tube, Heated, Humidifier   O2 Flow Rate (L/min): 50 l/min   Temp (24hrs), Av.2 °F (37.3 °C), Min:98.1 °F (36.7 °C), Max:99.8 °F (37.7 °C)       Intake/Output:   Last shift:      No intake/output data recorded.   Last 3 shifts:  1901 -  0700  In: 2358 [I.V.:63]  Out: 1680 [Urine:1680]    Intake/Output Summary (Last 24 hours) at 2022 0845  Last data filed at 8/27/2022 0600  Gross per 24 hour   Intake 1383.32 ml   Output 980 ml   Net 403.32 ml       Physical Exam:    General:  Sedated, awake and following commands    Head:  Normocephalic, without obvious abnormality, atraumatic. Eyes:  Conjunctivae/corneas clear. PERRL, EOMs intact. Nose: Nares normal. Septum midline. Mucosa normal.  ET tube orally   Throat: Lips, mucosa, and tongue normal. Teeth and gums normal.   Neck: Supple, symmetrical, trachea midline   Lungs:   Coarse rhonchi bilaterally, R>L.+ thick, copious secretions from ETT tube. Chest wall:  +thoracotomy scar. No tenderness or deformity. Heart:   rate and regular rhythm, S1, S2 normal, no murmur, click, rub or gallop. Abdomen:   Soft, non-tender. Bowel sounds normal. No masses,  No organomegaly. Extremities: Extremities normal, atraumatic, BLE trace edema. BUE edema +1, non-pitting. Right groin dressing   Pulses: 2+ and symmetric all extremities. Skin: Skin color, texture, turgor normal. No rashes or lesions. Normal capillary refill. Neurologic: Sedated.  But follows commands        Data:     Recent Results (from the past 24 hour(s))   GLUCOSE, POC    Collection Time: 08/26/22 12:08 PM   Result Value Ref Range    Glucose (POC) 186 (H) 70 - 110 mg/dL   GLUCOSE, POC    Collection Time: 08/26/22  5:33 PM   Result Value Ref Range    Glucose (POC) 212 (H) 70 - 110 mg/dL   GLUCOSE, POC    Collection Time: 08/27/22 12:15 AM   Result Value Ref Range    Glucose (POC) 192 (H) 70 - 110 mg/dL   MAGNESIUM    Collection Time: 08/27/22  2:14 AM   Result Value Ref Range    Magnesium 1.8 1.6 - 2.6 mg/dL   PHOSPHORUS    Collection Time: 08/27/22  2:14 AM   Result Value Ref Range    Phosphorus 3.7 2.5 - 4.9 MG/DL   CBC WITH AUTOMATED DIFF    Collection Time: 08/27/22  2:14 AM   Result Value Ref Range    WBC 11.1 4.6 - 13.2 K/uL    RBC 2.84 (L) 4.20 - 5.30 M/uL    HGB 7.8 (L) 12.0 - 16.0 g/dL    HCT 25.4 (L) 35.0 - 45.0 %    MCV 89.4 78.0 - 100.0 FL    MCH 27.5 24.0 - 34.0 PG    MCHC 30.7 (L) 31.0 - 37.0 g/dL    RDW 17.6 (H) 11.6 - 14.5 %    PLATELET 599 672 - 284 K/uL    MPV 10.9 9.2 - 11.8 FL    NRBC 0.2 (H) 0  WBC    ABSOLUTE NRBC 0.02 (H) 0.00 - 0.01 K/uL    NEUTROPHILS 77 (H) 40 - 73 %    BAND NEUTROPHILS 8 (H) 0 - 5 %    LYMPHOCYTES 7 (L) 21 - 52 %    MONOCYTES 6 3 - 10 %    EOSINOPHILS 0 0 - 5 %    BASOPHILS 0 0 - 2 %    METAMYELOCYTES 2 (H) 0 %    IMMATURE GRANULOCYTES 0 %    ABS. NEUTROPHILS 9.4 (H) 1.8 - 8.0 K/UL    ABS. LYMPHOCYTES 0.8 (L) 0.9 - 3.6 K/UL    ABS. MONOCYTES 0.7 0.05 - 1.2 K/UL    ABS. EOSINOPHILS 0.0 0.0 - 0.4 K/UL    ABS. BASOPHILS 0.0 0.0 - 0.1 K/UL    ABS. IMM.  GRANS. 0.0 K/UL    DF MANUAL      RBC COMMENTS ANISOCYTOSIS  1+        RBC COMMENTS HYPOCHROMIA  1+        RBC COMMENTS POLYCHROMASIA  1+        RBC COMMENTS MACROCYTOSIS  1+        WBC COMMENTS TOXIC GRANULATION     METABOLIC PANEL, BASIC    Collection Time: 08/27/22  2:14 AM   Result Value Ref Range    Sodium 137 136 - 145 mmol/L    Potassium 4.1 3.5 - 5.5 mmol/L    Chloride 103 100 - 111 mmol/L    CO2 29 21 - 32 mmol/L    Anion gap 5 3.0 - 18 mmol/L    Glucose 161 (H) 74 - 99 mg/dL    BUN 32 (H) 7.0 - 18 MG/DL    Creatinine 0.85 0.6 - 1.3 MG/DL    BUN/Creatinine ratio 38 (H) 12 - 20      GFR est AA >60 >60 ml/min/1.73m2    GFR est non-AA >60 >60 ml/min/1.73m2    Calcium 9.0 8.5 - 10.1 MG/DL   BLOOD GAS, ARTERIAL POC    Collection Time: 08/27/22  4:35 AM   Result Value Ref Range    Device: ADULT VENT      FIO2 (POC) 35 %    pH (POC) 7.44 7.35 - 7.45      pCO2 (POC) 41.4 35.0 - 45.0 MMHG    pO2 (POC) 80 80 - 100 MMHG    HCO3 (POC) 28.3 (H) 22 - 26 MMOL/L    sO2 (POC) 96.2 92 - 97 %    Base excess (POC) 3.8 mmol/L    Mode ASSIST CONTROL      Tidal volume 480 ml    Set Rate 15 bpm    PEEP/CPAP (POC) 12 cmH2O    Allens test (POC) Positive      Site RIGHT RADIAL      Specimen type (POC) ARTERIAL      Performed by Latricia Epstein Dr, POC    Collection Time: 08/27/22  5:36 AM   Result Value Ref Range    Glucose (POC) 175 (H) 70 - 110 mg/dL             Telemetry:normal sinus rhythm    Imaging:  CXR Results  (Last 48 hours)                 08/26/22 0554  XR CHEST PORT Final result    Impression:  Stable cardiopulmonary findings. Support devices as discussed. Narrative:  EXAM: XR CHEST PORT       INDICATION: intubated       COMPARISON: Recent prior. FINDINGS: A single view of the chest demonstrates no gross interval change. .   Stable central congestion. Stable enlarged cardiac silhouette. Aortic stent   graft stable. Clips and mediastinal wires again noted, grossly similar to prior   exam.. No acute bone findings. Endotracheal tube above the level of the anai. Right IJ CVL appears to have been removed. Enteric tube grossly similar. .                    CT Results  (Last 48 hours)      None                     JOYA SPLIT/SHARE TIME: 15 mins    ERLINDA Naik  08/27/22  Pulmonary, Critical Care Medicine  [facility]

## 2022-08-28 ENCOUNTER — APPOINTMENT (OUTPATIENT)
Dept: GENERAL RADIOLOGY | Age: 62
DRG: 219 | End: 2022-08-28
Attending: PHYSICIAN ASSISTANT
Payer: MEDICARE

## 2022-08-28 LAB
ANION GAP SERPL CALC-SCNC: 6 MMOL/L (ref 3–18)
ARTERIAL PATENCY WRIST A: POSITIVE
BASE EXCESS BLD CALC-SCNC: 4.3 MMOL/L
BASOPHILS # BLD: 0 K/UL (ref 0–0.1)
BASOPHILS NFR BLD: 0 % (ref 0–2)
BDY SITE: ABNORMAL
BUN SERPL-MCNC: 33 MG/DL (ref 7–18)
BUN/CREAT SERPL: 38 (ref 12–20)
CALCIUM SERPL-MCNC: 8.8 MG/DL (ref 8.5–10.1)
CHLORIDE SERPL-SCNC: 103 MMOL/L (ref 100–111)
CO2 SERPL-SCNC: 28 MMOL/L (ref 21–32)
CREAT SERPL-MCNC: 0.87 MG/DL (ref 0.6–1.3)
DIFFERENTIAL METHOD BLD: ABNORMAL
EOSINOPHIL # BLD: 0 K/UL (ref 0–0.4)
EOSINOPHIL NFR BLD: 0 % (ref 0–5)
ERYTHROCYTE [DISTWIDTH] IN BLOOD BY AUTOMATED COUNT: 17.6 % (ref 11.6–14.5)
GAS FLOW.O2 O2 DELIVERY SYS: ABNORMAL L/MIN
GAS FLOW.O2 SETTING OXYMISER: 15 BPM
GLUCOSE BLD STRIP.AUTO-MCNC: 128 MG/DL (ref 70–110)
GLUCOSE BLD STRIP.AUTO-MCNC: 178 MG/DL (ref 70–110)
GLUCOSE BLD STRIP.AUTO-MCNC: 192 MG/DL (ref 70–110)
GLUCOSE SERPL-MCNC: 156 MG/DL (ref 74–99)
HCO3 BLD-SCNC: 30.3 MMOL/L (ref 22–26)
HCT VFR BLD AUTO: 24.8 % (ref 35–45)
HGB BLD-MCNC: 7.3 G/DL (ref 12–16)
IMM GRANULOCYTES # BLD AUTO: 0 K/UL (ref 0–0.04)
IMM GRANULOCYTES NFR BLD AUTO: 0 % (ref 0–0.5)
LYMPHOCYTES # BLD: 0.9 K/UL (ref 0.9–3.6)
LYMPHOCYTES NFR BLD: 7 % (ref 21–52)
MAGNESIUM SERPL-MCNC: 2.1 MG/DL (ref 1.6–2.6)
MCH RBC QN AUTO: 27 PG (ref 24–34)
MCHC RBC AUTO-ENTMCNC: 29.4 G/DL (ref 31–37)
MCV RBC AUTO: 91.9 FL (ref 78–100)
METAMYELOCYTES NFR BLD MANUAL: 1 %
MONOCYTES # BLD: 1.1 K/UL (ref 0.05–1.2)
MONOCYTES NFR BLD: 9 % (ref 3–10)
NEUTS BAND NFR BLD MANUAL: 9 %
NEUTS SEG # BLD: 10.1 K/UL (ref 1.8–8)
NEUTS SEG NFR BLD: 74 % (ref 40–73)
NRBC # BLD: 0.02 K/UL (ref 0–0.01)
NRBC BLD-RTO: 0.2 PER 100 WBC
O2/TOTAL GAS SETTING VFR VENT: 30 %
PCO2 BLD: 53.6 MMHG (ref 35–45)
PEEP RESPIRATORY: 12 CMH2O
PH BLD: 7.36 [PH] (ref 7.35–7.45)
PHOSPHATE SERPL-MCNC: 4.5 MG/DL (ref 2.5–4.9)
PLATELET # BLD AUTO: 206 K/UL (ref 135–420)
PLATELET COMMENTS,PCOM: ABNORMAL
PMV BLD AUTO: 11.6 FL (ref 9.2–11.8)
PO2 BLD: 70 MMHG (ref 80–100)
POTASSIUM SERPL-SCNC: 4.5 MMOL/L (ref 3.5–5.5)
RBC # BLD AUTO: 2.7 M/UL (ref 4.2–5.3)
RBC MORPH BLD: ABNORMAL
SAO2 % BLD: 92.8 % (ref 92–97)
SERVICE CMNT-IMP: ABNORMAL
SODIUM SERPL-SCNC: 137 MMOL/L (ref 136–145)
SPECIMEN TYPE: ABNORMAL
TOTAL RESP. RATE, ITRR: 15
VENTILATION MODE VENT: ABNORMAL
VT SETTING VENT: 480 ML
WBC # BLD AUTO: 12.2 K/UL (ref 4.6–13.2)

## 2022-08-28 PROCEDURE — 36600 WITHDRAWAL OF ARTERIAL BLOOD: CPT

## 2022-08-28 PROCEDURE — 84100 ASSAY OF PHOSPHORUS: CPT

## 2022-08-28 PROCEDURE — 94762 N-INVAS EAR/PLS OXIMTRY CONT: CPT

## 2022-08-28 PROCEDURE — 74011250637 HC RX REV CODE- 250/637: Performed by: PHYSICIAN ASSISTANT

## 2022-08-28 PROCEDURE — 82962 GLUCOSE BLOOD TEST: CPT

## 2022-08-28 PROCEDURE — 74011250636 HC RX REV CODE- 250/636: Performed by: NURSE PRACTITIONER

## 2022-08-28 PROCEDURE — 74011250636 HC RX REV CODE- 250/636: Performed by: INTERNAL MEDICINE

## 2022-08-28 PROCEDURE — 74011250636 HC RX REV CODE- 250/636: Performed by: REGISTERED NURSE

## 2022-08-28 PROCEDURE — 94003 VENT MGMT INPAT SUBQ DAY: CPT

## 2022-08-28 PROCEDURE — 74011250636 HC RX REV CODE- 250/636: Performed by: PHYSICIAN ASSISTANT

## 2022-08-28 PROCEDURE — 99291 CRITICAL CARE FIRST HOUR: CPT | Performed by: NURSE PRACTITIONER

## 2022-08-28 PROCEDURE — 74011000250 HC RX REV CODE- 250: Performed by: INTERNAL MEDICINE

## 2022-08-28 PROCEDURE — 36415 COLL VENOUS BLD VENIPUNCTURE: CPT

## 2022-08-28 PROCEDURE — 74011000258 HC RX REV CODE- 258: Performed by: NURSE PRACTITIONER

## 2022-08-28 PROCEDURE — 74011250636 HC RX REV CODE- 250/636

## 2022-08-28 PROCEDURE — 74011000258 HC RX REV CODE- 258: Performed by: INTERNAL MEDICINE

## 2022-08-28 PROCEDURE — 74011250636 HC RX REV CODE- 250/636: Performed by: SURGERY

## 2022-08-28 PROCEDURE — 65610000006 HC RM INTENSIVE CARE

## 2022-08-28 PROCEDURE — 2709999900 HC NON-CHARGEABLE SUPPLY

## 2022-08-28 PROCEDURE — 83735 ASSAY OF MAGNESIUM: CPT

## 2022-08-28 PROCEDURE — 77030018842 HC SOL IRR SOD CL 9% BAXT -A

## 2022-08-28 PROCEDURE — 71045 X-RAY EXAM CHEST 1 VIEW: CPT

## 2022-08-28 PROCEDURE — P9045 ALBUMIN (HUMAN), 5%, 250 ML: HCPCS | Performed by: PHYSICIAN ASSISTANT

## 2022-08-28 PROCEDURE — 85025 COMPLETE CBC W/AUTO DIFF WBC: CPT

## 2022-08-28 PROCEDURE — 74011636637 HC RX REV CODE- 636/637: Performed by: PHYSICIAN ASSISTANT

## 2022-08-28 PROCEDURE — 80048 BASIC METABOLIC PNL TOTAL CA: CPT

## 2022-08-28 PROCEDURE — 82803 BLOOD GASES ANY COMBINATION: CPT

## 2022-08-28 PROCEDURE — 74011250637 HC RX REV CODE- 250/637: Performed by: NURSE PRACTITIONER

## 2022-08-28 PROCEDURE — APPSS15 APP SPLIT SHARED TIME 0-15 MINUTES: Performed by: NURSE PRACTITIONER

## 2022-08-28 PROCEDURE — 74011000250 HC RX REV CODE- 250: Performed by: PHYSICIAN ASSISTANT

## 2022-08-28 PROCEDURE — 74011636637 HC RX REV CODE- 636/637: Performed by: INTERNAL MEDICINE

## 2022-08-28 PROCEDURE — 74011250637 HC RX REV CODE- 250/637: Performed by: SURGERY

## 2022-08-28 PROCEDURE — 94640 AIRWAY INHALATION TREATMENT: CPT

## 2022-08-28 RX ORDER — MIDAZOLAM HYDROCHLORIDE 1 MG/ML
INJECTION, SOLUTION INTRAMUSCULAR; INTRAVENOUS
Status: COMPLETED
Start: 2022-08-28 | End: 2022-08-28

## 2022-08-28 RX ORDER — MIDAZOLAM IN 0.9 % SOD.CHLORID 1 MG/ML
0-10 PLASTIC BAG, INJECTION (ML) INTRAVENOUS
Status: DISCONTINUED | OUTPATIENT
Start: 2022-08-28 | End: 2022-08-28

## 2022-08-28 RX ORDER — IBUPROFEN 200 MG
1 TABLET ORAL DAILY
Status: DISCONTINUED | OUTPATIENT
Start: 2022-08-29 | End: 2022-09-05 | Stop reason: HOSPADM

## 2022-08-28 RX ORDER — MIDAZOLAM HYDROCHLORIDE 1 MG/ML
2 INJECTION, SOLUTION INTRAMUSCULAR; INTRAVENOUS ONCE
Status: COMPLETED | OUTPATIENT
Start: 2022-08-28 | End: 2022-08-28

## 2022-08-28 RX ORDER — INSULIN GLARGINE 100 [IU]/ML
14 INJECTION, SOLUTION SUBCUTANEOUS DAILY
Status: DISCONTINUED | OUTPATIENT
Start: 2022-08-29 | End: 2022-09-02

## 2022-08-28 RX ORDER — ALBUMIN HUMAN 50 G/1000ML
25 SOLUTION INTRAVENOUS ONCE
Status: COMPLETED | OUTPATIENT
Start: 2022-08-28 | End: 2022-08-29

## 2022-08-28 RX ORDER — MIDAZOLAM HYDROCHLORIDE 1 MG/ML
1 INJECTION, SOLUTION INTRAMUSCULAR; INTRAVENOUS
Status: DISCONTINUED | OUTPATIENT
Start: 2022-08-28 | End: 2022-08-31

## 2022-08-28 RX ORDER — INSULIN GLARGINE 100 [IU]/ML
2 INJECTION, SOLUTION SUBCUTANEOUS ONCE
Status: COMPLETED | OUTPATIENT
Start: 2022-08-28 | End: 2022-08-28

## 2022-08-28 RX ORDER — SODIUM CHLORIDE 9 MG/ML
500 INJECTION, SOLUTION INTRAVENOUS CONTINUOUS
Status: DISPENSED | OUTPATIENT
Start: 2022-08-28 | End: 2022-08-29

## 2022-08-28 RX ADMIN — LEVOTHYROXINE SODIUM ANHYDROUS 100 MCG: 100 INJECTION, POWDER, LYOPHILIZED, FOR SOLUTION INTRAVENOUS at 09:32

## 2022-08-28 RX ADMIN — ALBUMIN (HUMAN) 25 G: 12.5 INJECTION, SOLUTION INTRAVENOUS at 21:30

## 2022-08-28 RX ADMIN — SODIUM CHLORIDE SOLN NEBU 3% 4 ML: 3 NEBU SOLN at 12:44

## 2022-08-28 RX ADMIN — HEPARIN SODIUM 5000 UNITS: 5000 INJECTION INTRAVENOUS; SUBCUTANEOUS at 11:40

## 2022-08-28 RX ADMIN — PIPERACILLIN SODIUM AND TAZOBACTAM SODIUM 3.38 G: 3; .375 INJECTION, POWDER, LYOPHILIZED, FOR SOLUTION INTRAVENOUS at 15:39

## 2022-08-28 RX ADMIN — SODIUM CHLORIDE SOLN NEBU 3% 4 ML: 3 NEBU SOLN at 04:07

## 2022-08-28 RX ADMIN — SODIUM CHLORIDE SOLN NEBU 3% 4 ML: 3 NEBU SOLN at 19:19

## 2022-08-28 RX ADMIN — HYDROMORPHONE HYDROCHLORIDE 1 MG: 1 INJECTION, SOLUTION INTRAMUSCULAR; INTRAVENOUS; SUBCUTANEOUS at 03:15

## 2022-08-28 RX ADMIN — SODIUM CHLORIDE SOLN NEBU 3% 4 ML: 3 NEBU SOLN at 23:21

## 2022-08-28 RX ADMIN — POLYETHYLENE GLYCOL 3350 17 G: 17 POWDER, FOR SOLUTION ORAL at 09:32

## 2022-08-28 RX ADMIN — FENTANYL CITRATE 150 MCG/HR: 0.05 INJECTION, SOLUTION INTRAMUSCULAR; INTRAVENOUS at 12:08

## 2022-08-28 RX ADMIN — MIDAZOLAM 2 MG: 1 INJECTION INTRAMUSCULAR; INTRAVENOUS at 10:35

## 2022-08-28 RX ADMIN — Medication 2 UNITS: at 12:19

## 2022-08-28 RX ADMIN — SENNOSIDES 8.8 MG: 8.8 LIQUID ORAL at 18:14

## 2022-08-28 RX ADMIN — SODIUM CHLORIDE SOLN NEBU 3% 4 ML: 3 NEBU SOLN at 07:34

## 2022-08-28 RX ADMIN — CHLORHEXIDINE GLUCONATE 0.12% ORAL RINSE 10 ML: 1.2 LIQUID ORAL at 09:32

## 2022-08-28 RX ADMIN — MIDAZOLAM HYDROCHLORIDE 2 MG: 1 INJECTION, SOLUTION INTRAMUSCULAR; INTRAVENOUS at 10:35

## 2022-08-28 RX ADMIN — MIDAZOLAM HYDROCHLORIDE 2 MG: 1 INJECTION, SOLUTION INTRAMUSCULAR; INTRAVENOUS at 04:39

## 2022-08-28 RX ADMIN — ALBUTEROL SULFATE 2.5 MG: 2.5 SOLUTION RESPIRATORY (INHALATION) at 19:19

## 2022-08-28 RX ADMIN — SODIUM CHLORIDE 500 ML/HR: 9 INJECTION, SOLUTION INTRAVENOUS at 17:31

## 2022-08-28 RX ADMIN — ALBUTEROL SULFATE 2.5 MG: 2.5 SOLUTION RESPIRATORY (INHALATION) at 04:07

## 2022-08-28 RX ADMIN — Medication 2 UNITS: at 00:00

## 2022-08-28 RX ADMIN — HEPARIN SODIUM 5000 UNITS: 5000 INJECTION INTRAVENOUS; SUBCUTANEOUS at 04:10

## 2022-08-28 RX ADMIN — MIDAZOLAM 2 MG/HR: 5 INJECTION INTRAMUSCULAR; INTRAVENOUS at 15:29

## 2022-08-28 RX ADMIN — Medication 12 UNITS: at 09:31

## 2022-08-28 RX ADMIN — ASPIRIN 81 MG CHEWABLE TABLET 81 MG: 81 TABLET CHEWABLE at 09:32

## 2022-08-28 RX ADMIN — ALBUTEROL SULFATE 2.5 MG: 2.5 SOLUTION RESPIRATORY (INHALATION) at 23:21

## 2022-08-28 RX ADMIN — MIDAZOLAM 2 MG: 1 INJECTION INTRAMUSCULAR; INTRAVENOUS at 04:39

## 2022-08-28 RX ADMIN — INSULIN GLARGINE 2 UNITS: 100 INJECTION, SOLUTION SUBCUTANEOUS at 14:28

## 2022-08-28 RX ADMIN — FENTANYL CITRATE 150 MCG/HR: 0.05 INJECTION, SOLUTION INTRAMUSCULAR; INTRAVENOUS at 03:33

## 2022-08-28 RX ADMIN — ALBUTEROL SULFATE 2.5 MG: 2.5 SOLUTION RESPIRATORY (INHALATION) at 12:44

## 2022-08-28 RX ADMIN — PIPERACILLIN AND TAZOBACTAM 4.5 G: 4; .5 INJECTION, POWDER, LYOPHILIZED, FOR SOLUTION INTRAVENOUS at 10:34

## 2022-08-28 RX ADMIN — MIDAZOLAM HYDROCHLORIDE 1 MG: 1 INJECTION, SOLUTION INTRAMUSCULAR; INTRAVENOUS at 15:30

## 2022-08-28 RX ADMIN — Medication 2 UNITS: at 08:13

## 2022-08-28 RX ADMIN — ALBUTEROL SULFATE 2.5 MG: 2.5 SOLUTION RESPIRATORY (INHALATION) at 07:34

## 2022-08-28 RX ADMIN — SENNOSIDES 8.8 MG: 8.8 LIQUID ORAL at 09:33

## 2022-08-28 RX ADMIN — SODIUM CHLORIDE, PRESERVATIVE FREE 20 MG: 5 INJECTION INTRAVENOUS at 09:32

## 2022-08-28 RX ADMIN — HEPARIN SODIUM 5000 UNITS: 5000 INJECTION INTRAVENOUS; SUBCUTANEOUS at 20:23

## 2022-08-28 RX ADMIN — CHLORHEXIDINE GLUCONATE 0.12% ORAL RINSE 10 ML: 1.2 LIQUID ORAL at 20:23

## 2022-08-28 RX ADMIN — SODIUM CHLORIDE, PRESERVATIVE FREE 20 MG: 5 INJECTION INTRAVENOUS at 20:23

## 2022-08-28 NOTE — PROGRESS NOTES
Pharmacy Note     Zosyn 3.375 gm q6h was ordered for treatment of VAP. Per [address] [place] [address], this order will be changed to 4.5 gm IV x 30 mins, followed by 3.375 gm q8h to be infused over 240 mins. Estimated Creatinine Clearance: Estimated Creatinine Clearance: 89.2 mL/min (based on SCr of 0.87 mg/dL). Dialysis Status, DEAN, CKD: None    BMI:  Body mass index is 34.54 kg/m². Rationale for Adjustment:  [facility] B-Lactam extended infusion policy    Pharmacy will continue to monitor and adjust dose as necessary. Please call with any questions.     Thank you,  Josh Hay, PHARMD

## 2022-08-28 NOTE — ROUTINE PROCESS
Family at bedside most of afternoon. Pt tries to communicate with them by pointing and gestering. Given pen and paper but unable to write any words. Reminded family that pt needs to rest while intubated and not be stimulated too much. Pt has copious ETT secretions and coughs freq  and more when stimulated by family (they also try to calm pt down)  Bedside and Verbal shift change report given to [address] [facility] [facility] (oncoming nurse) by David Coronado RN (offgoing nurse). Report included the following information SBAR, Kardex, Intake/Output, MAR, Recent Results, and Cardiac Rhythm . Jessenia Vinson

## 2022-08-28 NOTE — PROGRESS NOTES
Remains stable on vent support  Secretions, chronic lung disease, and tracheomalacia limiting  Pulmonary appreciated

## 2022-08-28 NOTE — INTERDISCIPLINARY ROUNDS
[employer] [facility]  Pulmonary, Critical Care, and Sleep Medicine  Interdisciplinary and Ventilator Weaning Rounds    Patient discussed in morning walking rounds and interdisciplinary rounds. ICU day: Admit 8/21     Overnight events:   Still with a lot of secretions   Episode of agitation this AM. S/p Versed administration     Assessments and best practice:  Ventilator  Ventilator day intubated 8/18, extubated 8/22, reintubated for stridor 8/22  Vent settings: FiO2 of 35 and PEEP of 12  VAP bundle, aim to keep peak plateau pressure 25-33OG H2O  Weaning assessed and documented   Patient is on SBT. Patient is on sedation holiday. Plan to wean with PS n/a. Outcome: will SBT  Final plan: potential extubation  Sedation  Fentanyl   Other pertinent drips  N/a  Lines noted  PIV, grissom, OGT, ETT   Critical labs assessed  Yes  Antibiotics  N/a  Medications reviewed  Yes  Pending imaging  none  Pending send out labs  No  Pending Procedures  None  Glycemic control  Stress ulcer prophylaxis. Pepcid  DVT prophylaxis. Heparin   Need for Lines, grissom assessed. Yes  Restraint Reevaluation   Yes  I have reevaluated the patient one hour after initiation of intervention. The patient is comfortable, uninjured, but continues to pose an imminent risk of injury to self to themselves and/or serious disruption of medical treatment required to keep patient stable. The patient's current medical and behavioral conditions that warrant the use intervention include danger to self and Interference with medical equipment or treatment. Restraint or seclusion will be discontinued at the earliest possible time, regardless of the scheduled expiration of the order. Based on my evaluation, restraints will be continued: YES       Family contact/MPOA: spouse - Tona Owens [phone]  Family updated - will be updated today     Palliative consult within 3 days of admission to ICU-  Ethics Guidance: 21 days      Daily Plans:   Will hold off on SBT due to secretions   Resp cx with GPC's and GNR's, awaiting speciation.  Started on Zosyn   Urine malodorous with sediment, U/A ordered   Wound care consult for wound to right groin, inform Vascular Surgery when they round   Extubate to Bipap or HFNC if possible   May potentially bronch, needs good pulmonary toileting     JOYA time 15 minutes        Royal García NP  08/28/22  Pulmonary, Critical Care Medicine  [facility]

## 2022-08-28 NOTE — PROGRESS NOTES
[address] [facility]  Pulmonary, Critical Care, and Sleep Medicine      Name: Vincent Goodell MRN: [id number]   : 1960 Hospital: [facility]   Date: 2022          Critical Care progress note    IMPRESSION:   S/p Cardiac arrest - ROSC after ~9 min of ACLS, no further events  Hypoxic and hypercapnic respiratory failure - now on mechanical vent. 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 Peg Beltran. Seizure vs myoclonus - shortly following cardiac arrest x2. No further episodes of tremulousness or twitching noted  R groin hematoma w/ femstop in place  Hx of CAD w/ prior CABG  Hx of HFrEF - most recent EF from  -30%  Paroxysmal symptomatic a-fib with CHADSVASc2 score of 5. On chronic Amiodorone and anticoagulation  History of recurrent GI bleeding, requiring transfusion and HAS-BLED score of 4. Chronic stage 2 kidney disease. Remote breast cancer about ten years ago with mastectomy and chemotherapy, but no chest radiation. History of thyroid disorder  History of 'waking up 'previously with anesthesia. RECOMMENDATIONS:   Neuro: wean fentanyl gtt. RASS 0 to -1. PRN Fentanyl/ Versed for breakthrough sedation. Pulm:   -Titrate FiO2 for goal SPO2> 90%  -  Daily CXR and ABG while intubated  -S/p Bronch for mucus plugs (); Cont metanebs and hypertonic saline for copious secretions  - PRN duo-nebs q4,Steroids discontinued. -SBT daily, would like to extubate to Bipap ideally, however, secretions making this a contraindication. Will cont to Optimize bronchial hygiene  -Will need to check cuff leak prior to extubation   -May consider Bronch, but will likely not provide huge benefit, will consider    CVS: HD stable. Goal MAP >65mmHg. Cardiology following for assistance in management.  Con't PRN Hydralazine for SBP >180mmHg. Vascular following. Cont SQH prophylaxis. Duplex BLE neg   Fluids:N/A   GI: SUP(pepcid), Trend LFTs, OGT. Cont Senna/Miralax for bowel regimen   Renal: Trend Renal indices, Strict Is/Os, Gonzalez (+). Hem/Onc: Monitor for s/o active bleeding. Daily CBC. I/D: Trend WBCs and temperature curve. Resp cx 8/27 with GNR's and GPC's- will start Zosyn and await speciation, obtain U/A as urine is full of sediment, malodorous     Endocrine: Q6 glucoses, SSI. Metabolic:  Daily BMP, mag, phos. Trend lytes, replace as needed. Musc/Skin: no acute issues, wound care as needed  Full code  Discussed during IDRs     Subjective/History:       HPI  Patient is a 63 y.o. female w/ pmhx of a fib, CAD, CHF who presented for covered stent procedure for descending thoracic aortic saccular aneurysm. Procedure performed 8/18 w/o incident. Following procedure, patient was drowsy but answering questions appropriately per bedside RN. Per chart review, she began having oxygen desaturations and escalating oxygen requirements, eventually ending up on HFNC. Blood gas performed showed mild hypoxia and mild hypercapnia. Per chart review, patient was found in cardiac arrest and ACLS initiated with approximately 9 min of downtime. EPOC labs following cardiac arrest essentially unremarkable. ABG with mixed respiratory and metabolic acidosis. Patient was extubated 8/22 and reintubated same day for stridor. Now with thick, copious secretions and bronchotracheomalacia preventing liberation from ventilator. Failing SBT's due to above, may need trach. The patient is critically ill and can not provide additional history due to Ventilated.      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    Anxiety     Breast CA ([facility] [address] 12/2009    S/P Lt Mastectomy and Chemo    CAD (coronary artery disease) 08/30/2010    S/P CABG X 4 ([address] to LAD, Sequential SVG to D2, Ramus, OM) 01/2011    Cardiomyopathy ([facility] [address]     LVEF 35-40 % (08/20) 60% (08/14), 40% (2011)    Chemotherapy 04/2010    for 4 months    CHF (congestive heart failure) (HCC)     Chronic kidney disease     stage 2 per cardiac note cc    Chronic pain     COPD (chronic obstructive pulmonary disease) ([facility] [address] 08/30/2010    Depression     Diabetes (HCC)     GERD (gastroesophageal reflux disease)     GI bleed     History of blood transfusion     2010 and 1/2022    Hx of heart artery stent 2010    x 10  (4, then 4, then 2)    Hyperlipidemia     Hypertension     MI (myocardial infarction) ([facility] [address] 2009    x 3    Schatzki's ring     S/P EGD and Dilation (07/16)    Thyroid disease     hypothyroid    Tobacco abuse       Past Surgical History:   Procedure Laterality Date    COLONOSCOPY N/A 5/3/2017    COLON  performed by Arelis Palacios MD at [facility] ENDOSCOPY    COLONOSCOPY N/A 2/15/2021    COLONOSCOPY performed by Venkata Elias MD at [facility] ENDOSCOPY    HX APPENDECTOMY      HX CHOLECYSTECTOMY      HX GYN      tubal ligation    HX HEART CATHETERIZATION  11/11/09; 10/21/09    HX HEART CATHETERIZATION  10/15/10; 10/06/09    left heart    HX HEART CATHETERIZATION  11/7/2011    left heart cath, no new findings    HX OTHER SURGICAL  4/6/2010    Insertion right internal jugular single lumen MediPort.     HX RADICAL MASTECTOMY Left 2/2010    left, with chemo    MO BREAST SURGERY PROCEDURE UNLISTED  2/22/10    left w/sentinel node bx    MO CABG, ARTERY-VEIN, FOUR  12/2010    MO CARDIAC SURG PROCEDURE UNLIST      stent placement before CABG NE CHEST SURGERY PROCEDURE UNLISTED Right     Prior port placement      Prior to Admission medications    Medication Sig Start Date End Date Taking? Authorizing Provider   esomeprazole (NEXIUM) 40 mg capsule Take  by mouth daily. Yes Provider, Historical   metFORMIN (GLUMETZA ER) 500 mg TG24 24 hour tablet Take  by mouth two (2) times a day. Yes Provider, Historical   albuterol-ipratropium (DUO-NEB) 2.5 mg-0.5 mg/3 ml nebu Take 3 mL by inhalation every six (6) hours as needed. Use every night   Yes Provider, Historical   rosuvastatin (Crestor) 20 mg tablet Take 1 Tablet by mouth daily. Patient taking differently: Take 20 mg by mouth nightly. 2/3/22  Yes Joseph Gill MD   bumetanide (BUMEX) 1 mg tablet Take 1 Tablet by mouth every other day. Patient taking differently: Take 1 mg by mouth as needed. 2/3/22  Yes Joseph Gill MD   sacubitriL-valsartan Gonzalez Mealing) 24-26 mg tablet Take 1 Tablet by mouth two (2) times a day. 2/3/22  Yes Joseph Gill MD   apixaban (Eliquis) 5 mg tablet Take 1 Tablet by mouth two (2) times a day. 2/1/22  Yes Joseph Gill MD   metoprolol succinate (TOPROL-XL) 25 mg XL tablet Take 1 Tablet by mouth two (2) times a day. Patient taking differently: Take 25 mg by mouth nightly. 1/20/22  Yes Joseph Gill MD   amiodarone (CORDARONE) 200 mg tablet TAKE 1 TABLET EVERY DAY 10/5/21  Yes Joseph Gill MD   albuterol (PROVENTIL HFA, VENTOLIN HFA) 90 mcg/actuation inhaler Take 1 Puff by inhalation every four (4) hours as needed. Yes Provider, Historical   levothyroxine (SYNTHROID) 150 mcg tablet Take  by mouth Daily (before breakfast). Yes Provider, Historical   nitroglycerin (NITROSTAT) 0.4 mg SL tablet 1 Tablet by SubLINGual route every five (5) minutes as needed for Chest Pain.  11/30/21   Joseph Gill MD     Current Facility-Administered Medications   Medication Dose Route Frequency    albuterol (PROVENTIL VENTOLIN) nebulizer solution 2.5 mg  2.5 mg Nebulization Q4H RT    sodium chloride 3% hypertonic nebulizer soln  4 mL Nebulization Q4H RT    sennosides (SENOKOT) 8.8 mg/5 mL syrup 8.8 mg  5 mL Per G Tube BID    fentaNYL (PF) 1,500 mcg/30 mL (50 mcg/mL) infusion  0-200 mcg/hr IntraVENous TITRATE    insulin glargine (LANTUS) injection 12 Units  12 Units SubCUTAneous DAILY    levothyroxine (SYNTHROID) injection 100 mcg  100 mcg IntraVENous DAILY    heparin (porcine) injection 5,000 Units  5,000 Units SubCUTAneous Q8H    polyethylene glycol (MIRALAX) packet 17 g  17 g Oral DAILY    aspirin chewable tablet 81 mg  81 mg Oral DAILY    famotidine (PF) (PEPCID) 20 mg in 0.9% sodium chloride 10 mL injection  20 mg IntraVENous Q12H    chlorhexidine (PERIDEX) 0.12 % mouthwash 10 mL  10 mL Oral Q12H    insulin lispro (HUMALOG) injection   SubCUTAneous Q6H    [Held by provider] amiodarone (CORDARONE) tablet 200 mg  200 mg Oral DAILY    [Held by provider] levothyroxine (SYNTHROID) tablet 150 mcg  150 mcg Oral 6am    [Held by provider] rosuvastatin (CRESTOR) tablet 20 mg  20 mg Oral QHS    [Held by provider] sacubitriL-valsartan (ENTRESTO) 24-26 mg tablet 1 Tablet  1 Tablet Oral BID    [Held by provider] metoprolol succinate (TOPROL-XL) XL tablet 25 mg  25 mg Oral QHS     Allergies   Allergen Reactions    Shellfish Derived Hives     Eyes and Throat swelling      Social History     Tobacco Use    Smoking status: Every Day     Packs/day: 1.00     Years: 40.00     Pack years: 40.00     Types: Cigarettes    Smokeless tobacco: Never   Substance Use Topics    Alcohol use: No      Family History   Problem Relation Age of Onset    Hypertension Mother     Diabetes Mother     Breast Problems Mother         fiber cystic    Hypertension Other         Review of Systems:  Review of systems not obtained due to patient factors.     Objective:   Vital Signs:    Visit Vitals  /62   Pulse 79   Temp 98.6 °F (37 °C)   Resp 18   Ht 5' 9\" (1.753 m)   Wt 106.1 kg (233 lb 14.5 oz)   SpO2 98%   BMI 34.54 kg/m²       O2 Device: Ventilator, Endotracheal tube, Heated, Humidifier   O2 Flow Rate (L/min): 50 l/min   Temp (24hrs), Av.3 °F (36.8 °C), Min:98.1 °F (36.7 °C), Max:98.6 °F (37 °C)       Intake/Output:   Last shift:      No intake/output data recorded. Last 3 shifts: 1901 -  0700  In: 2761.2 [I.V.:96.2]  Out: 1855 [Urine:1855]    Intake/Output Summary (Last 24 hours) at 2022 0841  Last data filed at 2022 0700  Gross per 24 hour   Intake 1764.33 ml   Output 1275 ml   Net 489.33 ml       Physical Exam:    General:  Sedated, awake and following commands    Head:  Normocephalic, without obvious abnormality, atraumatic. Eyes:  Conjunctivae/corneas clear. PERRL, EOMs intact. Nose: Nares normal. Septum midline. Mucosa normal.  ET tube orally   Throat: Lips, mucosa, and tongue normal. Teeth and gums normal.   Neck: Supple, symmetrical, trachea midline   Lungs:   Coarse rhonchi bilaterally, R>L.+ thick, copious secretions from ETT tube. Chest wall:  +thoracotomy scar. No tenderness or deformity. Heart:   rate and regular rhythm, S1, S2 normal, no murmur, click, rub or gallop. Abdomen:   Soft, non-tender. Bowel sounds normal. No masses,  No organomegaly. Extremities: Extremities normal, atraumatic, R leg swelling (entire leg bigger than right), LLE trace edema, pitting, LUE +2 edema, RUE trace edema,   Right groin dressing   Pulses: 2+ and symmetric all extremities. Skin: Skin color, texture, turgor normal. No rashes or lesions. Normal capillary refill. Neurologic: Sedated.  But follows commands        Data:     Recent Results (from the past 24 hour(s))   GLUCOSE, POC    Collection Time: 22 12:00 PM   Result Value Ref Range    Glucose (POC) 184 (H) 70 - 110 mg/dL   CULTURE, RESPIRATORY/SPUTUM/BRONCH W GRAM STAIN    Collection Time: 22 12:30 PM    Specimen: Sputum,ET Suction   Result Value Ref Range    Special Requests: NO SPECIAL REQUESTS      GRAM STAIN 3+ WBCS SEEN GRAM STAIN 2+ GRAM POSITIVE COCCI IN PAIRS AND CHAINS      GRAM STAIN 1+ GRAM NEGATIVE RODS      Culture result: PENDING    GLUCOSE, POC    Collection Time: 08/27/22  3:49 PM   Result Value Ref Range    Glucose (POC) 171 (H) 70 - 110 mg/dL   GLUCOSE, POC    Collection Time: 08/27/22 11:43 PM   Result Value Ref Range    Glucose (POC) 153 (H) 70 - 110 mg/dL   MAGNESIUM    Collection Time: 08/28/22  2:27 AM   Result Value Ref Range    Magnesium 2.1 1.6 - 2.6 mg/dL   PHOSPHORUS    Collection Time: 08/28/22  2:27 AM   Result Value Ref Range    Phosphorus 4.5 2.5 - 4.9 MG/DL   CBC WITH AUTOMATED DIFF    Collection Time: 08/28/22  2:27 AM   Result Value Ref Range    WBC 12.2 4.6 - 13.2 K/uL    RBC 2.70 (L) 4.20 - 5.30 M/uL    HGB 7.3 (L) 12.0 - 16.0 g/dL    HCT 24.8 (L) 35.0 - 45.0 %    MCV 91.9 78.0 - 100.0 FL    MCH 27.0 24.0 - 34.0 PG    MCHC 29.4 (L) 31.0 - 37.0 g/dL    RDW 17.6 (H) 11.6 - 14.5 %    PLATELET 559 010 - 190 K/uL    MPV 11.6 9.2 - 11.8 FL    NRBC 0.2 (H) 0  WBC    ABSOLUTE NRBC 0.02 (H) 0.00 - 0.01 K/uL    NEUTROPHILS 74 (H) 40 - 73 %    BAND NEUTROPHILS 9 %    LYMPHOCYTES 7 (L) 21 - 52 %    MONOCYTES 9 3 - 10 %    EOSINOPHILS 0 0 - 5 %    BASOPHILS 0 0 - 2 %    METAMYELOCYTES 1 %    IMMATURE GRANULOCYTES 0 0.0 - 0.5 %    ABS. NEUTROPHILS 10.1 (H) 1.8 - 8.0 K/UL    ABS. LYMPHOCYTES 0.9 0.9 - 3.6 K/UL    ABS. MONOCYTES 1.1 0.05 - 1.2 K/UL    ABS. EOSINOPHILS 0.0 0.0 - 0.4 K/UL    ABS. BASOPHILS 0.0 0.0 - 0.1 K/UL    ABS. IMM.  GRANS. 0.0 0.00 - 0.04 K/UL    DF MANUAL      PLATELET COMMENTS ADEQUATE PLATELETS      RBC COMMENTS HYPOCHROMIA  2+        RBC COMMENTS MICROCYTOSIS  2+        RBC COMMENTS POLYCHROMASIA  2+       METABOLIC PANEL, BASIC    Collection Time: 08/28/22  2:27 AM   Result Value Ref Range    Sodium 137 136 - 145 mmol/L    Potassium 4.5 3.5 - 5.5 mmol/L    Chloride 103 100 - 111 mmol/L    CO2 28 21 - 32 mmol/L    Anion gap 6 3.0 - 18 mmol/L    Glucose 156 (H) 74 - 99 mg/dL    BUN 33 (H) 7.0 - 18 MG/DL    Creatinine 0.87 0.6 - 1.3 MG/DL    BUN/Creatinine ratio 38 (H) 12 - 20      GFR est AA >60 >60 ml/min/1.73m2    GFR est non-AA >60 >60 ml/min/1.73m2    Calcium 8.8 8.5 - 10.1 MG/DL   BLOOD GAS, ARTERIAL POC    Collection Time: 08/28/22  5:09 AM   Result Value Ref Range    Device: ADULT VENT      FIO2 (POC) 30 %    pH (POC) 7.36 7.35 - 7.45      pCO2 (POC) 53.6 (H) 35.0 - 45.0 MMHG    pO2 (POC) 70 (L) 80 - 100 MMHG    HCO3 (POC) 30.3 (H) 22 - 26 MMOL/L    sO2 (POC) 92.8 92 - 97 %    Base excess (POC) 4.3 mmol/L    Mode ASSIST CONTROL      Tidal volume 480 ml    Set Rate 15 bpm    PEEP/CPAP (POC) 12 cmH2O    Allens test (POC) Positive      Total resp. rate 15      Site RIGHT RADIAL      Specimen type (POC) ARTERIAL      Performed by Stef Mulligan    GLUCOSE, POC    Collection Time: 08/28/22  8:09 AM   Result Value Ref Range    Glucose (POC) 178 (H) 70 - 110 mg/dL             Telemetry:normal sinus rhythm    Imaging:  CXR Results  (Last 48 hours)                 08/28/22 0349  XR CHEST PORT Final result    Impression:      Slight interval improvement in aeration compared to the preceding study with   slight decrease in interstitial prominence suspect for decrease in mild   pulmonary edema. No other significant interval change. Cardiomegaly. Narrative:  AP CHEST, PORTABLE       INDICATION: Above. Intubated. COMPARISON: 8/27/2022. TECHNIQUE: Portable semi-upright AP chest radiograph is reviewed. FINDINGS:       Endotracheal tube tip and esophagogastric tube project in stable and   satisfactory position. EKG leads/wires and other catheter tubing overlie the   patient. Median sternotomy wires and surgical clips project over the mediastinum. Aortic   stent graft is again seen. Surgical clips again project over the left axillary   region.        Interval decrease in interstitial prominence compared to the preceding   radiograph suspect for interval decrease in mild pulmonary edema. No evidence of   focal pulmonary consolidation. No evidence of pneumothorax. The costophrenic   sulci appear sharp. The cardiac silhouette is again enlarged. 08/27/22 0409  XR CHEST PORT Final result    Impression:  Persistent enlarged cardiac silhouette and vascular congestion/pulmonary edema   with perhaps slight increase in the right since prior exam..       Narrative:  EXAM: XR CHEST PORT       INDICATION: intubated       COMPARISON: Recent prior. FINDINGS: A single view of the chest demonstrates persistent central   congestion. . Perhaps slight increase on the right since prior exam. Stable   enlarged cardiac silhouette. . No acute bone findings. Stable sternotomy wires   and mediastinal clips. Question similar aortic stent. Grossly similar   endotracheal tube. Enteric tube grossly unchanged. No acute bone findings. .                    CT Results  (Last 48 hours)      None                     JOYA SPLIT/SHARE TIME: 15 mins    ERLINDA Dahl  08/28/22  Pulmonary, Critical Care Medicine  [facility]

## 2022-08-29 ENCOUNTER — APPOINTMENT (OUTPATIENT)
Dept: GENERAL RADIOLOGY | Age: 62
DRG: 219 | End: 2022-08-29
Attending: PHYSICIAN ASSISTANT
Payer: MEDICARE

## 2022-08-29 LAB
ANION GAP SERPL CALC-SCNC: 7 MMOL/L (ref 3–18)
ARTERIAL PATENCY WRIST A: POSITIVE
BACTERIA SPEC CULT: ABNORMAL
BACTERIA SPEC CULT: ABNORMAL
BASE EXCESS BLD CALC-SCNC: 5 MMOL/L
BASOPHILS # BLD: 0 K/UL (ref 0–0.1)
BASOPHILS NFR BLD: 0 % (ref 0–2)
BDY SITE: ABNORMAL
BUN SERPL-MCNC: 37 MG/DL (ref 7–18)
BUN/CREAT SERPL: 41 (ref 12–20)
CALCIUM SERPL-MCNC: 8.1 MG/DL (ref 8.5–10.1)
CHLORIDE SERPL-SCNC: 105 MMOL/L (ref 100–111)
CO2 SERPL-SCNC: 29 MMOL/L (ref 21–32)
CREAT SERPL-MCNC: 0.9 MG/DL (ref 0.6–1.3)
DIFFERENTIAL METHOD BLD: ABNORMAL
EOSINOPHIL # BLD: 0.1 K/UL (ref 0–0.4)
EOSINOPHIL NFR BLD: 1 % (ref 0–5)
ERYTHROCYTE [DISTWIDTH] IN BLOOD BY AUTOMATED COUNT: 17.2 % (ref 11.6–14.5)
GAS FLOW.O2 O2 DELIVERY SYS: ABNORMAL L/MIN
GAS FLOW.O2 SETTING OXYMISER: 15 BPM
GLUCOSE BLD STRIP.AUTO-MCNC: 126 MG/DL (ref 70–110)
GLUCOSE BLD STRIP.AUTO-MCNC: 145 MG/DL (ref 70–110)
GLUCOSE BLD STRIP.AUTO-MCNC: 157 MG/DL (ref 70–110)
GLUCOSE BLD STRIP.AUTO-MCNC: 170 MG/DL (ref 70–110)
GLUCOSE SERPL-MCNC: 130 MG/DL (ref 74–99)
GRAM STN SPEC: ABNORMAL
HCO3 BLD-SCNC: 28.4 MMOL/L (ref 22–26)
HCT VFR BLD AUTO: 21.9 % (ref 35–45)
HCT VFR BLD AUTO: 26.2 % (ref 35–45)
HGB BLD-MCNC: 6.5 G/DL (ref 12–16)
HGB BLD-MCNC: 7.8 G/DL (ref 12–16)
HISTORY CHECKED?,CKHIST: NORMAL
IMM GRANULOCYTES # BLD AUTO: 0.2 K/UL (ref 0–0.04)
IMM GRANULOCYTES NFR BLD AUTO: 1 % (ref 0–0.5)
LYMPHOCYTES # BLD: 0.5 K/UL (ref 0.9–3.6)
LYMPHOCYTES NFR BLD: 5 % (ref 21–52)
MAGNESIUM SERPL-MCNC: 2.2 MG/DL (ref 1.6–2.6)
MCH RBC QN AUTO: 27.5 PG (ref 24–34)
MCHC RBC AUTO-ENTMCNC: 29.7 G/DL (ref 31–37)
MCV RBC AUTO: 92.8 FL (ref 78–100)
MONOCYTES # BLD: 1.1 K/UL (ref 0.05–1.2)
MONOCYTES NFR BLD: 10 % (ref 3–10)
NEUTS SEG # BLD: 9.2 K/UL (ref 1.8–8)
NEUTS SEG NFR BLD: 82 % (ref 40–73)
NRBC # BLD: 0 K/UL (ref 0–0.01)
NRBC BLD-RTO: 0 PER 100 WBC
O2/TOTAL GAS SETTING VFR VENT: 28 %
PAW @ MEAN EXP FLOW ON VENT: 15 CMH2O
PCO2 BLD: 35.4 MMHG (ref 35–45)
PEEP RESPIRATORY: 12 CMH2O
PH BLD: 7.51 [PH] (ref 7.35–7.45)
PHOSPHATE SERPL-MCNC: 3.7 MG/DL (ref 2.5–4.9)
PIP ISTAT,IPIP: 28
PLATELET # BLD AUTO: 216 K/UL (ref 135–420)
PMV BLD AUTO: 11.3 FL (ref 9.2–11.8)
PO2 BLD: 88 MMHG (ref 80–100)
POTASSIUM SERPL-SCNC: 4.3 MMOL/L (ref 3.5–5.5)
RBC # BLD AUTO: 2.36 M/UL (ref 4.2–5.3)
SAO2 % BLD: 97.7 % (ref 92–97)
SERVICE CMNT-IMP: ABNORMAL
SERVICE CMNT-IMP: ABNORMAL
SODIUM SERPL-SCNC: 141 MMOL/L (ref 136–145)
SPECIMEN TYPE: ABNORMAL
TOTAL RESP. RATE, ITRR: 20
VENTILATION MODE VENT: ABNORMAL
VT SETTING VENT: 480 ML
WBC # BLD AUTO: 11.2 K/UL (ref 4.6–13.2)

## 2022-08-29 PROCEDURE — 74011000250 HC RX REV CODE- 250: Performed by: INTERNAL MEDICINE

## 2022-08-29 PROCEDURE — 2709999900 HC NON-CHARGEABLE SUPPLY

## 2022-08-29 PROCEDURE — 71045 X-RAY EXAM CHEST 1 VIEW: CPT

## 2022-08-29 PROCEDURE — 84100 ASSAY OF PHOSPHORUS: CPT

## 2022-08-29 PROCEDURE — 74011000250 HC RX REV CODE- 250: Performed by: PHYSICIAN ASSISTANT

## 2022-08-29 PROCEDURE — 74011250636 HC RX REV CODE- 250/636: Performed by: SURGERY

## 2022-08-29 PROCEDURE — 74011636637 HC RX REV CODE- 636/637: Performed by: INTERNAL MEDICINE

## 2022-08-29 PROCEDURE — 74011250636 HC RX REV CODE- 250/636: Performed by: INTERNAL MEDICINE

## 2022-08-29 PROCEDURE — 36415 COLL VENOUS BLD VENIPUNCTURE: CPT

## 2022-08-29 PROCEDURE — 83735 ASSAY OF MAGNESIUM: CPT

## 2022-08-29 PROCEDURE — 94762 N-INVAS EAR/PLS OXIMTRY CONT: CPT

## 2022-08-29 PROCEDURE — 80048 BASIC METABOLIC PNL TOTAL CA: CPT

## 2022-08-29 PROCEDURE — 74011250637 HC RX REV CODE- 250/637: Performed by: PHYSICIAN ASSISTANT

## 2022-08-29 PROCEDURE — 85025 COMPLETE CBC W/AUTO DIFF WBC: CPT

## 2022-08-29 PROCEDURE — 74011250636 HC RX REV CODE- 250/636: Performed by: NURSE PRACTITIONER

## 2022-08-29 PROCEDURE — 86923 COMPATIBILITY TEST ELECTRIC: CPT

## 2022-08-29 PROCEDURE — 36430 TRANSFUSION BLD/BLD COMPNT: CPT

## 2022-08-29 PROCEDURE — 82962 GLUCOSE BLOOD TEST: CPT

## 2022-08-29 PROCEDURE — 74011636637 HC RX REV CODE- 636/637: Performed by: PHYSICIAN ASSISTANT

## 2022-08-29 PROCEDURE — 74011250636 HC RX REV CODE- 250/636: Performed by: REGISTERED NURSE

## 2022-08-29 PROCEDURE — 82803 BLOOD GASES ANY COMBINATION: CPT

## 2022-08-29 PROCEDURE — 74011250637 HC RX REV CODE- 250/637: Performed by: SURGERY

## 2022-08-29 PROCEDURE — APPSS15 APP SPLIT SHARED TIME 0-15 MINUTES: Performed by: INTERNAL MEDICINE

## 2022-08-29 PROCEDURE — 65610000006 HC RM INTENSIVE CARE

## 2022-08-29 PROCEDURE — 85018 HEMOGLOBIN: CPT

## 2022-08-29 PROCEDURE — 99291 CRITICAL CARE FIRST HOUR: CPT | Performed by: INTERNAL MEDICINE

## 2022-08-29 PROCEDURE — 74011250636 HC RX REV CODE- 250/636: Performed by: PHYSICIAN ASSISTANT

## 2022-08-29 PROCEDURE — P9016 RBC LEUKOCYTES REDUCED: HCPCS

## 2022-08-29 PROCEDURE — 74011000258 HC RX REV CODE- 258: Performed by: NURSE PRACTITIONER

## 2022-08-29 PROCEDURE — 36600 WITHDRAWAL OF ARTERIAL BLOOD: CPT

## 2022-08-29 PROCEDURE — 94003 VENT MGMT INPAT SUBQ DAY: CPT

## 2022-08-29 PROCEDURE — 74011250637 HC RX REV CODE- 250/637: Performed by: NURSE PRACTITIONER

## 2022-08-29 PROCEDURE — 86900 BLOOD TYPING SEROLOGIC ABO: CPT

## 2022-08-29 PROCEDURE — 94640 AIRWAY INHALATION TREATMENT: CPT

## 2022-08-29 RX ORDER — KETAMINE HCL 50MG/ML(1)
25 SYRINGE (ML) INTRAVENOUS ONCE
Status: COMPLETED | OUTPATIENT
Start: 2022-08-29 | End: 2022-08-29

## 2022-08-29 RX ORDER — MIDAZOLAM HYDROCHLORIDE 1 MG/ML
2 INJECTION, SOLUTION INTRAMUSCULAR; INTRAVENOUS ONCE
Status: COMPLETED | OUTPATIENT
Start: 2022-08-29 | End: 2022-08-29

## 2022-08-29 RX ORDER — QUETIAPINE FUMARATE 50 MG/1
50 TABLET, EXTENDED RELEASE ORAL DAILY
Status: DISCONTINUED | OUTPATIENT
Start: 2022-08-29 | End: 2022-09-05 | Stop reason: HOSPADM

## 2022-08-29 RX ORDER — SODIUM CHLORIDE 9 MG/ML
250 INJECTION, SOLUTION INTRAVENOUS AS NEEDED
Status: DISCONTINUED | OUTPATIENT
Start: 2022-08-29 | End: 2022-09-04

## 2022-08-29 RX ORDER — VANCOMYCIN 2 GRAM/500 ML IN 0.9 % SODIUM CHLORIDE INTRAVENOUS
2000 ONCE
Status: COMPLETED | OUTPATIENT
Start: 2022-08-29 | End: 2022-08-29

## 2022-08-29 RX ORDER — DOCUSATE SODIUM 50 MG/5ML
100 LIQUID ORAL DAILY
Status: DISCONTINUED | OUTPATIENT
Start: 2022-08-29 | End: 2022-09-03

## 2022-08-29 RX ORDER — VANCOMYCIN/0.9 % SOD CHLORIDE 1.5G/250ML
1500 PLASTIC BAG, INJECTION (ML) INTRAVENOUS EVERY 24 HOURS
Status: DISCONTINUED | OUTPATIENT
Start: 2022-08-30 | End: 2022-08-30

## 2022-08-29 RX ADMIN — Medication 14 UNITS: at 09:22

## 2022-08-29 RX ADMIN — Medication 25 MG: at 14:35

## 2022-08-29 RX ADMIN — FENTANYL CITRATE 200 MCG/HR: 0.05 INJECTION, SOLUTION INTRAMUSCULAR; INTRAVENOUS at 11:08

## 2022-08-29 RX ADMIN — SODIUM CHLORIDE SOLN NEBU 3% 4 ML: 3 NEBU SOLN at 03:36

## 2022-08-29 RX ADMIN — HEPARIN SODIUM 5000 UNITS: 5000 INJECTION INTRAVENOUS; SUBCUTANEOUS at 21:13

## 2022-08-29 RX ADMIN — SODIUM CHLORIDE, PRESERVATIVE FREE 20 MG: 5 INJECTION INTRAVENOUS at 21:13

## 2022-08-29 RX ADMIN — FENTANYL CITRATE 200 MCG/HR: 0.05 INJECTION, SOLUTION INTRAMUSCULAR; INTRAVENOUS at 15:56

## 2022-08-29 RX ADMIN — POLYETHYLENE GLYCOL 3350 17 G: 17 POWDER, FOR SOLUTION ORAL at 09:22

## 2022-08-29 RX ADMIN — FENTANYL CITRATE 100 MCG: 50 INJECTION INTRAMUSCULAR; INTRAVENOUS at 10:36

## 2022-08-29 RX ADMIN — PIPERACILLIN SODIUM AND TAZOBACTAM SODIUM 3.38 G: 3; .375 INJECTION, POWDER, LYOPHILIZED, FOR SOLUTION INTRAVENOUS at 16:48

## 2022-08-29 RX ADMIN — QUETIAPINE FUMARATE 50 MG: 50 TABLET, EXTENDED RELEASE ORAL at 13:33

## 2022-08-29 RX ADMIN — HEPARIN SODIUM 5000 UNITS: 5000 INJECTION INTRAVENOUS; SUBCUTANEOUS at 12:53

## 2022-08-29 RX ADMIN — PIPERACILLIN SODIUM AND TAZOBACTAM SODIUM 3.38 G: 3; .375 INJECTION, POWDER, LYOPHILIZED, FOR SOLUTION INTRAVENOUS at 00:06

## 2022-08-29 RX ADMIN — SENNOSIDES 8.8 MG: 8.8 LIQUID ORAL at 09:22

## 2022-08-29 RX ADMIN — CHLORHEXIDINE GLUCONATE 0.12% ORAL RINSE 10 ML: 1.2 LIQUID ORAL at 21:14

## 2022-08-29 RX ADMIN — LEVOTHYROXINE SODIUM ANHYDROUS 100 MCG: 100 INJECTION, POWDER, LYOPHILIZED, FOR SOLUTION INTRAVENOUS at 09:28

## 2022-08-29 RX ADMIN — ALBUTEROL SULFATE 2.5 MG: 2.5 SOLUTION RESPIRATORY (INHALATION) at 03:36

## 2022-08-29 RX ADMIN — SODIUM CHLORIDE 0.05 MG/KG/HR: 9 INJECTION, SOLUTION INTRAVENOUS at 17:28

## 2022-08-29 RX ADMIN — FENTANYL CITRATE 100 MCG: 50 INJECTION INTRAMUSCULAR; INTRAVENOUS at 20:20

## 2022-08-29 RX ADMIN — FENTANYL CITRATE 75 MCG/HR: 0.05 INJECTION, SOLUTION INTRAMUSCULAR; INTRAVENOUS at 00:07

## 2022-08-29 RX ADMIN — HEPARIN SODIUM 5000 UNITS: 5000 INJECTION INTRAVENOUS; SUBCUTANEOUS at 04:30

## 2022-08-29 RX ADMIN — MIDAZOLAM 2 MG: 1 INJECTION INTRAMUSCULAR; INTRAVENOUS at 04:15

## 2022-08-29 RX ADMIN — ALBUTEROL SULFATE 2.5 MG: 2.5 SOLUTION RESPIRATORY (INHALATION) at 08:06

## 2022-08-29 RX ADMIN — FENTANYL CITRATE 200 MCG/HR: 0.05 INJECTION, SOLUTION INTRAMUSCULAR; INTRAVENOUS at 23:41

## 2022-08-29 RX ADMIN — SODIUM CHLORIDE SOLN NEBU 3% 4 ML: 3 NEBU SOLN at 13:10

## 2022-08-29 RX ADMIN — VANCOMYCIN HYDROCHLORIDE 2000 MG: 10 INJECTION, POWDER, LYOPHILIZED, FOR SOLUTION INTRAVENOUS at 13:33

## 2022-08-29 RX ADMIN — PIPERACILLIN SODIUM AND TAZOBACTAM SODIUM 3.38 G: 3; .375 INJECTION, POWDER, LYOPHILIZED, FOR SOLUTION INTRAVENOUS at 08:50

## 2022-08-29 RX ADMIN — NALOXEGOL OXALATE 25 MG: 25 TABLET, FILM COATED ORAL at 10:23

## 2022-08-29 RX ADMIN — SODIUM CHLORIDE SOLN NEBU 3% 4 ML: 3 NEBU SOLN at 20:53

## 2022-08-29 RX ADMIN — ALBUTEROL SULFATE 2.5 MG: 2.5 SOLUTION RESPIRATORY (INHALATION) at 20:53

## 2022-08-29 RX ADMIN — SODIUM CHLORIDE, PRESERVATIVE FREE 20 MG: 5 INJECTION INTRAVENOUS at 09:21

## 2022-08-29 RX ADMIN — Medication 2 UNITS: at 12:53

## 2022-08-29 RX ADMIN — ALBUTEROL SULFATE 2.5 MG: 2.5 SOLUTION RESPIRATORY (INHALATION) at 13:10

## 2022-08-29 RX ADMIN — Medication 2 UNITS: at 23:49

## 2022-08-29 RX ADMIN — CHLORHEXIDINE GLUCONATE 0.12% ORAL RINSE 10 ML: 1.2 LIQUID ORAL at 09:21

## 2022-08-29 RX ADMIN — SODIUM CHLORIDE SOLN NEBU 3% 4 ML: 3 NEBU SOLN at 08:06

## 2022-08-29 RX ADMIN — DOCUSATE SODIUM 100 MG: 50 LIQUID ORAL at 10:23

## 2022-08-29 RX ADMIN — ASPIRIN 81 MG CHEWABLE TABLET 81 MG: 81 TABLET CHEWABLE at 09:21

## 2022-08-29 NOTE — WOUND CARE
Physical Exam  Room 312: pt not seen at this time, please see consult & orders from 8/25. Will turn over care to nursing staff at this time.   Eagle BERNSTEINN, RN, [facility], [address]

## 2022-08-29 NOTE — PROGRESS NOTES
1200 Continues to require Q 30-60 min ETT suctioning most of the shift. Pt with strong cough, thick tan white secretions. Son visiting, trying to offer pt pen and paper to write notes, pt uncooperative with note writing. Son was discouraged from stimulating pt as she gets very agitated, easily. Fentanyl drip infusing. 1035 Rec'd 2 mg Versed, in 1 mg increments over 10 min, monitoring SBP. Kept pt calm for about 30-45 min, easily arousable. 1208 Increased Fentanyl drip to 150 mcg/hr for continues restlessness, coughing. Pt able to calm for short periods of time. Pt moves legs frequently, per family Ang Fitzpatrick has restless leg syndrome\". Placed legs on pillows as she squirms and eventually kicks off heel protector boots. Tolerates the pillows under legs. Pt does pull legs up for comfort. When asked \"are you comfortable laying like this\" and nods head up and down yes. Pt also leans towards the right side and scoots back to same position when centered in bed for CLRT. 0 Daughter and granddaughter in to visit, trying to communicate with pt. Pt restless, coughing. Trying to sit up in bed. Suctioned . Asked family to return to waiting room while this nurse could  get pt repositioned and pulled up. Remains restless  Spoke with ICU team and rec'd order for versed drip. Medicated with 1 mg Versed until  the versed drip arrived from pharmacy. was able to bathe pt and changed linens. 1533 versed drip started at 2mg hr. Pt calm, HR 50's, SBP soft, 81/52. Arouses easily, HR increases to 60-70's and SBP increases when stimulated. Family returned to room for a few min. Encouraged not to stimulate pt as she required more sedation to keep calm. 1715 BP still soft, HR 55. Spoke to ICU team in unit and order rec'd for 500cc NS bolus and given. Versed drip stopped at 1700, will re-evaluate need as BP improves. 1745 pt remains calm and responds easily to stimulation, moving in bed, but calms down again.    1830 maintaining SBP in the 90's, responds to voice but calm, less restless. Bedside and Verbal shift change report given to Megan Tarango (oncoming nurse) by Hillary Shelley RN (offgoing nurse). Report included the following information SBAR, Kardex, Intake/Output, MAR, Recent Results, and Cardiac Rhythm .

## 2022-08-29 NOTE — PROGRESS NOTES
Comprehensive Nutrition Assessment    Type and Reason for Visit: Reassess, Consult    Nutrition Recommendations/Plan:   Continue tube feeding regimen:   Formula: Vital AF 1.2  Goal Rate: 65 mL/hr   Water Flushes: 150 mL q 4 hours  Modular(s): Prosource once daily  Goal Regimen Provides (with modulars):  1912 kcal, 128g protein, 2164 ml free water, 100% RDIs     Malnutrition Assessment:  Malnutrition Status: At risk for malnutrition (specify) (NPO, vent) (08/23/22 0825)    Context:  Acute illness       Nutrition Assessment:    Last recorded BM since PTA. Plan to increase bowel regimen and increase free water. Per RN, abd okay, passing gas, tolerating feeds at goal rate. Nutrition Related Findings:    Pertinent Meds: pepcid, humalog, synthroid, zosyn, miralax, senokot, fentanyl gtt  Pertinent Labs: POC Glucose 126-192 mg/dl x 24 hrs Wound Type: Surgical incision    Current Nutrition Intake & Therapies:  DIET NPO  ADULT TUBE FEEDING Orogastric; Peptide Based; Delivery Method: Continuous; Continuous Initial Rate (mL/hr): 65; Continuous Advance Tube Feeding: No; Water Flush Volume (mL): 150; Water Flush Frequency: Q 4 hours; Modulars/Additives: Protein; Speci. .. Nutrition intake per I/O: Total daily TF provision on average of 1182 ml + Propofol 131 ml x last 7 days per I/O. Provides 1562 kcal (84%), 88 g protein (89%). Anthropometric Measures:  Height: 5' 9\" (175.3 cm)  Ideal Body Weight (IBW): 145 lbs (66 kg)  Admission Body Weight: 225 lb (102.2 kg - lowest wt)   Current Body Wt:  107.8 kg (237 lb 10.5 oz)  Pt +2.3 L per I&Os if accurate. Last 3 Recorded Weights in this Encounter    08/25/22 0528 08/26/22 2200 08/29/22 0422   Weight: 105.4 kg (232 lb 5.8 oz) 106.1 kg (233 lb 14.5 oz) 107.8 kg (237 lb 10.5 oz)      155.8 % IBW. Current BMI (kg/m2): 35.1    BMI Category: Obese class 1 (BMI 30.0-34. 9)    Estimated Daily Nutrient Needs:  Energy Requirements Based On: Formula  Weight Used for Energy Requirements: Admission  Energy (kcal/day): 7376-6041 kcals/day [facility] 2010 x 0.9-1.1)  Weight Used for Protein Requirements: Ideal  Protein (g/day):  g/day (1.5-2 g/day)  Method Used for Fluid Requirements: 1 ml/kcal  Fluid (ml/day): 0868-1273 ml/day    Nutrition Diagnosis:   Swallowing difficulty related to impaired respiratory function as evidenced by NPO or clear liquid status due to medical condition, intubation, nutrition support-enteral nutrition    Nutrition Interventions:   Food and/or Nutrient Delivery: Continue NPO, Modify tube feeding     Coordination of Nutrition Care: Interdisciplinary rounds  Plan of Care discussed with: interdisciplinary team    Goals:  Previous Goal Met: Progressing toward goal(s)  Goals: Meet at least 75% of estimated needs, by next RD assessment       Nutrition Monitoring and Evaluation:      Food/Nutrient Intake Outcomes: Enteral nutrition intake/tolerance  Physical Signs/Symptoms Outcomes: Biochemical data, Weight, Hemodynamic status, Fluid status or edema, GI status    Discharge Planning:     Too soon to determine    Kwadwo Greene [address], [address] [place] [address]   Contact: [phone]

## 2022-08-29 NOTE — PROGRESS NOTES
From: Suzie Kapadia  To: Eladio Fuchs  Sent: 3/30/2021 1:48 PM CDT  Subject: Medication Question    Hi Dr. SANZ-    Is there any other medication we can use while breastfeeding? I am really struggling and don't find that the buspirone is even touching my anxiety, mood swings or rage.     I'd like to keep the fluoxetine because it does help with my depression, but we need to make a change in my medication. As it is starting to affect my personal and professional life.     Suzie   SBT initiated, PS 8, PEEP 12, FiO2 28%. Pt awake and alert, follows commands though appears very uncomfortable. 08/29/22 0712   Weaning Parameters   Spontaneous Breathing Trial Complete Yes   Resp Rate Observed 19   Ve 9.5      RSBI 42     Tolerating with PS 8 and PEEP 12. Will monitor for signs of apnea or distress, and for weaning PEEP as tolerated based on RR, tidal volumes, airway pressures, and secretion output.

## 2022-08-29 NOTE — INTERDISCIPLINARY ROUNDS
[facility]  Pulmonary, Critical Care, and Sleep Medicine  Interdisciplinary and Ventilator Weaning Rounds    Patient discussed in morning walking rounds and interdisciplinary rounds. ICU day: Admit 8/21     Overnight events:   Given 1 unit PRBCs for HGB <7    Assessments and best practice:  Ventilator  Ventilator day intubated 8/18, extubated 8/22, reintubated for stridor 8/22  Vent settings: FiO2 of 35 and PEEP of 12  VAP bundle, aim to keep peak plateau pressure 84-26BD H2O  Weaning assessed and documented   Patient is on SBT. Patient is on sedation holiday. Plan to wean with PS. Outcome: will SBT  Final plan: potential extubation  Sedation  Fentanyl   Other pertinent drips  N/a  Lines noted  PIV, grissom, OGT, ETT   Critical labs assessed  Yes  Antibiotics  N/a  Medications reviewed  Yes  Pending imaging  none  Pending send out labs  No  Pending Procedures  None  Glycemic control  Stress ulcer prophylaxis. Pepcid  DVT prophylaxis. Heparin   Need for Lines, grissom assessed. Yes  Restraint Reevaluation   Yes  I have reevaluated the patient one hour after initiation of intervention. The patient is comfortable, uninjured, but continues to pose an imminent risk of injury to self to themselves and/or serious disruption of medical treatment required to keep patient stable. The patient's current medical and behavioral conditions that warrant the use intervention include danger to self and Interference with medical equipment or treatment. Restraint or seclusion will be discontinued at the earliest possible time, regardless of the scheduled expiration of the order. Based on my evaluation, restraints will be continued: YES       Family contact/MPOA: spouse - Chase Romero [phone]  Family updated - will be updated today     Palliative consult within 3 days of admission to ICU-  Ethics Guidance: 21 days      Daily Plans:   Will give a dose of movantik  Colace added  Will increase free water flushes  Obtain MRSA swab  Will discontinue grissom  Will restart vanc given GPCs in respiratory Cx    JOYA time 15 minutes        Berlin Sterling PA-C  08/29/22  Pulmonary, Critical Care Medicine  [facility]

## 2022-08-29 NOTE — PROGRESS NOTES
[facility]  Pulmonary, Critical Care, and Sleep Medicine      Name: Fani Campos MRN: [id number]   : 1960 Hospital: [address] [place] [address]    Date: 2022          Critical Care progress note    IMPRESSION:   S/p Cardiac arrest - ROSC after ~9 min of ACLS, no further events  Hypoxic and hypercapnic respiratory failure - now on mechanical vent. 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 Rolly Farris. Seizure vs myoclonus - shortly following cardiac arrest x2. No further episodes of tremulousness or twitching noted  R groin hematoma w/ femstop in place  Hx of CAD w/ prior CABG  Hx of HFrEF - most recent EF from  -30%  Paroxysmal symptomatic a-fib with CHADSVASc2 score of 5. On chronic Amiodorone and anticoagulation  History of recurrent GI bleeding, requiring transfusion and HAS-BLED score of 4. Chronic stage 2 kidney disease. Remote breast cancer about ten years ago with mastectomy and chemotherapy, but no chest radiation. History of thyroid disorder  History of 'waking up 'previously with anesthesia. RECOMMENDATIONS:   Neuro: wean fentanyl gtt. RASS 0 to -1. Ketamine gtt added. PRN Fentanyl/ Versed for breakthrough sedation. +seroquel  Pulm: Titrate FiO2 for goal SPO2> 90%, Daily CXR and ABG while intubated, S/p Bronch for mucus plugs (); Cont metanebs and hypertonic saline for copious secretions, PRN duo-nebs q4,Steroids discontinued. SBT daily, would like to extubate to Bipap ideally, however, secretions making this a contraindication.   -Will need to check cuff leak prior to extubation   CVS: HD stable. Goal MAP >65mmHg. Cardiology following for assistance in management. Con't PRN Hydralazine for SBP >180mmHg. Vascular following. Cont SQH prophylaxis.  Duplex BLE neg   Fluids: N/A   GI: SUP(pepcid), Trend LFTs, OGT. Cont Senna/Miralax for bowel regimen   Renal: Trend Renal indices, Strict Is/Os, Gonzalez (+). Hem/Onc: Monitor for s/o active bleeding. Daily CBC. I/D: Trend WBCs and temperature curve. Resp cx 8/27 with GNR's and GPC's- will start Zosyn and await speciation    Endocrine: Q6 glucoses, SSI. Metabolic:  Daily BMP, mag, phos. Trend lytes, replace as needed. Musc/Skin: no acute issues, wound care as needed  Full code  Discussed during IDRs     Subjective/History:       HPI  Patient is a 63 y.o. female w/ pmhx of a fib, CAD, CHF who presented for covered stent procedure for descending thoracic aortic saccular aneurysm. Procedure performed 8/18 w/o incident. Following procedure, patient was drowsy but answering questions appropriately per bedside RN. Per chart review, she began having oxygen desaturations and escalating oxygen requirements, eventually ending up on HFNC. Blood gas performed showed mild hypoxia and mild hypercapnia. Per chart review, patient was found in cardiac arrest and ACLS initiated with approximately 9 min of downtime. EPOC labs following cardiac arrest essentially unremarkable. ABG with mixed respiratory and metabolic acidosis. Patient was extubated 8/22 and reintubated same day for stridor. Now with thick, copious secretions and bronchotracheomalacia preventing liberation from ventilator. Failing SBT's due to above, may need trach. The patient is critically ill and can not provide additional history due to Ventilated.      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    Anxiety     Breast CA ([facility] 12/2009    S/P Lt Mastectomy and Chemo    CAD (coronary artery disease) 08/30/2010    S/P CABG X 4 ([address] to LAD, Sequential SVG to D2, Ramus, OM) 01/2011    Cardiomyopathy ([facility]     LVEF 35-40 % (08/20) 60% (08/14), 40% (2011)    Chemotherapy 04/2010    for 4 months    CHF (congestive heart failure) ([facility]     Chronic kidney disease     stage 2 per cardiac note cc    Chronic pain     COPD (chronic obstructive pulmonary disease) ([facility] 08/30/2010    Depression     Diabetes (HCC)     GERD (gastroesophageal reflux disease)     GI bleed     History of blood transfusion     2010 and 1/2022    Hx of heart artery stent 2010    x 10  (4, then 4, then 2)    Hyperlipidemia     Hypertension     MI (myocardial infarction) ([facility] 2009    x 3    Schatzki's ring     S/P EGD and Dilation (07/16)    Thyroid disease     hypothyroid    Tobacco abuse       Past Surgical History:   Procedure Laterality Date    COLONOSCOPY N/A 5/3/2017    COLON  performed by Osman Ochoa MD at [address] [place] [address] ENDOSCOPY    COLONOSCOPY N/A 2/15/2021    COLONOSCOPY performed by Marilyn Bryant MD at [address] [place] [address] ENDOSCOPY    HX APPENDECTOMY      HX CHOLECYSTECTOMY      HX GYN      tubal ligation    HX HEART CATHETERIZATION  11/11/09; 10/21/09    HX HEART CATHETERIZATION  10/15/10; 10/06/09    left heart    HX HEART CATHETERIZATION  11/7/2011    left heart cath, no new findings    HX OTHER SURGICAL  4/6/2010    Insertion right internal jugular single lumen MediPort. HX RADICAL MASTECTOMY Left 2/2010    left, with chemo    [place] BREAST SURGERY PROCEDURE UNLISTED  2/22/10    left w/sentinel node bx    [place] CABG, ARTERY-VEIN, FOUR  12/2010    [place] CARDIAC SURG PROCEDURE UNLIST      stent placement before CABG    [place] CHEST SURGERY PROCEDURE UNLISTED Right     Prior port placement      Prior to Admission medications    Medication Sig Start Date End Date Taking? Authorizing Provider   esomeprazole (NEXIUM) 40 mg capsule Take  by mouth daily.    Yes Provider, Historical   metFORMIN (GLUMETZA ER) 500 mg TG24 24 hour tablet Take  by mouth two (2) times a day. Yes Provider, Historical   albuterol-ipratropium (DUO-NEB) 2.5 mg-0.5 mg/3 ml nebu Take 3 mL by inhalation every six (6) hours as needed. Use every night   Yes Provider, Historical   rosuvastatin (Crestor) 20 mg tablet Take 1 Tablet by mouth daily. Patient taking differently: Take 20 mg by mouth nightly. 2/3/22  Yes Irving Duong MD   bumetanide (BUMEX) 1 mg tablet Take 1 Tablet by mouth every other day. Patient taking differently: Take 1 mg by mouth as needed. 2/3/22  Yes Irving Duong MD   sacubitriL-valsartan Gearl Wheeler) 24-26 mg tablet Take 1 Tablet by mouth two (2) times a day. 2/3/22  Yes Irving Duong MD   apixaban (Eliquis) 5 mg tablet Take 1 Tablet by mouth two (2) times a day. 2/1/22  Yes Irving Duong MD   metoprolol succinate (TOPROL-XL) 25 mg XL tablet Take 1 Tablet by mouth two (2) times a day. Patient taking differently: Take 25 mg by mouth nightly. 1/20/22  Yes Irving Duong MD   amiodarone (CORDARONE) 200 mg tablet TAKE 1 TABLET EVERY DAY 10/5/21  Yes Irving Duong MD   albuterol (PROVENTIL HFA, VENTOLIN HFA) 90 mcg/actuation inhaler Take 1 Puff by inhalation every four (4) hours as needed. Yes Provider, Historical   levothyroxine (SYNTHROID) 150 mcg tablet Take  by mouth Daily (before breakfast). Yes Provider, Historical   nitroglycerin (NITROSTAT) 0.4 mg SL tablet 1 Tablet by SubLINGual route every five (5) minutes as needed for Chest Pain.  11/30/21   Irving Duong MD     Current Facility-Administered Medications   Medication Dose Route Frequency    docusate (COLACE) 50 mg/5 mL oral liquid 100 mg  100 mg Oral DAILY    naloxegoL (MOVANTIK) tablet 25 mg  25 mg Oral DAILY    vancomycin (VANCOCIN) 2000 mg in  ml infusion  2,000 mg IntraVENous ONCE    QUEtiapine SR (SEROquel XR) tablet 50 mg  50 mg Oral DAILY    [START ON 8/30/2022] vancomycin (VANCOCIN) 1500 mg in  ml infusion  1,500 mg IntraVENous Q24H    ketamine (KETALAR) 500 mg in 0.9% sodium chloride 500 mL infusion  0.05-0.4 mg/kg/hr IntraVENous TITRATE    piperacillin-tazobactam (ZOSYN) 3.375 g in 0.9% sodium chloride (MBP/ADV) 100 mL MBP  3.375 g IntraVENous Q8H    insulin glargine (LANTUS) injection 14 Units  14 Units SubCUTAneous DAILY    nicotine (NICODERM CQ) 14 mg/24 hr patch 1 Patch  1 Patch TransDERmal DAILY    0.9% sodium chloride infusion  500 mL/hr IntraVENous CONTINUOUS    albuterol (PROVENTIL VENTOLIN) nebulizer solution 2.5 mg  2.5 mg Nebulization Q4H RT    sodium chloride 3% hypertonic nebulizer soln  4 mL Nebulization Q4H RT    sennosides (SENOKOT) 8.8 mg/5 mL syrup 8.8 mg  5 mL Per G Tube BID    fentaNYL (PF) 1,500 mcg/30 mL (50 mcg/mL) infusion  0-200 mcg/hr IntraVENous TITRATE    levothyroxine (SYNTHROID) injection 100 mcg  100 mcg IntraVENous DAILY    heparin (porcine) injection 5,000 Units  5,000 Units SubCUTAneous Q8H    polyethylene glycol (MIRALAX) packet 17 g  17 g Oral DAILY    aspirin chewable tablet 81 mg  81 mg Oral DAILY    famotidine (PF) (PEPCID) 20 mg in 0.9% sodium chloride 10 mL injection  20 mg IntraVENous Q12H    chlorhexidine (PERIDEX) 0.12 % mouthwash 10 mL  10 mL Oral Q12H    insulin lispro (HUMALOG) injection   SubCUTAneous Q6H    [Held by provider] amiodarone (CORDARONE) tablet 200 mg  200 mg Oral DAILY    [Held by provider] levothyroxine (SYNTHROID) tablet 150 mcg  150 mcg Oral 6am    [Held by provider] rosuvastatin (CRESTOR) tablet 20 mg  20 mg Oral QHS    [Held by provider] sacubitriL-valsartan (ENTRESTO) 24-26 mg tablet 1 Tablet  1 Tablet Oral BID    [Held by provider] metoprolol succinate (TOPROL-XL) XL tablet 25 mg  25 mg Oral QHS     Allergies   Allergen Reactions    Shellfish Derived Hives     Eyes and Throat swelling      Social History     Tobacco Use    Smoking status: Every Day     Packs/day: 1.00     Years: 40.00     Pack years: 40.00     Types: Cigarettes    Smokeless tobacco: Never   Substance Use Topics    Alcohol use:  No Family History   Problem Relation Age of Onset    Hypertension Mother     Diabetes Mother     Breast Problems Mother         fiber cystic    Hypertension Other         Review of Systems:  Review of systems not obtained due to patient factors. Objective:   Vital Signs:    Visit Vitals  BP (!) 137/107   Pulse 84   Temp 99 °F (37.2 °C)   Resp 25   Ht 5' 9\" (1.753 m)   Wt 107.8 kg (237 lb 10.5 oz)   SpO2 97%   BMI 35.10 kg/m²       O2 Device: Ventilator, Endotracheal tube, Heated, Humidifier   O2 Flow Rate (L/min): 50 l/min   Temp (24hrs), Av.9 °F (37.2 °C), Min:98.4 °F (36.9 °C), Max:99.4 °F (37.4 °C)       Intake/Output:   Last shift:       07 -  1900  In: 355   Out: 430 [Urine:430]  Last 3 shifts:  190 -  0700  In: 3711.5 [I.V.:901.5]  Out: 1960 [Urine:1960]    Intake/Output Summary (Last 24 hours) at 2022 1522  Last data filed at 2022 1338  Gross per 24 hour   Intake 2268.5 ml   Output 1565 ml   Net 703.5 ml       Physical Exam:    General:  Sedated, awake and following commands    Head:  Normocephalic, without obvious abnormality, atraumatic. Eyes:  Conjunctivae/corneas clear. PERRL, EOMs intact. Nose: Nares normal. Septum midline. Mucosa normal.  ET tube orally   Throat: Lips, mucosa, and tongue normal. Teeth and gums normal.   Neck: Supple, symmetrical, trachea midline   Lungs:   Coarse rhonchi bilaterally, R>L.+ thick, copious secretions from ETT tube. Chest wall:  +thoracotomy scar. No tenderness or deformity. Heart:   rate and regular rhythm, S1, S2 normal, no murmur, click, rub or gallop. Abdomen:   Soft, non-tender. Bowel sounds normal. No masses,  No organomegaly. Extremities: Extremities normal, atraumatic, R leg swelling (entire leg bigger than right), LLE trace edema, pitting, LUE +2 edema, RUE trace edema,   Right groin dressing   Pulses: 2+ and symmetric all extremities. Skin: Skin color, texture, turgor normal. No rashes or lesions.  Normal capillary refill. Neurologic: Sedated. But follows commands        Data:     Recent Results (from the past 24 hour(s))   GLUCOSE, POC    Collection Time: 08/28/22  6:00 PM   Result Value Ref Range    Glucose (POC) 128 (H) 70 - 110 mg/dL   MAGNESIUM    Collection Time: 08/29/22  2:39 AM   Result Value Ref Range    Magnesium 2.2 1.6 - 2.6 mg/dL   PHOSPHORUS    Collection Time: 08/29/22  2:39 AM   Result Value Ref Range    Phosphorus 3.7 2.5 - 4.9 MG/DL   CBC WITH AUTOMATED DIFF    Collection Time: 08/29/22  2:39 AM   Result Value Ref Range    WBC 11.2 4.6 - 13.2 K/uL    RBC 2.36 (L) 4.20 - 5.30 M/uL    HGB 6.5 (L) 12.0 - 16.0 g/dL    HCT 21.9 (L) 35.0 - 45.0 %    MCV 92.8 78.0 - 100.0 FL    MCH 27.5 24.0 - 34.0 PG    MCHC 29.7 (L) 31.0 - 37.0 g/dL    RDW 17.2 (H) 11.6 - 14.5 %    PLATELET 830 702 - 056 K/uL    MPV 11.3 9.2 - 11.8 FL    NRBC 0.0 0  WBC    ABSOLUTE NRBC 0.00 0.00 - 0.01 K/uL    NEUTROPHILS 82 (H) 40 - 73 %    LYMPHOCYTES 5 (L) 21 - 52 %    MONOCYTES 10 3 - 10 %    EOSINOPHILS 1 0 - 5 %    BASOPHILS 0 0 - 2 %    IMMATURE GRANULOCYTES 1 (H) 0.0 - 0.5 %    ABS. NEUTROPHILS 9.2 (H) 1.8 - 8.0 K/UL    ABS. LYMPHOCYTES 0.5 (L) 0.9 - 3.6 K/UL    ABS. MONOCYTES 1.1 0.05 - 1.2 K/UL    ABS. EOSINOPHILS 0.1 0.0 - 0.4 K/UL    ABS. BASOPHILS 0.0 0.0 - 0.1 K/UL    ABS. IMM.  GRANS. 0.2 (H) 0.00 - 0.04 K/UL    DF AUTOMATED     METABOLIC PANEL, BASIC    Collection Time: 08/29/22  2:39 AM   Result Value Ref Range    Sodium 141 136 - 145 mmol/L    Potassium 4.3 3.5 - 5.5 mmol/L    Chloride 105 100 - 111 mmol/L    CO2 29 21 - 32 mmol/L    Anion gap 7 3.0 - 18 mmol/L    Glucose 130 (H) 74 - 99 mg/dL    BUN 37 (H) 7.0 - 18 MG/DL    Creatinine 0.90 0.6 - 1.3 MG/DL    BUN/Creatinine ratio 41 (H) 12 - 20      GFR est AA >60 >60 ml/min/1.73m2    GFR est non-AA >60 >60 ml/min/1.73m2    Calcium 8.1 (L) 8.5 - 10.1 MG/DL   BLOOD GAS, ARTERIAL POC    Collection Time: 08/29/22  3:43 AM   Result Value Ref Range    Device: ADULT VENT      FIO2 (POC) 28 %    pH (POC) 7.51 (H) 7.35 - 7.45      pCO2 (POC) 35.4 35.0 - 45.0 MMHG    pO2 (POC) 88 80 - 100 MMHG    HCO3 (POC) 28.4 (H) 22 - 26 MMOL/L    sO2 (POC) 97.7 (H) 92 - 97 %    Base excess (POC) 5.0 mmol/L    Mode ASSIST CONTROL      Tidal volume 480 ml    Set Rate 15 bpm    PEEP/CPAP (POC) 12 cmH2O    Mean Airway Pressure 15 cmH2O    PIP (POC) 28      Allens test (POC) Positive      Total resp. rate 20      Site RIGHT RADIAL      Specimen type (POC) ARTERIAL      Performed by Jennifer Reyes, ALLOCATE    Collection Time: 08/29/22  4:30 AM   Result Value Ref Range    HISTORY CHECKED? Historical check performed    GLUCOSE, POC    Collection Time: 08/29/22  4:49 AM   Result Value Ref Range    Glucose (POC) 126 (H) 70 - 110 mg/dL   TYPE & SCREEN    Collection Time: 08/29/22  5:27 AM   Result Value Ref Range    Crossmatch Expiration 09/01/2022,2359     ABO/Rh(D) Jamiejay Lexy NEGATIVE     Antibody screen NEG     CALLED TO: KIRA DAVISU, 0618, 8/29/2022 BY [id number]     Unit number [id number]     Blood component type  LR     Unit division 00     Status of unit ISSUED     Crossmatch result Compatible    GLUCOSE, POC    Collection Time: 08/29/22 12:49 PM   Result Value Ref Range    Glucose (POC) 170 (H) 70 - 110 mg/dL   HGB & HCT    Collection Time: 08/29/22  1:39 PM   Result Value Ref Range    HGB 7.8 (L) 12.0 - 16.0 g/dL    HCT 26.2 (L) 35.0 - 45.0 %             Telemetry:normal sinus rhythm    Imaging:  CXR Results  (Last 48 hours)                 08/29/22 0550  XR CHEST PORT Final result    Impression:      Mild increase in pulmonary edema of the bilateral lungs. No other significant   interval change. Cardiomegaly. Narrative:  AP CHEST, PORTABLE       INDICATION: Above. Intubated. COMPARISON: 8/28/2022. TECHNIQUE: Portable semi-upright AP chest radiograph is reviewed. FINDINGS:       Endotracheal tube tip and esophagogastric tube project in stable and   satisfactory position. EKG leads/wires and other catheter tubing overlie the   patient. Median sternotomy wires and surgical clips project over the mediastinum. Aortic   stent graft is again seen. Surgical clips again project over the left axillary   region. Mild increase in interstitial prominence compared to the preceding radiograph . No evidence of focal pulmonary consolidation. No evidence of pneumothorax. The   costophrenic sulci appear sharp. The cardiac silhouette is again enlarged. 08/28/22 0349  XR CHEST PORT Final result    Impression:      Slight interval improvement in aeration compared to the preceding study with   slight decrease in interstitial prominence suspect for decrease in mild   pulmonary edema. No other significant interval change. Cardiomegaly. Narrative:  AP CHEST, PORTABLE       INDICATION: Above. Intubated. COMPARISON: 8/27/2022. TECHNIQUE: Portable semi-upright AP chest radiograph is reviewed. FINDINGS:       Endotracheal tube tip and esophagogastric tube project in stable and   satisfactory position. EKG leads/wires and other catheter tubing overlie the   patient. Median sternotomy wires and surgical clips project over the mediastinum. Aortic   stent graft is again seen. Surgical clips again project over the left axillary   region. Interval decrease in interstitial prominence compared to the preceding   radiograph suspect for interval decrease in mild pulmonary edema. No evidence of   focal pulmonary consolidation. No evidence of pneumothorax. The costophrenic   sulci appear sharp. The cardiac silhouette is again enlarged.                     CT Results  (Last 48 hours)      None                     JOYA SPLIT/SHARE TIME: 15 mins  Keenan Quispe   08/29/22    Pulmonary, Critical Care Medicine  [facility]

## 2022-08-29 NOTE — PROGRESS NOTES
attended the interdisciplinary rounds for Chaparro Brown, who is a 63 y.o.,female. Patients Primary Language is: [place]. According to the patients EMR Mandaeism Affiliation is: [facility].     The reason the Patient came to the hospital is:   Patient Active Problem List    Diagnosis Date Noted    Saccular aneurysm 08/18/2022    SOB (shortness of breath) 06/01/2012    Diabetes ([address]     Hypertension     Hyperlipidemia     Aneurysm ([address]     Breast CA ([facility]     Cancer of breast, left 08/30/2010    COPD (chronic obstructive pulmonary disease) ([facility] 08/30/2010    CAD (coronary artery disease) 08/30/2010      Plan:  Chaplains will continue to follow and will provide pastoral care on an as needed/requested basis.  recommends bedside caregivers page  on duty if patient shows signs of acute spiritual or emotional distress.     [address] [facility]  Board Certified [address] [facility]   [phone]

## 2022-08-29 NOTE — ROUTINE PROCESS
Bedside and Verbal shift change report given to Jojo Munoz RN (oncoming nurse) by Peter Dorantes RN (offgoing nurse). Report included the following information SBAR, Kardex, ED Summary, Intake/Output, MAR, and Cardiac Rhythm Sinus Charles Bryant .

## 2022-08-29 NOTE — PROGRESS NOTES
Saw patient at bedside and discussed with critical care team  Antibiotics initiated treating for tracheobronchial infection  Pulmonary appreciated  Otherwise stable  Pulses intact peripherally  Incisions are clean Body Location Override (Optional - Billing Will Still Be Based On Selected Body Map Location If Applicable): Left Shoulder

## 2022-08-30 ENCOUNTER — APPOINTMENT (OUTPATIENT)
Dept: GENERAL RADIOLOGY | Age: 62
DRG: 219 | End: 2022-08-30
Attending: PHYSICIAN ASSISTANT
Payer: MEDICARE

## 2022-08-30 LAB
ABO + RH BLD: NORMAL
ANION GAP SERPL CALC-SCNC: 5 MMOL/L (ref 3–18)
ARTERIAL PATENCY WRIST A: POSITIVE
BACTERIA SPEC CULT: NORMAL
BACTERIA SPEC CULT: NORMAL
BASE EXCESS BLD CALC-SCNC: 2.9 MMOL/L
BASOPHILS # BLD: 0 K/UL (ref 0–0.1)
BASOPHILS NFR BLD: 0 % (ref 0–2)
BDY SITE: ABNORMAL
BLD PROD TYP BPU: NORMAL
BLOOD GROUP ANTIBODIES SERPL: NORMAL
BPU ID: NORMAL
BUN SERPL-MCNC: 35 MG/DL (ref 7–18)
BUN/CREAT SERPL: 42 (ref 12–20)
CALCIUM SERPL-MCNC: 8.6 MG/DL (ref 8.5–10.1)
CALLED TO:,BCALL1: NORMAL
CHLORIDE SERPL-SCNC: 105 MMOL/L (ref 100–111)
CO2 SERPL-SCNC: 29 MMOL/L (ref 21–32)
CREAT SERPL-MCNC: 0.84 MG/DL (ref 0.6–1.3)
CROSSMATCH RESULT,%XM: NORMAL
DIFFERENTIAL METHOD BLD: ABNORMAL
EOSINOPHIL # BLD: 0.1 K/UL (ref 0–0.4)
EOSINOPHIL NFR BLD: 1 % (ref 0–5)
ERYTHROCYTE [DISTWIDTH] IN BLOOD BY AUTOMATED COUNT: 17.1 % (ref 11.6–14.5)
GAS FLOW.O2 O2 DELIVERY SYS: ABNORMAL L/MIN
GAS FLOW.O2 SETTING OXYMISER: 8 BPM
GLUCOSE BLD STRIP.AUTO-MCNC: 133 MG/DL (ref 70–110)
GLUCOSE BLD STRIP.AUTO-MCNC: 150 MG/DL (ref 70–110)
GLUCOSE BLD STRIP.AUTO-MCNC: 170 MG/DL (ref 70–110)
GLUCOSE SERPL-MCNC: 100 MG/DL (ref 74–99)
HCO3 BLD-SCNC: 28.2 MMOL/L (ref 22–26)
HCT VFR BLD AUTO: 24 % (ref 35–45)
HGB BLD-MCNC: 7.2 G/DL (ref 12–16)
IMM GRANULOCYTES # BLD AUTO: 0.3 K/UL (ref 0–0.04)
IMM GRANULOCYTES NFR BLD AUTO: 2 % (ref 0–0.5)
LYMPHOCYTES # BLD: 0.8 K/UL (ref 0.9–3.6)
LYMPHOCYTES NFR BLD: 6 % (ref 21–52)
MAGNESIUM SERPL-MCNC: 2.1 MG/DL (ref 1.6–2.6)
MAGNESIUM SERPL-MCNC: 2.5 MG/DL (ref 1.6–2.6)
MCH RBC QN AUTO: 27.5 PG (ref 24–34)
MCHC RBC AUTO-ENTMCNC: 30 G/DL (ref 31–37)
MCV RBC AUTO: 91.6 FL (ref 78–100)
MONOCYTES # BLD: 1.3 K/UL (ref 0.05–1.2)
MONOCYTES NFR BLD: 10 % (ref 3–10)
NEUTS SEG # BLD: 10.7 K/UL (ref 1.8–8)
NEUTS SEG NFR BLD: 81 % (ref 40–73)
NRBC # BLD: 0 K/UL (ref 0–0.01)
NRBC BLD-RTO: 0 PER 100 WBC
O2/TOTAL GAS SETTING VFR VENT: 28 %
PCO2 BLD: 45.9 MMHG (ref 35–45)
PEEP RESPIRATORY: 12 CMH2O
PH BLD: 7.4 [PH] (ref 7.35–7.45)
PHOSPHATE SERPL-MCNC: 3.1 MG/DL (ref 2.5–4.9)
PLATELET # BLD AUTO: 227 K/UL (ref 135–420)
PMV BLD AUTO: 10.7 FL (ref 9.2–11.8)
PO2 BLD: 75 MMHG (ref 80–100)
POTASSIUM SERPL-SCNC: 4 MMOL/L (ref 3.5–5.5)
PRESSURE SUPPORT SETTING VENT: 10 CMH2O
RBC # BLD AUTO: 2.62 M/UL (ref 4.2–5.3)
SAO2 % BLD: 94.6 % (ref 92–97)
SERVICE CMNT-IMP: ABNORMAL
SERVICE CMNT-IMP: NORMAL
SODIUM SERPL-SCNC: 139 MMOL/L (ref 136–145)
SPECIMEN EXP DATE BLD: NORMAL
SPECIMEN TYPE: ABNORMAL
STATUS OF UNIT,%ST: NORMAL
UNIT DIVISION, %UDIV: 0
VANCOMYCIN SERPL-MCNC: 12.6 UG/ML (ref 5–40)
VENTILATION MODE VENT: ABNORMAL
VT SETTING VENT: 480 ML
WBC # BLD AUTO: 13.2 K/UL (ref 4.6–13.2)

## 2022-08-30 PROCEDURE — 36415 COLL VENOUS BLD VENIPUNCTURE: CPT

## 2022-08-30 PROCEDURE — 65610000006 HC RM INTENSIVE CARE

## 2022-08-30 PROCEDURE — 94762 N-INVAS EAR/PLS OXIMTRY CONT: CPT

## 2022-08-30 PROCEDURE — 74011000250 HC RX REV CODE- 250: Performed by: PHYSICIAN ASSISTANT

## 2022-08-30 PROCEDURE — APPSS15 APP SPLIT SHARED TIME 0-15 MINUTES: Performed by: INTERNAL MEDICINE

## 2022-08-30 PROCEDURE — 85025 COMPLETE CBC W/AUTO DIFF WBC: CPT

## 2022-08-30 PROCEDURE — 74011636637 HC RX REV CODE- 636/637: Performed by: INTERNAL MEDICINE

## 2022-08-30 PROCEDURE — 74011000250 HC RX REV CODE- 250: Performed by: INTERNAL MEDICINE

## 2022-08-30 PROCEDURE — 83735 ASSAY OF MAGNESIUM: CPT

## 2022-08-30 PROCEDURE — 94003 VENT MGMT INPAT SUBQ DAY: CPT

## 2022-08-30 PROCEDURE — 99291 CRITICAL CARE FIRST HOUR: CPT | Performed by: INTERNAL MEDICINE

## 2022-08-30 PROCEDURE — 74011250637 HC RX REV CODE- 250/637: Performed by: SURGERY

## 2022-08-30 PROCEDURE — 2709999900 HC NON-CHARGEABLE SUPPLY

## 2022-08-30 PROCEDURE — 36600 WITHDRAWAL OF ARTERIAL BLOOD: CPT

## 2022-08-30 PROCEDURE — 74011250637 HC RX REV CODE- 250/637: Performed by: NURSE PRACTITIONER

## 2022-08-30 PROCEDURE — 80202 ASSAY OF VANCOMYCIN: CPT

## 2022-08-30 PROCEDURE — 80048 BASIC METABOLIC PNL TOTAL CA: CPT

## 2022-08-30 PROCEDURE — 71045 X-RAY EXAM CHEST 1 VIEW: CPT

## 2022-08-30 PROCEDURE — 74011000258 HC RX REV CODE- 258: Performed by: NURSE PRACTITIONER

## 2022-08-30 PROCEDURE — 74011250636 HC RX REV CODE- 250/636: Performed by: PHYSICIAN ASSISTANT

## 2022-08-30 PROCEDURE — 74011250636 HC RX REV CODE- 250/636: Performed by: SURGERY

## 2022-08-30 PROCEDURE — 82803 BLOOD GASES ANY COMBINATION: CPT

## 2022-08-30 PROCEDURE — 84100 ASSAY OF PHOSPHORUS: CPT

## 2022-08-30 PROCEDURE — 94640 AIRWAY INHALATION TREATMENT: CPT

## 2022-08-30 PROCEDURE — 74011250636 HC RX REV CODE- 250/636: Performed by: NURSE PRACTITIONER

## 2022-08-30 PROCEDURE — 74011250636 HC RX REV CODE- 250/636: Performed by: INTERNAL MEDICINE

## 2022-08-30 PROCEDURE — 74011250637 HC RX REV CODE- 250/637: Performed by: PHYSICIAN ASSISTANT

## 2022-08-30 PROCEDURE — 82962 GLUCOSE BLOOD TEST: CPT

## 2022-08-30 PROCEDURE — 74011636637 HC RX REV CODE- 636/637: Performed by: PHYSICIAN ASSISTANT

## 2022-08-30 PROCEDURE — 74011250636 HC RX REV CODE- 250/636: Performed by: REGISTERED NURSE

## 2022-08-30 RX ORDER — VANCOMYCIN/0.9 % SOD CHLORIDE 1.5G/250ML
1500 PLASTIC BAG, INJECTION (ML) INTRAVENOUS
Status: DISCONTINUED | OUTPATIENT
Start: 2022-08-30 | End: 2022-08-31

## 2022-08-30 RX ORDER — FUROSEMIDE 10 MG/ML
40 INJECTION INTRAMUSCULAR; INTRAVENOUS ONCE
Status: COMPLETED | OUTPATIENT
Start: 2022-08-30 | End: 2022-08-30

## 2022-08-30 RX ORDER — MAGNESIUM SULFATE HEPTAHYDRATE 40 MG/ML
2 INJECTION, SOLUTION INTRAVENOUS ONCE
Status: COMPLETED | OUTPATIENT
Start: 2022-08-30 | End: 2022-08-30

## 2022-08-30 RX ORDER — ALBUTEROL SULFATE 0.83 MG/ML
2.5 SOLUTION RESPIRATORY (INHALATION)
Status: DISCONTINUED | OUTPATIENT
Start: 2022-08-30 | End: 2022-09-01

## 2022-08-30 RX ORDER — SODIUM CHLORIDE FOR INHALATION 3 %
4 VIAL, NEBULIZER (ML) INHALATION
Status: DISCONTINUED | OUTPATIENT
Start: 2022-08-30 | End: 2022-09-05 | Stop reason: HOSPADM

## 2022-08-30 RX ADMIN — MIDAZOLAM HYDROCHLORIDE 1 MG: 1 INJECTION, SOLUTION INTRAMUSCULAR; INTRAVENOUS at 22:38

## 2022-08-30 RX ADMIN — HEPARIN SODIUM 5000 UNITS: 5000 INJECTION INTRAVENOUS; SUBCUTANEOUS at 13:01

## 2022-08-30 RX ADMIN — FENTANYL CITRATE 200 MCG/HR: 0.05 INJECTION, SOLUTION INTRAMUSCULAR; INTRAVENOUS at 14:57

## 2022-08-30 RX ADMIN — LEVOTHYROXINE SODIUM ANHYDROUS 100 MCG: 100 INJECTION, POWDER, LYOPHILIZED, FOR SOLUTION INTRAVENOUS at 08:59

## 2022-08-30 RX ADMIN — ASPIRIN 81 MG CHEWABLE TABLET 81 MG: 81 TABLET CHEWABLE at 08:57

## 2022-08-30 RX ADMIN — SODIUM CHLORIDE SOLN NEBU 3% 4 ML: 3 NEBU SOLN at 08:21

## 2022-08-30 RX ADMIN — SODIUM CHLORIDE, PRESERVATIVE FREE 20 MG: 5 INJECTION INTRAVENOUS at 21:12

## 2022-08-30 RX ADMIN — Medication 14 UNITS: at 08:58

## 2022-08-30 RX ADMIN — ALBUTEROL SULFATE 2.5 MG: 2.5 SOLUTION RESPIRATORY (INHALATION) at 04:10

## 2022-08-30 RX ADMIN — FENTANYL CITRATE 100 MCG: 50 INJECTION INTRAMUSCULAR; INTRAVENOUS at 05:30

## 2022-08-30 RX ADMIN — HEPARIN SODIUM 5000 UNITS: 5000 INJECTION INTRAVENOUS; SUBCUTANEOUS at 04:15

## 2022-08-30 RX ADMIN — ALBUTEROL SULFATE 2.5 MG: 2.5 SOLUTION RESPIRATORY (INHALATION) at 00:42

## 2022-08-30 RX ADMIN — PIPERACILLIN SODIUM AND TAZOBACTAM SODIUM 3.38 G: 3; .375 INJECTION, POWDER, LYOPHILIZED, FOR SOLUTION INTRAVENOUS at 00:55

## 2022-08-30 RX ADMIN — PIPERACILLIN SODIUM AND TAZOBACTAM SODIUM 3.38 G: 3; .375 INJECTION, POWDER, LYOPHILIZED, FOR SOLUTION INTRAVENOUS at 17:46

## 2022-08-30 RX ADMIN — MIDAZOLAM HYDROCHLORIDE 1 MG: 1 INJECTION, SOLUTION INTRAMUSCULAR; INTRAVENOUS at 17:54

## 2022-08-30 RX ADMIN — FENTANYL CITRATE 200 MCG/HR: 0.05 INJECTION, SOLUTION INTRAMUSCULAR; INTRAVENOUS at 22:25

## 2022-08-30 RX ADMIN — POLYETHYLENE GLYCOL 3350 17 G: 17 POWDER, FOR SOLUTION ORAL at 08:57

## 2022-08-30 RX ADMIN — HEPARIN SODIUM 5000 UNITS: 5000 INJECTION INTRAVENOUS; SUBCUTANEOUS at 21:12

## 2022-08-30 RX ADMIN — QUETIAPINE FUMARATE 50 MG: 50 TABLET, EXTENDED RELEASE ORAL at 08:57

## 2022-08-30 RX ADMIN — Medication 2 UNITS: at 17:54

## 2022-08-30 RX ADMIN — CHLORHEXIDINE GLUCONATE 0.12% ORAL RINSE 10 ML: 1.2 LIQUID ORAL at 08:56

## 2022-08-30 RX ADMIN — SENNOSIDES 8.8 MG: 8.8 LIQUID ORAL at 08:57

## 2022-08-30 RX ADMIN — ALBUTEROL SULFATE 2.5 MG: 2.5 SOLUTION RESPIRATORY (INHALATION) at 08:21

## 2022-08-30 RX ADMIN — MAGNESIUM SULFATE HEPTAHYDRATE 2 G: 40 INJECTION, SOLUTION INTRAVENOUS at 08:18

## 2022-08-30 RX ADMIN — Medication 2 UNITS: at 12:58

## 2022-08-30 RX ADMIN — MIDAZOLAM HYDROCHLORIDE 1 MG: 1 INJECTION, SOLUTION INTRAMUSCULAR; INTRAVENOUS at 13:57

## 2022-08-30 RX ADMIN — FENTANYL CITRATE 200 MCG/HR: 0.05 INJECTION, SOLUTION INTRAMUSCULAR; INTRAVENOUS at 07:38

## 2022-08-30 RX ADMIN — MIDAZOLAM HYDROCHLORIDE 1 MG: 1 INJECTION, SOLUTION INTRAMUSCULAR; INTRAVENOUS at 00:19

## 2022-08-30 RX ADMIN — SODIUM CHLORIDE SOLN NEBU 3% 4 ML: 3 NEBU SOLN at 00:42

## 2022-08-30 RX ADMIN — VANCOMYCIN HYDROCHLORIDE 1500 MG: 10 INJECTION, POWDER, LYOPHILIZED, FOR SOLUTION INTRAVENOUS at 10:16

## 2022-08-30 RX ADMIN — SODIUM CHLORIDE, PRESERVATIVE FREE 20 MG: 5 INJECTION INTRAVENOUS at 08:57

## 2022-08-30 RX ADMIN — DOCUSATE SODIUM 100 MG: 50 LIQUID ORAL at 08:57

## 2022-08-30 RX ADMIN — FUROSEMIDE 40 MG: 10 INJECTION, SOLUTION INTRAMUSCULAR; INTRAVENOUS at 10:16

## 2022-08-30 RX ADMIN — PIPERACILLIN SODIUM AND TAZOBACTAM SODIUM 3.38 G: 3; .375 INJECTION, POWDER, LYOPHILIZED, FOR SOLUTION INTRAVENOUS at 08:18

## 2022-08-30 RX ADMIN — NALOXEGOL OXALATE 25 MG: 25 TABLET, FILM COATED ORAL at 08:57

## 2022-08-30 RX ADMIN — SODIUM CHLORIDE SOLN NEBU 3% 4 ML: 3 NEBU SOLN at 04:10

## 2022-08-30 RX ADMIN — CHLORHEXIDINE GLUCONATE 0.12% ORAL RINSE 10 ML: 1.2 LIQUID ORAL at 21:12

## 2022-08-30 NOTE — PROGRESS NOTES
discussed with Dr. Johanna Rosario discharge planning. Dr. Johanna Rosario indicated that the patient is on a vent and is not ready for discharge.       RAN Albarran

## 2022-08-30 NOTE — PROGRESS NOTES
[facility]  Pulmonary, Critical Care, and Sleep Medicine      Name: Delbert Hayes MRN: [id number]   : 1960 Hospital: [address] [place] [address]    Date: 2022          Critical Care progress note    IMPRESSION:   S/p Cardiac arrest - ROSC after ~9 min of ACLS, no further events  Hypoxic and hypercapnic respiratory failure - now on mechanical vent. 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 Seema Espinoza. Seizure vs myoclonus - shortly following cardiac arrest x2. No further episodes of tremulousness or twitching noted  R groin hematoma w/ femstop in place  Hx of CAD w/ prior CABG  Hx of HFrEF - most recent EF from  -30%  Paroxysmal symptomatic a-fib with CHADSVASc2 score of 5. On chronic Amiodorone and anticoagulation  History of recurrent GI bleeding, requiring transfusion and HAS-BLED score of 4. Chronic stage 2 kidney disease. Remote breast cancer about ten years ago with mastectomy and chemotherapy, but no chest radiation. History of thyroid disorder  History of 'waking up 'previously with anesthesia. RECOMMENDATIONS:   Neuro: wean fentanyl gtt. RASS 0 to -1. Ketamine gtt added. PRN Fentanyl/ Versed for breakthrough sedation. +seroquel  Pulm: Titrate FiO2 for goal SPO2> 90%, Daily CXR and ABG while intubated, S/p Bronch for mucus plugs (); Cont metanebs and hypertonic saline for copious secretions, PRN duo-nebs q4,Steroids discontinued. SBT daily, would like to extubate to Bipap ideally, however, secretions making this a contraindication.   -Will need to check cuff leak prior to extubation   CVS: HD stable. Goal MAP >65mmHg. Cardiology following for assistance in management. Con't PRN Hydralazine for SBP >180mmHg. Vascular following. Cont SQH prophylaxis.  Will give 40 mg lasix  Fluids: N/A   GI: SUP(pepcid), Trend LFTs, OGT. Cont Senna/Miralax for bowel regimen   Renal: Trend Renal indices, Strict Is/Os, Gonzalez (+). Hem/Onc: Monitor for s/o active bleeding. Daily CBC. I/D: Trend WBCs and temperature curve. Resp cx 8/27 with GNR's and GPC's- will start Zosyn and await speciation    Endocrine: Q6 glucoses, SSI. Metabolic:  Daily BMP, mag, phos. Trend lytes, replace as needed. Musc/Skin: no acute issues, wound care as needed  Full code  Discussed during IDRs     Subjective/History:       HPI  Patient is a 63 y.o. female w/ pmhx of a fib, CAD, CHF who presented for covered stent procedure for descending thoracic aortic saccular aneurysm. Procedure performed 8/18 w/o incident. Following procedure, patient was drowsy but answering questions appropriately per bedside RN. Per chart review, she began having oxygen desaturations and escalating oxygen requirements, eventually ending up on HFNC. Blood gas performed showed mild hypoxia and mild hypercapnia. Per chart review, patient was found in cardiac arrest and ACLS initiated with approximately 9 min of downtime. EPOC labs following cardiac arrest essentially unremarkable. ABG with mixed respiratory and metabolic acidosis. Patient was extubated 8/22 and reintubated same day for stridor. Now with thick, copious secretions and bronchotracheomalacia preventing liberation from ventilator. Failing SBT's due to above, may need trach. The patient is critically ill and can not provide additional history due to Ventilated.      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    Anxiety     Breast CA ([facility] 12/2009    S/P Lt Mastectomy and Chemo CAD (coronary artery disease) 08/30/2010    S/P CABG X 4 ([address] to LAD, Sequential SVG to D2, Ramus, OM) 01/2011    Cardiomyopathy ([facility]     LVEF 35-40 % (08/20) 60% (08/14), 40% (2011)    Chemotherapy 04/2010    for 4 months    CHF (congestive heart failure) (HCC)     Chronic kidney disease     stage 2 per cardiac note cc    Chronic pain     COPD (chronic obstructive pulmonary disease) [facility] 08/30/2010    Depression     Diabetes (HCC)     GERD (gastroesophageal reflux disease)     GI bleed     History of blood transfusion     2010 and 1/2022    Hx of heart artery stent 2010    x 10  (4, then 4, then 2)    Hyperlipidemia     Hypertension     MI (myocardial infarction) ([facility] 2009    x 3    Schatzki's ring     S/P EGD and Dilation (07/16)    Thyroid disease     hypothyroid    Tobacco abuse       Past Surgical History:   Procedure Laterality Date    COLONOSCOPY N/A 5/3/2017    COLON  performed by Harry Cho MD at [facility] ENDOSCOPY    COLONOSCOPY N/A 2/15/2021    COLONOSCOPY performed by Jefferson Camejo MD at [facility] ENDOSCOPY    HX APPENDECTOMY      HX CHOLECYSTECTOMY      HX GYN      tubal ligation    HX HEART CATHETERIZATION  11/11/09; 10/21/09    HX HEART CATHETERIZATION  10/15/10; 10/06/09    left heart    HX HEART CATHETERIZATION  11/7/2011    left heart cath, no new findings    HX OTHER SURGICAL  4/6/2010    Insertion right internal jugular single lumen MediPort. HX RADICAL MASTECTOMY Left 2/2010    left, with chemo    IL BREAST SURGERY PROCEDURE UNLISTED  2/22/10    left w/sentinel node bx    IL CABG, ARTERY-VEIN, FOUR  12/2010    IL CARDIAC SURG PROCEDURE UNLIST      stent placement before CABG    IL CHEST SURGERY PROCEDURE UNLISTED Right     Prior port placement      Prior to Admission medications    Medication Sig Start Date End Date Taking? Authorizing Provider   esomeprazole (NEXIUM) 40 mg capsule Take  by mouth daily.    Yes Provider, Historical   metFORMIN (GLUMETZA ER) 500 mg TG24 24 hour tablet Take by mouth two (2) times a day. Yes Provider, Historical   albuterol-ipratropium (DUO-NEB) 2.5 mg-0.5 mg/3 ml nebu Take 3 mL by inhalation every six (6) hours as needed. Use every night   Yes Provider, Historical   rosuvastatin (Crestor) 20 mg tablet Take 1 Tablet by mouth daily. Patient taking differently: Take 20 mg by mouth nightly. 2/3/22  Yes Prsahant Beaevr MD   bumetanide (BUMEX) 1 mg tablet Take 1 Tablet by mouth every other day. Patient taking differently: Take 1 mg by mouth as needed. 2/3/22  Yes Prashant Beaver MD   sacubitriL-valsartan Rekha Sosa) 24-26 mg tablet Take 1 Tablet by mouth two (2) times a day. 2/3/22  Yes Prashant Beaver MD   apixaban (Eliquis) 5 mg tablet Take 1 Tablet by mouth two (2) times a day. 2/1/22  Yes Prashant Beaver MD   metoprolol succinate (TOPROL-XL) 25 mg XL tablet Take 1 Tablet by mouth two (2) times a day. Patient taking differently: Take 25 mg by mouth nightly. 1/20/22  Yes Prashant Beaver MD   amiodarone (CORDARONE) 200 mg tablet TAKE 1 TABLET EVERY DAY 10/5/21  Yes Prashant Beaver MD   albuterol (PROVENTIL HFA, VENTOLIN HFA) 90 mcg/actuation inhaler Take 1 Puff by inhalation every four (4) hours as needed. Yes Provider, Historical   levothyroxine (SYNTHROID) 150 mcg tablet Take  by mouth Daily (before breakfast). Yes Provider, Historical   nitroglycerin (NITROSTAT) 0.4 mg SL tablet 1 Tablet by SubLINGual route every five (5) minutes as needed for Chest Pain.  11/30/21   Prashant Beaver MD     Current Facility-Administered Medications   Medication Dose Route Frequency    magnesium sulfate 2 g/50 ml IVPB (premix or compounded)  2 g IntraVENous ONCE    vancomycin (VANCOCIN) 1500 mg in  ml infusion  1,500 mg IntraVENous Q18H    furosemide (LASIX) injection 40 mg  40 mg IntraVENous ONCE    docusate (COLACE) 50 mg/5 mL oral liquid 100 mg  100 mg Oral DAILY    naloxegoL (MOVANTIK) tablet 25 mg  25 mg Oral DAILY    QUEtiapine SR (SEROquel XR) tablet 50 mg  50 mg Oral DAILY    ketamine (KETALAR) 500 mg in 0.9% sodium chloride 500 mL infusion  0.05-0.4 mg/kg/hr IntraVENous TITRATE    piperacillin-tazobactam (ZOSYN) 3.375 g in 0.9% sodium chloride (MBP/ADV) 100 mL MBP  3.375 g IntraVENous Q8H    insulin glargine (LANTUS) injection 14 Units  14 Units SubCUTAneous DAILY    nicotine (NICODERM CQ) 14 mg/24 hr patch 1 Patch  1 Patch TransDERmal DAILY    albuterol (PROVENTIL VENTOLIN) nebulizer solution 2.5 mg  2.5 mg Nebulization Q4H RT    sodium chloride 3% hypertonic nebulizer soln  4 mL Nebulization Q4H RT    sennosides (SENOKOT) 8.8 mg/5 mL syrup 8.8 mg  5 mL Per G Tube BID    fentaNYL (PF) 1,500 mcg/30 mL (50 mcg/mL) infusion  0-200 mcg/hr IntraVENous TITRATE    levothyroxine (SYNTHROID) injection 100 mcg  100 mcg IntraVENous DAILY    heparin (porcine) injection 5,000 Units  5,000 Units SubCUTAneous Q8H    polyethylene glycol (MIRALAX) packet 17 g  17 g Oral DAILY    aspirin chewable tablet 81 mg  81 mg Oral DAILY    famotidine (PF) (PEPCID) 20 mg in 0.9% sodium chloride 10 mL injection  20 mg IntraVENous Q12H    chlorhexidine (PERIDEX) 0.12 % mouthwash 10 mL  10 mL Oral Q12H    insulin lispro (HUMALOG) injection   SubCUTAneous Q6H    [Held by provider] amiodarone (CORDARONE) tablet 200 mg  200 mg Oral DAILY    [Held by provider] levothyroxine (SYNTHROID) tablet 150 mcg  150 mcg Oral 6am    [Held by provider] rosuvastatin (CRESTOR) tablet 20 mg  20 mg Oral QHS    [Held by provider] sacubitriL-valsartan (ENTRESTO) 24-26 mg tablet 1 Tablet  1 Tablet Oral BID    [Held by provider] metoprolol succinate (TOPROL-XL) XL tablet 25 mg  25 mg Oral QHS     Allergies   Allergen Reactions    Shellfish Derived Hives     Eyes and Throat swelling      Social History     Tobacco Use    Smoking status: Every Day     Packs/day: 1.00     Years: 40.00     Pack years: 40.00     Types: Cigarettes    Smokeless tobacco: Never   Substance Use Topics    Alcohol use: No      Family History   Problem Relation Age of Onset    Hypertension Mother     Diabetes Mother     Breast Problems Mother         fiber cystic    Hypertension Other         Review of Systems:  Review of systems not obtained due to patient factors. Objective:   Vital Signs:    Visit Vitals  /60   Pulse 63   Temp 99.5 °F (37.5 °C)   Resp 13   Ht 5' 9\" (1.753 m)   Wt 105.4 kg (232 lb 5.8 oz) Comment: bed scale not working, written on board   SpO2 100%   BMI 34.31 kg/m²       O2 Device: Ventilator, Endotracheal tube, Heated, Humidifier   O2 Flow Rate (L/min): 50 l/min   Temp (24hrs), Av.7 °F (37.1 °C), Min:97.8 °F (36.6 °C), Max:99.6 °F (37.6 °C)       Intake/Output:   Last shift:      No intake/output data recorded. Last 3 shifts:  1901 -  0700  In: 4554.8 [I.V.:1189.8]  Out: 1190 [Urine:1190]    Intake/Output Summary (Last 24 hours) at 2022 0949  Last data filed at 2022 0700  Gross per 24 hour   Intake 3297.68 ml   Output 250 ml   Net 3047.68 ml       Physical Exam:    General:  Sedated but following commands when aroused   Head:  Normocephalic, without obvious abnormality, atraumatic. Eyes:  Conjunctivae/corneas clear. PERRL, EOMs intact. Nose: Nares normal. Septum midline. Mucosa normal.  ET tube orally   Throat: Lips, mucosa, and tongue normal. Teeth and gums normal.   Neck: Supple, symmetrical, trachea midline   Lungs:   Coarse rhonchi bilaterally, R>L.+ thick, copious secretions from ETT tube. Chest wall:  +thoracotomy scar. No tenderness or deformity. Heart:   rate and regular rhythm, S1, S2 normal, no murmur, click, rub or gallop. Abdomen:   Soft, non-tender. Bowel sounds normal. No masses,  No organomegaly. Extremities: Extremities normal, atraumatic, R leg swelling (entire leg bigger than right), LLE trace edema, pitting, LUE +2 edema, RUE trace edema,   Right groin dressing   Pulses: 2+ and symmetric all extremities. Skin: Skin color, texture, turgor normal. No rashes or lesions.  Normal capillary refill. Neurologic: Sedated. But follows commands        Data:     Recent Results (from the past 24 hour(s))   GLUCOSE, POC    Collection Time: 08/29/22 12:49 PM   Result Value Ref Range    Glucose (POC) 170 (H) 70 - 110 mg/dL   HGB & HCT    Collection Time: 08/29/22  1:39 PM   Result Value Ref Range    HGB 7.8 (L) 12.0 - 16.0 g/dL    HCT 26.2 (L) 35.0 - 45.0 %   GLUCOSE, POC    Collection Time: 08/29/22  6:02 PM   Result Value Ref Range    Glucose (POC) 145 (H) 70 - 110 mg/dL   GLUCOSE, POC    Collection Time: 08/29/22 11:39 PM   Result Value Ref Range    Glucose (POC) 157 (H) 70 - 110 mg/dL   MAGNESIUM    Collection Time: 08/30/22  3:44 AM   Result Value Ref Range    Magnesium 2.1 1.6 - 2.6 mg/dL   PHOSPHORUS    Collection Time: 08/30/22  3:44 AM   Result Value Ref Range    Phosphorus 3.1 2.5 - 4.9 MG/DL   CBC WITH AUTOMATED DIFF    Collection Time: 08/30/22  3:44 AM   Result Value Ref Range    WBC 13.2 4.6 - 13.2 K/uL    RBC 2.62 (L) 4.20 - 5.30 M/uL    HGB 7.2 (L) 12.0 - 16.0 g/dL    HCT 24.0 (L) 35.0 - 45.0 %    MCV 91.6 78.0 - 100.0 FL    MCH 27.5 24.0 - 34.0 PG    MCHC 30.0 (L) 31.0 - 37.0 g/dL    RDW 17.1 (H) 11.6 - 14.5 %    PLATELET 716 006 - 330 K/uL    MPV 10.7 9.2 - 11.8 FL    NRBC 0.0 0  WBC    ABSOLUTE NRBC 0.00 0.00 - 0.01 K/uL    NEUTROPHILS 81 (H) 40 - 73 %    LYMPHOCYTES 6 (L) 21 - 52 %    MONOCYTES 10 3 - 10 %    EOSINOPHILS 1 0 - 5 %    BASOPHILS 0 0 - 2 %    IMMATURE GRANULOCYTES 2 (H) 0.0 - 0.5 %    ABS. NEUTROPHILS 10.7 (H) 1.8 - 8.0 K/UL    ABS. LYMPHOCYTES 0.8 (L) 0.9 - 3.6 K/UL    ABS. MONOCYTES 1.3 (H) 0.05 - 1.2 K/UL    ABS. EOSINOPHILS 0.1 0.0 - 0.4 K/UL    ABS. BASOPHILS 0.0 0.0 - 0.1 K/UL    ABS. IMM.  GRANS. 0.3 (H) 0.00 - 0.04 K/UL    DF AUTOMATED     METABOLIC PANEL, BASIC    Collection Time: 08/30/22  3:44 AM   Result Value Ref Range    Sodium 139 136 - 145 mmol/L    Potassium 4.0 3.5 - 5.5 mmol/L    Chloride 105 100 - 111 mmol/L    CO2 29 21 - 32 mmol/L    Anion gap 5 3.0 - 18 mmol/L    Glucose 100 (H) 74 - 99 mg/dL    BUN 35 (H) 7.0 - 18 MG/DL    Creatinine 0.84 0.6 - 1.3 MG/DL    BUN/Creatinine ratio 42 (H) 12 - 20      GFR est AA >60 >60 ml/min/1.73m2    GFR est non-AA >60 >60 ml/min/1.73m2    Calcium 8.6 8.5 - 10.1 MG/DL   VANCOMYCIN, RANDOM    Collection Time: 08/30/22  3:44 AM   Result Value Ref Range    Vancomycin, random 12.6 5.0 - 40.0 UG/ML   BLOOD GAS, ARTERIAL POC    Collection Time: 08/30/22  4:16 AM   Result Value Ref Range    Device: ADULT VENT      FIO2 (POC) 28 %    pH (POC) 7.40 7.35 - 7.45      pCO2 (POC) 45.9 (H) 35.0 - 45.0 MMHG    pO2 (POC) 75 (L) 80 - 100 MMHG    HCO3 (POC) 28.2 (H) 22 - 26 MMOL/L    sO2 (POC) 94.6 92 - 97 %    Base excess (POC) 2.9 mmol/L    Mode ASSIST CONTROL      Tidal volume 480 ml    Set Rate 8 bpm    PEEP/CPAP (POC) 12 cmH2O    Pressure support 10 cmH2O    Allens test (POC) Positive      Site RIGHT RADIAL      Specimen type (POC) ARTERIAL      Performed by Nelly Hernandez    GLUCOSE, POC    Collection Time: 08/30/22  5:22 AM   Result Value Ref Range    Glucose (POC) 133 (H) 70 - 110 mg/dL             Telemetry:normal sinus rhythm    Imaging:  CXR Results  (Last 48 hours)                 08/30/22 0612  XR CHEST PORT Final result    Impression:   IMPRESSION:       1. Worsened sequela of moderate congestion. Tubes and catheters as above. Follow-up with plain imaging of the chest.       Narrative:  HISTORY: Respiratory failure. Patient is intubated. Follow-up imaging. EXAM: Chest.       TECHNIQUE: Single view AP portable semiupright chest.        COMPARISON: 8/29/2022       FINDINGS: Worsened aeration of bilateral hemithoraces is demonstrated with   moderate bilateral diffuse interstitial prominence and associated perihilar and   bibasilar opacities. No pneumothorax or pleural effusions. Endotracheal tube tip is projecting approximately 3.7 cm above the anai. Unchanged position of nasogastric tube.         Heart and mediastinal structures are unchanged. Heart is enlarged. . Visualized   bony thorax and soft tissues are within normal limits. 08/29/22 0550  XR CHEST PORT Final result    Impression:      Mild increase in pulmonary edema of the bilateral lungs. No other significant   interval change. Cardiomegaly. Narrative:  AP CHEST, PORTABLE       INDICATION: Above. Intubated. COMPARISON: 8/28/2022. TECHNIQUE: Portable semi-upright AP chest radiograph is reviewed. FINDINGS:       Endotracheal tube tip and esophagogastric tube project in stable and   satisfactory position. EKG leads/wires and other catheter tubing overlie the   patient. Median sternotomy wires and surgical clips project over the mediastinum. Aortic   stent graft is again seen. Surgical clips again project over the left axillary   region. Mild increase in interstitial prominence compared to the preceding radiograph . No evidence of focal pulmonary consolidation. No evidence of pneumothorax. The   costophrenic sulci appear sharp. The cardiac silhouette is again enlarged.                     CT Results  (Last 48 hours)      None                     JOYA SPLIT/SHARE TIME: 13 mins  Stephy Ash   08/30/22    Pulmonary, Critical Care Medicine  [facility]

## 2022-08-30 NOTE — INTERDISCIPLINARY ROUNDS
[facility]  Pulmonary, Critical Care, and Sleep Medicine  Interdisciplinary and Ventilator Weaning Rounds    Patient discussed in morning walking rounds and interdisciplinary rounds. ICU day: Admit 8/21     Overnight events:   Given 1 unit PRBCs for HGB <7    Assessments and best practice:  Ventilator  Ventilator day intubated 8/18, extubated 8/22, reintubated for stridor 8/22  Vent settings: FiO2 of 35 and PEEP of 12  VAP bundle, aim to keep peak plateau pressure 61-16SU H2O  Weaning assessed and documented   Patient is not on SBT. Patient is on sedation holiday. Plan to wean with PS. Outcome: will SBT  Final plan: will likely remain intubated given oxygen requirement  Sedation  Fentanyl   Other pertinent drips  N/a  Lines noted  PIV, grissom, OGT, ETT   Critical labs assessed  Yes  Antibiotics  N/a  Medications reviewed  Yes  Pending imaging  none  Pending send out labs  No  Pending Procedures  None  Glycemic control  Stress ulcer prophylaxis. Pepcid  DVT prophylaxis. Heparin   Need for Lines, grissom assessed. Yes  Restraint Reevaluation   Yes  I have reevaluated the patient one hour after initiation of intervention. The patient is comfortable, uninjured, but continues to pose an imminent risk of injury to self to themselves and/or serious disruption of medical treatment required to keep patient stable. The patient's current medical and behavioral conditions that warrant the use intervention include danger to self and Interference with medical equipment or treatment. Restraint or seclusion will be discontinued at the earliest possible time, regardless of the scheduled expiration of the order. Based on my evaluation, restraints will be continued: YES       Family contact/MPOA: spouse - Emi Leecel [phone]  Family updated - will be updated today     Palliative consult within 3 days of admission to ICU-  Ethics Guidance: 21 days      Daily Plans:   Will give 40 mg lasix  Continue fentanyl and ketamine gtt    JOYA time 13 minutes    Clinton Rank  08/30/22  Pulmonary, Critical Care Medicine  [facility]

## 2022-08-30 NOTE — PROGRESS NOTES
spoke with the patient's , Trudi Stevens, regarding discharge planning.  introduced herself as new  for the patient.  discussed  role.  inquired if the discharge plan is for the patient to return home once the patient is medically stable. The patient's  indicated that the patient will discharge home once she is medically stable.         RAN Teresa

## 2022-08-30 NOTE — DIABETES MGMT
Diabetes/ Glycemic Control Plan of Care   Pt is receiving 14 units Lantus daily. Glucose readings are in the target range. Pt has required 6 units corrective lispro. Assessment:   DX: Pt with hx of DM on glumetza at home. 1. Acute respiratory failure with hypoxia and hypercapnia     2. Cardiac arrest     3. Aneurysm     4. Saccular aneurysm     5. Other emphysema     Blood glucose values:     (H) 08/30/2022 03:44 AM    GLUCPOC 170 (H) 08/30/2022 11:47 AM    GLUCPOC 133 (H) 08/30/2022 05:22 AM    GLUCPOC 157 (H) 08/29/2022 11:39 PM     Within target range (70-180mg/dL):  yes  Current insulin orders:   Lantus 14 units daily plus Lispro corrective insulin coverage every 6 hours   Total Daily Dose previous 24 hours = 22 units lispro  Current A1c:   Lab Results   Component Value Date/Time    Hemoglobin A1c 6.1 (H) 08/18/2022 07:12 AM      Equivalent  to an average Blood Glucose of 129 mg/dl for 2-3 months prior to admission  Adequate glycemic control PTA: YES  Nutrition/Diet:  NPO/ TF   Home diabetes medications:    metFORMIN (GLUMETZA ER) 500 mg TG24  Take  by mouth two (2) times a day. Plan/Goals:   Blood glucose will be within target of 70 - 180 mg/dl within 72 hours  Reinforce dietary and medication compliance at home.           Education:  [] Refer to Diabetes Education Record                       [x] Education not indicated at this time - on iza Osorio RD BC-ADM

## 2022-08-30 NOTE — ROUTINE PROCESS
0730 Bedside and Verbal shift change report given to Kari Mclaughlin RN (oncoming nurse) by Jersey Martínez. Kathleen Hicks RN (offgoing nurse). Report included the following information SBAR, Kardex, MAR, and Recent Results.

## 2022-08-30 NOTE — PROGRESS NOTES
attended the interdisciplinary rounds for Amelia Chinchilla, who is a 63 y.o.,female. Patients Primary Language is: [place]. According to the patients EMR [employer] Affiliation is: [facility].     The reason the Patient came to the hospital is:   Patient Active Problem List    Diagnosis Date Noted    Saccular aneurysm 08/18/2022    SOB (shortness of breath) 06/01/2012    Diabetes ([facility]     Hypertension     Hyperlipidemia     Aneurysm ([facility]     Breast CA ([facility]     Cancer of breast, left 08/30/2010    COPD (chronic obstructive pulmonary disease) ([facility] 08/30/2010    CAD (coronary artery disease) 08/30/2010      Plan:  Chaplains will continue to follow and will provide pastoral care on an as needed/requested basis.  recommends bedside caregivers page  on duty if patient shows signs of acute spiritual or emotional distress.     [address] [facility]  Board Certified [address] [place] [address]   [phone]

## 2022-08-31 ENCOUNTER — APPOINTMENT (OUTPATIENT)
Dept: GENERAL RADIOLOGY | Age: 62
DRG: 219 | End: 2022-08-31
Attending: PHYSICIAN ASSISTANT
Payer: MEDICARE

## 2022-08-31 LAB
ANION GAP SERPL CALC-SCNC: 6 MMOL/L (ref 3–18)
ARTERIAL PATENCY WRIST A: POSITIVE
BASE EXCESS BLD CALC-SCNC: 2.5 MMOL/L
BASOPHILS # BLD: 0 K/UL (ref 0–0.1)
BASOPHILS NFR BLD: 0 % (ref 0–2)
BDY SITE: ABNORMAL
BUN SERPL-MCNC: 35 MG/DL (ref 7–18)
BUN/CREAT SERPL: 40 (ref 12–20)
CALCIUM SERPL-MCNC: 9.1 MG/DL (ref 8.5–10.1)
CHLORIDE SERPL-SCNC: 102 MMOL/L (ref 100–111)
CO2 SERPL-SCNC: 30 MMOL/L (ref 21–32)
CREAT SERPL-MCNC: 0.88 MG/DL (ref 0.6–1.3)
DIFFERENTIAL METHOD BLD: ABNORMAL
EOSINOPHIL # BLD: 0.1 K/UL (ref 0–0.4)
EOSINOPHIL NFR BLD: 1 % (ref 0–5)
ERYTHROCYTE [DISTWIDTH] IN BLOOD BY AUTOMATED COUNT: 17 % (ref 11.6–14.5)
GAS FLOW.O2 O2 DELIVERY SYS: ABNORMAL L/MIN
GAS FLOW.O2 SETTING OXYMISER: 8 BPM
GLUCOSE BLD STRIP.AUTO-MCNC: 127 MG/DL (ref 70–110)
GLUCOSE BLD STRIP.AUTO-MCNC: 144 MG/DL (ref 70–110)
GLUCOSE BLD STRIP.AUTO-MCNC: 149 MG/DL (ref 70–110)
GLUCOSE BLD STRIP.AUTO-MCNC: 75 MG/DL (ref 70–110)
GLUCOSE BLD STRIP.AUTO-MCNC: 90 MG/DL (ref 70–110)
GLUCOSE SERPL-MCNC: 136 MG/DL (ref 74–99)
HCO3 BLD-SCNC: 28.2 MMOL/L (ref 22–26)
HCT VFR BLD AUTO: 25.7 % (ref 35–45)
HGB BLD-MCNC: 7.7 G/DL (ref 12–16)
IMM GRANULOCYTES # BLD AUTO: 0.7 K/UL (ref 0–0.04)
IMM GRANULOCYTES NFR BLD AUTO: 5 % (ref 0–0.5)
LYMPHOCYTES # BLD: 0.9 K/UL (ref 0.9–3.6)
LYMPHOCYTES NFR BLD: 6 % (ref 21–52)
MAGNESIUM SERPL-MCNC: 2.2 MG/DL (ref 1.6–2.6)
MCH RBC QN AUTO: 27.7 PG (ref 24–34)
MCHC RBC AUTO-ENTMCNC: 30 G/DL (ref 31–37)
MCV RBC AUTO: 92.4 FL (ref 78–100)
MONOCYTES # BLD: 1.5 K/UL (ref 0.05–1.2)
MONOCYTES NFR BLD: 10 % (ref 3–10)
NEUTS SEG # BLD: 11.5 K/UL (ref 1.8–8)
NEUTS SEG NFR BLD: 78 % (ref 40–73)
NRBC # BLD: 0 K/UL (ref 0–0.01)
NRBC BLD-RTO: 0 PER 100 WBC
O2/TOTAL GAS SETTING VFR VENT: 35 %
PCO2 BLD: 48.7 MMHG (ref 35–45)
PEEP RESPIRATORY: 12 CMH2O
PH BLD: 7.37 [PH] (ref 7.35–7.45)
PHOSPHATE SERPL-MCNC: 3.7 MG/DL (ref 2.5–4.9)
PLATELET # BLD AUTO: 252 K/UL (ref 135–420)
PMV BLD AUTO: 10.7 FL (ref 9.2–11.8)
PO2 BLD: 111 MMHG (ref 80–100)
POTASSIUM SERPL-SCNC: 4.1 MMOL/L (ref 3.5–5.5)
PRESSURE SUPPORT SETTING VENT: 10 CMH2O
RBC # BLD AUTO: 2.78 M/UL (ref 4.2–5.3)
SAO2 % BLD: 98.1 % (ref 92–97)
SERVICE CMNT-IMP: ABNORMAL
SODIUM SERPL-SCNC: 138 MMOL/L (ref 136–145)
SPECIMEN TYPE: ABNORMAL
TOTAL RESP. RATE, ITRR: 19
VENTILATION MODE VENT: ABNORMAL
VT SETTING VENT: 480 ML
WBC # BLD AUTO: 14.7 K/UL (ref 4.6–13.2)

## 2022-08-31 PROCEDURE — 74011250636 HC RX REV CODE- 250/636: Performed by: NURSE PRACTITIONER

## 2022-08-31 PROCEDURE — 74011000258 HC RX REV CODE- 258: Performed by: NURSE PRACTITIONER

## 2022-08-31 PROCEDURE — 36600 WITHDRAWAL OF ARTERIAL BLOOD: CPT

## 2022-08-31 PROCEDURE — 94003 VENT MGMT INPAT SUBQ DAY: CPT

## 2022-08-31 PROCEDURE — 36415 COLL VENOUS BLD VENIPUNCTURE: CPT

## 2022-08-31 PROCEDURE — 65610000006 HC RM INTENSIVE CARE

## 2022-08-31 PROCEDURE — 74011250636 HC RX REV CODE- 250/636: Performed by: PHYSICIAN ASSISTANT

## 2022-08-31 PROCEDURE — 74011000250 HC RX REV CODE- 250: Performed by: PHYSICIAN ASSISTANT

## 2022-08-31 PROCEDURE — 85025 COMPLETE CBC W/AUTO DIFF WBC: CPT

## 2022-08-31 PROCEDURE — 74011250637 HC RX REV CODE- 250/637: Performed by: PHYSICIAN ASSISTANT

## 2022-08-31 PROCEDURE — APPSS30 APP SPLIT SHARED TIME 16-30 MINUTES: Performed by: PHYSICIAN ASSISTANT

## 2022-08-31 PROCEDURE — 83735 ASSAY OF MAGNESIUM: CPT

## 2022-08-31 PROCEDURE — 80048 BASIC METABOLIC PNL TOTAL CA: CPT

## 2022-08-31 PROCEDURE — 84100 ASSAY OF PHOSPHORUS: CPT

## 2022-08-31 PROCEDURE — 94762 N-INVAS EAR/PLS OXIMTRY CONT: CPT

## 2022-08-31 PROCEDURE — 99291 CRITICAL CARE FIRST HOUR: CPT | Performed by: INTERNAL MEDICINE

## 2022-08-31 PROCEDURE — 74011636637 HC RX REV CODE- 636/637: Performed by: INTERNAL MEDICINE

## 2022-08-31 PROCEDURE — 74011250637 HC RX REV CODE- 250/637: Performed by: SURGERY

## 2022-08-31 PROCEDURE — 74011250637 HC RX REV CODE- 250/637: Performed by: NURSE PRACTITIONER

## 2022-08-31 PROCEDURE — 74011250636 HC RX REV CODE- 250/636: Performed by: SURGERY

## 2022-08-31 PROCEDURE — 94640 AIRWAY INHALATION TREATMENT: CPT

## 2022-08-31 PROCEDURE — 74011250636 HC RX REV CODE- 250/636: Performed by: INTERNAL MEDICINE

## 2022-08-31 PROCEDURE — 94660 CPAP INITIATION&MGMT: CPT

## 2022-08-31 PROCEDURE — 82962 GLUCOSE BLOOD TEST: CPT

## 2022-08-31 PROCEDURE — 71045 X-RAY EXAM CHEST 1 VIEW: CPT

## 2022-08-31 PROCEDURE — 82803 BLOOD GASES ANY COMBINATION: CPT

## 2022-08-31 PROCEDURE — 74011000250 HC RX REV CODE- 250: Performed by: INTERNAL MEDICINE

## 2022-08-31 PROCEDURE — 74011250636 HC RX REV CODE- 250/636: Performed by: REGISTERED NURSE

## 2022-08-31 RX ADMIN — QUETIAPINE FUMARATE 50 MG: 50 TABLET, EXTENDED RELEASE ORAL at 08:48

## 2022-08-31 RX ADMIN — DOCUSATE SODIUM 100 MG: 50 LIQUID ORAL at 08:49

## 2022-08-31 RX ADMIN — HEPARIN SODIUM 5000 UNITS: 5000 INJECTION INTRAVENOUS; SUBCUTANEOUS at 04:10

## 2022-08-31 RX ADMIN — VANCOMYCIN HYDROCHLORIDE 1500 MG: 10 INJECTION, POWDER, LYOPHILIZED, FOR SOLUTION INTRAVENOUS at 04:10

## 2022-08-31 RX ADMIN — PIPERACILLIN SODIUM AND TAZOBACTAM SODIUM 3.38 G: 3; .375 INJECTION, POWDER, LYOPHILIZED, FOR SOLUTION INTRAVENOUS at 16:00

## 2022-08-31 RX ADMIN — ALBUTEROL SULFATE 2.5 MG: 2.5 SOLUTION RESPIRATORY (INHALATION) at 07:50

## 2022-08-31 RX ADMIN — PIPERACILLIN SODIUM AND TAZOBACTAM SODIUM 3.38 G: 3; .375 INJECTION, POWDER, LYOPHILIZED, FOR SOLUTION INTRAVENOUS at 08:46

## 2022-08-31 RX ADMIN — FENTANYL CITRATE 200 MCG/HR: 0.05 INJECTION, SOLUTION INTRAMUSCULAR; INTRAVENOUS at 07:12

## 2022-08-31 RX ADMIN — ASPIRIN 81 MG CHEWABLE TABLET 81 MG: 81 TABLET CHEWABLE at 08:46

## 2022-08-31 RX ADMIN — LEVOTHYROXINE SODIUM ANHYDROUS 100 MCG: 100 INJECTION, POWDER, LYOPHILIZED, FOR SOLUTION INTRAVENOUS at 08:47

## 2022-08-31 RX ADMIN — MIDAZOLAM HYDROCHLORIDE 1 MG: 1 INJECTION, SOLUTION INTRAMUSCULAR; INTRAVENOUS at 05:30

## 2022-08-31 RX ADMIN — Medication 14 UNITS: at 09:58

## 2022-08-31 RX ADMIN — HEPARIN SODIUM 5000 UNITS: 5000 INJECTION INTRAVENOUS; SUBCUTANEOUS at 22:03

## 2022-08-31 RX ADMIN — NALOXEGOL OXALATE 25 MG: 25 TABLET, FILM COATED ORAL at 08:47

## 2022-08-31 RX ADMIN — PIPERACILLIN SODIUM AND TAZOBACTAM SODIUM 3.38 G: 3; .375 INJECTION, POWDER, LYOPHILIZED, FOR SOLUTION INTRAVENOUS at 00:20

## 2022-08-31 RX ADMIN — ALBUTEROL SULFATE 2.5 MG: 2.5 SOLUTION RESPIRATORY (INHALATION) at 21:07

## 2022-08-31 RX ADMIN — SODIUM CHLORIDE 0.15 MG/KG/HR: 9 INJECTION, SOLUTION INTRAVENOUS at 07:13

## 2022-08-31 RX ADMIN — SODIUM CHLORIDE SOLN NEBU 3% 4 ML: 3 NEBU SOLN at 07:50

## 2022-08-31 RX ADMIN — SENNOSIDES 8.8 MG: 8.8 LIQUID ORAL at 08:48

## 2022-08-31 RX ADMIN — SODIUM CHLORIDE, PRESERVATIVE FREE 20 MG: 5 INJECTION INTRAVENOUS at 22:02

## 2022-08-31 RX ADMIN — CHLORHEXIDINE GLUCONATE 0.12% ORAL RINSE 10 ML: 1.2 LIQUID ORAL at 10:02

## 2022-08-31 RX ADMIN — SODIUM CHLORIDE SOLN NEBU 3% 4 ML: 3 NEBU SOLN at 21:07

## 2022-08-31 RX ADMIN — SODIUM CHLORIDE, PRESERVATIVE FREE 20 MG: 5 INJECTION INTRAVENOUS at 08:47

## 2022-08-31 NOTE — PROGRESS NOTES
Bedside and Verbal shift change report given to Laila Loomis RN (oncoming nurse) by BIANCA Spangler (offgoing nurse). Report included the following information SBAR, Intake/Output, MAR, Recent Results, Med Rec Status, Cardiac Rhythm NSR, Alarm Parameters , and Quality Measures.

## 2022-08-31 NOTE — PROGRESS NOTES
Start SBT for possible extubation to bipap or high flow,      08/31/22 1037   Weaning Parameters   Spontaneous Breathing Trial Complete (S)  Yes  (ps 6 peep 12 to support trach)   Resp Rate Observed 20   Ve 13      RSBI 45

## 2022-08-31 NOTE — PROGRESS NOTES
attended the interdisciplinary rounds for Fani Campos, who is a 63 y.o.,female. Patients Primary Language is: [place]. According to the patients EMR Christianity Affiliation is: [facility].     The reason the Patient came to the hospital is:   Patient Active Problem List    Diagnosis Date Noted    Saccular aneurysm 08/18/2022    SOB (shortness of breath) 06/01/2012    Diabetes ([facility] [address]     Hypertension     Hyperlipidemia     Aneurysm ([facility] [address]     Breast CA ([facility] [address]     Cancer of breast, left 08/30/2010    COPD (chronic obstructive pulmonary disease) ([facility] [address] 08/30/2010    CAD (coronary artery disease) 08/30/2010      Plan:  Chaplains will continue to follow and will provide pastoral care on an as needed/requested basis.  recommends bedside caregivers page  on duty if patient shows signs of acute spiritual or emotional distress.     [address] [facility]  Board Certified [address] [facility]   [phone]

## 2022-08-31 NOTE — PROGRESS NOTES
[facility]. Pulmonary, Critical Care, and Sleep Medicine    Name: Jules Sanchez MRN: [id number]   : 1960 Hospital: [address] [place] [address] Dr   Date: 2022  Admission Date: 2022     Chart and notes reviewed. Data reviewed. I have evaluated all findings. [x]I have reviewed the flowsheet and previous days notes. [x]The patient is unable to give any meaningful history or review of systems because the patient is:  [x]Intubated []Sedated   []Unresponsive      [x]The patient is critically ill on      [x]Mechanical ventilation []Pressors   []BiPAP []         Interval HPI:Patient is a 63 y.o. female w/ pmhx of a fib, CAD, CHF who presented for covered stent procedure for descending thoracic aortic saccular aneurysm. Procedure performed  w/o incident. Following procedure, patient was drowsy but answering questions appropriately per bedside RN. Per chart review, she began having oxygen desaturations and escalating oxygen requirements, eventually ending up on HFNC. Blood gas performed showed mild hypoxia and mild hypercapnia. Per chart review, patient was found in cardiac arrest and ACLS initiated with approximately 9 min of downtime. . ABG with mixed respiratory and metabolic acidosis. Patient was extubated  and reintubated same day for stridor. Now with thick, copious secretions and bronchotracheomalacia preventing liberation from ventilator. Failing SBT's due to above, may need trach. Subjective 22  Hospital Day:   Vent Day: since   No overnight events  Awake alert   PLan for SBT and possible extubation               ROS:Review of systems not obtained due to patient factors. Events and notes from last 24 hours reviewed.      Patient Active Problem List   Diagnosis Code    Cancer of breast, left C50.919    COPD (chronic obstructive pulmonary disease) ([facility] [address] J44.9    CAD (coronary artery disease) I25.10    Diabetes ([facility] [address] E11.9    Hypertension I10 Hyperlipidemia E78.5    Aneurysm (HCC) I72.9    Breast CA (HCC) C50.919    SOB (shortness of breath) R06.02    Saccular aneurysm I67.1       Vital Signs:  Visit Vitals  BP (!) 141/55   Pulse 69   Temp 98.8 °F (37.1 °C)   Resp 11   Ht 5' 9\" (1.753 m)   Wt 105.4 kg (232 lb 5.8 oz)   SpO2 100%   BMI 34.31 kg/m²       O2 Device: Hi flow nasal cannula, Heated, Humidifier (vapotherm)   O2 Flow Rate (L/min): 40 l/min   Temp (24hrs), Av.3 °F (36.8 °C), Min:97.2 °F (36.2 °C), Max:98.8 °F (37.1 °C)       Intake/Output:   Last shift:      No intake/output data recorded.   Last 3 shifts:  1901 -  0700  In: 5206.7 [I.V.:2141.7]  Out: 1050 [Urine:1050]    Intake/Output Summary (Last 24 hours) at 2022 1643  Last data filed at 2022 0700  Gross per 24 hour   Intake 1653 ml   Output 800 ml   Net 853 ml          Current Facility-Administered Medications   Medication Dose Route Frequency    albuterol (PROVENTIL VENTOLIN) nebulizer solution 2.5 mg  2.5 mg Nebulization BID RT    sodium chloride 3% hypertonic nebulizer soln  4 mL Nebulization BID RT    docusate (COLACE) 50 mg/5 mL oral liquid 100 mg  100 mg Oral DAILY    naloxegoL (MOVANTIK) tablet 25 mg  25 mg Oral DAILY    QUEtiapine SR (SEROquel XR) tablet 50 mg  50 mg Oral DAILY    ketamine (KETALAR) 500 mg in 0.9% sodium chloride 500 mL infusion  0.05-0.4 mg/kg/hr IntraVENous TITRATE    piperacillin-tazobactam (ZOSYN) 3.375 g in 0.9% sodium chloride (MBP/ADV) 100 mL MBP  3.375 g IntraVENous Q8H    insulin glargine (LANTUS) injection 14 Units  14 Units SubCUTAneous DAILY    nicotine (NICODERM CQ) 14 mg/24 hr patch 1 Patch  1 Patch TransDERmal DAILY    sennosides (SENOKOT) 8.8 mg/5 mL syrup 8.8 mg  5 mL Per G Tube BID    fentaNYL (PF) 1,500 mcg/30 mL (50 mcg/mL) infusion  0-200 mcg/hr IntraVENous TITRATE    levothyroxine (SYNTHROID) injection 100 mcg  100 mcg IntraVENous DAILY    heparin (porcine) injection 5,000 Units  5,000 Units SubCUTAneous Q8H polyethylene glycol (MIRALAX) packet 17 g  17 g Oral DAILY    aspirin chewable tablet 81 mg  81 mg Oral DAILY    famotidine (PF) (PEPCID) 20 mg in 0.9% sodium chloride 10 mL injection  20 mg IntraVENous Q12H    chlorhexidine (PERIDEX) 0.12 % mouthwash 10 mL  10 mL Oral Q12H    insulin lispro (HUMALOG) injection   SubCUTAneous Q6H    [Held by provider] amiodarone (CORDARONE) tablet 200 mg  200 mg Oral DAILY    [Held by provider] levothyroxine (SYNTHROID) tablet 150 mcg  150 mcg Oral 6am    [Held by provider] rosuvastatin (CRESTOR) tablet 20 mg  20 mg Oral QHS    [Held by provider] sacubitriL-valsartan (ENTRESTO) 24-26 mg tablet 1 Tablet  1 Tablet Oral BID    [Held by provider] metoprolol succinate (TOPROL-XL) XL tablet 25 mg  25 mg Oral QHS         Telemetry: [x]Sinus []A-flutter []Paced    []A-fib []Multiple PVCs                  Physical Exam:      General: Intubated/sedated and awake   HEENT:  Anicteric sclerae; pink palpebral conjunctivae; mucosa moist  Resp:  Symmetrical chest expansion, no accessory muscle use; good airway entry; diffuse rhonchi, green to yellow tinged secretions   CV:  S1, S2 present; regular rate and rhythm  GI:  Abdomen soft, non-tender; (+) active bowel sounds  Extremities:  +2 pulses on all extremities; no edema/ cyanosis/ clubbing noted   Skin:  Warm; no rashes/ lesions noted, normal turgor/cap refill   Neurologic:  Non-focal      DATA:  MAR reviewed and pertinent medications noted or modified as needed    Labs:  Recent Labs     08/31/22  0351 08/30/22  0344 08/29/22  1339 08/29/22  0239   WBC 14.7* 13.2  --  11.2   HGB 7.7* 7.2* 7.8* 6.5*   HCT 25.7* 24.0* 26.2* 21.9*    227  --  216     Recent Labs     08/31/22  0351 08/30/22  1112 08/30/22  0344 08/29/22  0239     --  139 141   K 4.1  --  4.0 4.3     --  105 105   CO2 30  --  29 29   *  --  100* 130*   BUN 35*  --  35* 37*   CREA 0.88  --  0.84 0.90   CA 9.1  --  8.6 8.1*   MG 2.2 2.5 2.1 2.2   PHOS 3.7  -- 3.1 3.7     No results for input(s): PH, PCO2, PO2, HCO3, FIO2 in the last 72 hours. Recent Labs     08/31/22  0443 08/30/22  0416 08/29/22  0343   FIO2I 35 28 28   HCO3I 28.2* 28.2* 28.4*   PCO2I 48.7* 45.9* 35.4   PHI 7.37 7.40 7.51*   PO2I 111* 75* 88       Imaging:  [x]   I have personally reviewed the patients radiographs and reports  XR Results (most recent):  XR Results (most recent):  Results from Hospital Encounter encounter on 08/18/22    XR CHEST PORT    Narrative  EXAM: XR CHEST PORT    INDICATION: intubated    COMPARISON: Recent prior. FINDINGS: A single view of the chest demonstrates bilateral airspace opacities  and interstitial opacities noted. . No gross interval change. Stable cardiac  silhouette. . Stable aortic stent. Median sternotomy wires and mediastinal clips  noted. Endotracheal tube and enteric tube grossly similar given limitations. .    Impression  No gross interval change. .     CT Results (most recent):  Results from Hospital Encounter encounter on 07/19/22    CTA CHEST W OR W WO CONT    Narrative  CTA Chest -Watchmen Protocol    INDICATION: Abnormality left atrial appendage, atrial fibrillation, Watchmen  evaluation. TECHNIQUE: Thin collimation axial images obtained through the level of the  thoracic aorta following the uneventful administration of intravenous contrast.  Images obtained during maximum aortic enhancement. Images reconstructed into  three dimensional coronal and sagittal projections for complete evaluation of  the tortuous vascular structures and to reduce patient radiation dose. All CT scans at this facility are performed using dose optimization technique as  appropriate to a performed exam, to include automated exposure control,  adjustment of the mA and/or kV according to patient size (including appropriate  matching first site-specific examinations), or use of iterative reconstruction  technique. COMPARISON: None.     FINDINGS: Limited cardiac CT performed with exclusion of portions of the chest.    At least right and possible left atrial cardiac chamber enlargement. Left  ventricle is 5.5 cm transverse diameter. There are no filling defects in the  left atrium or ventricle. No filling defect in the left atrial appendage. Left  atrial appendage is 3.1 x 2.5 x 2.7 cm. Volume 6.91 cc. Status post CABG with  severe coronary arteriosclerosis throughout. Normal trileaflet aortic valve. Mild aortic atherosclerosis. There is a focal 1.4 x 0.8 cm lateral bulge aortic  lumen distal descending aorta (series 2, image 217). Central pulmonary arteries  are patent, though suboptimally opacified. Moderate bronchial wall thickening and regions of groundglass. Small sliding hiatal hernia. Status post median sternotomy. Left mastectomy. Mild anasarca. No acute bone findings. Impression  1. Left atrial appendage measurements given above. No thrombus. 2.  Multichamber cardiac enlargement. 3.  Penetrating atherosclerotic ulcer descending aorta. 4.  Moderate bronchitis or central venous congestion with suspected pulmonary  edema. 5.  Small sliding hiatal hernia. 6.  Mild anasarca. 08/18/22    ECHO ADULT FOLLOW-UP OR LIMITED 08/19/2022 8/20/2022    Interpretation Summary  Formatting of this result is different from the original.      Left Ventricle: Mildly reduced left ventricular systolic function with a visually estimated EF of 45 - 50%. Left ventricle size is normal. Increased wall thickness. Mild global hypokinesis present. Right Ventricle: Mildly reduced systolic function. Mitral Valve: Mild regurgitation. Addendum by: Aaron Antunez MD on 8/20/2022  9:53 AM    Signed by: Maria Guadalupe Romero MD on 8/19/2022  2:43 PM       IMPRESSION:   S/p Cardiac arrest - ROSC after ~9 min of ACLS, no further events  Hypoxic and hypercapnic respiratory failure - now on mechanical vent.  Extubated 8/22 and reintubated same day for stridor  Acute Encephalopathy- likely metabolic in nature, improving   Saccular Aneurysm of the descending thoracic aorta s/p covered stent procedure on 8/18 by Dr. Monika Persaud. Seizure vs myoclonus - shortly following cardiac arrest x2. No further episodes of tremulousness or twitching noted  R groin hematoma w/ femstop in place  H Influenza pneumonia   Hx of CAD w/ prior CABG  Hx of HFrEF - most recent EF from 2/22 -30%  Paroxysmal symptomatic a-fib with CHADSVASc2 score of 5. On chronic Amiodorone and anticoagulation  History of recurrent GI bleeding, requiring transfusion and HAS-BLED score of 4. Chronic stage 2 kidney disease. Remote breast cancer about ten years ago with mastectomy and chemotherapy, but no chest radiation. History of thyroid disorder  History of 'waking up 'previously with anesthesia. Patient Active Problem List   Diagnosis Code    Cancer of breast, left C50.919    COPD (chronic obstructive pulmonary disease) ([facility]) J44.9    CAD (coronary artery disease) I25.10    Diabetes ([facility] [address] E11.9    Hypertension I10    Hyperlipidemia E78.5    Aneur[facility]m ([facility] I72.9    Breast CA ([facility]) C50.919    SOB (shortness of breath) R06.02    Saccular aneurysm I67.1        RECOMMENDATIONS:   Neuro:Titrate sedation for RASS goal 0 to -1, daily sedation holiday. Pulm: Aspiration precautions, HOB>30'. VAP Bundle SBT today. Bronchial hygiene protocol. Hypertonic saline nebs   CVS:Monitor HD, MAP goal >65. GI: NPO. SUP  Renal: Trend Cr, UOP. Hem/Onc: Trend H/H, monitor for s/o active bleeding. Daily CBC. I/D:Trend WBCs and temperature curve. Cont' zoysn. Metabolic: Daily BMP, mag, phos. Trend lytes and replace per protocol. Endocrine:Q6 glucoses. SSI, lantus. Avoid hypoglycemia.   Musc/Skin: wound care, PT/OT/SLP  Full Code  Discussed in interdisciplinary rounds     Best practice :    Glycemic control  IHI ICU bundles:   Vent Bundle Followed, Vent Day 8/18     [facility] Vent patients-    VAP bundle-Cadwell tube to suction at 20-30 cm Hg, Maintain Cadwell tube with 5-10ml air every 4 hours, Routine oral care every 4 hours, Elevation of head > 45 degree, Daily sedation holiday and SBT evaluation starting at 6.00am. and [facility] network Guidelines: Lung protective strategy and Plateau  Pressure goal < 30 cm H2O goals, Oxygenation Goals PaO2 55-80 mm Hg or SaO2 88-95%, PH goal 7.30-7.45  Stress ulcer prophylaxis. Pepcid  DVT prophylaxis. SQH  Need for Lines, grissom assessed. Palliative care evaluation. Restraints need. Not required        JOYA time 10 mins   This care involved high complexity decision making to assess, manipulate, and support vital system functions, to treat this degreee vital organ system failure and to prevent further life threatening deterioration of the patients condition  The services I provided to this patient were to treat and/or prevent clinically significant deterioration that could result in the failure of one or more body systems and/or organ systems due to respiratory distress, hypoxia, cardiac dysrhythmia.        Tiki Avelar PA-C   08/31/22  Pulmonary, Critical Care Medicine  [facility]

## 2022-08-31 NOTE — INTERDISCIPLINARY ROUNDS
[facility]  Pulmonary, Critical Care, and Sleep Medicine  Interdisciplinary and Ventilator Weaning Rounds    Patient discussed in morning walking rounds and interdisciplinary rounds. ICU day: Admit 8/21     Overnight events:   No overnight events     Assessments and best practice:  Ventilator  Ventilator day intubated 8/18, extubated 8/22, reintubated for stridor 8/22  Vent settings: FiO2 of 35 and PEEP of 12  VAP bundle, aim to keep peak plateau pressure 68-48HQ H2O  Weaning assessed and documented   Patient is not on SBT. Patient is on sedation holiday. Plan to wean with PS 8-10. Outcome: will SBT  Final plan: will likely remain intubated given oxygen requirement  Sedation  Fentanyl , ketamine  Other pertinent drips  N/a  Lines noted  PIV, grissom, OGT, ETT   Critical labs assessed  Yes  Antibiotics  N/a  Medications reviewed  Yes  Pending imaging  none  Pending send out labs  No  Pending Procedures  None  Glycemic control  Stress ulcer prophylaxis. Pepcid  DVT prophylaxis. Heparin   Need for Lines, grissom assessed. Yes  Restraint Reevaluation   Yes  I have reevaluated the patient one hour after initiation of intervention. The patient is comfortable, uninjured, but continues to pose an imminent risk of injury to self to themselves and/or serious disruption of medical treatment required to keep patient stable. The patient's current medical and behavioral conditions that warrant the use intervention include danger to self and Interference with medical equipment or treatment. Restraint or seclusion will be discontinued at the earliest possible time, regardless of the scheduled expiration of the order.  Based on my evaluation, restraints will be continued: YES       Family contact/MPOA: spouse - Trudi Rodney [phone]  Family updated -  updated at bedside    Palliative consult within 3 days of admission to ICU-  Ethics Guidance: 21 days      Daily Plans:  Wean PEEP to 10   SBT ? repeat bronchoscopy pending course     JOYA time 5 minutes    Bib Shelton PA-C  08/31/22  Pulmonary, Critical Care Medicine  [facility]

## 2022-09-01 ENCOUNTER — APPOINTMENT (OUTPATIENT)
Dept: GENERAL RADIOLOGY | Age: 62
DRG: 219 | End: 2022-09-01
Attending: PHYSICIAN ASSISTANT
Payer: MEDICARE

## 2022-09-01 LAB
ANION GAP SERPL CALC-SCNC: 6 MMOL/L (ref 3–18)
BASOPHILS # BLD: 0 K/UL (ref 0–0.1)
BASOPHILS NFR BLD: 0 % (ref 0–2)
BUN SERPL-MCNC: 28 MG/DL (ref 7–18)
BUN/CREAT SERPL: 35 (ref 12–20)
CALCIUM SERPL-MCNC: 8.6 MG/DL (ref 8.5–10.1)
CHLORIDE SERPL-SCNC: 104 MMOL/L (ref 100–111)
CO2 SERPL-SCNC: 29 MMOL/L (ref 21–32)
CREAT SERPL-MCNC: 0.79 MG/DL (ref 0.6–1.3)
DIFFERENTIAL METHOD BLD: ABNORMAL
EOSINOPHIL # BLD: 0.1 K/UL (ref 0–0.4)
EOSINOPHIL NFR BLD: 1 % (ref 0–5)
ERYTHROCYTE [DISTWIDTH] IN BLOOD BY AUTOMATED COUNT: 17.2 % (ref 11.6–14.5)
FSH SERPL-ACNC: 3.3 MIU/ML
GLUCOSE BLD STRIP.AUTO-MCNC: 150 MG/DL (ref 70–110)
GLUCOSE BLD STRIP.AUTO-MCNC: 170 MG/DL (ref 70–110)
GLUCOSE BLD STRIP.AUTO-MCNC: 70 MG/DL (ref 70–110)
GLUCOSE BLD STRIP.AUTO-MCNC: 76 MG/DL (ref 70–110)
GLUCOSE BLD STRIP.AUTO-MCNC: 99 MG/DL (ref 70–110)
GLUCOSE SERPL-MCNC: 83 MG/DL (ref 74–99)
HCT VFR BLD AUTO: 25.5 % (ref 35–45)
HGB BLD-MCNC: 7.6 G/DL (ref 12–16)
IMM GRANULOCYTES # BLD AUTO: 0.6 K/UL (ref 0–0.04)
IMM GRANULOCYTES NFR BLD AUTO: 5 % (ref 0–0.5)
LH SERPL-ACNC: 0.6 MIU/ML
LYMPHOCYTES # BLD: 0.9 K/UL (ref 0.9–3.6)
LYMPHOCYTES NFR BLD: 7 % (ref 21–52)
MAGNESIUM SERPL-MCNC: 2 MG/DL (ref 1.6–2.6)
MCH RBC QN AUTO: 27.4 PG (ref 24–34)
MCHC RBC AUTO-ENTMCNC: 29.8 G/DL (ref 31–37)
MCV RBC AUTO: 92.1 FL (ref 78–100)
MONOCYTES # BLD: 1.2 K/UL (ref 0.05–1.2)
MONOCYTES NFR BLD: 9 % (ref 3–10)
NEUTS SEG # BLD: 10.2 K/UL (ref 1.8–8)
NEUTS SEG NFR BLD: 78 % (ref 40–73)
NRBC # BLD: 0 K/UL (ref 0–0.01)
NRBC BLD-RTO: 0 PER 100 WBC
PHOSPHATE SERPL-MCNC: 2.6 MG/DL (ref 2.5–4.9)
PLATELET # BLD AUTO: 287 K/UL (ref 135–420)
PMV BLD AUTO: 10.4 FL (ref 9.2–11.8)
POTASSIUM SERPL-SCNC: 3.6 MMOL/L (ref 3.5–5.5)
RBC # BLD AUTO: 2.77 M/UL (ref 4.2–5.3)
SODIUM SERPL-SCNC: 139 MMOL/L (ref 136–145)
WBC # BLD AUTO: 13 K/UL (ref 4.6–13.2)

## 2022-09-01 PROCEDURE — 97535 SELF CARE MNGMENT TRAINING: CPT

## 2022-09-01 PROCEDURE — 97112 NEUROMUSCULAR REEDUCATION: CPT

## 2022-09-01 PROCEDURE — 74011250637 HC RX REV CODE- 250/637: Performed by: NURSE PRACTITIONER

## 2022-09-01 PROCEDURE — 97162 PT EVAL MOD COMPLEX 30 MIN: CPT

## 2022-09-01 PROCEDURE — 74011000250 HC RX REV CODE- 250: Performed by: PHYSICIAN ASSISTANT

## 2022-09-01 PROCEDURE — 74011000258 HC RX REV CODE- 258: Performed by: NURSE PRACTITIONER

## 2022-09-01 PROCEDURE — 82962 GLUCOSE BLOOD TEST: CPT

## 2022-09-01 PROCEDURE — 83735 ASSAY OF MAGNESIUM: CPT

## 2022-09-01 PROCEDURE — 94762 N-INVAS EAR/PLS OXIMTRY CONT: CPT

## 2022-09-01 PROCEDURE — 85025 COMPLETE CBC W/AUTO DIFF WBC: CPT

## 2022-09-01 PROCEDURE — 94640 AIRWAY INHALATION TREATMENT: CPT

## 2022-09-01 PROCEDURE — 77010033711 HC HIGH FLOW OXYGEN

## 2022-09-01 PROCEDURE — 74011000250 HC RX REV CODE- 250: Performed by: INTERNAL MEDICINE

## 2022-09-01 PROCEDURE — 84100 ASSAY OF PHOSPHORUS: CPT

## 2022-09-01 PROCEDURE — 83001 ASSAY OF GONADOTROPIN (FSH): CPT

## 2022-09-01 PROCEDURE — 74011000250 HC RX REV CODE- 250: Performed by: SURGERY

## 2022-09-01 PROCEDURE — 71045 X-RAY EXAM CHEST 1 VIEW: CPT

## 2022-09-01 PROCEDURE — 74011250636 HC RX REV CODE- 250/636: Performed by: SURGERY

## 2022-09-01 PROCEDURE — 74011636637 HC RX REV CODE- 636/637: Performed by: PHYSICIAN ASSISTANT

## 2022-09-01 PROCEDURE — 74011250637 HC RX REV CODE- 250/637: Performed by: PHYSICIAN ASSISTANT

## 2022-09-01 PROCEDURE — 74011250636 HC RX REV CODE- 250/636: Performed by: PHYSICIAN ASSISTANT

## 2022-09-01 PROCEDURE — 2709999900 HC NON-CHARGEABLE SUPPLY

## 2022-09-01 PROCEDURE — 74011250636 HC RX REV CODE- 250/636: Performed by: NURSE PRACTITIONER

## 2022-09-01 PROCEDURE — 97166 OT EVAL MOD COMPLEX 45 MIN: CPT

## 2022-09-01 PROCEDURE — 36415 COLL VENOUS BLD VENIPUNCTURE: CPT

## 2022-09-01 PROCEDURE — 97116 GAIT TRAINING THERAPY: CPT

## 2022-09-01 PROCEDURE — 92610 EVALUATE SWALLOWING FUNCTION: CPT

## 2022-09-01 PROCEDURE — 65610000006 HC RM INTENSIVE CARE

## 2022-09-01 PROCEDURE — 80048 BASIC METABOLIC PNL TOTAL CA: CPT

## 2022-09-01 PROCEDURE — 99291 CRITICAL CARE FIRST HOUR: CPT | Performed by: INTERNAL MEDICINE

## 2022-09-01 PROCEDURE — 92526 ORAL FUNCTION THERAPY: CPT

## 2022-09-01 RX ORDER — FUROSEMIDE 10 MG/ML
20 INJECTION INTRAMUSCULAR; INTRAVENOUS ONCE
Status: COMPLETED | OUTPATIENT
Start: 2022-09-01 | End: 2022-09-01

## 2022-09-01 RX ORDER — DEXTROSE MONOHYDRATE AND SODIUM CHLORIDE 5; .45 G/100ML; G/100ML
75 INJECTION, SOLUTION INTRAVENOUS CONTINUOUS
Status: DISCONTINUED | OUTPATIENT
Start: 2022-09-01 | End: 2022-09-02

## 2022-09-01 RX ORDER — IPRATROPIUM BROMIDE AND ALBUTEROL SULFATE 2.5; .5 MG/3ML; MG/3ML
3 SOLUTION RESPIRATORY (INHALATION)
Status: DISCONTINUED | OUTPATIENT
Start: 2022-09-01 | End: 2022-09-05

## 2022-09-01 RX ADMIN — Medication 2 UNITS: at 17:52

## 2022-09-01 RX ADMIN — DEXTROSE MONOHYDRATE AND SODIUM CHLORIDE 75 ML/HR: 5; .45 INJECTION, SOLUTION INTRAVENOUS at 15:15

## 2022-09-01 RX ADMIN — SODIUM CHLORIDE SOLN NEBU 3% 4 ML: 3 NEBU SOLN at 08:27

## 2022-09-01 RX ADMIN — METHYLPREDNISOLONE SODIUM SUCCINATE 40 MG: 40 INJECTION, POWDER, FOR SOLUTION INTRAMUSCULAR; INTRAVENOUS at 08:56

## 2022-09-01 RX ADMIN — HEPARIN SODIUM 5000 UNITS: 5000 INJECTION INTRAVENOUS; SUBCUTANEOUS at 20:44

## 2022-09-01 RX ADMIN — SODIUM CHLORIDE, PRESERVATIVE FREE 20 MG: 5 INJECTION INTRAVENOUS at 08:56

## 2022-09-01 RX ADMIN — PIPERACILLIN SODIUM AND TAZOBACTAM SODIUM 3.38 G: 3; .375 INJECTION, POWDER, LYOPHILIZED, FOR SOLUTION INTRAVENOUS at 15:07

## 2022-09-01 RX ADMIN — LEVOTHYROXINE SODIUM ANHYDROUS 100 MCG: 100 INJECTION, POWDER, LYOPHILIZED, FOR SOLUTION INTRAVENOUS at 09:13

## 2022-09-01 RX ADMIN — PIPERACILLIN SODIUM AND TAZOBACTAM SODIUM 3.38 G: 3; .375 INJECTION, POWDER, LYOPHILIZED, FOR SOLUTION INTRAVENOUS at 09:08

## 2022-09-01 RX ADMIN — SODIUM CHLORIDE SOLN NEBU 3% 4 ML: 3 NEBU SOLN at 21:01

## 2022-09-01 RX ADMIN — FUROSEMIDE 20 MG: 10 INJECTION, SOLUTION INTRAMUSCULAR; INTRAVENOUS at 09:50

## 2022-09-01 RX ADMIN — IPRATROPIUM BROMIDE AND ALBUTEROL SULFATE 3 ML: 2.5; .5 SOLUTION RESPIRATORY (INHALATION) at 21:01

## 2022-09-01 RX ADMIN — PIPERACILLIN SODIUM AND TAZOBACTAM SODIUM 3.38 G: 3; .375 INJECTION, POWDER, LYOPHILIZED, FOR SOLUTION INTRAVENOUS at 00:41

## 2022-09-01 RX ADMIN — HEPARIN SODIUM 5000 UNITS: 5000 INJECTION INTRAVENOUS; SUBCUTANEOUS at 04:10

## 2022-09-01 RX ADMIN — IPRATROPIUM BROMIDE AND ALBUTEROL SULFATE 3 ML: 2.5; .5 SOLUTION RESPIRATORY (INHALATION) at 13:18

## 2022-09-01 RX ADMIN — QUETIAPINE FUMARATE 50 MG: 50 TABLET, EXTENDED RELEASE ORAL at 09:00

## 2022-09-01 RX ADMIN — IPRATROPIUM BROMIDE AND ALBUTEROL SULFATE 3 ML: 2.5; .5 SOLUTION RESPIRATORY (INHALATION) at 08:27

## 2022-09-01 RX ADMIN — IPRATROPIUM BROMIDE AND ALBUTEROL SULFATE 3 ML: .5; 3 SOLUTION RESPIRATORY (INHALATION) at 05:40

## 2022-09-01 RX ADMIN — SODIUM CHLORIDE, PRESERVATIVE FREE 20 MG: 5 INJECTION INTRAVENOUS at 20:44

## 2022-09-01 RX ADMIN — DEXTROSE MONOHYDRATE AND SODIUM CHLORIDE 75 ML/HR: 5; .45 INJECTION, SOLUTION INTRAVENOUS at 03:00

## 2022-09-01 RX ADMIN — DEXTROSE MONOHYDRATE AND SODIUM CHLORIDE 75 ML/HR: 5; .45 INJECTION, SOLUTION INTRAVENOUS at 11:19

## 2022-09-01 RX ADMIN — HEPARIN SODIUM 5000 UNITS: 5000 INJECTION INTRAVENOUS; SUBCUTANEOUS at 11:29

## 2022-09-01 RX ADMIN — METHYLPREDNISOLONE SODIUM SUCCINATE 40 MG: 40 INJECTION, POWDER, FOR SOLUTION INTRAMUSCULAR; INTRAVENOUS at 20:44

## 2022-09-01 NOTE — PROGRESS NOTES
Remains extubated  Vital signs and labs are stable  Comfortable but deconditioned  Discussed with ICU care team  We will get physical therapy and supportive care on board

## 2022-09-01 NOTE — PROGRESS NOTES
attended the interdisciplinary rounds for Lang Wheeler, who is a 63 y.o.,female. Patients Primary Language is: [place]. According to the patients EMR Latter day Affiliation is: [employer].     The reason the Patient came to the hospital is:   Patient Active Problem List    Diagnosis Date Noted    Saccular aneurysm 08/18/2022    SOB (shortness of breath) 06/01/2012    Diabetes ([facility] [address]     Hypertension     Hyperlipidemia     Aneurysm ([facility] [address]     Breast CA ([facility] [address]     Cancer of breast, left 08/30/2010    COPD (chronic obstructive pulmonary disease) ([facility] [address] 08/30/2010    CAD (coronary artery disease) 08/30/2010      Plan:  Chaplains will continue to follow and will provide pastoral care on an as needed/requested basis.  recommends bedside caregivers page  on duty if patient shows signs of acute spiritual or emotional distress.     [address] [facility]  Board Certified [address] [place] [address]   [phone]

## 2022-09-01 NOTE — PROGRESS NOTES
Problem: Dysphagia (Adult)  Goal: *Acute Goals and Plan of Care (Insert Text)  Description:     Patient will:  1. Tolerate PO trials with 0 s/s overt distress in 4/5 trials  2. Utilize compensatory swallow strategies/maneuvers (decrease bite/sip, size/rate, alt. liq/sol) with min cues in 4/5 trials  3. Perform oral-motor/laryngeal exercises to increase oropharyngeal swallow function with min cues  4. Complete an objective swallow study (i.e., MBSS) to assess swallow integrity, r/o aspiration, and determine of safest LRD, min A    Recommend:   NPO with alternate means of nutrition/hydration vs comfort feeds   Ice chips okay following oral care  Oral meds crushed in puree  Strict aspiration precautions (HOB >30 degrees at all times, Oral care TID)    Outcome: Progressing Towards Goal   SPEECH LANGUAGE PATHOLOGY BEDSIDE SWALLOW EVALUATION/TREATMENT    Patient: Maddy Harris (68 y.o. female)  Date: 9/1/2022  Primary Diagnosis: Aortic aneurysm without rupture, unspecified portion of aorta (HCC) [I71.9]  Saccular aneurysm [I67.1]  Procedure(s) (LRB):  WASHOUT RIGHT GROIN (Right) 13 Days Post-Op   Precautions: aspiration     PLOF: As per H&P    ASSESSMENT :  Based on the objective data described below, the patient presents with mild oral and mod-sev pharyngeal dysphagia s/p lengthy intubation of ~14 days. Pt on HFNC upon SLP arrival. She tolerated ice chips and 1/2 tsp of puree presentations without any overt s/sx of aspiration. Increased RR, watery eyes, and altered vocal quality s/p tsp trials of thin and honey-thick liquids. Laryngeal elevation was weak to palpation with multiple swallows across all PO trials. Pt appears to be at a high risk for aspiration, malnutrition, and dehydration with PO. Rec NPO with consideration of an alternate means of nutrition/hydration, aspiration precautions, and oral care TID. Ice chips okay conservatively post oral care. Oral meds okay in puree.  D/w ICU team.     TREATMENT :  Skilled therapy initiated; Educated pt high risk of aspiration with PO and diet recs; verbalized comprehension. Patient will benefit from skilled intervention to address the above impairments. Patient's rehabilitation potential is considered to be Good  Factors which may influence rehabilitation potential include:   []            None noted  [x]            Mental ability/status  [x]            Medical condition  []            Home/family situation and support systems  []            Safety awareness  []            Pain tolerance/management  []            Other:      PLAN :  Recommendations and Planned Interventions: See above  Frequency/Duration: Patient will be followed by speech-language pathology 1-2 times per day/3-5 days per week to address goals. SUBJECTIVE:   Patient stated Okay. 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    Anxiety     Breast CA ([facility] 12/2009    S/P Lt Mastectomy and Chemo    CAD (coronary artery disease) 08/30/2010    S/P CABG X 4 ([address] to LAD, Sequential SVG to D2, Ramus, OM) 01/2011    Cardiomyopathy ([facility]     LVEF 35-40 % (08/20) 60% (08/14), 40% (2011)    Chemotherapy 04/2010    for 4 months    CHF (congestive heart failure) ([facility]     Chronic kidney disease     stage 2 per cardiac note cc    Chronic pain     COPD (chronic obstructive pulmonary disease) ([facility] 08/30/2010    Depression     Diabetes (HCC)     GERD (gastroesophageal reflux disease)     GI bleed     History of blood transfusion     2010 and 1/2022    Hx of heart artery stent 2010    x 10  (4, then 4, then 2)    Hyperlipidemia     Hypertension     MI (myocardial infarction) ([facility] 2009    x 3    Schatzki's ring     S/P EGD and Dilation (07/16)    Thyroid disease     hypothyroid    Tobacco abuse      Past Surgical History:   Procedure Laterality Date    COLONOSCOPY N/A 5/3/2017    COLON  performed by Bibiana García MD at [facility] ENDOSCOPY    COLONOSCOPY N/A 2/15/2021    COLONOSCOPY performed by Eli Mederos MD at [facility] ENDOSCOPY    HX APPENDECTOMY      HX CHOLECYSTECTOMY      HX GYN      tubal ligation    HX HEART CATHETERIZATION  11/11/09; 10/21/09    HX HEART CATHETERIZATION  10/15/10; 10/06/09    left heart    HX HEART CATHETERIZATION  11/7/2011    left heart cath, no new findings    HX OTHER SURGICAL  4/6/2010    Insertion right internal jugular single lumen MediPort. HX RADICAL MASTECTOMY Left 2/2010    left, with chemo    MS BREAST SURGERY PROCEDURE UNLISTED  2/22/10    left w/sentinel node bx    MS CABG, ARTERY-VEIN, FOUR  12/2010    MS CARDIAC SURG PROCEDURE UNLIST      stent placement before CABG    MS CHEST SURGERY PROCEDURE UNLISTED Right     Prior port placement     Prior Level of Function/Home Situation: see below  Home Situation  Home Environment: Private residence  # Steps to Enter: 2  One/Two Story Residence: One story  Living Alone: No  Support Systems: Spouse/Significant Other  Patient Expects to be Discharged to[de-identified] Unable to determine at this time  Current DME Used/Available at Home: None    Diet prior to admission: reg solid with thin   Current Diet:  NPO     Cognitive and Communication Status:  Neurologic State: Alert  Orientation Level: Oriented to person, Oriented to place, Oriented to situation  Cognition: Follows commands  Oral Assessment:  Oral Assessment  Labial: No impairment  Dentition: Natural;Intact  Oral Hygiene: Adequate  Lingual: No impairment  Velum: No impairment  Mandible: No impairment  P.O. Trials:  Patient Position: [facility] at [facility]  Vocal quality prior to P.O.: Low volume;Breathy  Consistency Presented: Thin liquid;Honey thick liquid;Pudding; Ice chips  How Presented: SLP-fed/presented;Spoon  Bolus Acceptance: No impairment  Bolus Formation/Control: Impaired  Type of Impairment: Delayed  Propulsion: Delayed (# of seconds)  Oral Residue: None  Initiation of Swallow: Delayed (# of seconds)  Laryngeal Elevation: Weak;Decreased  Aspiration Signs/Symptoms: Change vocal quality; Watery eyes; Increase in RR  Pharyngeal Phase Characteristics: Suspected pharyngeal residue;Multiple swallows  Effective Modifications: None  Cues for Modifications: Moderate  Oral Phase Severity: Mild  Pharyngeal Phase Severity : Moderate-severe    PAIN:  Start of Eval: 0  End of Eval: 0     After treatment:   []            Patient left in no apparent distress sitting up in chair  [x]            Patient left in no apparent distress in bed  [x]            Call bell left within reach  [x]            Nursing notified  []            Family present  []            Caregiver present  []            Bed alarm activated    COMMUNICATION/EDUCATION:   [x]            Aspiration precautions; swallow safety; compensatory techniques. [x]            Patient/family have participated as able in goal setting and plan of care. []            Patient/family agree to work toward stated goals and plan of care. []            Patient understands intent and goals of therapy; neutral about participation. []            Patient unable to participate in goal setting/plan of care; educ ongoing with interdisciplinary staff  [x]         Posted safety precautions in patient's room.     Thank you for this referral.    Smooth Keith M.S., CCC-SLP/L  Speech-Language Pathologist

## 2022-09-01 NOTE — PROGRESS NOTES
Problem: Mobility Impaired (Adult and Pediatric)  Goal: *Acute Goals and Plan of Care (Insert Text)  Description: Physical Therapy Goals  Initiated 9/1/2022 and to be accomplished within 7 day(s)  1. Patient will move from supine to sit and sit to supine in bed with supervision/set-up. 2.  Patient will transfer from bed to chair and chair to bed with minimal assistance/contact guard assist using the least restrictive device. 3.  Patient will perform sit to stand with minimal assistance/contact guard assist.  4.  Patient will ambulate with minimal assistance/contact guard assist for 25 feet with the least restrictive device. 5.  Patient will ascend/descend 1 step with handrail(s) with minimal assistance/contact guard assist.    PLOF: Independent. Lives with spouse. One story home with 3 steps to enter. Outcome: Progressing Towards Goal   PHYSICAL THERAPY EVALUATION    Patient: Sherie Bentley (18 y.o. 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 Co-treated with OT to maximize patient safety and participation in functional mobility. On hiflow O2 40L 35%. O2 saturation % entire session. Mod A x2 for supine to sit. Seated EOB with fair balance; min A at times for posterior support. Mod A x2 for sit to stand. Mod A x2 for transfer to bedside commode. Verbal cues for sequencing. Good seated balance on bedside commode. Mod A x2 for sit to stand and return transfer to bed. Mod A for sit to supine. Seated in bed with HOB elevated. Educated on need for RN assistance with mobility; verbalized understanding. Call workman in reach. RN Maliha/Jackie aware. Patient will benefit from skilled intervention to address the above impairments.   Patient's rehabilitation potential is considered to be Fair  Factors which may influence rehabilitation potential include:   []         None noted  []         Mental ability/status  [x]         Medical condition  []         Home/family situation and support systems  []         Safety awareness  []         Pain tolerance/management  []         Other:      PLAN :  Recommendations and Planned Interventions:   [x]           Bed Mobility Training             [x]    Neuromuscular Re-Education  [x]           Transfer Training                   []    Orthotic/Prosthetic Training  [x]           Gait Training                          []    Modalities  [x]           Therapeutic Exercises           []    Edema Management/Control  [x]           Therapeutic Activities            [x]    Family Training/Education  [x]           Patient Education  []           Other (comment):    Frequency/Duration: Patient will be followed by physical therapy 3-5 times a week to address goals. Further Equipment Recommendations for Discharge: rolling walker    AMPA Basic Mobility Inpatient Short Form:  11/24   This AMPAC score should be considered in conjunction with interdisciplinary team recommendations to determine the most appropriate discharge setting. Patient's social support, diagnosis, medical stability, and prior level of function should also be taken into consideration. Based on an AM-PAC score of 11/24 and current functional mobility deficits, it is recommended that the patient have 5-7 sessions per week of physical therapy at d/c to increase the patient's independence. Currently, this patient demonstrates the potential endurance, and/or tolerance for 3 hours of therapy each day at d/c.     SUBJECTIVE:   Patient stated I'm tired. 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    Anxiety Breast CA ([facility] 12/2009    S/P Lt Mastectomy and Chemo    CAD (coronary artery disease) 08/30/2010    S/P CABG X 4 ([address] to LAD, Sequential SVG to D2, Ramus, OM) 01/2011    Card[facility]opathy ([facility]     LVEF 35-40 % (08/20) 60% (08/14), 40% (2011)    Chemotherapy 04/2010    for 4 months    CHF (congestive heart failure) (HCC)     Chronic kidney disease     stage 2 per cardiac note cc    Chronic pain     COPD (chronic obstructive pulmonary disease) ([facility] 08/30/2010    Depression     Diabetes (HCC)     GERD (gastroesophageal reflux disease)     GI bleed     History of blood transfusion     2010 and 1/2022    Hx of heart artery stent 2010    x 10  (4, then 4, then 2)    Hyperlipidemia     Hypertension     MI (myocardial infarction) ([facility] 2009    x 3    Schatzki's ring     S/P EGD and Dilation (07/16)    Thyroid disease     hypothyroid    Tobacco abuse      Past Surgical History:   Procedure Laterality Date    COLONOSCOPY N/A 5/3/2017    COLON  performed by Jonh Jin MD at [facility] ENDOSCOPY    COLONOSCOPY N/A 2/15/2021    COLONOSCOPY performed by Ragini Choi MD at [facility] ENDOSCOPY    HX APPENDECTOMY      HX CHOLECYSTECTOMY      HX GYN      tubal ligation    HX HEART CATHETERIZATION  11/11/09; 10/21/09    HX HEART CATHETERIZATION  10/15/10; 10/06/09    left heart    HX HEART CATHETERIZATION  11/7/2011    left heart cath, no new findings    HX OTHER SURGICAL  4/6/2010    Insertion right internal jugular single lumen MediPort.     HX RADICAL MASTECTOMY Left 2/2010    left, with chemo    VT BREAST SURGERY PROCEDURE UNLISTED  2/22/10    left w/sentinel node bx    VT CABG, ARTERY-VEIN, FOUR  12/2010    VT CARDIAC SURG PROCEDURE UNLIST      stent placement before CABG    VT CHEST SURGERY PROCEDURE UNLISTED Right     Prior port placement     Barriers to Learning/Limitations: None  Compensate with: Visual Cues, Verbal Cues, Tactile Cues and Kinesthetic Cues    Home Situation:  Home Situation  Home Environment: Private residence  # Steps to Enter: 3  Rails to Enter: Yes  One/Two Story Residence: One story  Living Alone: No  Support Systems: Spouse/Significant Other  Patient Expects to be Discharged to[de-identified] Home  Current DME Used/Available at Home: Cane, straight, Walker, rolling    Critical Behavior:  Neurologic State: Alert  Orientation Level: Oriented to place;Oriented to person    Strength:    Manual Muscle Testing (LE)         R     L    Hip Flexion:   3+/5  3+/5  Knee EXT:   4/5  4/5  Knee FLEX:   4/5  4/5  Ankle DF:   4/5 4/5  _________________________________________________   Range Of Motion:  BLE AROM WFL  Functional Mobility:  Bed Mobility:  Supine to Sit: Moderate assistance;Assist x2  Sit to Supine: Moderate assistance;Assist x2  Transfers:  Sit to Stand: Moderate assistance;Assist x2  Stand to Sit: Moderate assistance;Assist x2  Bed to Chair: Moderate assistance;Assist x2  Balance:   Sitting: Impaired  Sitting - Static: Good (unsupported)  Sitting - Dynamic: Fair (occasional)  Standing: Impaired  Standing - Static: Fair  Standing - Dynamic : Poor  Neuro Re-education:  Seated balance 10 minutes  Therapeutic Exercises:   Sit to stand x2  Pain:  Pain level pre-treatment: 0/10   Pain level post-treatment: 0/10     Activity Tolerance:   Fair    After treatment:   []         Patient left in no apparent distress sitting up in chair  [x]         Patient left in no apparent distress in bed  [x]         Call bell left within reach  [x]         Nursing notified  []         Caregiver present  []         Bed alarm activated  []         SCDs applied    COMMUNICATION/EDUCATION:   [x]         Role of physical therapy and plan of care in the acute care setting. [x]         Fall prevention education was provided and the patient/caregiver indicated understanding. [x]         Patient/family have participated as able in goal setting and plan of care. []         Patient/family agree to work toward stated goals and plan of care.   [] Patient understands intent and goals of therapy, but is neutral about his/her participation. []         Patient is unable to participate in goal setting/plan of care: ongoing with therapy staff. Thank you for this referral.  Teetee Bangura, PT   Time Calculation: 31 mins    Eval Complexity: History: MEDIUM  Complexity : 1-2 comorbidities / personal factors will impact the outcome/ POC Exam:MEDIUM Complexity : 3 Standardized tests and measures addressing body structure, function, activity limitation and / or participation in recreation  Presentation: MEDIUM Complexity : Evolving with changing characteristics  Clinical Decision Making:Medium Complexity    Clinical judgement; ROM, MMT, functional mobility Overall Complexity:MEDIUM    [address] [facility] [address] [address] AM-PAC® Basic Mobility Inpatient Short Form (6-Clicks) Version 2    How much HELP from another person does the patient currently need    (If the patient hasn't done an activity recently, how much help from another person do you think he/she would need if he/she tried?)   Total (Total A or Dep)   A Lot  (Mod to Max A)   A Little (Sup or Min A)   None (Mod I to I)   Turning from your back to your side while in a flat bed without using bedrails? [] 1 [x] 2 [] 3 [] 4   2. Moving from lying on your back to sitting on the side of a flat bed without using bedrails? [] 1 [x] 2 [] 3 [] 4   3. Moving to and from a bed to a chair (including a wheelchair)? [] 1 [x] 2 [] 3 [] 4   4. Standing up from a chair using your arms (e.g., wheelchair, or bedside chair)? [] 1 [x] 2 [] 3 [] 4   5. Walking in hospital room? [] 1 [x] 2 [] 3 [] 4   6. Climbing 3-5 steps with a railing?+   [x] 1 [] 2 [] 3 [] 4   +If stair climbing cannot be assessed, skip item #6. Sum responses from items 1-5.

## 2022-09-01 NOTE — PROGRESS NOTES
Problem: Falls - Risk of  Goal: *Absence of Falls  Description: Document Dany Moody Fall Risk and appropriate interventions in the flowsheet. Outcome: Progressing Towards Goal  Note: Fall Risk Interventions:  Mobility Interventions: Bed/chair exit alarm, Strengthening exercises (ROM-active/passive)    Mentation Interventions: Bed/chair exit alarm, Reorient patient    Medication Interventions: Bed/chair exit alarm    Elimination Interventions: Call light in reach, Bed/chair exit alarm, Patient to call for help with toileting needs    History of Falls Interventions: Bed/chair exit alarm, Door open when patient unattended, Vital signs minimum Q4HRs X 24 hrs (comment for end date)         Problem: Patient Education: Go to Patient Education Activity  Goal: Patient/Family Education  Outcome: Progressing Towards Goal     Problem: Ventilator Management  Goal: *Absence of infection signs and symptoms  Outcome: Progressing Towards Goal     Problem: Patient Education: Go to Patient Education Activity  Goal: Patient/Family Education  Outcome: Progressing Towards Goal     Problem: Non-Violent Restraints  Goal: Patient's dignity will be maintained  Outcome: Progressing Towards Goal     Problem: Pressure Injury - Risk of  Goal: *Prevention of pressure injury  Description: Document Alfredo Scale and appropriate interventions in the flowsheet. Outcome: Progressing Towards Goal  Note: Pressure Injury Interventions:  Sensory Interventions: Assess changes in LOC, Assess need for specialty bed, Minimize linen layers    Moisture Interventions: Assess need for specialty bed, Absorbent underpads, Apply protective barrier, creams and emollients, Moisture barrier, Minimize layers    Activity Interventions: Assess need for specialty bed, Pressure redistribution bed/mattress(bed type)    Mobility Interventions: Pressure redistribution bed/mattress (bed type), Turn and reposition approx.  every two hours(pillow and wedges), Assess need for specialty bed    Nutrition Interventions:  (NPO)    Friction and Shear Interventions: HOB 30 degrees or less, Minimize layers, Feet elevated on foot rest, Foam dressings/transparent film/skin sealants, Transferring/repositioning devices                Problem: Patient Education: Go to Patient Education Activity  Goal: Patient/Family Education  Outcome: Progressing Towards Goal    Claudetta Banker, RN

## 2022-09-01 NOTE — PROGRESS NOTES
08/31/22 2110   Oxygen Therapy   O2 Sat (%) 100 %   Pulse via Oximetry 71 beats per minute   O2 Device Heated; Hi flow nasal cannula   O2 Flow Rate (L/min) 40 l/min   O2 Temperature 91.4 °F (33 °C)   FIO2 (%) 30 %

## 2022-09-01 NOTE — INTERDISCIPLINARY ROUNDS
[address] [place] [address] [facility]  Pulmonary, Critical Care, and Sleep Medicine  Interdisciplinary and Ventilator Weaning Rounds    Patient discussed in morning walking rounds and interdisciplinary rounds. ICU day: Admit 8/21     Overnight events:   Extubated yesterday  Wheezing and SOB this am, required duo neb, now scheduled. Steroids added. Assessments and best practice:  Ventilator  na  Sedation  N/a  Other pertinent drips  N/a  Lines noted  PIV  Critical labs assessed  Yes  Antibiotics  N/a  Medications reviewed  Yes  Pending imaging  none  Pending send out labs  No  Pending Procedures  None  Glycemic control  Stress ulcer prophylaxis. Pepcid  DVT prophylaxis. Heparin   Need for Lines, grissom assessed.   Yes  Restraint Reevaluation   N/a      Family contact/MPOA: spouse - Calin Araiza [phone]  Family updated -  will update at bedside     Palliative consult within 3 days of admission to ICU-  Ethics Guidance: 21 days      Daily Plans:  PT/OT/SLP  Monitor BS closely   Bronchial hygiene     JOYA time 10 minutes    Bib Shelton PA-C  09/01/22  Pulmonary, Critical Care Medicine  [address] [place] [address] [facility]

## 2022-09-01 NOTE — PROGRESS NOTES
spoke with ESTER Ochoa and Dr. Naa Arguelles regarding discharge planning. Dr. Naa Arguelles indicated that the patient is not ready for discharge and the patient may need rehab.       RAN Pandey

## 2022-09-01 NOTE — PROGRESS NOTES
Bedside and Verbal shift change report given to Chad Barrett RN (oncoming nurse) by Glenroy Hein RN (offgoing nurse). Report included the following information SBAR, Kardex, Intake/Output, MAR, Recent Results, and Cardiac Rhythm :NSR .

## 2022-09-01 NOTE — PROGRESS NOTES
Problem: Self Care Deficits Care Plan (Adult)  Goal: *Acute Goals and Plan of Care (Insert Text)  Description: Occupational Therapy Goals  Initiated 9/1/2022 within 7 day(s). 1.  Patient will perform grooming with supervision/set-up while sitting at EOB with Good balance for >5 min. 2.  Patient will perform bed mobility for ADLs with supervision/set-up. 3.  Patient will perform upper body dressing/bathing with supervision/set-up. 4.  Patient will perform toilet transfers with minimal assistance/contact guard assist.  5.  Patient will perform all aspects of toileting with minimal assistance/contact guard assist.  6.  Patient will participate in upper extremity therapeutic exercise/activities with supervision/set-up for 8 minutes to improve endurance and UB strength needed for ADLs    7. Patient will utilize energy conservation techniques during functional activities with verbal cues. Prior Level of Function: Pt lives with spouse, independent with ADLs and functional mobility w/o AD. Outcome: Progressing Towards Goal  OCCUPATIONAL THERAPY EVALUATION    Patient: Ziggy Aviles (13 y.o. female)  Date: 9/1/2022  Primary Diagnosis: Aortic aneurysm without rupture, unspecified portion of aorta (HCC) [I71.9]  Saccular aneurysm [I67.1]  Procedure(s) (LRB):  WASHOUT RIGHT GROIN (Right) 13 Days Post-Op   Precautions:   Fall, Skin    ASSESSMENT :  Pt cleared to participate in OT evaluation by RN. Upon entering room, pt received in bed, alert, and agreeable to OT eval/treatment. Pt seen in conjunction with PT to maximize safety of patient and staff members. Based on the objective data described below, the patient presents with decreased endurance and functional activity tolerance, generalized weakness, decreased functional mobility, sitting and standing balance, decreased BUE shoulder AROM and strength, limiting pt's participation and independence with ADLs, requiring increased assistance from others.  Pt requires additional time to complete all functional mobility tasks and benefits from 2x person assist for safety at this time. Upon maneuvering to EOB pt requesting to use BR, required Mod Ax2 and max VCs for hands placement to perform SPT to [facility]. Pt urinated and had small BM in [facility], required Max A for toileting. Pt returned to bed and positioned for comfort. O2 sats remained >96% on 40L HFNC. Patient will benefit from skilled intervention to address the above impairments. Patient's rehabilitation potential is considered to be Fair  Factors which may influence rehabilitation potential include:   []             None noted  []             Mental ability/status  [x]             Medical condition  []             Home/family situation and support systems  []             Safety awareness  []             Pain tolerance/management  []             Other:      PLAN :  Recommendations and Planned Interventions:  [x]               Self Care Training                  [x]      Therapeutic Activities  [x]               Functional Mobility Training   []      Cognitive Retraining  [x]               Therapeutic Exercises           [x]      Endurance Activities  [x]               Balance Training                    [x]      Neuromuscular Re-Education  []               Visual/Perceptual Training     [x]      Home Safety Training  [x]               Patient Education                   [x]      Family Training/Education  []               Other (comment):    Frequency/Duration: Patient will be followed by occupational therapy 1-2 times per day/3-5 days per week to address goals. Further Equipment Recommendations for Discharge: bedside commode, tub transfer bench, and rolling walker    AMPAC: Based on an AM-PAC score of 14/24 and their current ADL deficits; it is recommended that the patient have 5-7 sessions per week of Occupational Therapy at d/c to increase the patient's independence.   Currently, this patient demonstrates the potential endurance, and/or tolerance for 3 hours of therapy each day at d/c. This [facility] score should be considered in conjunction with interdisciplinary team recommendations to determine the most appropriate discharge setting. Patient's social support, diagnosis, medical stability, and prior level of function should also be taken into consideration. SUBJECTIVE:   Patient stated I need to go to the bathroom. 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    Anxiety     Breast CA ([facility] 12/2009    S/P Lt Mastectomy and Chemo    CAD (coronary artery disease) 08/30/2010    S/P CABG X 4 ([address] to LAD, Sequential SVG to D2, Ramus, OM) 01/2011    Cardiomyopathy ([facility]     LVEF 35-40 % (08/20) 60% (08/14), 40% (2011)    Chemotherapy 04/2010    for 4 months    CHF (congestive heart failure) (HCC)     Chronic kidney disease     stage 2 per cardiac note cc    Chronic pain     COPD (chronic obstructive pulmonary disease) ([facility] 08/30/2010    Depression     Diabetes (HCC)     GERD (gastroesophageal reflux disease)     GI bleed     History of blood transfusion     2010 and 1/2022    Hx of heart artery stent 2010    x 10  (4, then 4, then 2)    Hyperlipidemia     Hypertension     MI (myocardial infarction) ([facility] 2009    x 3    Schatzki's ring     S/P EGD and Dilation (07/16)    Thyroid disease     hypothyroid    Tobacco abuse      Past Surgical History:   Procedure Laterality Date    COLONOSCOPY N/A 5/3/2017    COLON  performed by Mary Gold MD at [facility] [address] ENDOSCOPY    COLONOSCOPY N/A 2/15/2021    COLONOSCOPY performed by Christelle Barakat MD at [facility] [address] ENDOSCOPY    HX APPENDECTOMY      HX CHOLECYSTECTOMY      HX GYN      tubal ligation    HX HEART CATHETERIZATION  11/11/09; 10/21/09    HX HEART CATHETERIZATION  10/15/10; 10/06/09    left heart HX HEART CATHETERIZATION  11/7/2011    left heart cath, no new findings    HX OTHER SURGICAL  4/6/2010    Insertion right internal jugular single lumen MediPort.     HX RADICAL MASTECTOMY Left 2/2010    left, with chemo    [place] BREAST SURGERY PROCEDURE UNLISTED  2/22/10    left w/sentinel node bx    [place] CABG, ARTERY-VEIN, FOUR  12/2010    [place] CARDIAC SURG PROCEDURE UNLIST      stent placement before CABG    [place] CHEST SURGERY PROCEDURE UNLISTED Right     Prior port placement     Barriers to Learning/Limitations: None  Compensate with: visual, verbal, tactile, kinesthetic cues/model    Home Situation:   Home Situation  Home Environment: Private residence  # Steps to Enter: 3  Rails to Enter: Yes  One/Two Story Residence: One story  Living Alone: No  Support Systems: Spouse/Significant Other  Patient Expects to be Discharged to[de-identified] Home  Current DME Used/Available at Home: Cane, straight, Walker, rolling  Tub or Shower Type: Tub/Shower combination  []  Right hand dominant   [x]  Left hand dominant    Cognitive/Behavioral Status:  Neurologic State: Alert  Orientation Level: Oriented to person;Oriented to place  Cognition: Follows commands;Decreased attention/concentration  Safety/Judgement: Awareness of environment    Skin: visible skin intact  Edema: moderate in distal BUE    Vision/Perceptual:            Acuity: Able to read clock/calendar on wall without difficulty     Coordination: BUE  Coordination: Generally decreased, functional  Fine Motor Skills-Upper: Left Impaired;Right Impaired    Gross Motor Skills-Upper: Left Impaired;Right Impaired    Balance:  Sitting: Impaired  Sitting - Static: Good (unsupported)  Sitting - Dynamic: Fair (occasional)  Standing: Impaired  Standing - Static: Fair  Standing - Dynamic : Poor    Strength: BUE  Strength: Generally decreased, functional (bilateral shoulders decreased strength grossly 2+/5)   Tone & Sensation: BUE  Tone: Normal  Sensation: Intact   Range of Motion: BUE  AROM: Generally decreased, functional (bilateral shoulders decreased ROM)  PROM: Within functional limits   Functional Mobility and Transfers for ADLs:  Bed Mobility:     Supine to Sit: Moderate assistance;Assist x2  Sit to Supine: Moderate assistance;Assist x2     Transfers:  Sit to Stand: Moderate assistance;Assist x2  Stand to Sit: Moderate assistance;Assist x2     Bed to Chair: Moderate assistance;Assist x2   Toilet Transfer : Moderate assistance;Assist x2 (to [facility])    ADL Assessment:   Feeding: Minimum assistance (simulated pt is NPO)    Oral Facial Hygiene/Grooming: Minimum assistance    Bathing: Maximum assistance    Upper Body Dressing: Moderate assistance    Lower Body Dressing: Maximum assistance    Toileting: Maximum assistance     ADL Intervention:     Cognitive Retraining  Safety/Judgement: Awareness of environment  Pain:  Pain level pre-treatment: 0/10   Pain level post-treatment: 0/10       Activity Tolerance:   Fair  Please refer to the flowsheet for vital signs taken during this treatment. After treatment:   [] Patient left in no apparent distress sitting up in chair  [x] Patient left in no apparent distress in bed  [x] Call bell left within reach  [x] Nursing notified  [] Caregiver present  [] Bed alarm activated    COMMUNICATION/EDUCATION:   [x] Role of Occupational Therapy in the acute care setting  [x] Home safety education was provided and the patient/caregiver indicated understanding. [x] Patient/family have participated as able in goal setting and plan of care. [] Patient/family agree to work toward stated goals and plan of care. [] Patient understands intent and goals of therapy, but is neutral about his/her participation. [] Patient is unable to participate in goal setting and plan of care.     Thank you for this referral.  Clement Morris, OTR/L  Time Calculation: 33 mins    Eval Complexity: History: MEDIUM Complexity : Expanded review of history including physical, cognitive and psychosocial  history ; Examination: MEDIUM Complexity : 3-5 performance deficits relating to physical, cognitive , or psychosocial skils that result in activity limitations and / or participation restrictions; Decision Making:MEDIUM Complexity : Patient may present with comorbidities that affect occupational performnce. Miniml to moderate modification of tasks or assistance (eg, physical or verbal ) with assesment(s) is necessary to enable patient to complete evaluation     [facility] AM-PAC® Daily Activity Inpatient Short Form (6-Clicks)*    How much HELP from another person does the patient currently need    (If the patient hasn't done an activity recently, how much help from another person do you think he/she would need if he/she tried?)   Total (Total A or Dep)   A Lot  (Mod to Max A)   A Little (Sup or Min A)   None (Mod I to I)   Putting on and taking off regular lower body clothing? [] 1 [x] 2 [] 3 [] 4   2. Bathing (including washing, rinsing,      drying)? [] 1 [x] 2 [] 3 [] 4   3. Toileting, which includes using toilet, bedpan or urinal?   [] 1 [x] 2 [] 3 [] 4   4. Putting on and taking off regular upper body clothing? [] 1 [x] 2 [] 3 [] 4   5. Taking care of personal grooming such as brushing teeth? [] 1 [] 2 [x] 3 [] 4   6. Eating meals? [] 1 [] 2 [x] 3 [] 4   14/24  Based on an AM-PAC score of 14/24 and their current ADL deficits; it is recommended that the patient have 5-7 sessions per week of Occupational Therapy at d/c to increase the patient's independence. Currently, this patient demonstrates the potential endurance, and/or tolerance for 3 hours of therapy each day at d/c.

## 2022-09-01 NOTE — PROGRESS NOTES
Progress Note  Pulmonary, Critical Care, and Sleep Medicine    Name: Moris Gonzalez MRN: [id number]   : 1960 Hospital: [address] [place] [address]    Date: 2022        IMPRESSION:   S/p Cardiac arrest - ROSC after ~9 min of ACLS, no further events  Hypoxic and hypercapnic respiratory failure - requiring mechanical vent. Extubated  and reintubated same day for stridor. 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 Radha Pastor. Seizure vs myoclonus - shortly following cardiac arrest x2. No further seizures  Dysphagia   R groin hematoma w/ femstop in place, HH stable  H Influenza pneumonia   Hx of CAD w/ prior CABG  Hx of HFrEF - Echo with EF from  -30%, now 45%  Paroxysmal symptomatic a-fib with CHADSVASc2 score of 5. On chronic Amiodorone and anticoagulation  History of recurrent GI bleeding, S/p transfusion , HH stable post transfusion  Chronic stage 2 kidney disease. Remote breast cancer S/p mastectomy and chemotherapy, no XRT  History of hypothyroidism on Levothyroxine      PLAN:   Neuro: no issues  Pulm: Aspiration precautions, HOB>30'. Bronchial hygiene protocol. CVS:Monitor HD, MAP goal >65. GI: SLP recommendations appreciated. NPO pending further improvement in dysphagia  Renal: Trend Cr, UOP. Hem/Onc: Trend H/H, monitor for s/o active bleeding. Daily CBC. I/D:Trend WBCs and temperature curve. Continue Zosyn to complete  day course for presumed aspiration pneumonia  Metabolic: Daily BMP, mag, phos. Trend lytes and replace per protocol. Continue D5-1/2 NS while NPO  Endocrine:Q6 glucoses. SSI, lantus. Avoid hypoglycemia. Musc/Skin:  PT/OT/SLP  Full Code  Discussed in interdisciplinary rounds      Subjective/Interval History:   I have reviewed the flowsheet and previous days notes. Reviewed interval history. Discussed management with nursing staff.     63 y.o. female w/ pmhx of a fib, CAD, CHF who presented for covered stent procedure for descending thoracic aortic saccular aneurysm. Procedure performed  w/o incident. Following procedure, patient was drowsy but answering questions appropriately per bedside RN. Pt was later found in cardiac arrest with ROSC after ~9 minutes. Extubated  but reintubated almost immediately for stridor and respiratory distress. Successfully extubated , with pt able to clear copious secretions. 22  Remains off vent support  Afebrile, no new complaints        ROS:Pertinent items are noted in HPI. Orders reviewed including medications. Changes made if indicated. Telemetry monitor reviewed at the bedside. Objective:   Vital Signs:    Visit Vitals  /89   Pulse 69   Temp 97.4 °F (36.3 °C)   Resp 18   Ht 5' 9\" (1.753 m)   Wt 105.4 kg (232 lb 5.8 oz)   SpO2 100%   BMI 34.31 kg/m²       O2 Device: Hi flow nasal cannula   O2 Flow Rate (L/min): 40 l/min   Temp (24hrs), Av °F (36.7 °C), Min:97.4 °F (36.3 °C), Max:98.5 °F (36.9 °C)       Intake/Output:   Last shift:       07 -  1900  In: 525 [I.V.:525]  Out: 350 [Urine:350]  Last 3 shifts: 1901 -  0700  In: 2999.8 [I.V.:1624.8]  Out: 470 [Urine:470]    Intake/Output Summary (Last 24 hours) at 2022 1425  Last data filed at 2022 1400  Gross per 24 hour   Intake 1187.19 ml   Output 500 ml   Net 687.19 ml        Physical Exam:    General:  Alert, cooperative, in no distress, appears stated age. Head:  Normocephalic, without obvious abnormality, atraumatic. Eyes:  ANicteric, PERRL,   Nose: Nares normal. Mucosa normal. No drainage or sinus tenderness. Throat: Lips, mucosa, and tongue normal. Teeth and gums normal    Neck: Supple, symmetrical, trachea midline, no adenopathy, thyroid: no enlargment/tenderness/nodules    Back:   Symmetric    Lungs:   Bilateral auscultation decreased breath sounds both bases.  No rales or wheezes anteriorly   Chest wall:  No tenderness or deformity. NO intercostal retractions   Heart:  Regular rate and rhythm, S1, S2 normal, no murmur, click, rub or gallop. Abdomen:   Soft, non-tender. Bowel sounds normal. No masses,  No organomegaly. NO paradoxical motion   Extremities: normal, atraumatic, no cyanosis or edema. Pulses: 2+ and symmetric all extremities. Skin: Skin color, texture, turgor normal. No rashes or lesions. NO clubbing   Lymph nodes: Cervical, supraclavicular nodes normal.   Neurologic: Grossly nonfocal      :        Devices:             Drips:    DATA:  Labs:  Recent Labs     09/01/22  0747 08/31/22  0351 08/30/22  0344   WBC 13.0 14.7* 13.2   HGB 7.6* 7.7* 7.2*   HCT 25.5* 25.7* 24.0*    252 227     Recent Labs     09/01/22  0747 08/31/22  0351 08/30/22  1112 08/30/22  0344    138  --  139   K 3.6 4.1  --  4.0    102  --  105   CO2 29 30  --  29   GLU 83 136*  --  100*   BUN 28* 35*  --  35*   CREA 0.79 0.88  --  0.84   CA 8.6 9.1  --  8.6   MG 2.0 2.2 2.5 2.1   PHOS 2.6 3.7  --  3.1     No results for input(s): PH, PCO2, PO2, HCO3, FIO2 in the last 72 hours. Imaging:  [x]        I have personally reviewed the patients radiographs and reports  []         []        No change from prior, tubes and lines in adequate position  []        Improved   []        Worsening  High complexity decision making was performed during this consultation and evaluation.  Critical care time exclusive of procedures spent managing the patient:  38 minutes    Huma Lagunas MD

## 2022-09-02 LAB
ANION GAP SERPL CALC-SCNC: 9 MMOL/L (ref 3–18)
BASOPHILS # BLD: 0 K/UL (ref 0–0.1)
BASOPHILS # BLD: NORMAL K/UL
BASOPHILS NFR BLD: 0 % (ref 0–2)
BASOPHILS NFR BLD: NORMAL %
BUN SERPL-MCNC: 28 MG/DL (ref 7–18)
BUN/CREAT SERPL: 31 (ref 12–20)
CALCIUM SERPL-MCNC: 8.8 MG/DL (ref 8.5–10.1)
CHLORIDE SERPL-SCNC: 102 MMOL/L (ref 100–111)
CO2 SERPL-SCNC: 26 MMOL/L (ref 21–32)
CREAT SERPL-MCNC: 0.89 MG/DL (ref 0.6–1.3)
DIFFERENTIAL METHOD BLD: ABNORMAL
DIFFERENTIAL METHOD BLD: NORMAL
EOSINOPHIL # BLD: 0 K/UL (ref 0–0.4)
EOSINOPHIL # BLD: NORMAL K/UL
EOSINOPHIL NFR BLD: 0 % (ref 0–5)
EOSINOPHIL NFR BLD: NORMAL %
ERYTHROCYTE [DISTWIDTH] IN BLOOD BY AUTOMATED COUNT: 17.2 % (ref 11.6–14.5)
ERYTHROCYTE [DISTWIDTH] IN BLOOD BY AUTOMATED COUNT: NORMAL % (ref 11.6–14.5)
GLUCOSE BLD STRIP.AUTO-MCNC: 185 MG/DL (ref 70–110)
GLUCOSE BLD STRIP.AUTO-MCNC: 209 MG/DL (ref 70–110)
GLUCOSE BLD STRIP.AUTO-MCNC: 283 MG/DL (ref 70–110)
GLUCOSE SERPL-MCNC: 155 MG/DL (ref 74–99)
HCT VFR BLD AUTO: 25.3 % (ref 35–45)
HCT VFR BLD AUTO: NORMAL % (ref 35–45)
HGB BLD-MCNC: 7.8 G/DL (ref 12–16)
HGB BLD-MCNC: NORMAL G/DL (ref 12–16)
IMM GRANULOCYTES # BLD AUTO: 0.4 K/UL (ref 0–0.04)
IMM GRANULOCYTES # BLD AUTO: NORMAL K/UL
IMM GRANULOCYTES NFR BLD AUTO: 4 % (ref 0–0.5)
IMM GRANULOCYTES NFR BLD AUTO: NORMAL %
LYMPHOCYTES # BLD: 0.8 K/UL (ref 0.9–3.6)
LYMPHOCYTES # BLD: NORMAL K/UL
LYMPHOCYTES NFR BLD: 7 % (ref 21–52)
LYMPHOCYTES NFR BLD: NORMAL %
MAGNESIUM SERPL-MCNC: 2 MG/DL (ref 1.6–2.6)
MCH RBC QN AUTO: 27.9 PG (ref 24–34)
MCH RBC QN AUTO: NORMAL PG (ref 24–34)
MCHC RBC AUTO-ENTMCNC: 30.8 G/DL (ref 31–37)
MCHC RBC AUTO-ENTMCNC: NORMAL G/DL (ref 31–37)
MCV RBC AUTO: 90.4 FL (ref 78–100)
MCV RBC AUTO: NORMAL FL (ref 78–100)
MONOCYTES # BLD: 1 K/UL (ref 0.05–1.2)
MONOCYTES # BLD: NORMAL K/UL
MONOCYTES NFR BLD: 9 % (ref 3–10)
MONOCYTES NFR BLD: NORMAL %
NEUTS SEG # BLD: 8.8 K/UL (ref 1.8–8)
NEUTS SEG # BLD: NORMAL K/UL
NEUTS SEG NFR BLD: 80 % (ref 40–73)
NEUTS SEG NFR BLD: NORMAL %
NRBC # BLD: 0 K/UL (ref 0–0.01)
NRBC # BLD: NORMAL K/UL (ref 0–0.01)
NRBC BLD-RTO: 0 PER 100 WBC
NRBC BLD-RTO: NORMAL PER 100 WBC
PHOSPHATE SERPL-MCNC: 3.5 MG/DL (ref 2.5–4.9)
PLATELET # BLD AUTO: 306 K/UL (ref 135–420)
PLATELET # BLD AUTO: NORMAL K/UL (ref 135–420)
PMV BLD AUTO: 10.3 FL (ref 9.2–11.8)
POTASSIUM SERPL-SCNC: 4.2 MMOL/L (ref 3.5–5.5)
RBC # BLD AUTO: 2.8 M/UL (ref 4.2–5.3)
RBC # BLD AUTO: NORMAL M/UL (ref 4.2–5.3)
SODIUM SERPL-SCNC: 137 MMOL/L (ref 136–145)
T4 FREE SERPL-MCNC: 1.5 NG/DL (ref 0.7–1.5)
TSH SERPL DL<=0.05 MIU/L-ACNC: 10.8 UIU/ML (ref 0.36–3.74)
WBC # BLD AUTO: 11 K/UL (ref 4.6–13.2)
WBC # BLD AUTO: NORMAL K/UL (ref 4.6–13.2)

## 2022-09-02 PROCEDURE — 92526 ORAL FUNCTION THERAPY: CPT

## 2022-09-02 PROCEDURE — APPSS30 APP SPLIT SHARED TIME 16-30 MINUTES: Performed by: NURSE PRACTITIONER

## 2022-09-02 PROCEDURE — 65660000004 HC RM CVT STEPDOWN

## 2022-09-02 PROCEDURE — 74011636637 HC RX REV CODE- 636/637: Performed by: PHYSICIAN ASSISTANT

## 2022-09-02 PROCEDURE — 74011636637 HC RX REV CODE- 636/637: Performed by: NURSE PRACTITIONER

## 2022-09-02 PROCEDURE — 80048 BASIC METABOLIC PNL TOTAL CA: CPT

## 2022-09-02 PROCEDURE — 74011250636 HC RX REV CODE- 250/636: Performed by: SURGERY

## 2022-09-02 PROCEDURE — 74011000250 HC RX REV CODE- 250: Performed by: PHYSICIAN ASSISTANT

## 2022-09-02 PROCEDURE — 99233 SBSQ HOSP IP/OBS HIGH 50: CPT | Performed by: STUDENT IN AN ORGANIZED HEALTH CARE EDUCATION/TRAINING PROGRAM

## 2022-09-02 PROCEDURE — 94762 N-INVAS EAR/PLS OXIMTRY CONT: CPT

## 2022-09-02 PROCEDURE — 74011000258 HC RX REV CODE- 258: Performed by: NURSE PRACTITIONER

## 2022-09-02 PROCEDURE — 84100 ASSAY OF PHOSPHORUS: CPT

## 2022-09-02 PROCEDURE — 99291 CRITICAL CARE FIRST HOUR: CPT | Performed by: INTERNAL MEDICINE

## 2022-09-02 PROCEDURE — 36415 COLL VENOUS BLD VENIPUNCTURE: CPT

## 2022-09-02 PROCEDURE — APPSS30 APP SPLIT SHARED TIME 16-30 MINUTES: Performed by: REGISTERED NURSE

## 2022-09-02 PROCEDURE — 83735 ASSAY OF MAGNESIUM: CPT

## 2022-09-02 PROCEDURE — 74011000258 HC RX REV CODE- 258: Performed by: INTERNAL MEDICINE

## 2022-09-02 PROCEDURE — 94640 AIRWAY INHALATION TREATMENT: CPT

## 2022-09-02 PROCEDURE — 74011250636 HC RX REV CODE- 250/636: Performed by: PHYSICIAN ASSISTANT

## 2022-09-02 PROCEDURE — 84439 ASSAY OF FREE THYROXINE: CPT

## 2022-09-02 PROCEDURE — 85025 COMPLETE CBC W/AUTO DIFF WBC: CPT

## 2022-09-02 PROCEDURE — 74011250637 HC RX REV CODE- 250/637: Performed by: SURGERY

## 2022-09-02 PROCEDURE — 74011250637 HC RX REV CODE- 250/637: Performed by: PHYSICIAN ASSISTANT

## 2022-09-02 PROCEDURE — 74011250636 HC RX REV CODE- 250/636: Performed by: INTERNAL MEDICINE

## 2022-09-02 PROCEDURE — 74011250637 HC RX REV CODE- 250/637: Performed by: NURSE PRACTITIONER

## 2022-09-02 PROCEDURE — 97530 THERAPEUTIC ACTIVITIES: CPT

## 2022-09-02 PROCEDURE — 74011000250 HC RX REV CODE- 250: Performed by: INTERNAL MEDICINE

## 2022-09-02 PROCEDURE — 74011250636 HC RX REV CODE- 250/636: Performed by: NURSE PRACTITIONER

## 2022-09-02 PROCEDURE — 82962 GLUCOSE BLOOD TEST: CPT

## 2022-09-02 PROCEDURE — 2709999900 HC NON-CHARGEABLE SUPPLY

## 2022-09-02 PROCEDURE — 77010033711 HC HIGH FLOW OXYGEN

## 2022-09-02 PROCEDURE — 84443 ASSAY THYROID STIM HORMONE: CPT

## 2022-09-02 RX ORDER — FUROSEMIDE 10 MG/ML
20 INJECTION INTRAMUSCULAR; INTRAVENOUS ONCE
Status: COMPLETED | OUTPATIENT
Start: 2022-09-02 | End: 2022-09-02

## 2022-09-02 RX ORDER — MAGNESIUM SULFATE 100 %
4 CRYSTALS MISCELLANEOUS AS NEEDED
Status: DISCONTINUED | OUTPATIENT
Start: 2022-09-02 | End: 2022-09-05 | Stop reason: HOSPADM

## 2022-09-02 RX ORDER — DEXTROSE MONOHYDRATE 100 MG/ML
0-250 INJECTION, SOLUTION INTRAVENOUS AS NEEDED
Status: DISCONTINUED | OUTPATIENT
Start: 2022-09-02 | End: 2022-09-05 | Stop reason: HOSPADM

## 2022-09-02 RX ORDER — INSULIN LISPRO 100 [IU]/ML
INJECTION, SOLUTION INTRAVENOUS; SUBCUTANEOUS
Status: DISCONTINUED | OUTPATIENT
Start: 2022-09-02 | End: 2022-09-05 | Stop reason: HOSPADM

## 2022-09-02 RX ADMIN — PIPERACILLIN SODIUM AND TAZOBACTAM SODIUM 3.38 G: 3; .375 INJECTION, POWDER, LYOPHILIZED, FOR SOLUTION INTRAVENOUS at 02:21

## 2022-09-02 RX ADMIN — DEXTROSE MONOHYDRATE AND SODIUM CHLORIDE 75 ML/HR: 5; .45 INJECTION, SOLUTION INTRAVENOUS at 02:27

## 2022-09-02 RX ADMIN — IPRATROPIUM BROMIDE AND ALBUTEROL SULFATE 3 ML: 2.5; .5 SOLUTION RESPIRATORY (INHALATION) at 15:42

## 2022-09-02 RX ADMIN — ASPIRIN 81 MG CHEWABLE TABLET 81 MG: 81 TABLET CHEWABLE at 11:03

## 2022-09-02 RX ADMIN — FUROSEMIDE 20 MG: 10 INJECTION, SOLUTION INTRAMUSCULAR; INTRAVENOUS at 11:04

## 2022-09-02 RX ADMIN — IPRATROPIUM BROMIDE AND ALBUTEROL SULFATE 3 ML: 2.5; .5 SOLUTION RESPIRATORY (INHALATION) at 01:16

## 2022-09-02 RX ADMIN — IPRATROPIUM BROMIDE AND ALBUTEROL SULFATE 3 ML: 2.5; .5 SOLUTION RESPIRATORY (INHALATION) at 19:56

## 2022-09-02 RX ADMIN — SENNOSIDES 8.8 MG: 8.8 LIQUID ORAL at 11:06

## 2022-09-02 RX ADMIN — METHYLPREDNISOLONE SODIUM SUCCINATE 40 MG: 40 INJECTION, POWDER, FOR SOLUTION INTRAMUSCULAR; INTRAVENOUS at 21:28

## 2022-09-02 RX ADMIN — PIPERACILLIN SODIUM AND TAZOBACTAM SODIUM 3.38 G: 3; .375 INJECTION, POWDER, LYOPHILIZED, FOR SOLUTION INTRAVENOUS at 23:58

## 2022-09-02 RX ADMIN — HEPARIN SODIUM 5000 UNITS: 5000 INJECTION INTRAVENOUS; SUBCUTANEOUS at 21:27

## 2022-09-02 RX ADMIN — POLYETHYLENE GLYCOL 3350 17 G: 17 POWDER, FOR SOLUTION ORAL at 11:04

## 2022-09-02 RX ADMIN — PIPERACILLIN SODIUM AND TAZOBACTAM SODIUM 3.38 G: 3; .375 INJECTION, POWDER, LYOPHILIZED, FOR SOLUTION INTRAVENOUS at 17:38

## 2022-09-02 RX ADMIN — SODIUM CHLORIDE SOLN NEBU 3% 4 ML: 3 NEBU SOLN at 19:57

## 2022-09-02 RX ADMIN — IPRATROPIUM BROMIDE AND ALBUTEROL SULFATE 3 ML: 2.5; .5 SOLUTION RESPIRATORY (INHALATION) at 23:01

## 2022-09-02 RX ADMIN — QUETIAPINE FUMARATE 50 MG: 50 TABLET, EXTENDED RELEASE ORAL at 11:05

## 2022-09-02 RX ADMIN — IPRATROPIUM BROMIDE AND ALBUTEROL SULFATE 3 ML: 2.5; .5 SOLUTION RESPIRATORY (INHALATION) at 04:51

## 2022-09-02 RX ADMIN — LEVOTHYROXINE SODIUM ANHYDROUS 100 MCG: 100 INJECTION, POWDER, LYOPHILIZED, FOR SOLUTION INTRAVENOUS at 09:00

## 2022-09-02 RX ADMIN — METHYLPREDNISOLONE SODIUM SUCCINATE 40 MG: 40 INJECTION, POWDER, FOR SOLUTION INTRAMUSCULAR; INTRAVENOUS at 11:04

## 2022-09-02 RX ADMIN — DOCUSATE SODIUM 100 MG: 50 LIQUID ORAL at 11:04

## 2022-09-02 RX ADMIN — HEPARIN SODIUM 5000 UNITS: 5000 INJECTION INTRAVENOUS; SUBCUTANEOUS at 04:00

## 2022-09-02 RX ADMIN — HEPARIN SODIUM 5000 UNITS: 5000 INJECTION INTRAVENOUS; SUBCUTANEOUS at 14:16

## 2022-09-02 RX ADMIN — IPRATROPIUM BROMIDE AND ALBUTEROL SULFATE 3 ML: 2.5; .5 SOLUTION RESPIRATORY (INHALATION) at 13:35

## 2022-09-02 RX ADMIN — Medication 4 UNITS: at 21:41

## 2022-09-02 RX ADMIN — Medication 6 UNITS: at 14:17

## 2022-09-02 RX ADMIN — SODIUM CHLORIDE, PRESERVATIVE FREE 20 MG: 5 INJECTION INTRAVENOUS at 21:28

## 2022-09-02 RX ADMIN — SODIUM CHLORIDE SOLN NEBU 3% 4 ML: 3 NEBU SOLN at 09:34

## 2022-09-02 RX ADMIN — IPRATROPIUM BROMIDE AND ALBUTEROL SULFATE 3 ML: 2.5; .5 SOLUTION RESPIRATORY (INHALATION) at 09:34

## 2022-09-02 RX ADMIN — SODIUM CHLORIDE, PRESERVATIVE FREE 20 MG: 5 INJECTION INTRAVENOUS at 11:05

## 2022-09-02 RX ADMIN — Medication 2 UNITS: at 17:37

## 2022-09-02 RX ADMIN — PIPERACILLIN SODIUM AND TAZOBACTAM SODIUM 3.38 G: 3; .375 INJECTION, POWDER, LYOPHILIZED, FOR SOLUTION INTRAVENOUS at 11:06

## 2022-09-02 NOTE — PROGRESS NOTES
2000   Pt incontinent of large amount of urine. Oriented x 3, occasional forgetfullness. Denies pain. VSS.     2100  bath completed, replaced pure wick. 0200 lab draw obtained, tech reported (difficult stick)    0400 lab reported purple top drawn at 0200 was hemolized, redraw needed. Butterfly needle used, unable to obtain blood draw at this time, pt refused any other blood work at this time. 0500  Updated lab to attempt to draw CBC around [id number] /aM if pt will agree.

## 2022-09-02 NOTE — PROGRESS NOTES
09/01/22 2125   Oxygen Therapy   O2 Sat (%) 100 %   Pulse via Oximetry 73 beats per minute   O2 Device Heated; Hi flow nasal cannula   O2 Flow Rate (L/min) 40 l/min   O2 Temperature 91.4 °F (33 °C)   FIO2 (%) 35 %

## 2022-09-02 NOTE — PROGRESS NOTES
Patient arrived unit via bed from main ICU. Assessment and vital signs in progress. Dual skin check with Rachel Knox RN. Soiled with small amount of urine. Shantel care performed. Bed pad changed. Pure wick and mepilex applied. Repositioned up in bed. Oriented to call bell and surrounding. Call bell in reach.  Family at bedside

## 2022-09-02 NOTE — PROGRESS NOTES
Problem: Mobility Impaired (Adult and Pediatric)  Goal: *Acute Goals and Plan of Care (Insert Text)  Description: Physical Therapy Goals  Initiated 9/1/2022 and to be accomplished within 7 day(s)  1. Patient will move from supine to sit and sit to supine in bed with supervision/set-up. 2.  Patient will transfer from bed to chair and chair to bed with minimal assistance/contact guard assist using the least restrictive device. 3.  Patient will perform sit to stand with minimal assistance/contact guard assist.  4.  Patient will ambulate with minimal assistance/contact guard assist for 25 feet with the least restrictive device. 5.  Patient will ascend/descend 1 step with handrail(s) with minimal assistance/contact guard assist.    PLOF: Independent. Lives with spouse. One story home with 3 steps to enter. Outcome: Progressing Towards Goal    PHYSICAL THERAPY TREATMENT    Patient: Nino Castañeda (22 y.o. 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] Saccular aneurysm  Procedure(s) (LRB):  WASHOUT RIGHT GROIN (Right) 14 Days Post-Op  Precautions: Fall, Skin      ASSESSMENT:  Patient cleared by nursing for PT treatment. Patient sitting in bed with head elevated and agreeable to participate in mobility. She is on 35 L HFNC with O2 above 90's with mobility. Min A supine to sit transfers with HOB elevated. She demonstrates good balance with UE support on the bed. She completes 10x2 LAQ's per flow sheet. Educated on pursed lip breathing to improve oxygenation and improve tolerance to activity. She stands up at [facility] with mod A and transfers to the [facility] with Min A. Rehab tech present for line management. Pt is able to have BM. Verbal cues for hand placement in order to push up from the chair. Pt ambulates 3 ft back to bed with min A for balance and RW management.  She returns to supine and declines transfer to the chair today. Call bell in reach and needs addressed. Progression toward goals:   []      Improving appropriately and progressing toward goals  [x]      Improving slowly and progressing toward goals  []      Not making progress toward goals and plan of care will be adjusted     PLAN:  Patient continues to benefit from skilled intervention to address the above impairments. Continue treatment per established plan of care. Further Equipment Recommendations for Discharge:  bedside commode, rolling walker  AMPAC: Based on an AM-PAC score of 17/24 (**/20 if omitting stairs) and their current functional mobility deficits, it is recommended that the patient have 3-5 sessions per week of Physical Therapy at d/c to increase the patient's independence. This AMPAC score should be considered in conjunction with interdisciplinary team recommendations to determine the most appropriate discharge setting. Patient's social support, diagnosis, medical stability, and prior level of function should also be taken into consideration. SUBJECTIVE:   Patient stated I think I can.     OBJECTIVE DATA SUMMARY:   Critical Behavior:  Neurologic State: Alert  Orientation Level: Oriented X4  Cognition: Follows commands  Safety/Judgement: Awareness of environment  Functional Mobility Training:  Bed Mobility:     Supine to Sit: Minimum assistance  Sit to Supine: Minimum assistance            Transfers:  Sit to Stand: Minimum assistance; Moderate assistance;Assist x1  Stand to Sit: Minimum assistance                             Balance:  Sitting: Impaired; With support  Sitting - Static: Good (unsupported)  Sitting - Dynamic: Fair (occasional)  Standing: With support  Standing - Static: Fair ((+))  Standing - Dynamic : Fair         Ambulation/Gait Training:  Distance (ft): 3 Feet (ft)  Assistive Device: Walker, rolling  Ambulation - Level of Assistance: Minimal assistance    Gait Abnormalities: Decreased step clearance       Therapeutic Exercises:   10x2 LAQ        Pain:  Pain level pre-treatment: 0/10  Pain level post-treatment: 0/10   Pain Intervention(s): Medication (see MAR); Rest, Ice, Repositioning   Response to intervention: Nurse notified, See doc flow    Activity Tolerance:     Please refer to the flowsheet for vital signs taken during this treatment. After treatment:   [] Patient left in no apparent distress sitting up in chair  [x] Patient left in no apparent distress in bed  [x] Call bell left within reach  [x] Nursing notified  [] Caregiver present  [] Bed alarm activated  [] SCDs applied      COMMUNICATION/EDUCATION:   [x]         Role of Physical Therapy in the acute care setting. [x]         Fall prevention education was provided and the patient/caregiver indicated understanding. [x]         Patient/family have participated as able in working toward goals and plan of care. [x]         Patient/family agree to work toward stated goals and plan of care. []         Patient understands intent and goals of therapy, but is neutral about his/her participation. []         Patient is unable to participate in stated goals/plan of care: ongoing with therapy staff.  []         Other:        Pastor Medina, PT   Time Calculation: 43 mins    [employer] AM-PAC® Basic Mobility Inpatient Short Form (6-Clicks) Version 2    How much HELP from another person does the patient currently need    (If the patient hasn't done an activity recently, how much help from another person do you think he/she would need if he/she tried?)   Total (Total A or Dep)   A Lot  (Mod to Max A)   A Little (Sup or Min A)   None (Mod I to I)   Turning from your back to your side while in a flat bed without using bedrails? [] 1 [] 2 [x] 3 [] 4   2. Moving from lying on your back to sitting on the side of a flat bed without using bedrails? [] 1 [] 2 [x] 3 [] 4   3. Moving to and from a bed to a chair (including a wheelchair)? [] 1 [] 2 [x] 3 [] 4   4. Standing up from a chair using your arms (e.g., wheelchair, or bedside chair)? [] 1 [] 2 [x] 3 [] 4   5. Walking in hospital room? [] 1 [] 2 [x] 3 [] 4   6. Climbing 3-5 steps with a railing?+   [] 1 [x] 2 [] 3 [] 4   +If stair climbing cannot be assessed, skip item #6. Sum responses from items 1-5.

## 2022-09-02 NOTE — PROGRESS NOTES
Bedside and Verbal shift change report given to Xavi Gilman RN (oncoming nurse) by Alejandra Barrios RN (offgoing nurse). Report included the following information SBAR, Kardex, ED Summary, Procedure Summary, Intake/Output, MAR, Recent Results, Med Rec Status, and Cardiac Rhythm NSR . Wound Prevention Checklist    Patient: Chaparro Brown (89 y.o. 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] Saccular aneurysm    Precautions: Fall, Skin       []  Heel prevention boots placed on patient    [x]  Patient turned q2h during shift    []  Lift team ordered    []  Patient on [place] bed/Specialty bed    [x]  Each Wound is documented during shift (Stage, Color, drainage, odor, measurements, and dressings)    [x]  Dual skin checks done at bedside during shift report with Alejandra Barrios, [address]unas RN      2443: AOX4, denies pain or sob, on 30L Hi flow NC, shift assessment completed, bed locked and low position, call bell within reach    2128: Due Medication administered,  - Humalog 9 units sc given per protocol    0001: Zosyn administered as schedule, VSS, no change from previous assessment    0207: Pt awake, voiced no c/o, call bell within reach    0352: Medication given, Pt on 3L O2 nc, VSS, voiced no c/o    0400: R groin dressing changed    0503: Bath provided, linen, pad and gown changed    Bedside and Verbal shift change report given to Kristie James RN (oncoming nurse) by Xavi Gilman RN (offgoing nurse). Report included the following information SBAR, Kardex, ED Summary, Procedure Summary, Intake/Output, MAR, Recent Results, Med Rec Status, and Cardiac Rhythm NSR .

## 2022-09-02 NOTE — PROGRESS NOTES
Problem: Falls - Risk of  Goal: *Absence of Falls  Description: Document [place]via Fall Risk and appropriate interventions in the flowsheet.   Outcome: Progressing Towards Goal  Note: Fall Risk Interventions:  Mobility Interventions: Bed/chair exit alarm, Strengthening exercises (ROM-active/passive)    Mentation Interventions: Bed/chair exit alarm, Door open when patient unattended, Eyeglasses and hearing aids, Reorient patient, Room close to nurse's station    Medication Interventions: Bed/chair exit alarm    Elimination Interventions: Call light in reach, Toilet paper/wipes in reach, Bed/chair exit alarm    History of Falls Interventions: Bed/chair exit alarm, Door open when patient unattended         Problem: Patient Education: Go to Patient Education Activity  Goal: Patient/Family Education  Outcome: Progressing Towards Goal     Problem: Ventilator Management  Goal: *Adequate oxygenation and ventilation  Outcome: Progressing Towards Goal  Goal: *Patient maintains clear airway/free of aspiration  Outcome: Progressing Towards Goal  Goal: *Absence of infection signs and symptoms  Outcome: Progressing Towards Goal  Goal: *Normal spontaneous ventilation  Outcome: Progressing Towards Goal

## 2022-09-02 NOTE — PROGRESS NOTES
3 [facility]. Pulmonary, Critical Care, and Sleep Medicine    Name: Hao Rose MRN: [id number]   : 1960 Hospital: [address] [place] [address] Dr   Date: 2022  Admission Date: 2022     Chart and notes reviewed. Data reviewed. I have evaluated all findings. [x]I have reviewed the flowsheet and previous days notes. []The patient is unable to give any meaningful history or review of systems because the patient is:  []Intubated []Sedated   []Unresponsive      []The patient is critically ill on      []Mechanical ventilation []Pressors   []BiPAP []         Interval HPI:  63 y.o. female w/ pmhx of a fib, CAD, CHF who presented for covered stent procedure for descending thoracic aortic saccular aneurysm. Procedure performed  w/o incident. Following procedure, patient was drowsy but answering questions appropriately per bedside RN. Pt was later found in cardiac arrest with ROSC after ~9 minutes. Extubated  but reintubated almost immediately for stridor and respiratory distress. Successfully extubated , with pt able to clear copious secretions. Subjective 22  Hospital Day:14  Vent Day:extubated   Overnight events:No acute events overnight. Remains on HFNC  Mentation/Activity: AAO  Respiratory/ Secretions:HFNC 40L/35%, productive cough  Hemodynamics:stable  Urine output, bowel: non-oliguric, 0.6 ml/kg/hr  Diet:NPO with ice chips  Need for procedures:n/a              ROS:Pertinent items are noted in HPI. Events and notes from last 24 hours reviewed.      Patient Active Problem List   Diagnosis Code    Cancer of breast, left C50.919    COPD (chronic obstructive pulmonary disease) ([facility]) J44.9    CAD (coronary artery disease) I25.10    Diabetes ([facility] [address] E11.9    Hypertension I10    Hyperlipidemia E78.5    Aneurysm ([facility]) I72.9    Breast CA ([facility]) C50.919    SOB (shortness of breath) R06.02    Saccular aneurysm I67.1       Vital Signs:  Visit Vitals  /79 (BP 1 Location: Left leg, BP Patient Position: Semi fowlers)   Pulse 73   Temp 97.3 °F (36.3 °C)   Resp 22   Ht 5' 9\" (1.753 m)   Wt 105.4 kg (232 lb 5.8 oz)   SpO2 100%   BMI 34.31 kg/m²       O2 Device: Hi flow nasal cannula   O2 Flow Rate (L/min): 40 l/min   Temp (24hrs), Av °F (36.7 °C), Min:97.3 °F (36.3 °C), Max:98.6 °F (37 °C)       Intake/Output:   Last shift:      No intake/output data recorded.   Last 3 shifts:  0701 -  1900  In: 2287.2 [I.V.:1562.2]  Out: 850 [Urine:850]    Intake/Output Summary (Last 24 hours) at 2022  Last data filed at 2022 1900  Gross per 24 hour   Intake 1300 ml   Output 850 ml   Net 450 ml          Current Facility-Administered Medications   Medication Dose Route Frequency    albuterol-ipratropium (DUO-NEB) 2.5 MG-0.5 MG/3 ML  3 mL Nebulization Q4H RT    methylPREDNISolone (PF) (SOLU-MEDROL) injection 40 mg  40 mg IntraVENous Q12H    dextrose 5 % - 0.45% NaCl infusion  75 mL/hr IntraVENous CONTINUOUS    sodium chloride 3% hypertonic nebulizer soln  4 mL Nebulization BID RT    docusate (COLACE) 50 mg/5 mL oral liquid 100 mg  100 mg Oral DAILY    QUEtiapine SR (SEROquel XR) tablet 50 mg  50 mg Oral DAILY    piperacillin-tazobactam (ZOSYN) 3.375 g in 0.9% sodium chloride (MBP/ADV) 100 mL MBP  3.375 g IntraVENous Q8H    insulin glargine (LANTUS) injection 14 Units  14 Units SubCUTAneous DAILY    nicotine (NICODERM CQ) 14 mg/24 hr patch 1 Patch  1 Patch TransDERmal DAILY    sennosides (SENOKOT) 8.8 mg/5 mL syrup 8.8 mg  5 mL Per G Tube BID    levothyroxine (SYNTHROID) injection 100 mcg  100 mcg IntraVENous DAILY    heparin (porcine) injection 5,000 Units  5,000 Units SubCUTAneous Q8H    polyethylene glycol (MIRALAX) packet 17 g  17 g Oral DAILY    aspirin chewable tablet 81 mg  81 mg Oral DAILY    famotidine (PF) (PEPCID) 20 mg in 0.9% sodium chloride 10 mL injection  20 mg IntraVENous Q12H    insulin lispro (HUMALOG) injection   SubCUTAneous Q6H    [Held by provider] amiodarone (CORDARONE) tablet 200 mg  200 mg Oral DAILY    [Held by provider] levothyroxine (SYNTHROID) tablet 150 mcg  150 mcg Oral 6am    [Held by provider] rosuvastatin (CRESTOR) tablet 20 mg  20 mg Oral QHS    [Held by provider] sacubitriL-valsartan (ENTRESTO) 24-26 mg tablet 1 Tablet  1 Tablet Oral BID    [Held by provider] metoprolol succinate (TOPROL-XL) XL tablet 25 mg  25 mg Oral QHS         Telemetry: []Sinus []A-flutter []Paced    []A-fib []Multiple PVCs                  Physical Exam:       Alert, cooperative, in no distress, appears stated age. Head:  Normocephalic, without obvious abnormality, atraumatic. Eyes:  ANicteric, PERRL,   Nose: Nares normal. Mucosa normal. No drainage or sinus tenderness. Throat: Lips, mucosa, and tongue normal. Teeth and gums normal    Neck: Supple, symmetrical, trachea midline, no adenopathy, thyroid: no enlargment/tenderness/nodules    Back:   Symmetric    Lungs:   Bilateral auscultation decreased breath sounds both bases. No rales or wheezes anteriorly   Chest wall:  No tenderness or deformity. NO intercostal retractions   Heart:  Regular rate and rhythm, S1, S2 normal, no murmur, click, rub or gallop. Abdomen:   Soft, non-tender. Bowel sounds normal. No masses,  No organomegaly. NO paradoxical motion   Extremities: normal, atraumatic, no cyanosis or edema. Pulses: 2+ and symmetric all extremities. Skin: Skin color, texture, turgor normal. No rashes or lesions.  NO clubbing   Lymph nodes: Cervical, supraclavicular nodes normal.   Neurologic: Grossly nonfocal         DATA:  MAR reviewed and pertinent medications noted or modified as needed    Labs:  Recent Labs     09/01/22  0747 08/31/22  0351 08/30/22  0344   WBC 13.0 14.7* 13.2   HGB 7.6* 7.7* 7.2*   HCT 25.5* 25.7* 24.0*    252 227     Recent Labs     09/01/22  0747 08/31/22  0351 08/30/22  1112 08/30/22  0344    138  --  139   K 3.6 4.1  --  4.0    102  --  105   CO2 29 30  -- 29   GLU 83 136*  --  100*   BUN 28* 35*  --  35*   CREA 0.79 0.88  --  0.84   CA 8.6 9.1  --  8.6   MG 2.0 2.2 2.5 2.1   PHOS 2.6 3.7  --  3.1     No results for input(s): PH, PCO2, PO2, HCO3, FIO2 in the last 72 hours. Recent Labs     08/31/22  0443 08/30/22  0416   FIO2I 35 28   HCO3I 28.2* 28.2*   PCO2I 48.7* 45.9*   PHI 7.37 7.40   PO2I 111* 75*       Imaging:  [x]   I have personally reviewed the patients radiographs and reports  XR Results (most recent):  XR Results (most recent):  Results from Hospital Encounter encounter on 08/18/22    XR CHEST PORT    Narrative  EXAMINATION: Chest single view    INDICATION: Wheezing, shortness of breath    COMPARISON: One day prior    FINDINGS: Single frontal view. Underpenetration slightly limits evaluation. Post  median sternotomy with multiple surgical clips, and descending thoracic aorta  stent graft. Mildly enlarged cardiac silhouette. Perihilar and beyond patchy  hazy streaky densities. No definite pneumothorax. No obvious acute osseous  findings. Impression  Postsurgical changes as above. Mildly enlarged cardiac silhouette with perihilar  and beyond patchy streaky densities similar to prior. CT Results (most recent):  Results from Hospital Encounter encounter on 07/19/22    CTA CHEST W OR W WO CONT    Narrative  CTA Chest -Watchmen Protocol    INDICATION: Abnormality left atrial appendage, atrial fibrillation, Watchmen  evaluation. TECHNIQUE: Thin collimation axial images obtained through the level of the  thoracic aorta following the uneventful administration of intravenous contrast.  Images obtained during maximum aortic enhancement. Images reconstructed into  three dimensional coronal and sagittal projections for complete evaluation of  the tortuous vascular structures and to reduce patient radiation dose.     All CT scans at this facility are performed using dose optimization technique as  appropriate to a performed exam, to include automated exposure control,  adjustment of the mA and/or kV according to patient size (including appropriate  matching first site-specific examinations), or use of iterative reconstruction  technique. COMPARISON: None. FINDINGS: Limited cardiac CT performed with exclusion of portions of the chest.    At least right and possible left atrial cardiac chamber enlargement. Left  ventricle is 5.5 cm transverse diameter. There are no filling defects in the  left atrium or ventricle. No filling defect in the left atrial appendage. Left  atrial appendage is 3.1 x 2.5 x 2.7 cm. Volume 6.91 cc. Status post CABG with  severe coronary arteriosclerosis throughout. Normal trileaflet aortic valve. Mild aortic atherosclerosis. There is a focal 1.4 x 0.8 cm lateral bulge aortic  lumen distal descending aorta (series 2, image 217). Central pulmonary arteries  are patent, though suboptimally opacified. Moderate bronchial wall thickening and regions of groundglass. Small sliding hiatal hernia. Status post median sternotomy. Left mastectomy. Mild anasarca. No acute bone findings. Impression  1. Left atrial appendage measurements given above. No thrombus. 2.  Multichamber cardiac enlargement. 3.  Penetrating atherosclerotic ulcer descending aorta. 4.  Moderate bronchitis or central venous congestion with suspected pulmonary  edema. 5.  Small sliding hiatal hernia. 6.  Mild anasarca. 08/18/22    ECHO ADULT FOLLOW-UP OR LIMITED 08/19/2022 8/20/2022    Interpretation Summary  Formatting of this result is different from the original.      Left Ventricle: Mildly reduced left ventricular systolic function with a visually estimated EF of 45 - 50%. Left ventricle size is normal. Increased wall thickness. Mild global hypokinesis present. Right Ventricle: Mildly reduced systolic function. Mitral Valve: Mild regurgitation.   Addendum by: Sulaiman Matthews MD on 8/20/2022  9:53 AM    Signed by: Gail Santiago MD on 8/19/2022 2:43 PM       IMPRESSION:   S/p Cardiac arrest - ROSC after ~9 min of ACLS, no further events  Hypoxic and hypercapnic respiratory failure - requiring mechanical vent. Extubated 8/22 and reintubated same day for stridor. 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 Ramirez Muñoz. Seizure vs myoclonus - shortly following cardiac arrest x2. No further seizures  Dysphagia   R groin hematoma w/ femstop in place, HH stable  H Influenza pneumonia   Hx of CAD w/ prior CABG  Hx of HFrEF - Echo with EF from 2/22 -30%, now 45%  Paroxysmal symptomatic a-fib with CHADSVASc2 score of 5. On chronic Amiodorone and anticoagulation  History of recurrent GI bleeding, S/p transfusion 8/29, HH stable post transfusion  Chronic stage 2 kidney disease. Remote breast cancer S/p mastectomy and chemotherapy, no XRT  History of hypothyroidism on Levothyroxine     Patient Active Problem List   Diagnosis Code    Cancer of breast, left C50.919    COPD (chronic obstructive pulmonary disease) ([facility]) J44.9    CAD (coronary artery disease) I25.10    Diabetes ([facility] [address] E11.9    Hypertension I10    Hyperlipidemia E78.5    Aneurysm ([facility]) I72.9    Breast CA ([facility]) C50.919    SOB (shortness of breath) R06.02    Saccular aneurysm I67.1        RECOMMENDATIONS:   Neuro: Neuro checks per routine, avoid sedating medications  Pulm: Aspiration precautions, HOB>30'. Bronchial hygiene protocol. Titrate FIO2 for O2sats >92  CVS:Monitor HD, MAP goal >65. GI: SLP recommendations appreciated. NPO pending further improvement in dysphagia  Renal: Trend Cr, UOP. Hem/Onc: Trend H/H, monitor for s/o active bleeding. Daily CBC. I/D:Trend WBCs and temperature curve. Continue Zosyn to complete  day course for presumed aspiration pneumonia  Metabolic: Daily BMP, mag, phos. Trend lytes and replace per protocol.  Continue D5-1/2 NS while NPO  Endocrine:Q6 glucoses. SSI, lantus. Avoid hypoglycemia. Musc/Skin:  PT/OT/SLP  Full Code  Discussed in interdisciplinary rounds      Best practice :    Glycemic control  Stress ulcer prophylaxis. Pepcid  DVT prophylaxis. SCDs  Need for Lines, grissom assessed. Palliative care evaluation. Restraints need. This care involved high complexity decision making to assess, manipulate, and support vital system functions, to treat this degreee vital organ system failure and to prevent further life threatening deterioration of the patients condition  The services I provided to this patient were to treat and/or prevent clinically significant deterioration that could result in the failure of one or more body systems and/or organ systems due to respiratory distress, hypoxia, cardiac dysrhythmia.        Indy Diaz NP   09/01/22  Pulmonary, Critical Care Medicine  [facility]

## 2022-09-02 NOTE — PROGRESS NOTES
[facility]  Progress Note    Patient: Mehul Newman Age: 63 y.o. : 1960 MR#: [id number] SSN: [id number]  Date: 2022     Subjective:   ICU transfer to stepdown  Interval HPI:  63 y.o. female w/ pmhx of a fib, CAD, CHF who presented for covered stent procedure for descending thoracic aortic saccular aneurysm. Procedure performed  w/o incident. Following procedure, patient was drowsy but answering questions appropriately per bedside RN. Pt was later found in cardiac arrest with ROSC after ~9 minutes. Extubated  but reintubated almost immediately for stridor and respiratory distress. Successfully extubated , with pt able to clear copious secretions. Patient is alert and oriented x2-3. Raspy voice and soft voice. No complaints at this time. No needs expressed at this time. NAD. Tolerating HFNC. Son at bedside. Assessment/Plan:   1. Saccular aneurysm at the mid to distal thoracic descending aorta without rupture s/p endovascular thoracic aneurysm repair 22. 2. Cardiac arrest with ROSC after 9 minutes 22  3. Acute respiratory failure, hypoxia and hypercapnic requiring mechanical ventilation. Extubated on  and re intubated same day for stridor. Extubated . Now on HFNC. 4. Concern for seizure v myoclonus following cardiac arrest.   5. H. Influenza PNA  6. Dysphagia  7. Thyroid disease  8. DM    Plan  Monitor oxygen demand and wean as tolerated. No home oxygen. Currently on HFNC. Scheduled nebulizer treatments. Antibiotic therapy initiated while in ICU. Continue with zosyn until 22 or pending hospital course treatment. Continue solumedrol and wean. POC glucose and treat if needed. PT/OT/Speech. Dysphagia recommendations easy to chew with nectar thick liquids. Supplements. Cardiac rate controlling medications, Entresto, and statin on hold for now. Re evaluate and restart per hospital course. POC glucose, SSI.  A1c 6.1%. lantus discontinued. home metformin on hold. Home Eliquis on hold per vascular surgery. SC heparin and ASA ordered. Nicotine patch for smoking sensation  Monitor metabolic panel and renal function. Monitor TSH levels. Currently treatment for thyroid disease. 45 minutes in total spent today in preparation for visit, review of external notes and test results, review of test results, order placement, obtaining history, physical exam of the patient, and/or counseling the patient concerning diagnosis, test results, treatment plan and speaking with physicians and nurses involved in patient's care. Additional time spent in review and independent interpretation of external notes, imaging, labs via hospital EMR and/or Care Everywhere, as well as pre-charting activity all of which occur on the day of service. Case discussed with:  [x]Patient  []Family  [x]Nursing  []Case Management  DVT Prophylaxis:  []Lovenox  [x]Hep SQ  []SCDs  []Coumadin   []On Heparin gtt    Objective:   VS: Visit Vitals  BP (!) 101/58   Pulse 60   Temp 98.9 °F (37.2 °C)   Resp 14   Ht 5' 9\" (1.753 m)   Wt 105.4 kg (232 lb 5.8 oz)   SpO2 100%   BMI 34.31 kg/m²      Tmax/24hrs: Temp (24hrs), Av °F (36.7 °C), Min:97 °F (36.1 °C), Max:98.9 °F (37.2 °C)    Intake/Output Summary (Last 24 hours) at 2022 1515  Last data filed at 2022 1200  Gross per 24 hour   Intake 1480 ml   Output 750 ml   Net 730 ml     General:         Alert, cooperative, no acute distress    HEENT:           NC, Atraumatic. PERRLA, anicteric sclerae. Lungs:            CTA bilaterally. No Wheezing/Rhonchi/Rales  Heart:              RRR, No murmur, No Rubs, No Gallops  Abdomen:      Soft, Non distended, Non tender. +Bowel sounds, no HSM  Extremities:   No c/c/e  Psych:              Good insight. Not anxious or agitated. Neurologic:     Alert and oriented X 2-3.   No acute neurological deficits     Labs:    Recent Results (from the past 24 hour(s))   GLUCOSE, POC    Collection Time: 09/01/22  5:31 PM   Result Value Ref Range    Glucose (POC) 170 (H) 70 - 110 mg/dL   GLUCOSE, POC    Collection Time: 09/01/22 11:58 PM   Result Value Ref Range    Glucose (POC) 150 (H) 70 - 110 mg/dL   MAGNESIUM    Collection Time: 09/02/22  2:12 AM   Result Value Ref Range    Magnesium 2.0 1.6 - 2.6 mg/dL   PHOSPHORUS    Collection Time: 09/02/22  2:12 AM   Result Value Ref Range    Phosphorus 3.5 2.5 - 4.9 MG/DL   CBC WITH AUTOMATED DIFF    Collection Time: 09/02/22  2:12 AM   Result Value Ref Range    WBC ABNORMAL 4.6 - 13.2 K/uL    RBC ABNORMAL 4.20 - 5.30 M/uL    HGB ABNORMAL 12.0 - 16.0 g/dL    HCT ABNORMAL 35.0 - 45.0 %    MCV ABNORMAL 78.0 - 100.0 FL    MCH ABNORMAL 24.0 - 34.0 PG    MCHC ABNORMAL 31.0 - 37.0 g/dL    RDW ABNORMAL 11.6 - 14.5 %    PLATELET ABNORMAL 008 - 420 K/uL    NRBC ABNORMAL 0  WBC    ABSOLUTE NRBC ABNORMAL 0.00 - 0.01 K/uL    NEUTROPHILS SPECIMEN CLOTTED SUGGEST RECOLLECTION %    LYMPHOCYTES SPECIMEN CLOTTED SUGGEST RECOLLECTION %    MONOCYTES SPECIMEN CLOTTED SUGGEST RECOLLECTION %    EOSINOPHILS SPECIMEN CLOTTED SUGGEST RECOLLECTION %    BASOPHILS SPECIMEN CLOTTED SUGGEST RECOLLECTION %    IMMATURE GRANULOCYTES SPECIMEN CLOTTED SUGGEST RECOLLECTION %    ABS. NEUTROPHILS SPECIMEN CLOTTED SUGGEST RECOLLECTION K/UL    ABS. LYMPHOCYTES SPECIMEN CLOTTED SUGGEST RECOLLECTION K/UL    ABS. MONOCYTES SPECIMEN CLOTTED SUGGEST RECOLLECTION K/UL    ABS. EOSINOPHILS SPECIMEN CLOTTED SUGGEST RECOLLECTION K/UL    ABS. BASOPHILS SPECIMEN CLOTTED SUGGEST RECOLLECTION K/UL    ABS. IMM. GRANS.  SPECIMEN CLOTTED SUGGEST RECOLLECTION K/UL    DF SPECIMEN CLOTTED SUGGEST RECOLLECTION    METABOLIC PANEL, BASIC    Collection Time: 09/02/22  2:12 AM   Result Value Ref Range    Sodium 137 136 - 145 mmol/L    Potassium 4.2 3.5 - 5.5 mmol/L    Chloride 102 100 - 111 mmol/L    CO2 26 21 - 32 mmol/L    Anion gap 9 3.0 - 18 mmol/L    Glucose 155 (H) 74 - 99 mg/dL    BUN 28 (H) 7.0 - 18 MG/DL    Creatinine 0.89 0.6 - 1.3 MG/DL    BUN/Creatinine ratio 31 (H) 12 - 20      GFR est AA >60 >60 ml/min/1.73m2    GFR est non-AA >60 >60 ml/min/1.73m2    Calcium 8.8 8.5 - 10.1 MG/DL   CBC WITH AUTOMATED DIFF    Collection Time: 09/02/22 11:45 AM   Result Value Ref Range    WBC 11.0 4.6 - 13.2 K/uL    RBC 2.80 (L) 4.20 - 5.30 M/uL    HGB 7.8 (L) 12.0 - 16.0 g/dL    HCT 25.3 (L) 35.0 - 45.0 %    MCV 90.4 78.0 - 100.0 FL    MCH 27.9 24.0 - 34.0 PG    MCHC 30.8 (L) 31.0 - 37.0 g/dL    RDW 17.2 (H) 11.6 - 14.5 %    PLATELET 408 151 - 085 K/uL    MPV 10.3 9.2 - 11.8 FL    NRBC 0.0 0  WBC    ABSOLUTE NRBC 0.00 0.00 - 0.01 K/uL    NEUTROPHILS 80 (H) 40 - 73 %    LYMPHOCYTES 7 (L) 21 - 52 %    MONOCYTES 9 3 - 10 %    EOSINOPHILS 0 0 - 5 %    BASOPHILS 0 0 - 2 %    IMMATURE GRANULOCYTES 4 (H) 0.0 - 0.5 %    ABS. NEUTROPHILS 8.8 (H) 1.8 - 8.0 K/UL    ABS. LYMPHOCYTES 0.8 (L) 0.9 - 3.6 K/UL    ABS. MONOCYTES 1.0 0.05 - 1.2 K/UL    ABS. EOSINOPHILS 0.0 0.0 - 0.4 K/UL    ABS. BASOPHILS 0.0 0.0 - 0.1 K/UL    ABS. IMM.  GRANS. 0.4 (H) 0.00 - 0.04 K/UL    DF AUTOMATED     TSH 3RD GENERATION    Collection Time: 09/02/22 11:45 AM   Result Value Ref Range    TSH 10.80 (H) 0.36 - 3.74 uIU/mL   T4, FREE    Collection Time: 09/02/22 11:45 AM   Result Value Ref Range    T4, Free 1.5 0.7 - 1.5 NG/DL   GLUCOSE, POC    Collection Time: 09/02/22 12:13 PM   Result Value Ref Range    Glucose (POC) 283 (H) 70 - 110 mg/dL       Signed By: Rip Ayoub NP     September 2, 2022

## 2022-09-02 NOTE — PROGRESS NOTES
Comprehensive Nutrition Assessment    Type and Reason for Visit: Reassess    Nutrition Recommendations/Plan:   Continue easy to chew, mildly thick liquids per SLP. Order [employer] (each provides 290 kcal, 9g protein) BID. Encourage PO intake. Monitor PO intake, compliance of oral supplement, weight, labs and plan of care during admission. Malnutrition Assessment:  Malnutrition Status: At risk for malnutrition (specify) (prolonged clinical course, s/p ext, dysphagia diet) (09/02/22 1123)    Context:  Acute illness       Nutrition Assessment:    Discussed care during interdisciplinary rounds. Pt s/p extubation 8/22, immediately re-intubated 8/22. Pt s/p extubation 8/31, NPO since extubation. Per SLP 9/2: bedside eval, rec'd easy to chew, mildly thick liquids. Per RN, pt sat up on the side of the bed w/ PT/OT yesterday, had 1 BM. Plan to hold IVF, stop lantus and transfer out of the ICU. Nutrition Related Findings:    Pertinent Meds: pepcid, synthroid, lasix  Pertinent Labs: POC Glucose  mg/dl x 24 hrs Wound Type: Surgical incision    Current Nutrition Intake & Therapies:        ADULT DIET Easy to Chew; Mildly Thick (Nectar)  ADULT ORAL NUTRITION SUPPLEMENT Lunch, Dinner; Frozen Supplement    Anthropometric Measures:  Height: 5' 9\" (175.3 cm)  Ideal Body Weight (IBW): 145 lbs (66 kg)  Admission Body Weight: 225 lb (102.2 kg - lowest wt)  Current Body Wt:  107.8 kg (237 lb 10.5 oz)  Last 3 Recorded Weights in this Encounter    08/29/22 0422 08/30/22 0855 08/31/22 1549   Weight: 107.8 kg (237 lb 10.5 oz) 105.4 kg (232 lb 5.8 oz) 105.4 kg (232 lb 5.8 oz)      155.8 % IBW. Current BMI (kg/m2): 35.1       BMI Category: Obese class 1 (BMI 30.0-34. 9)    Estimated Daily Nutrient Needs:  Energy Requirements Based On: Formula  Weight Used for Energy Requirements: Admission  Energy (kcal/day): 4588-4627 kcals/day (MSJ 1.1-1.2)  Weight Used for Protein Requirements: Ideal  Protein (g/day):  g/day (1.5-2 g/day)  Method Used for Fluid Requirements: 1 ml/kcal  Fluid (ml/day): 4026-3215 ml/day    Nutrition Diagnosis:   Swallowing difficulty related to  (s/p extubation, dysphagia) as evidenced by  (SLP rec'd modified textured diet)    Nutrition Interventions:   Food and/or Nutrient Delivery: Start oral diet, Start oral nutrition supplement     Coordination of Nutrition Care: Interdisciplinary rounds, Speech therapy  Plan of Care discussed with: interdisciplinary team    Goals:  Previous Goal Met: Progressing toward goal(s)  Goals: Meet at least 75% of estimated needs, by next RD assessment       Nutrition Monitoring and Evaluation:      Food/Nutrient Intake Outcomes: Food and nutrient intake, Diet advancement/tolerance, Supplement intake  Physical Signs/Symptoms Outcomes: Biochemical data, Weight, Meal time behavior    Discharge Planning:     Too soon to determine    Kwadwo Holman Abdulaziz 89, [address]   Contact: [phone]

## 2022-09-02 NOTE — INTERDISCIPLINARY ROUNDS
[facility]  Pulmonary, Critical Care, and Sleep Medicine  Interdisciplinary and Ventilator Weaning Rounds    Patient discussed in morning walking rounds and interdisciplinary rounds. ICU day: Admit 8/21     Overnight events:   Passed SLP  Some labs pending     Assessments and best practice:  Ventilator  na  Sedation  N/a  Other pertinent drips  N/a  Lines noted  PIV  Critical labs assessed  Yes  Antibiotics  N/a  Medications reviewed  Yes  Pending imaging  none  Pending send out labs  No  Pending Procedures  None  Glycemic control  Stress ulcer prophylaxis. Pepcid  DVT prophylaxis. Heparin   Need for Lines, grissom assessed.   Yes  Restraint Reevaluation   N/a      Family contact/MPOA: spouse - Mona Carrel [phone]  Family updated -  will update at bedside     Palliative consult within 3 days of admission to ICU-  Ethics Guidance: 21 days      Daily Plans:  PT/OT/SLP  Stopping Lantus  FU TSH, T4 for dosing levothyroxine  ABX to stop tomorrow after 7 days   Cont' to hold rate controlling meds  Transfer to stepdown    JOYA time 10 minutes    Trinidad Walton PA-C  09/02/22  Pulmonary, Critical Care Medicine  [facility]

## 2022-09-02 NOTE — PROGRESS NOTES
Transferred out of ICU to CVT stepdown with improved condition  Remains extubated  Seen sitting up at bedside tolerating dinner, tells me she feels much better  Vital signs are stable  Groins are soft and stable  Peripheral pulses are palpable  PT evaluation  Suitability for rehab versus home to be determined

## 2022-09-02 NOTE — PROGRESS NOTES
Problem: Dysphagia (Adult)  Goal: *Acute Goals and Plan of Care (Insert Text)  Description:     Patient will:  1. Tolerate PO trials with 0 s/s overt distress in 4/5 trials  2. Utilize compensatory swallow strategies/maneuvers (decrease bite/sip, size/rate, alt. liq/sol) with min cues in 4/5 trials  3. Perform oral-motor/laryngeal exercises to increase oropharyngeal swallow function with min cues  4. Complete an objective swallow study (i.e., MBSS) to assess swallow integrity, r/o aspiration, and determine of safest LRD, min A as indicated/ordered by MD     Rec:     Easy to chew diet with nectar-thick liquids  Aspiration precautions  HOB >45 during po intake, remain >30 for 30-45 minutes after po   Small bites/sips; alternate liquid/solid with slow feeding rate   Oral care TID  Meds per pt preference  Slow rate of intake      Outcome: Progressing Towards Goal   SPEECH LANGUAGE PATHOLOGY DYSPHAGIA TREATMENT    Patient: Bernarda Calvert (89 y.o. 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] Saccular aneurysm  Procedure(s) (LRB):  WASHOUT RIGHT GROIN (Right) 14 Days Post-Op  Precautions: aspiration Fall, Skin  PLOF: As per H&P      ASSESSMENT:  Pt was seen at bedside for follow up dysphagia management. She was on HFNC, WOB appeared improved this AM as compared to yesterday. Pt tolerated reg solid, puree, and nectar-thick liquids without any overt s/sx of aspiration. Weak cough and throat clear observed s/p thin liquid trials. Laryngeal elevation was weak to palpation with multiple swallows per bolus consistency with all PO. Minimally labored mastication of solids also observed. Reviewed breathing/swallowing coordination and importance of strict aspiration precautions with verbalized comp. Rec diet initiation of easy to chew with nectar-thick liquids, aspiration precautions, oral care TID, and meds as tolerated.  Rec slow rate of intake with breaks as needed. ST will continue to follow. Progression toward goals:  [x]         Improving appropriately and progressing toward goals  []         Improving slowly and progressing toward goals  []         Not making progress toward goals and plan of care will be adjusted     PLAN:  Recommendations and Planned Interventions: See above  Patient continues to benefit from skilled intervention to address the above impairments. Continue treatment per established plan of care. SUBJECTIVE:   Patient stated I like apple juice better than cranberry juice.     OBJECTIVE:   Cognitive and Communication Status:  Neurologic State: Alert  Orientation Level: Oriented X4  Cognition: Follows commands  Perception: Appears intact  Perseveration: No perseveration noted  Safety/Judgement: Awareness of environment  Dysphagia Treatment:  Oral Assessment:  Oral Assessment  Labial: No impairment  Dentition: Natural, Intact  Oral Hygiene: Adequate  Lingual: Decreased strength  Velum: No impairment  Mandible: No impairment  P.O. Trials:   Patient Position: 60 at [facility]   Vocal quality prior to P.O.: Breathy   Consistency Presented: Nectar thick liquid, Thin liquid, Solid, Puree   How Presented: Self-fed/presented, Cup/sip, Spoon, Straw, Successive swallows   Bolus Acceptance: No impairment   Bolus Formation/Control: Impaired   Type of Impairment: Delayed, Mastication   Propulsion: Delayed (# of seconds)   Oral Residue: None   Initiation of Swallow: Delayed (# of seconds)   Laryngeal Elevation: Weak, Decreased   Aspiration Signs/Symptoms: Clear throat, Weak cough   Pharyngeal Phase Characteristics: Suspected pharyngeal residue, Poor endurance, Multiple swallows   Effective Modifications: Small sips and bites, Alternate liquids/solids   Cues for Modifications:  Moderate       Oral Phase Severity: Mild   Pharyngeal Phase Severity : Moderate       PAIN:  Start of Tx: 0  End of Tx: 0     After treatment:   [] Patient left in no apparent distress sitting up in chair  [x]              Patient left in no apparent distress in bed  [x]              Call bell left within reach  [x]              Nursing notified  []              Family present  []              Caregiver present  []              Bed alarm activated    COMMUNICATION/EDUCATION:   [x] Aspiration precautions; swallow safety; compensatory techniques  [x]        Patient/family able to participate in training and education   []  Patient unable to participate in training and education, education ongoing with staff   [] Patient understands goals and intent of therapy; neutral about participation     Thank you for this referral.    Beata Mcmanus M.S., CCC-SLP/L  Speech-Language Pathologist

## 2022-09-03 LAB
ANION GAP SERPL CALC-SCNC: 7 MMOL/L (ref 3–18)
BASOPHILS # BLD: 0 K/UL (ref 0–0.1)
BASOPHILS NFR BLD: 0 % (ref 0–2)
BUN SERPL-MCNC: 32 MG/DL (ref 7–18)
BUN/CREAT SERPL: 28 (ref 12–20)
CALCIUM SERPL-MCNC: 9 MG/DL (ref 8.5–10.1)
CHLORIDE SERPL-SCNC: 102 MMOL/L (ref 100–111)
CHOLEST SERPL-MCNC: 130 MG/DL
CO2 SERPL-SCNC: 29 MMOL/L (ref 21–32)
CREAT SERPL-MCNC: 1.16 MG/DL (ref 0.6–1.3)
DIFFERENTIAL METHOD BLD: ABNORMAL
EOSINOPHIL # BLD: 0 K/UL (ref 0–0.4)
EOSINOPHIL NFR BLD: 0 % (ref 0–5)
ERYTHROCYTE [DISTWIDTH] IN BLOOD BY AUTOMATED COUNT: 17.8 % (ref 11.6–14.5)
GLUCOSE BLD STRIP.AUTO-MCNC: 182 MG/DL (ref 70–110)
GLUCOSE BLD STRIP.AUTO-MCNC: 206 MG/DL (ref 70–110)
GLUCOSE BLD STRIP.AUTO-MCNC: 229 MG/DL (ref 70–110)
GLUCOSE BLD STRIP.AUTO-MCNC: 279 MG/DL (ref 70–110)
GLUCOSE SERPL-MCNC: 161 MG/DL (ref 74–99)
HCT VFR BLD AUTO: 25.1 % (ref 35–45)
HDLC SERPL-MCNC: 29 MG/DL (ref 40–60)
HDLC SERPL: 4.5 {RATIO} (ref 0–5)
HGB BLD-MCNC: 7.7 G/DL (ref 12–16)
IMM GRANULOCYTES # BLD AUTO: 0.2 K/UL (ref 0–0.04)
IMM GRANULOCYTES NFR BLD AUTO: 2 % (ref 0–0.5)
LDLC SERPL CALC-MCNC: 78.6 MG/DL (ref 0–100)
LIPID PROFILE,FLP: ABNORMAL
LYMPHOCYTES # BLD: 0.6 K/UL (ref 0.9–3.6)
LYMPHOCYTES NFR BLD: 5 % (ref 21–52)
MAGNESIUM SERPL-MCNC: 2.2 MG/DL (ref 1.6–2.6)
MCH RBC QN AUTO: 27.9 PG (ref 24–34)
MCHC RBC AUTO-ENTMCNC: 30.7 G/DL (ref 31–37)
MCV RBC AUTO: 90.9 FL (ref 78–100)
MONOCYTES # BLD: 0.6 K/UL (ref 0.05–1.2)
MONOCYTES NFR BLD: 5 % (ref 3–10)
NEUTS SEG # BLD: 9.7 K/UL (ref 1.8–8)
NEUTS SEG NFR BLD: 88 % (ref 40–73)
NRBC # BLD: 0 K/UL (ref 0–0.01)
NRBC BLD-RTO: 0 PER 100 WBC
PHOSPHATE SERPL-MCNC: 3.6 MG/DL (ref 2.5–4.9)
PLATELET # BLD AUTO: 328 K/UL (ref 135–420)
PMV BLD AUTO: 10.5 FL (ref 9.2–11.8)
POTASSIUM SERPL-SCNC: 3.9 MMOL/L (ref 3.5–5.5)
RBC # BLD AUTO: 2.76 M/UL (ref 4.2–5.3)
SODIUM SERPL-SCNC: 138 MMOL/L (ref 136–145)
TRIGL SERPL-MCNC: 112 MG/DL (ref ?–150)
VLDLC SERPL CALC-MCNC: 22.4 MG/DL
WBC # BLD AUTO: 11.1 K/UL (ref 4.6–13.2)

## 2022-09-03 PROCEDURE — 84100 ASSAY OF PHOSPHORUS: CPT

## 2022-09-03 PROCEDURE — 74011636637 HC RX REV CODE- 636/637: Performed by: NURSE PRACTITIONER

## 2022-09-03 PROCEDURE — 94640 AIRWAY INHALATION TREATMENT: CPT

## 2022-09-03 PROCEDURE — 74011250636 HC RX REV CODE- 250/636: Performed by: INTERNAL MEDICINE

## 2022-09-03 PROCEDURE — 74011250636 HC RX REV CODE- 250/636: Performed by: PHYSICIAN ASSISTANT

## 2022-09-03 PROCEDURE — 80048 BASIC METABOLIC PNL TOTAL CA: CPT

## 2022-09-03 PROCEDURE — 65660000004 HC RM CVT STEPDOWN

## 2022-09-03 PROCEDURE — 74011250636 HC RX REV CODE- 250/636: Performed by: SURGERY

## 2022-09-03 PROCEDURE — 74011000250 HC RX REV CODE- 250: Performed by: PHYSICIAN ASSISTANT

## 2022-09-03 PROCEDURE — 2709999900 HC NON-CHARGEABLE SUPPLY

## 2022-09-03 PROCEDURE — 74011000258 HC RX REV CODE- 258: Performed by: INTERNAL MEDICINE

## 2022-09-03 PROCEDURE — 99233 SBSQ HOSP IP/OBS HIGH 50: CPT | Performed by: INTERNAL MEDICINE

## 2022-09-03 PROCEDURE — 80061 LIPID PANEL: CPT

## 2022-09-03 PROCEDURE — 77010033678 HC OXYGEN DAILY

## 2022-09-03 PROCEDURE — 94760 N-INVAS EAR/PLS OXIMETRY 1: CPT

## 2022-09-03 PROCEDURE — 85025 COMPLETE CBC W/AUTO DIFF WBC: CPT

## 2022-09-03 PROCEDURE — 36415 COLL VENOUS BLD VENIPUNCTURE: CPT

## 2022-09-03 PROCEDURE — 74011250637 HC RX REV CODE- 250/637: Performed by: NURSE PRACTITIONER

## 2022-09-03 PROCEDURE — 74011250637 HC RX REV CODE- 250/637: Performed by: PHYSICIAN ASSISTANT

## 2022-09-03 PROCEDURE — 74011250637 HC RX REV CODE- 250/637: Performed by: INTERNAL MEDICINE

## 2022-09-03 PROCEDURE — 77030038269 HC DRN EXT URIN PURWCK BARD -A

## 2022-09-03 PROCEDURE — 74011250637 HC RX REV CODE- 250/637: Performed by: SURGERY

## 2022-09-03 PROCEDURE — 82962 GLUCOSE BLOOD TEST: CPT

## 2022-09-03 PROCEDURE — 97530 THERAPEUTIC ACTIVITIES: CPT

## 2022-09-03 PROCEDURE — 99232 SBSQ HOSP IP/OBS MODERATE 35: CPT | Performed by: INTERNAL MEDICINE

## 2022-09-03 PROCEDURE — 74011000250 HC RX REV CODE- 250: Performed by: INTERNAL MEDICINE

## 2022-09-03 PROCEDURE — 83735 ASSAY OF MAGNESIUM: CPT

## 2022-09-03 PROCEDURE — 74011636637 HC RX REV CODE- 636/637: Performed by: INTERNAL MEDICINE

## 2022-09-03 RX ORDER — METOPROLOL SUCCINATE 25 MG/1
12.5 TABLET, EXTENDED RELEASE ORAL
Status: DISCONTINUED | OUTPATIENT
Start: 2022-09-03 | End: 2022-09-05 | Stop reason: HOSPADM

## 2022-09-03 RX ORDER — BUDESONIDE 1 MG/2ML
1000 INHALANT ORAL
Status: DISCONTINUED | OUTPATIENT
Start: 2022-09-03 | End: 2022-09-05 | Stop reason: HOSPADM

## 2022-09-03 RX ORDER — ARFORMOTEROL TARTRATE 15 UG/2ML
15 SOLUTION RESPIRATORY (INHALATION)
Status: DISCONTINUED | OUTPATIENT
Start: 2022-09-03 | End: 2022-09-05 | Stop reason: HOSPADM

## 2022-09-03 RX ORDER — PREDNISONE 20 MG/1
40 TABLET ORAL
Status: DISCONTINUED | OUTPATIENT
Start: 2022-09-03 | End: 2022-09-05 | Stop reason: HOSPADM

## 2022-09-03 RX ORDER — FAMOTIDINE 20 MG/1
20 TABLET, FILM COATED ORAL DAILY
Status: DISCONTINUED | OUTPATIENT
Start: 2022-09-04 | End: 2022-09-05 | Stop reason: HOSPADM

## 2022-09-03 RX ADMIN — IPRATROPIUM BROMIDE AND ALBUTEROL SULFATE 3 ML: 2.5; .5 SOLUTION RESPIRATORY (INHALATION) at 17:53

## 2022-09-03 RX ADMIN — HEPARIN SODIUM 5000 UNITS: 5000 INJECTION INTRAVENOUS; SUBCUTANEOUS at 12:13

## 2022-09-03 RX ADMIN — ARFORMOTEROL TARTRATE 15 MCG: 15 SOLUTION RESPIRATORY (INHALATION) at 19:29

## 2022-09-03 RX ADMIN — BUDESONIDE 1000 MCG: 1 SUSPENSION RESPIRATORY (INHALATION) at 19:29

## 2022-09-03 RX ADMIN — Medication 4 UNITS: at 12:13

## 2022-09-03 RX ADMIN — QUETIAPINE FUMARATE 50 MG: 50 TABLET, EXTENDED RELEASE ORAL at 08:33

## 2022-09-03 RX ADMIN — Medication 4 UNITS: at 22:24

## 2022-09-03 RX ADMIN — Medication 6 UNITS: at 16:51

## 2022-09-03 RX ADMIN — PIPERACILLIN SODIUM AND TAZOBACTAM SODIUM 3.38 G: 3; .375 INJECTION, POWDER, LYOPHILIZED, FOR SOLUTION INTRAVENOUS at 16:58

## 2022-09-03 RX ADMIN — SODIUM CHLORIDE, PRESERVATIVE FREE 20 MG: 5 INJECTION INTRAVENOUS at 08:41

## 2022-09-03 RX ADMIN — PIPERACILLIN SODIUM AND TAZOBACTAM SODIUM 3.38 G: 3; .375 INJECTION, POWDER, LYOPHILIZED, FOR SOLUTION INTRAVENOUS at 23:40

## 2022-09-03 RX ADMIN — PREDNISONE 40 MG: 20 TABLET ORAL at 16:57

## 2022-09-03 RX ADMIN — IPRATROPIUM BROMIDE AND ALBUTEROL SULFATE 3 ML: 2.5; .5 SOLUTION RESPIRATORY (INHALATION) at 19:29

## 2022-09-03 RX ADMIN — Medication 2 UNITS: at 08:15

## 2022-09-03 RX ADMIN — METOPROLOL SUCCINATE 12.5 MG: 25 TABLET, EXTENDED RELEASE ORAL at 22:24

## 2022-09-03 RX ADMIN — IPRATROPIUM BROMIDE AND ALBUTEROL SULFATE 3 ML: 2.5; .5 SOLUTION RESPIRATORY (INHALATION) at 08:02

## 2022-09-03 RX ADMIN — HEPARIN SODIUM 5000 UNITS: 5000 INJECTION INTRAVENOUS; SUBCUTANEOUS at 03:52

## 2022-09-03 RX ADMIN — PIPERACILLIN SODIUM AND TAZOBACTAM SODIUM 3.38 G: 3; .375 INJECTION, POWDER, LYOPHILIZED, FOR SOLUTION INTRAVENOUS at 08:44

## 2022-09-03 RX ADMIN — SODIUM CHLORIDE SOLN NEBU 3% 4 ML: 3 NEBU SOLN at 19:29

## 2022-09-03 RX ADMIN — METHYLPREDNISOLONE SODIUM SUCCINATE 40 MG: 40 INJECTION, POWDER, FOR SOLUTION INTRAMUSCULAR; INTRAVENOUS at 08:28

## 2022-09-03 RX ADMIN — LEVOTHYROXINE SODIUM 150 MCG: 150 TABLET ORAL at 05:41

## 2022-09-03 RX ADMIN — ROSUVASTATIN CALCIUM 20 MG: 20 TABLET, FILM COATED ORAL at 22:24

## 2022-09-03 RX ADMIN — ASPIRIN 81 MG CHEWABLE TABLET 81 MG: 81 TABLET CHEWABLE at 08:33

## 2022-09-03 RX ADMIN — SODIUM CHLORIDE SOLN NEBU 3% 4 ML: 3 NEBU SOLN at 08:02

## 2022-09-03 RX ADMIN — BUDESONIDE 1000 MCG: 1 SUSPENSION RESPIRATORY (INHALATION) at 13:13

## 2022-09-03 RX ADMIN — HEPARIN SODIUM 5000 UNITS: 5000 INJECTION INTRAVENOUS; SUBCUTANEOUS at 22:24

## 2022-09-03 RX ADMIN — ARFORMOTEROL TARTRATE 15 MCG: 15 SOLUTION RESPIRATORY (INHALATION) at 13:13

## 2022-09-03 RX ADMIN — IPRATROPIUM BROMIDE AND ALBUTEROL SULFATE 3 ML: 2.5; .5 SOLUTION RESPIRATORY (INHALATION) at 03:20

## 2022-09-03 RX ADMIN — IPRATROPIUM BROMIDE AND ALBUTEROL SULFATE 3 ML: 2.5; .5 SOLUTION RESPIRATORY (INHALATION) at 13:13

## 2022-09-03 NOTE — PROGRESS NOTES
[facility]. Pulmonary, Critical Care, and Sleep Medicine    Name: Preeti Aquino MRN: [id number]   : 1960 Hospital: [facility]   Date: 9/3/2022  Admission Date: 2022     Chart and notes reviewed. Data reviewed. I have evaluated all findings. [x]I have reviewed the flowsheet and previous days notes. []The patient is unable to give any meaningful history or review of systems because the patient is:  []Intubated []Sedated   []Unresponsive      []The patient is critically ill on      []Mechanical ventilation []Pressors   []BiPAP []         Interval HPI:  63 y.o. female w/ pmhx of a fib, CAD, CHF who presented for covered stent procedure for descending thoracic aortic saccular aneurysm. Procedure performed  w/o incident. Following procedure, patient was drowsy but answering questions appropriately per bedside RN. Pt was later found in cardiac arrest with ROSC after ~9 minutes. Extubated  but reintubated almost immediately for stridor and respiratory distress. Successfully extubated , with pt able to clear copious secretions. Subjective 22  Pt transferred out of ICU. On NC. Still coughing up lots of thick sputum, states she feels much better. ROS:Pertinent items are noted in HPI. Events and notes from last 24 hours reviewed.      Patient Active Problem List   Diagnosis Code    Cancer of breast, left C50.919    COPD (chronic obstructive pulmonary disease) ([facility]) J44.9    CAD (coronary artery disease) I25.10    Diabetes ([address] E11.9    Hypertension I10    Hyperlipidemia E78.5    Aneurysm (HCC) I72.9    Breast CA ([facility]) C50.919    SOB (shortness of breath) R06.02    Saccular aneurysm I67.1       Vital Signs:  Visit Vitals  /68   Pulse 68   Temp 98.1 °F (36.7 °C)   Resp 20   Ht 5' 9\" (1.753 m)   Wt 111 kg (244 lb 11.4 oz)   SpO2 99%   BMI 36.14 kg/m²       O2 Device: Nasal cannula   O2 Flow Rate (L/min): 3 l/min   Temp (24hrs), Av.5 °F (36.9 °C), Min:98 °F (36.7 °C), Max:98.9 °F (37.2 °C)       Intake/Output:   Last shift:      701 - 1900  In: 120 [P.O.:120]  Out: -   Last 3 shifts: 1901 -  0700  In: 1380 [P.O.:30; I.V.:1350]  Out: 1070 [Urine:1070]    Intake/Output Summary (Last 24 hours) at 9/3/2022 1316  Last data filed at 9/3/2022 0848  Gross per 24 hour   Intake 320 ml   Output 320 ml   Net 0 ml            Current Facility-Administered Medications   Medication Dose Route Frequency    predniSONE (DELTASONE) tablet 40 mg  40 mg Oral DAILY WITH DINNER    arformoteroL (BROVANA) neb solution 15 mcg  15 mcg Nebulization BID RT    budesonide (PULMICORT) 1,000 mcg/2 mL nebulizer susp  1,000 mcg Nebulization BID RT    insulin lispro (HUMALOG) injection   SubCUTAneous AC&HS    albuterol-ipratropium (DUO-NEB) 2.5 MG-0.5 MG/3 ML  3 mL Nebulization Q4H RT    sodium chloride 3% hypertonic nebulizer soln  4 mL Nebulization BID RT    docusate (COLACE) 50 mg/5 mL oral liquid 100 mg  100 mg Oral DAILY    QUEtiapine SR (SEROquel XR) tablet 50 mg  50 mg Oral DAILY    piperacillin-tazobactam (ZOSYN) 3.375 g in 0.9% sodium chloride (MBP/ADV) 100 mL MBP  3.375 g IntraVENous Q8H    nicotine (NICODERM CQ) 14 mg/24 hr patch 1 Patch  1 Patch TransDERmal DAILY    sennosides (SENOKOT) 8.8 mg/5 mL syrup 8.8 mg  5 mL Per G Tube BID    heparin (porcine) injection 5,000 Units  5,000 Units SubCUTAneous Q8H    polyethylene glycol (MIRALAX) packet 17 g  17 g Oral DAILY    aspirin chewable tablet 81 mg  81 mg Oral DAILY    famotidine (PF) (PEPCID) 20 mg in 0.9% sodium chloride 10 mL injection  20 mg IntraVENous Q12H    [Held by provider] amiodarone (CORDARONE) tablet 200 mg  200 mg Oral DAILY    levothyroxine (SYNTHROID) tablet 150 mcg  150 mcg Oral 6am    [Held by provider] rosuvastatin (CRESTOR) tablet 20 mg  20 mg Oral QHS    [Held by provider] sacubitriL-valsartan (ENTRESTO) 24-26 mg tablet 1 Tablet  1 Tablet Oral BID    [Held by provider] metoprolol succinate (TOPROL-XL) XL tablet 25 mg  25 mg Oral QHS         Telemetry: []Sinus []A-flutter []Paced    []A-fib []Multiple PVCs                  Physical Exam:       Alert, cooperative, in no distress, appears stated age. Head:  Normocephalic, without obvious abnormality, atraumatic. Eyes:  ANicteric, PERRL,   Nose: Nares normal. Mucosa normal. No drainage or sinus tenderness. Throat: Lips, mucosa, and tongue normal. Teeth and gums normal    Neck: Supple, symmetrical, trachea midline, no adenopathy, thyroid: no enlargment/tenderness/nodules    Back:   Symmetric    Lungs:   Bilateral auscultation decreased breath sounds both bases. No rales or wheezes anteriorly   Chest wall:  No tenderness or deformity. NO intercostal retractions   Heart:  Regular rate and rhythm, S1, S2 normal, no murmur, click, rub or gallop. Abdomen:   Soft, non-tender. Bowel sounds normal. No masses,  No organomegaly. NO paradoxical motion   Extremities: normal, atraumatic, no cyanosis or edema. Pulses: 2+ and symmetric all extremities. Skin: Skin color, texture, turgor normal. No rashes or lesions. NO clubbing   Lymph nodes: Cervical, supraclavicular nodes normal.   Neurologic: Grossly nonfocal         DATA:  MAR reviewed and pertinent medications noted or modified as needed    Labs:  Recent Labs     09/03/22  0635 09/02/22  1145 09/02/22  0212   WBC 11.1 11.0 ABNORMAL   HGB 7.7* 7.8* ABNORMAL   HCT 25.1* 25.3* ABNORMAL    306 ABNORMAL       Recent Labs     09/03/22  0635 09/02/22  0212 09/01/22  0747    137 139   K 3.9 4.2 3.6    102 104   CO2 29 26 29   * 155* 83   BUN 32* 28* 28*   CREA 1.16 0.89 0.79   CA 9.0 8.8 8.6   MG 2.2 2.0 2.0   PHOS 3.6 3.5 2.6       No results for input(s): PH, PCO2, PO2, HCO3, FIO2 in the last 72 hours. No results for input(s): FIO2I, IFO2, HCO3I, IHCO3, HCOPOC, PCO2I, PCOPOC, IPHI, PHI, PHPOC, PO2I, PO2POC in the last 72 hours.     No lab exists for component: IPOC2      Imaging:  [x]   I have personally reviewed the patients radiographs and reports  XR Results (most recent):  XR Results (most recent):  Results from Hospital Encounter encounter on 08/18/22    XR CHEST PORT    Narrative  EXAMINATION: Chest single view    INDICATION: Wheezing, shortness of breath    COMPARISON: One day prior    FINDINGS: Single frontal view. Underpenetration slightly limits evaluation. Post  median sternotomy with multiple surgical clips, and descending thoracic aorta  stent graft. Mildly enlarged cardiac silhouette. Perihilar and beyond patchy  hazy streaky densities. No definite pneumothorax. No obvious acute osseous  findings. Impression  Postsurgical changes as above. Mildly enlarged cardiac silhouette with perihilar  and beyond patchy streaky densities similar to prior. CT Results (most recent):  Results from Hospital Encounter encounter on 07/19/22    CTA CHEST W OR W WO CONT    Narrative  CTA Chest -Watchmen Protocol    INDICATION: Abnormality left atrial appendage, atrial fibrillation, Watchmen  evaluation. TECHNIQUE: Thin collimation axial images obtained through the level of the  thoracic aorta following the uneventful administration of intravenous contrast.  Images obtained during maximum aortic enhancement. Images reconstructed into  three dimensional coronal and sagittal projections for complete evaluation of  the tortuous vascular structures and to reduce patient radiation dose. All CT scans at this facility are performed using dose optimization technique as  appropriate to a performed exam, to include automated exposure control,  adjustment of the mA and/or kV according to patient size (including appropriate  matching first site-specific examinations), or use of iterative reconstruction  technique. COMPARISON: None.     FINDINGS: Limited cardiac CT performed with exclusion of portions of the chest.    At least right and possible left atrial cardiac chamber enlargement. Left  ventricle is 5.5 cm transverse diameter. There are no filling defects in the  left atrium or ventricle. No filling defect in the left atrial appendage. Left  atrial appendage is 3.1 x 2.5 x 2.7 cm. Volume 6.91 cc. Status post CABG with  severe coronary arteriosclerosis throughout. Normal trileaflet aortic valve. Mild aortic atherosclerosis. There is a focal 1.4 x 0.8 cm lateral bulge aortic  lumen distal descending aorta (series 2, image 217). Central pulmonary arteries  are patent, though suboptimally opacified. Moderate bronchial wall thickening and regions of groundglass. Small sliding hiatal hernia. Status post median sternotomy. Left mastectomy. Mild anasarca. No acute bone findings. Impression  1. Left atrial appendage measurements given above. No thrombus. 2.  Multichamber cardiac enlargement. 3.  Penetrating atherosclerotic ulcer descending aorta. 4.  Moderate bronchitis or central venous congestion with suspected pulmonary  edema. 5.  Small sliding hiatal hernia. 6.  Mild anasarca. 08/18/22    ECHO ADULT FOLLOW-UP OR LIMITED 08/19/2022 8/20/2022    Interpretation Summary  Formatting of this result is different from the original.      Left Ventricle: Mildly reduced left ventricular systolic function with a visually estimated EF of 45 - 50%. Left ventricle size is normal. Increased wall thickness. Mild global hypokinesis present. Right Ventricle: Mildly reduced systolic function. Mitral Valve: Mild regurgitation. Addendum by: Mildred Patel MD on 8/20/2022  9:53 AM    Signed by: Aleksandra Flores MD on 8/19/2022  2:43 PM       IMPRESSION:   S/p Cardiac arrest - ROSC after ~9 min of ACLS, no further events  Hypoxic and hypercapnic respiratory failure - requiring mechanical vent. Extubated 8/22 and reintubated same day for stridor.  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 Anni Hancock. Seizure vs myoclonus - shortly following cardiac arrest x2. No further seizures  Dysphagia   R groin hematoma w/ femstop in place, HH stable  H Influenza pneumonia   Hx of CAD w/ prior CABG  Hx of HFrEF - Echo with EF from 2/22 -30%, now 45%  Paroxysmal symptomatic a-fib with CHADSVASc2 score of 5. On chronic Amiodorone and anticoagulation  History of recurrent GI bleeding, S/p transfusion 8/29, HH stable post transfusion  Chronic stage 2 kidney disease. Remote breast cancer S/p mastectomy and chemotherapy, no XRT  History of hypothyroidism on Levothyroxine     Patient Active Problem List   Diagnosis Code    Cancer of breast, left C50.919    COPD (chronic obstructive pulmonary disease) ([facility]) J44.9    CAD (coronary artery disease) I25.10    Diabetes ([facility] [address] E11.9    Hypertension I10    Hyperlipidemia E78.5    Aneurysm (HCC) I72.9    Breast CA ([facility]) C50.919    SOB (shortness of breath) R06.02    Saccular aneurysm I67.1        RECOMMENDATIONS:   Wean O2 as tolerated, may need home O2 evaluation  Added brovana and pulmicort  Changed to PO steroids. PT/OT, OOB  Encouraged smoking cessation  Wants to follow up with a pulmonologist in [place]    Will follow     Best practice :    Glycemic control  Stress ulcer prophylaxis. Pepcid  DVT prophylaxis. SCDs  Need for Lines, grissom assessed. Palliative care evaluation. Restraints need. This care involved high complexity decision making to assess, manipulate, and support vital system functions, to treat this degreee vital organ system failure and to prevent further life threatening deterioration of the patients condition  The services I provided to this patient were to treat and/or prevent clinically significant deterioration that could result in the failure of one or more body systems and/or organ systems due to respiratory distress, hypoxia, cardiac dysrhythmia.        Guanako Hein MD 09/03/22  Pulmonary, Critical Care Medicine  [facility]

## 2022-09-03 NOTE — PROGRESS NOTES
Hospitalist Progress Note    Subjective:   Daily Progress Note: 9/3/2022     Patient states she feels fine. She does not use home oxygen. No headaches or dizziness. Shortness of breath present especially on exertion. No cough. No nausea or vomiting. No abdominal pain. She prefers going home rather than nursing home.     Current Facility-Administered Medications   Medication Dose Route Frequency    insulin lispro (HUMALOG) injection   SubCUTAneous AC&HS    glucose chewable tablet 16 g  4 Tablet Oral PRN    glucagon (GLUCAGEN) injection 1 mg  1 mg IntraMUSCular PRN    dextrose 10% infusion 0-250 mL  0-250 mL IntraVENous PRN    albuterol-ipratropium (DUO-NEB) 2.5 MG-0.5 MG/3 ML  3 mL Nebulization Q4H RT    methylPREDNISolone (PF) (SOLU-MEDROL) injection 40 mg  40 mg IntraVENous Q12H    sodium chloride 3% hypertonic nebulizer soln  4 mL Nebulization BID RT    0.9% sodium chloride infusion 250 mL  250 mL IntraVENous PRN    docusate (COLACE) 50 mg/5 mL oral liquid 100 mg  100 mg Oral DAILY    QUEtiapine SR (SEROquel XR) tablet 50 mg  50 mg Oral DAILY    piperacillin-tazobactam (ZOSYN) 3.375 g in 0.9% sodium chloride (MBP/ADV) 100 mL MBP  3.375 g IntraVENous Q8H    nicotine (NICODERM CQ) 14 mg/24 hr patch 1 Patch  1 Patch TransDERmal DAILY    sennosides (SENOKOT) 8.8 mg/5 mL syrup 8.8 mg  5 mL Per G Tube BID    heparin (porcine) injection 5,000 Units  5,000 Units SubCUTAneous Q8H    polyethylene glycol (MIRALAX) packet 17 g  17 g Oral DAILY    aspirin chewable tablet 81 mg  81 mg Oral DAILY    famotidine (PF) (PEPCID) 20 mg in 0.9% sodium chloride 10 mL injection  20 mg IntraVENous Q12H    albuterol-ipratropium (DUO-NEB) 2.5 MG-0.5 MG/3 ML  3 mL Nebulization Q4H PRN    [Held by provider] amiodarone (CORDARONE) tablet 200 mg  200 mg Oral DAILY    levothyroxine (SYNTHROID) tablet 150 mcg  150 mcg Oral 6am    [Held by provider] rosuvastatin (CRESTOR) tablet 20 mg  20 mg Oral QHS    [Held by provider] oxyCODONE-acetaminophen (PERCOCET) 5-325 mg per tablet 1 Tablet  1 Tablet Oral Q4H PRN    hydrALAZINE (APRESOLINE) 20 mg/mL injection 10-20 mg  10-20 mg IntraVENous Q6H PRN    [Held by provider] sacubitriL-valsartan (ENTRESTO) 24-26 mg tablet 1 Tablet  1 Tablet Oral BID    [Held by provider] metoprolol succinate (TOPROL-XL) XL tablet 25 mg  25 mg Oral QHS    bumetanide (BUMEX) tablet 1 mg  1 mg Oral DAILY PRN    atropine injection 1 mg  1 mg IntraVENous Q5MIN PRN        Review of Systems  Pertinent items are noted in HPI.     Objective:     Visit Vitals  /68   Pulse 68   Temp 98.1 °F (36.7 °C)   Resp 20   Ht 5' 9\" (1.753 m)   Wt 111 kg (244 lb 11.4 oz)   SpO2 99%   BMI 36.14 kg/m²    O2 Flow Rate (L/min): 3 l/min O2 Device: Nasal cannula    Temp (24hrs), Av.5 °F (36.9 °C), Min:98 °F (36.7 °C), Max:98.9 °F (37.2 °C)       07 -  1900  In: 120 [P.O.:120]  Out: -    190 -  0700  In: 1380 [P.O.:30; I.V.:1350]  Out: 1154 [Urine:1070]      General appearance -awake well appearing, and in no distress  Chest -diminished air entry noted in bases, no wheezes  Heart - S1 and S2 normal  Abdomen - soft, nontender, nondistended, Bowel sounds present  Neurological - alert, oriented, normal speech, no focal findings noted while in bed  Musculoskeletal - no joint tenderness or erythema of knees bilaterally  Extremities -right leg pitting pedal edema noted      Data Review    Recent Results (from the past 24 hour(s))   CBC WITH AUTOMATED DIFF    Collection Time: 22 11:45 AM   Result Value Ref Range    WBC 11.0 4.6 - 13.2 K/uL    RBC 2.80 (L) 4.20 - 5.30 M/uL    HGB 7.8 (L) 12.0 - 16.0 g/dL    HCT 25.3 (L) 35.0 - 45.0 %    MCV 90.4 78.0 - 100.0 FL    MCH 27.9 24.0 - 34.0 PG    MCHC 30.8 (L) 31.0 - 37.0 g/dL    RDW 17.2 (H) 11.6 - 14.5 %    PLATELET 103 494 - 075 K/uL    MPV 10.3 9.2 - 11.8 FL    NRBC 0.0 0  WBC    ABSOLUTE NRBC 0.00 0.00 - 0.01 K/uL    NEUTROPHILS 80 (H) 40 - 73 % LYMPHOCYTES 7 (L) 21 - 52 %    MONOCYTES 9 3 - 10 %    EOSINOPHILS 0 0 - 5 %    BASOPHILS 0 0 - 2 %    IMMATURE GRANULOCYTES 4 (H) 0.0 - 0.5 %    ABS. NEUTROPHILS 8.8 (H) 1.8 - 8.0 K/UL    ABS. LYMPHOCYTES 0.8 (L) 0.9 - 3.6 K/UL    ABS. MONOCYTES 1.0 0.05 - 1.2 K/UL    ABS. EOSINOPHILS 0.0 0.0 - 0.4 K/UL    ABS. BASOPHILS 0.0 0.0 - 0.1 K/UL    ABS. IMM. GRANS. 0.4 (H) 0.00 - 0.04 K/UL    DF AUTOMATED     TSH 3RD GENERATION    Collection Time: 09/02/22 11:45 AM   Result Value Ref Range    TSH 10.80 (H) 0.36 - 3.74 uIU/mL   T4, FREE    Collection Time: 09/02/22 11:45 AM   Result Value Ref Range    T4, Free 1.5 0.7 - 1.5 NG/DL   GLUCOSE, POC    Collection Time: 09/02/22 12:13 PM   Result Value Ref Range    Glucose (POC) 283 (H) 70 - 110 mg/dL   GLUCOSE, POC    Collection Time: 09/02/22  4:52 PM   Result Value Ref Range    Glucose (POC) 185 (H) 70 - 110 mg/dL   GLUCOSE, POC    Collection Time: 09/02/22  9:20 PM   Result Value Ref Range    Glucose (POC) 209 (H) 70 - 110 mg/dL   MAGNESIUM    Collection Time: 09/03/22  6:35 AM   Result Value Ref Range    Magnesium 2.2 1.6 - 2.6 mg/dL   PHOSPHORUS    Collection Time: 09/03/22  6:35 AM   Result Value Ref Range    Phosphorus 3.6 2.5 - 4.9 MG/DL   CBC WITH AUTOMATED DIFF    Collection Time: 09/03/22  6:35 AM   Result Value Ref Range    WBC 11.1 4.6 - 13.2 K/uL    RBC 2.76 (L) 4.20 - 5.30 M/uL    HGB 7.7 (L) 12.0 - 16.0 g/dL    HCT 25.1 (L) 35.0 - 45.0 %    MCV 90.9 78.0 - 100.0 FL    MCH 27.9 24.0 - 34.0 PG    MCHC 30.7 (L) 31.0 - 37.0 g/dL    RDW 17.8 (H) 11.6 - 14.5 %    PLATELET 701 080 - 948 K/uL    MPV 10.5 9.2 - 11.8 FL    NRBC 0.0 0  WBC    ABSOLUTE NRBC 0.00 0.00 - 0.01 K/uL    NEUTROPHILS 88 (H) 40 - 73 %    LYMPHOCYTES 5 (L) 21 - 52 %    MONOCYTES 5 3 - 10 %    EOSINOPHILS 0 0 - 5 %    BASOPHILS 0 0 - 2 %    IMMATURE GRANULOCYTES 2 (H) 0.0 - 0.5 %    ABS. NEUTROPHILS 9.7 (H) 1.8 - 8.0 K/UL    ABS. LYMPHOCYTES 0.6 (L) 0.9 - 3.6 K/UL    ABS.  MONOCYTES 0.6 0.05 - 1.2 K/UL    ABS. EOSINOPHILS 0.0 0.0 - 0.4 K/UL    ABS. BASOPHILS 0.0 0.0 - 0.1 K/UL    ABS. IMM. GRANS. 0.2 (H) 0.00 - 0.04 K/UL    DF AUTOMATED     METABOLIC PANEL, BASIC    Collection Time: 09/03/22  6:35 AM   Result Value Ref Range    Sodium 138 136 - 145 mmol/L    Potassium 3.9 3.5 - 5.5 mmol/L    Chloride 102 100 - 111 mmol/L    CO2 29 21 - 32 mmol/L    Anion gap 7 3.0 - 18 mmol/L    Glucose 161 (H) 74 - 99 mg/dL    BUN 32 (H) 7.0 - 18 MG/DL    Creatinine 1.16 0.6 - 1.3 MG/DL    BUN/Creatinine ratio 28 (H) 12 - 20      GFR est AA 58 (L) >60 ml/min/1.73m2    GFR est non-AA 48 (L) >60 ml/min/1.73m2    Calcium 9.0 8.5 - 10.1 MG/DL   LIPID PANEL    Collection Time: 09/03/22  6:35 AM   Result Value Ref Range    LIPID PROFILE          Cholesterol, total 130 <200 MG/DL    Triglyceride 112 <150 MG/DL    HDL Cholesterol 29 (L) 40 - 60 MG/DL    LDL, calculated 78.6 0 - 100 MG/DL    VLDL, calculated 22.4 MG/DL    CHOL/HDL Ratio 4.5 0 - 5.0     GLUCOSE, POC    Collection Time: 09/03/22  7:38 AM   Result Value Ref Range    Glucose (POC) 182 (H) 70 - 110 mg/dL         Assessment/Plan:     Principal Problem:    Saccular aneurysm (8/18/2022)      ASSESSMENT:    1.  S/p cardiac arrest  2. Acute hypoxic and hypercapnic respiratory failure s/p extubation. Slowly improving. 3.  Acute metabolic encephalopathy, improving  4. 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 Post cardiac arrest seizure versus myoclonus, no recurrence  6. History of coronary artery disease and prior CABG  7. Chronic systolic heart failure with EF of 45 percentage, currently compensated  8. Paroxysmal A. fib, rate controlled  9. History of recurrent GI bleed  10. Stage II chronic kidney disease  11. Remote history of breast cancer status postmastectomy and chemotherapy  12. Hypothyroidism  13. Anemia of chronic medical disease  14. Dysphagia    PLAN:    Wean patient off oxygen.   Pulmonologist to follow this patient. Chest x-ray report reviewed from September 1, 2022. Continue nebulizer treatment with Incentive spirometry  Pulmonology to follow this patient. Hope patient can be changed to p.o. steroid. Will change patient to p.o. Pepcid. Will continue aspirin, will restart Crestor and low-dose Toprol-XL with holding parameters. Cardiologist has been informed to see this patient for optimization of her current home medications. Continue Synthroid for hypothyroidism  PT, OT and speech therapist to follow this patient    Barriers for discharge today: Optimization of medical treatment, wean patient off oxygen, PT and OT clearance. Anticipated date of discharge: Monday if remains stable clinically and cleared by cardiologist, pulmonologist and vascular surgeon    Total time to take care of this patient was 30 minutes and more than 50% of time was spent counseling and coordinating care. Disclaimer: Sections of this note are dictated using utilizing voice recognition software, which may have resulted in some phonetic based errors in grammar and contents. Even though attempts were made to correct all the mistakes, some may have been missed, and remained in the body of the document. If questions arise, please contact our department.

## 2022-09-03 NOTE — PROGRESS NOTES
Problem: Self Care Deficits Care Plan (Adult)  Goal: *Acute Goals and Plan of Care (Insert Text)  Description: Occupational Therapy Goals  Initiated 9/1/2022 within 7 day(s). 1.  Patient will perform grooming with supervision/set-up while sitting at EOB with Good balance for >5 min. 2.  Patient will perform bed mobility for ADLs with supervision/set-up. 3.  Patient will perform upper body dressing/bathing with supervision/set-up. 4.  Patient will perform toilet transfers with minimal assistance/contact guard assist.  5.  Patient will perform all aspects of toileting with minimal assistance/contact guard assist.  6.  Patient will participate in upper extremity therapeutic exercise/activities with supervision/set-up for 8 minutes to improve endurance and UB strength needed for ADLs    7. Patient will utilize energy conservation techniques during functional activities with verbal cues. Prior Level of Function: Pt lives with spouse, independent with ADLs and functional mobility w/o AD. Outcome: Progressing Towards Goal   OCCUPATIONAL THERAPY TREATMENT    Patient: Cain Black (89 y.o. 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 Chart, occupational therapy assessment, plan of care, and goals were reviewed. ASSESSMENT:  Pt cleared by RN for OT tx at this time. Pt presented supine in bed upon entry, finishing her breakfast, on 3L O2NC, and agreeable to work with OT. Pt was able to transition to EOB from supine with CGA. Once sitting, pt tolerated ~ 12 mins sitting EOB and performed therapeutic reaching activity in mult planes to challenge balance and increase functional activity tolerance for ADL carryover.  Pt able to scoot laterally towards [facility] with CGA. She returned back to supine CGA and positioned for comfort. Pt's SPO2 monitored throughout tx session maintaining in mid 90s on 3L. Pt educated on importance of performing PLB technique to prevent SOB and for optimal oxygenation. She was left with HOB elevated, bed alarm active, and all needs left within reach. RN made aware of pt's participation and position. Progression toward goals:  []          Improving appropriately and progressing toward goals  [x]          Improving slowly and progressing toward goals  []          Not making progress toward goals and plan of care will be adjusted     PLAN:  Patient continues to benefit from skilled intervention to address the above impairments. Continue treatment per established plan of care. Further Equipment Recommendations for Discharge:  RW, [facility], extended tub bench    AMPAC: Based on an AM-PAC score of 14/24 and their current ADL deficits; it is recommended that the patient have 5-7 sessions per week of Occupational Therapy at d/c to increase the patient's independence. Currently, this patient demonstrates the potential endurance, and/or tolerance for 3 hours of therapy each day at d/c. This AMPAC score should be considered in conjunction with interdisciplinary team recommendations to determine the most appropriate discharge setting. Patient's social support, diagnosis, medical stability, and prior level of function should also be taken into consideration. SUBJECTIVE:   Patient stated I am doing better.     OBJECTIVE DATA SUMMARY:   Cognitive/Behavioral Status:  Neurologic State: Alert  Orientation Level: Oriented X4  Cognition: Follows commands, Poor safety awareness  Safety/Judgement: Awareness of environment    Functional Mobility and Transfers for ADLs:   Bed Mobility:     Supine to Sit: Contact guard assistance  Sit to Supine: Contact guard assistance  Scooting: Contact guard assistance     Balance:  Sitting: Impaired; Without support  Sitting - Static: Good (unsupported)  Sitting - Dynamic: Fair (occasional)    Pain:  Pain level pre-treatment: 0/10   Pain level post-treatment: 0/10      Activity Tolerance:    Fair   Please refer to the flowsheet for vital signs taken during this treatment. After treatment:   []  Patient left in no apparent distress sitting up in chair  [x]  Patient left in no apparent distress in bed  [x]  Call bell left within reach  [x]  Nursing notified  []  Caregiver present  [x]  Bed alarm activated    COMMUNICATION/EDUCATION:   [x] Role of Occupational Therapy in the acute care setting  [] Home safety education was provided and the patient/caregiver indicated understanding. [x] Patient/family have participated as able in working towards goals and plan of care. [x] Patient/family agree to work toward stated goals and plan of care. [] Patient understands intent and goals of therapy, but is neutral about his/her participation. [] Patient is unable to participate in goal setting and plan of care. Thank you for this referral.  PEMA Baron  Time Calculation: 24 mins    [employer] AM-PAC® Daily Activity Inpatient Short Form (6-Clicks)    How much HELP from another person does the patient currently need    (If the patient hasn't done an activity recently, how much help from another person do you think he/she would need if he/she tried?)   Total (Total A or Dep)   A Lot  (Mod to Max A)   A Little (Sup or Min A)   None (Mod I to I)   Putting on and taking off regular lower body clothing? [] 1 [x] 2 [] 3 [] 4   2. Bathing (including washing, rinsing,      drying)? [] 1 [x] 2 [] 3 [] 4   3. Toileting, which includes using toilet, bedpan or urinal?   [] 1 [x] 2 [] 3 [] 4   4. Putting on and taking off regular upper body clothing? [] 1 [x] 2 [] 3 [] 4   5. Taking care of personal grooming such as brushing teeth? [] 1 [] 2 [x] 3 [] 4   6. Eating meals?    [] 1 [] 2 [x] 3 [] 4

## 2022-09-03 NOTE — PROGRESS NOTES
Problem: Falls - Risk of  Goal: *Absence of Falls  Description: Document Clydene Bone Fall Risk and appropriate interventions in the flowsheet. Outcome: Progressing Towards Goal  Note: Fall Risk Interventions:  Mobility Interventions: Patient to call before getting OOB    Mentation Interventions: Adequate sleep, hydration, pain control, Door open when patient unattended    Medication Interventions: Patient to call before getting OOB, Teach patient to arise slowly    Elimination Interventions: Patient to call for help with toileting needs, Toileting schedule/hourly rounds, Call light in reach    History of Falls Interventions: Door open when patient unattended, Bed/chair exit alarm         Problem: Patient Education: Go to Patient Education Activity  Goal: Patient/Family Education  Outcome: Progressing Towards Goal     Problem: Pressure Injury - Risk of  Goal: *Prevention of pressure injury  Description: Document Alfredo Scale and appropriate interventions in the flowsheet.   Outcome: Progressing Towards Goal  Note: Pressure Injury Interventions:  Sensory Interventions: Minimize linen layers, Check visual cues for pain, Assess changes in LOC    Moisture Interventions: Absorbent underpads, Internal/External urinary devices    Activity Interventions: Pressure redistribution bed/mattress(bed type)    Mobility Interventions: HOB 30 degrees or less, Pressure redistribution bed/mattress (bed type)    Nutrition Interventions: Offer support with meals,snacks and hydration    Friction and Shear Interventions: Apply protective barrier, creams and emollients, Foam dressings/transparent film/skin sealants                Problem: Patient Education: Go to Patient Education Activity  Goal: Patient/Family Education  Outcome: Progressing Towards Goal

## 2022-09-03 NOTE — PROGRESS NOTES
Problem: Falls - Risk of  Goal: *Absence of Falls  Description: Document Mello Barrett Fall Risk and appropriate interventions in the flowsheet. Outcome: Progressing Towards Goal  Note: Fall Risk Interventions:  Mobility Interventions: Bed/chair exit alarm, Communicate number of staff needed for ambulation/transfer, Patient to call before getting OOB, PT Consult for mobility concerns, PT Consult for assist device competence, Strengthening exercises (ROM-active/passive), Utilize walker, cane, or other assistive device    Mentation Interventions: Adequate sleep, hydration, pain control, Bed/chair exit alarm, Door open when patient unattended    Medication Interventions: Bed/chair exit alarm, Evaluate medications/consider consulting pharmacy, Patient to call before getting OOB    Elimination Interventions: Bed/chair exit alarm, Call light in reach, Toileting schedule/hourly rounds    History of Falls Interventions: Bed/chair exit alarm, Evaluate medications/consider consulting pharmacy         Problem: Pressure Injury - Risk of  Goal: *Prevention of pressure injury  Description: Document Alfredo Scale and appropriate interventions in the flowsheet.   Outcome: Progressing Towards Goal  Note: Pressure Injury Interventions:  Sensory Interventions: Assess changes in LOC, Check visual cues for pain, Keep linens dry and wrinkle-free, Minimize linen layers    Moisture Interventions: Absorbent underpads, Internal/External urinary devices    Activity Interventions: Pressure redistribution bed/mattress(bed type)    Mobility Interventions: Pressure redistribution bed/mattress (bed type)    Nutrition Interventions: Document food/fluid/supplement intake, Offer support with meals,snacks and hydration    Friction and Shear Interventions: Apply protective barrier, creams and emollients, Foam dressings/transparent film/skin sealants, Lift team/patient mobility team                Problem: Patient Education: Go to Patient Education Activity  Goal: Patient/Family Education  Outcome: Progressing Towards Goal     Problem: Non-Violent Restraints  Goal: Removal from restraints as soon as assessed to be safe  Outcome: Resolved/Met  Goal: No harm/injury to patient while restraints in use  Outcome: Resolved/Met  Goal: Patient's dignity will be maintained  Outcome: Resolved/Met  Goal: Patient Interventions  Outcome: Resolved/Met

## 2022-09-03 NOTE — PROGRESS NOTES
Bedside and Verbal shift change report given to Janett Dominguez RN (oncoming nurse) by Izabel Mares RN (offgoing nurse). Report included the following information SBAR, Kardex, Intake/Output, MAR, and Cardiac Rhythm NSR . Wound Prevention Checklist    Patient: Joleen Pfeiffer (88 y.o. 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 A new dressing was put on with clotting pad and transparent Tegaderm.

## 2022-09-03 NOTE — PROGRESS NOTES
Sitting up in bed seems more comfortable each day  Tolerating diet  Physical therapy and respiratory evaluation  Evaluation for continued rehab versus home  Continue current care

## 2022-09-04 LAB
ANION GAP SERPL CALC-SCNC: 7 MMOL/L (ref 3–18)
BASOPHILS # BLD: 0 K/UL (ref 0–0.1)
BASOPHILS NFR BLD: 0 % (ref 0–2)
BUN SERPL-MCNC: 31 MG/DL (ref 7–18)
BUN/CREAT SERPL: 29 (ref 12–20)
CALCIUM SERPL-MCNC: 8.9 MG/DL (ref 8.5–10.1)
CHLORIDE SERPL-SCNC: 106 MMOL/L (ref 100–111)
CO2 SERPL-SCNC: 27 MMOL/L (ref 21–32)
CREAT SERPL-MCNC: 1.06 MG/DL (ref 0.6–1.3)
DIFFERENTIAL METHOD BLD: ABNORMAL
EOSINOPHIL # BLD: 0 K/UL (ref 0–0.4)
EOSINOPHIL NFR BLD: 0 % (ref 0–5)
ERYTHROCYTE [DISTWIDTH] IN BLOOD BY AUTOMATED COUNT: 18.5 % (ref 11.6–14.5)
GLUCOSE BLD STRIP.AUTO-MCNC: 115 MG/DL (ref 70–110)
GLUCOSE BLD STRIP.AUTO-MCNC: 160 MG/DL (ref 70–110)
GLUCOSE BLD STRIP.AUTO-MCNC: 169 MG/DL (ref 70–110)
GLUCOSE BLD STRIP.AUTO-MCNC: 95 MG/DL (ref 70–110)
GLUCOSE SERPL-MCNC: 165 MG/DL (ref 74–99)
HCT VFR BLD AUTO: 22.9 % (ref 35–45)
HCT VFR BLD AUTO: 24.1 % (ref 35–45)
HGB BLD-MCNC: 7 G/DL (ref 12–16)
HGB BLD-MCNC: 7.3 G/DL (ref 12–16)
HISTORY CHECKED?,CKHIST: NORMAL
IMM GRANULOCYTES # BLD AUTO: 0.3 K/UL (ref 0–0.04)
IMM GRANULOCYTES NFR BLD AUTO: 3 % (ref 0–0.5)
LYMPHOCYTES # BLD: 0.7 K/UL (ref 0.9–3.6)
LYMPHOCYTES NFR BLD: 6 % (ref 21–52)
MAGNESIUM SERPL-MCNC: 2.3 MG/DL (ref 1.6–2.6)
MCH RBC QN AUTO: 28.1 PG (ref 24–34)
MCHC RBC AUTO-ENTMCNC: 30.3 G/DL (ref 31–37)
MCV RBC AUTO: 92.7 FL (ref 78–100)
MONOCYTES # BLD: 0.8 K/UL (ref 0.05–1.2)
MONOCYTES NFR BLD: 7 % (ref 3–10)
NEUTS SEG # BLD: 10.7 K/UL (ref 1.8–8)
NEUTS SEG NFR BLD: 85 % (ref 40–73)
NRBC # BLD: 0.03 K/UL (ref 0–0.01)
NRBC BLD-RTO: 0.2 PER 100 WBC
PHOSPHATE SERPL-MCNC: 3.4 MG/DL (ref 2.5–4.9)
PLATELET # BLD AUTO: 317 K/UL (ref 135–420)
PMV BLD AUTO: 10.3 FL (ref 9.2–11.8)
POTASSIUM SERPL-SCNC: 4 MMOL/L (ref 3.5–5.5)
RBC # BLD AUTO: 2.6 M/UL (ref 4.2–5.3)
SODIUM SERPL-SCNC: 140 MMOL/L (ref 136–145)
WBC # BLD AUTO: 12.5 K/UL (ref 4.6–13.2)

## 2022-09-04 PROCEDURE — 94640 AIRWAY INHALATION TREATMENT: CPT

## 2022-09-04 PROCEDURE — 74011250637 HC RX REV CODE- 250/637: Performed by: INTERNAL MEDICINE

## 2022-09-04 PROCEDURE — 36430 TRANSFUSION BLD/BLD COMPNT: CPT

## 2022-09-04 PROCEDURE — 83735 ASSAY OF MAGNESIUM: CPT

## 2022-09-04 PROCEDURE — 74011636637 HC RX REV CODE- 636/637: Performed by: INTERNAL MEDICINE

## 2022-09-04 PROCEDURE — 36415 COLL VENOUS BLD VENIPUNCTURE: CPT

## 2022-09-04 PROCEDURE — 2709999900 HC NON-CHARGEABLE SUPPLY

## 2022-09-04 PROCEDURE — 74011250636 HC RX REV CODE- 250/636: Performed by: INTERNAL MEDICINE

## 2022-09-04 PROCEDURE — 74011250636 HC RX REV CODE- 250/636: Performed by: SURGERY

## 2022-09-04 PROCEDURE — 74011000258 HC RX REV CODE- 258: Performed by: INTERNAL MEDICINE

## 2022-09-04 PROCEDURE — 74011000250 HC RX REV CODE- 250: Performed by: PHYSICIAN ASSISTANT

## 2022-09-04 PROCEDURE — 74011000250 HC RX REV CODE- 250: Performed by: INTERNAL MEDICINE

## 2022-09-04 PROCEDURE — 80048 BASIC METABOLIC PNL TOTAL CA: CPT

## 2022-09-04 PROCEDURE — 74011250637 HC RX REV CODE- 250/637: Performed by: SURGERY

## 2022-09-04 PROCEDURE — 74011250637 HC RX REV CODE- 250/637: Performed by: NURSE PRACTITIONER

## 2022-09-04 PROCEDURE — 74011250637 HC RX REV CODE- 250/637: Performed by: PHYSICIAN ASSISTANT

## 2022-09-04 PROCEDURE — 97530 THERAPEUTIC ACTIVITIES: CPT

## 2022-09-04 PROCEDURE — 86900 BLOOD TYPING SEROLOGIC ABO: CPT

## 2022-09-04 PROCEDURE — 94762 N-INVAS EAR/PLS OXIMTRY CONT: CPT

## 2022-09-04 PROCEDURE — 65660000004 HC RM CVT STEPDOWN

## 2022-09-04 PROCEDURE — 84100 ASSAY OF PHOSPHORUS: CPT

## 2022-09-04 PROCEDURE — 85025 COMPLETE CBC W/AUTO DIFF WBC: CPT

## 2022-09-04 PROCEDURE — 99233 SBSQ HOSP IP/OBS HIGH 50: CPT | Performed by: INTERNAL MEDICINE

## 2022-09-04 PROCEDURE — 82962 GLUCOSE BLOOD TEST: CPT

## 2022-09-04 PROCEDURE — 92526 ORAL FUNCTION THERAPY: CPT

## 2022-09-04 PROCEDURE — P9016 RBC LEUKOCYTES REDUCED: HCPCS

## 2022-09-04 PROCEDURE — 99232 SBSQ HOSP IP/OBS MODERATE 35: CPT | Performed by: INTERNAL MEDICINE

## 2022-09-04 PROCEDURE — 86923 COMPATIBILITY TEST ELECTRIC: CPT

## 2022-09-04 PROCEDURE — 85018 HEMOGLOBIN: CPT

## 2022-09-04 RX ORDER — BUMETANIDE 1 MG/1
0.5 TABLET ORAL DAILY
Status: DISCONTINUED | OUTPATIENT
Start: 2022-09-04 | End: 2022-09-05 | Stop reason: HOSPADM

## 2022-09-04 RX ORDER — SODIUM CHLORIDE 9 MG/ML
250 INJECTION, SOLUTION INTRAVENOUS AS NEEDED
Status: DISCONTINUED | OUTPATIENT
Start: 2022-09-04 | End: 2022-09-05 | Stop reason: HOSPADM

## 2022-09-04 RX ADMIN — Medication 3 UNITS: at 21:32

## 2022-09-04 RX ADMIN — SODIUM CHLORIDE SOLN NEBU 3% 4 ML: 3 NEBU SOLN at 19:17

## 2022-09-04 RX ADMIN — HEPARIN SODIUM 5000 UNITS: 5000 INJECTION INTRAVENOUS; SUBCUTANEOUS at 06:03

## 2022-09-04 RX ADMIN — Medication 3 UNITS: at 08:38

## 2022-09-04 RX ADMIN — PREDNISONE 40 MG: 20 TABLET ORAL at 17:50

## 2022-09-04 RX ADMIN — PIPERACILLIN SODIUM AND TAZOBACTAM SODIUM 3.38 G: 3; .375 INJECTION, POWDER, LYOPHILIZED, FOR SOLUTION INTRAVENOUS at 09:42

## 2022-09-04 RX ADMIN — FAMOTIDINE 20 MG: 20 TABLET ORAL at 08:40

## 2022-09-04 RX ADMIN — IPRATROPIUM BROMIDE AND ALBUTEROL SULFATE 3 ML: 2.5; .5 SOLUTION RESPIRATORY (INHALATION) at 00:51

## 2022-09-04 RX ADMIN — ROSUVASTATIN CALCIUM 20 MG: 20 TABLET, FILM COATED ORAL at 21:32

## 2022-09-04 RX ADMIN — IPRATROPIUM BROMIDE AND ALBUTEROL SULFATE 3 ML: 2.5; .5 SOLUTION RESPIRATORY (INHALATION) at 19:18

## 2022-09-04 RX ADMIN — IPRATROPIUM BROMIDE AND ALBUTEROL SULFATE 3 ML: 2.5; .5 SOLUTION RESPIRATORY (INHALATION) at 15:49

## 2022-09-04 RX ADMIN — ASPIRIN 81 MG CHEWABLE TABLET 81 MG: 81 TABLET CHEWABLE at 08:39

## 2022-09-04 RX ADMIN — BUDESONIDE 1000 MCG: 1 SUSPENSION RESPIRATORY (INHALATION) at 19:17

## 2022-09-04 RX ADMIN — BUMETANIDE 0.5 MG: 1 TABLET ORAL at 12:21

## 2022-09-04 RX ADMIN — QUETIAPINE FUMARATE 50 MG: 50 TABLET, EXTENDED RELEASE ORAL at 08:46

## 2022-09-04 RX ADMIN — ARFORMOTEROL TARTRATE 15 MCG: 15 SOLUTION RESPIRATORY (INHALATION) at 19:18

## 2022-09-04 RX ADMIN — LEVOTHYROXINE SODIUM 150 MCG: 150 TABLET ORAL at 06:03

## 2022-09-04 RX ADMIN — METOPROLOL SUCCINATE 12.5 MG: 25 TABLET, EXTENDED RELEASE ORAL at 21:32

## 2022-09-04 NOTE — PROGRESS NOTES
Problem: Falls - Risk of  Goal: *Absence of Falls  Description: Document Alana Taylor Fall Risk and appropriate interventions in the flowsheet. Outcome: Progressing Towards Goal  Note: Fall Risk Interventions:  Mobility Interventions: Patient to call before getting OOB, PT Consult for mobility concerns, PT Consult for assist device competence, Strengthening exercises (ROM-active/passive), Utilize walker, cane, or other assistive device    Mentation Interventions: Bed/chair exit alarm, Evaluate medications/consider consulting pharmacy, Door open when patient unattended    Medication Interventions: Evaluate medications/consider consulting pharmacy, Patient to call before getting OOB, Teach patient to arise slowly    Elimination Interventions: Call light in reach, Patient to call for help with toileting needs    History of Falls Interventions: Door open when patient unattended, Evaluate medications/consider consulting pharmacy         Problem: Pressure Injury - Risk of  Goal: *Prevention of pressure injury  Description: Document Alfreod Scale and appropriate interventions in the flowsheet.   Outcome: Progressing Towards Goal  Note: Pressure Injury Interventions:  Sensory Interventions: Assess changes in LOC, Check visual cues for pain, Keep linens dry and wrinkle-free, Minimize linen layers    Moisture Interventions: Absorbent underpads, Internal/External urinary devices, Minimize layers    Activity Interventions: Pressure redistribution bed/mattress(bed type), Increase time out of bed    Mobility Interventions: Pressure redistribution bed/mattress (bed type)    Nutrition Interventions: Document food/fluid/supplement intake    Friction and Shear Interventions: Foam dressings/transparent film/skin sealants, Minimize layers                Problem: Nutrition Deficit  Goal: *Optimize nutritional status  Outcome: Progressing Towards Goal     Problem: Patient Education: Go to Patient Education Activity  Goal: Patient/Family Education  Outcome: Progressing Towards Goal

## 2022-09-04 NOTE — PROGRESS NOTES
Had report of bleed from right groin this morning. Discussed with patient she felt was watery and bloody  On exam today the right groin incisions intact there is no redness no hematoma had soft  No bleed at this moment  Potentially expressed seroma versus old hematoma  I do not see any evidence of active bleed  Will continue to hold Eliquis for now, discussed with cardiology  Her vital signs are stable we will follow labs  She runs very anemic  She is wanting to be discharged to home as a goal as opposed to rehab at this point.   We will continue with physical therapy occupational therapy and close monitoring

## 2022-09-04 NOTE — PROGRESS NOTES
Problem: Patient Education: Go to Patient Education Activity  Goal: Patient/Family Education  Outcome: Progressing Towards Goal     Problem: Dysphagia (Adult)  Goal: *Acute Goals and Plan of Care (Insert Text)  Description: Patient will:  1. Tolerate PO trials with 0 s/s overt distress in 4/5 trials  2. Utilize compensatory swallow strategies/maneuvers (decrease bite/sip, size/rate, alt. liq/sol) with min cues in 4/5 trials  3. Perform oral-motor/laryngeal exercises to increase oropharyngeal swallow function with min cues  4. Complete an objective swallow study (i.e., MBSS) to assess swallow integrity, r/o aspiration, and determine of safest LRD, min A as indicated/ordered by MD     Rec:     Easy to chew diet with nectar-thick liquids  Aspiration precautions  HOB >45 during po intake, remain >30 for 30-45 minutes after po   Small bites/sips; alternate liquid/solid with slow feeding rate   Oral care TID  Meds per pt preference  Slow rate of intake      Outcome: Progressing Towards Goal    SPEECH LANGUAGE PATHOLOGY DYSPHAGIA TREATMENT    Patient: Mesfin Haro (77 y.o. 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 Thin liquids via cup administered as self fed x 4oz with no overt s/s asp/pen. Monitor stats during po intake as all stats remained normal. Educated pt on liquid upgrade to thin, no drinking straws as diet remain the same as easy to chew. Recommend MBS on next scheduled date to assess oropharyngeal function with recent extubation as well. Discussed results with RN, [facility] to continue to follow.      Progression toward goals:  [x]         Improving appropriately and progressing toward goals  []         Improving slowly and progressing toward goals  []         Not making progress toward goals and plan of care will be adjusted     PLAN:  Recommendations and Planned Interventions:  See above  Patient continues to benefit from skilled intervention to address the above impairments. Continue treatment per established plan of care. SUBJECTIVE:   Patient stated \"I don't like the drinks I have\". OBJECTIVE:   Cognitive and Communication Status:  Neurologic State: Alert  Orientation Level: Oriented X4  Cognition: Follows commands  Perception: Appears intact  Perseveration: No perseveration noted  Safety/Judgement: Awareness of environment  Dysphagia Treatment:  Oral Assessment:  Oral Assessment  Labial: No impairment  Dentition: Natural, Intact  Oral Hygiene: Adequate  Lingual: Decreased strength  Velum: No impairment  Mandible: No impairment  P.O. Trials:   Patient Position: 60 at [facility]   Vocal quality prior to P.O.: Breathy   Consistency Presented: Nectar thick liquid, Thin liquid, Solid, Puree   How Presented: Self-fed/presented, Cup/sip, Spoon, Straw, Successive swallows       Bolus Acceptance: No impairment   Bolus Formation/Control: Impaired   Type of Impairment: Delayed, Mastication   Propulsion: Delayed (# of seconds)   Oral Residue: None   Initiation of Swallow: Delayed (# of seconds)   Laryngeal Elevation: Weak, Decreased   Aspiration Signs/Symptoms: Clear throat, Weak cough   Pharyngeal Phase Characteristics: Suspected pharyngeal residue, Poor endurance, Multiple swallows   Effective Modifications: Small sips and bites, Alternate liquids/solids   Cues for Modifications:  Moderate         Oral Phase Severity: Mild   Pharyngeal Phase Severity : Moderate   Oral Motor Exercises:      Exercises:  Laryngeal Exercises:  PAIN:  Pain level pre-treatment: 0/10   Pain level post-treatment: 0/10   Pain Intervention(s): Medication (see MAR); Rest, Ice, Repositioning   Response to intervention: Nurse notified, See doc flow    After treatment:   []              Patient left in no apparent distress sitting up in chair  [x]              Patient left in no apparent distress in bed  [x]              Call bell left within reach  [x]              Nursing notified  []              Family present  []              Caregiver present  []              Bed alarm activated      COMMUNICATION/EDUCATION:   [x] Aspiration precautions; swallow safety; compensatory techniques  []        Patient unable to participate in education; education ongoing with staff  []  Posted safety precautions in patient's room.   [] Oral-motor/laryngeal strengthening exercises      JACK Ronquillo  Time Calculation: 15 mins

## 2022-09-04 NOTE — PROGRESS NOTES
Hospitalist Progress Note    Subjective:   Daily Progress Note: 9/4/2022     Patient sitting up in a chair. No headaches or dizziness. Off oxygen. No more bleeding from right groin site. No nausea or vomiting. No new shortness of breath or cough.     Current Facility-Administered Medications   Medication Dose Route Frequency    predniSONE (DELTASONE) tablet 40 mg  40 mg Oral DAILY WITH DINNER    arformoteroL (BROVANA) neb solution 15 mcg  15 mcg Nebulization BID RT    budesonide (PULMICORT) 1,000 mcg/2 mL nebulizer susp  1,000 mcg Nebulization BID RT    famotidine (PEPCID) tablet 20 mg  20 mg Oral DAILY    metoprolol succinate (TOPROL-XL) XL tablet 12.5 mg  12.5 mg Oral QHS    insulin lispro (HUMALOG) injection   SubCUTAneous AC&HS    glucose chewable tablet 16 g  4 Tablet Oral PRN    glucagon (GLUCAGEN) injection 1 mg  1 mg IntraMUSCular PRN    dextrose 10% infusion 0-250 mL  0-250 mL IntraVENous PRN    albuterol-ipratropium (DUO-NEB) 2.5 MG-0.5 MG/3 ML  3 mL Nebulization Q4H RT    sodium chloride 3% hypertonic nebulizer soln  4 mL Nebulization BID RT    0.9% sodium chloride infusion 250 mL  250 mL IntraVENous PRN    QUEtiapine SR (SEROquel XR) tablet 50 mg  50 mg Oral DAILY    piperacillin-tazobactam (ZOSYN) 3.375 g in 0.9% sodium chloride (MBP/ADV) 100 mL MBP  3.375 g IntraVENous Q8H    nicotine (NICODERM CQ) 14 mg/24 hr patch 1 Patch  1 Patch TransDERmal DAILY    sennosides (SENOKOT) 8.8 mg/5 mL syrup 8.8 mg  5 mL Per G Tube BID    heparin (porcine) injection 5,000 Units  5,000 Units SubCUTAneous Q8H    polyethylene glycol (MIRALAX) packet 17 g  17 g Oral DAILY    aspirin chewable tablet 81 mg  81 mg Oral DAILY    albuterol-ipratropium (DUO-NEB) 2.5 MG-0.5 MG/3 ML  3 mL Nebulization Q4H PRN    [Held by provider] amiodarone (CORDARONE) tablet 200 mg  200 mg Oral DAILY    levothyroxine (SYNTHROID) tablet 150 mcg  150 mcg Oral 6am    rosuvastatin (CRESTOR) tablet 20 mg  20 mg Oral QHS    [Held by provider] oxyCODONE-acetaminophen (PERCOCET) 5-325 mg per tablet 1 Tablet  1 Tablet Oral Q4H PRN    hydrALAZINE (APRESOLINE) 20 mg/mL injection 10-20 mg  10-20 mg IntraVENous Q6H PRN    [Held by provider] sacubitriL-valsartan (ENTRESTO) 24-26 mg tablet 1 Tablet  1 Tablet Oral BID    bumetanide (BUMEX) tablet 1 mg  1 mg Oral DAILY PRN    atropine injection 1 mg  1 mg IntraVENous Q5MIN PRN        Review of Systems  Pertinent items are noted in HPI. Objective:     Visit Vitals  BP (!) 143/75   Pulse 67   Temp 97.3 °F (36.3 °C)   Resp 21   Ht 5' 9\" (1.753 m)   Wt 111 kg (244 lb 11.4 oz)   SpO2 100%   BMI 36.14 kg/m²    O2 Flow Rate (L/min): 3 l/min O2 Device: None (Room air)    Temp (24hrs), Av °F (36.7 °C), Min:97.3 °F (36.3 °C), Max:98.7 °F (37.1 °C)      No intake/output data recorded.  1901 -  0700  In: 540 [P.O.:240;  I.V.:300]  Out: 970 [Urine:970]      General appearance -awake well appearing, and in no distress  Chest -diminished air entry noted in bases, no wheezes  Heart - S1 and S2 normal  Abdomen - soft, nontender, nondistended, Bowel sounds present  Neurological - alert, oriented, normal speech, no focal findings noted while in bed  Musculoskeletal - no joint tenderness or erythema of knees bilaterally  Extremities -right leg pitting pedal edema noted      Data Review    Recent Results (from the past 24 hour(s))   GLUCOSE, POC    Collection Time: 22 11:15 AM   Result Value Ref Range    Glucose (POC) 206 (H) 70 - 110 mg/dL   GLUCOSE, POC    Collection Time: 22  4:18 PM   Result Value Ref Range    Glucose (POC) 279 (H) 70 - 110 mg/dL   GLUCOSE, POC    Collection Time: 22  9:36 PM   Result Value Ref Range    Glucose (POC) 229 (H) 70 - 110 mg/dL   MAGNESIUM    Collection Time: 22  5:52 AM   Result Value Ref Range    Magnesium 2.3 1.6 - 2.6 mg/dL   PHOSPHORUS    Collection Time: 22  5:52 AM   Result Value Ref Range    Phosphorus 3.4 2.5 - 4.9 MG/DL   CBC WITH AUTOMATED DIFF Collection Time: 09/04/22  5:52 AM   Result Value Ref Range    WBC 12.5 4.6 - 13.2 K/uL    RBC 2.60 (L) 4.20 - 5.30 M/uL    HGB 7.3 (L) 12.0 - 16.0 g/dL    HCT 24.1 (L) 35.0 - 45.0 %    MCV 92.7 78.0 - 100.0 FL    MCH 28.1 24.0 - 34.0 PG    MCHC 30.3 (L) 31.0 - 37.0 g/dL    RDW 18.5 (H) 11.6 - 14.5 %    PLATELET 341 275 - 030 K/uL    MPV 10.3 9.2 - 11.8 FL    NRBC 0.2 (H) 0  WBC    ABSOLUTE NRBC 0.03 (H) 0.00 - 0.01 K/uL    NEUTROPHILS 85 (H) 40 - 73 %    LYMPHOCYTES 6 (L) 21 - 52 %    MONOCYTES 7 3 - 10 %    EOSINOPHILS 0 0 - 5 %    BASOPHILS 0 0 - 2 %    IMMATURE GRANULOCYTES 3 (H) 0.0 - 0.5 %    ABS. NEUTROPHILS 10.7 (H) 1.8 - 8.0 K/UL    ABS. LYMPHOCYTES 0.7 (L) 0.9 - 3.6 K/UL    ABS. MONOCYTES 0.8 0.05 - 1.2 K/UL    ABS. EOSINOPHILS 0.0 0.0 - 0.4 K/UL    ABS. BASOPHILS 0.0 0.0 - 0.1 K/UL    ABS. IMM. GRANS. 0.3 (H) 0.00 - 0.04 K/UL    DF AUTOMATED     METABOLIC PANEL, BASIC    Collection Time: 09/04/22  5:52 AM   Result Value Ref Range    Sodium 140 136 - 145 mmol/L    Potassium 4.0 3.5 - 5.5 mmol/L    Chloride 106 100 - 111 mmol/L    CO2 27 21 - 32 mmol/L    Anion gap 7 3.0 - 18 mmol/L    Glucose 165 (H) 74 - 99 mg/dL    BUN 31 (H) 7.0 - 18 MG/DL    Creatinine 1.06 0.6 - 1.3 MG/DL    BUN/Creatinine ratio 29 (H) 12 - 20      GFR est AA >60 >60 ml/min/1.73m2    GFR est non-AA 53 (L) >60 ml/min/1.73m2    Calcium 8.9 8.5 - 10.1 MG/DL   GLUCOSE, POC    Collection Time: 09/04/22  7:43 AM   Result Value Ref Range    Glucose (POC) 169 (H) 70 - 110 mg/dL         Assessment/Plan:     Principal Problem:    Saccular aneurysm (8/18/2022)    ASSESSMENT:    1.  S/p cardiac arrest  2. Acute hypoxic and hypercapnic respiratory failure s/p extubation. Slowly improving. 3.  Acute metabolic encephalopathy, improving  4. 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 Post cardiac arrest seizure versus myoclonus, no recurrence  6. History of coronary artery disease and prior CABG  7.   Chronic systolic heart failure with EF of 45 percentage, currently compensated  8. Paroxysmal A. fib, rate controlled  9. History of recurrent GI bleed  10. Stage II chronic kidney disease  11. Remote history of breast cancer status postmastectomy and chemotherapy  12. Hypothyroidism  13. Worsening anemia of chronic medical disease  14. Dysphagia  15. Right groin incision site bleeding    PLAN:    Monitor off oxygen  Vascular surgeon was notified for # 15 this morning. Input noted. Will give patient 1 unit of blood transfusion as recommendation by cardiologist.  Continue PO steroid  Will change patient to p.o. Pepcid. Will continue aspirin, Crestor and low-dose Toprol-XL with holding parameters. Cardiology input noted about not starting back patient on amiodarone and Eliquis  Continue Synthroid for hypothyroidism  PT, OT and speech therapist to follow this patient    I have discussed with the patient the rationale for blood component transfusion; its benefits in treating or preventing fatigue, organ damage, or death; and its risk which includes mild transfusion reactions, rare risk of blood borne infection, or more serious but rare reactions. I have discussed the alternatives to transfusion, including the risk and consequences of not receiving transfusion. The patient had an opportunity to ask questions and had agreed to proceed with transfusion of blood components. Barriers for discharge today: Anemia, PT and OT clearance    Anticipated date of discharge: Monday if remains stable clinically and cleared by cardiologist, pulmonologist and vascular surgeon    Total time to take care of this patient was 30 minutes and more than 50% of time was spent counseling and coordinating care. Disclaimer: Sections of this note are dictated using utilizing voice recognition software, which may have resulted in some phonetic based errors in grammar and contents.  Even though attempts were made to correct all the mistakes, some may have been missed, and remained in the body of the document. If questions arise, please contact our department.

## 2022-09-04 NOTE — PROGRESS NOTES
2230 While conducting incontinence care, the patient's R groin incision site started to bleed. Manual pressure applied. Bleeding stopped after 5 minutes of pressure. Quick clot dressing applied. 0600 R groin dressing saturated and leaking serosanguineous drainage. Wound cleansed and new dressing applied.

## 2022-09-04 NOTE — PROGRESS NOTES
Cardiology Progress Note    Admit Date: 8/18/2022  Attending Cardiologist: Dr. Micaela Romero:     -S/p asystole cardiac arrest, ROSC after ~9 min ACLS protocol, in setting of desaturation despite increase to HFNC  -Acute hypoxic hypercapnic respiratory failure, intubated 8/18 PM  S/p therapeutic bronchoscopy 8/21/2022 with removal of mucus plugs.  -Presented for endovascular thoracic aneurysm repair by Dr. Teresa Mcghee 8/18/2022, which was performed as planned  R groin hematoma, s/p washout by Dr. Teresa Mcghee 8/19  -Chronic HFrEF/ischemic cardiomyopathy, EF 30% in 02/2022  Echo 8/19/2022 with LVEF 45-50%  -CAD, s/p CABG x 4 in 2010. Nuclear stress test in 2019 showed inferior scar, no ischemia. Last cardiac cath in 2011 with findings as follows:  LM: Normal  LAD: Eccentric ostial 70-80%, mid to distal LAD is a very small-caliber vessel, probably about a 2 mm vessel. Competitive filling from LIMA. Diagonal: Ostial 40-50% proximal moderate disease. Ramus: Proximal 100% in stent. LCx/OM: OM2 mid 100%. Native LCx diffuse luminal irregularities without any obstructive stenosis. Less than 2 mm vessel. RCA: proximal and mid stent diffuse 20% ISR. Otherwise, no obstructive disease. RPLS 60-70% tubular stenosis which appears unchanged form prior angiogram about a year ago. Sequential SVG-diagonal, ramus, OM: patent without any significant obstructive stenosis. There appears to be venous valve system distally into the graft. Native vessel which includes diagonal, ramus and obtuse marginal beyond graft appears to be patent and a very small-caliber vessel, less than 2-mm. LIMA TO LAD: widely patent. Distally, LAD has no significant stenosis. This appears to be a less than 2-mm vessel.  -Paroxysmal afib, recently referred to Dr. Emperatriz Mancini for [facility] evaluation given Hx recurrent GI bleeding requiring transfusion and HAS-BLED score of 4.   On Amiodarone and Eliquis  -Anemia, Hgb as low as 6.5 on 8/29/2022  -CKD  -HTN  -DM  -HLD  -Hx thyroid disorder  -Hx breast cancer s/p mastectomy and chemotherapy  -Hx tobacco abuse     Primary cardiologist is Dr. Gloria Hilliard:     -Amiodarone held during this admission due to bradycardia. TSH elevated at 10.80 on 9/2/2022. Will discontinue Amiodarone.  -Pt remains anemic, Hgb 7.3 this AM, recommend to keep Hgb > 8 from cardiac standpoint if possible given underlying CAD. Defer management of anemia to primary team.   -Pt remains off Eliquis in setting of anemia, with R groin hematoma/washout earlier this admission. Can consider resuming as outpatient.  -Will resume maintenance diuretics at Bumex 0.5 mg daily.  -Continue ASA, low-dose Toprol, Crestor.  -Can consider resuming Entresto as outpatient as allowed by BP. Most recent /65. EF 45-50% this admission.  -Dispo planning per primary team.    ---------------------------------------------------------------  This is a 62-year-old female well-known to our service. Due to increased thyroid and previous bradycardia, will stop amiodarone. Still mildly anemic with hemoglobin 7.3. She had right groin hematoma and seroma and I discussed with Dr. Renzo Osorio, hesitant to restart blood thinners at this point. Will re-evaluate tomorrow. She is on aspirin. Blood pressure stable today and if it continues to rise we can restart her Entresto. Continue Toprol and Crestor. Mild edema on exam today, starting back on bumex 0.5 mg daily. Subjective:     Pt notes oozing at R groin, notes dressing recently changed. Reports coughing less today. Hoping for discharge soon.     Objective:      Patient Vitals for the past 8 hrs:   Temp Pulse Resp BP SpO2   09/04/22 0740 97.3 °F (36.3 °C) 67 21 (!) 143/75 100 %   09/04/22 0400 98.7 °F (37.1 °C) 68 19 (!) 151/72 100 %         Patient Vitals for the past 96 hrs:   Weight   09/02/22 1601 111 kg (244 lb 11.4 oz)   08/31/22 1549 105.4 kg (232 lb 5.8 oz) Current Facility-Administered Medications   Medication Dose Route Frequency Last Admin    piperacillin-tazobactam (ZOSYN) 3.375 g in 0.9% sodium chloride (MBP/ADV) 100 mL MBP  3.375 g IntraVENous ONCE      predniSONE (DELTASONE) tablet 40 mg  40 mg Oral DAILY WITH DINNER 40 mg at 09/03/22 1657    arformoteroL (BROVANA) neb solution 15 mcg  15 mcg Nebulization BID RT 15 mcg at 09/03/22 1929    budesonide (PULMICORT) 1,000 mcg/2 mL nebulizer susp  1,000 mcg Nebulization BID RT 1,000 mcg at 09/03/22 1929    famotidine (PEPCID) tablet 20 mg  20 mg Oral DAILY 20 mg at 09/04/22 0840    metoprolol succinate (TOPROL-XL) XL tablet 12.5 mg  12.5 mg Oral QHS 12.5 mg at 09/03/22 2224    insulin lispro (HUMALOG) injection   SubCUTAneous AC&HS 3 Units at 09/04/22 0838    glucose chewable tablet 16 g  4 Tablet Oral PRN      glucagon (GLUCAGEN) injection 1 mg  1 mg IntraMUSCular PRN      dextrose 10% infusion 0-250 mL  0-250 mL IntraVENous PRN      albuterol-ipratropium (DUO-NEB) 2.5 MG-0.5 MG/3 ML  3 mL Nebulization Q4H RT 3 mL at 09/04/22 0051    sodium chloride 3% hypertonic nebulizer soln  4 mL Nebulization BID RT 4 mL at 09/03/22 1929    0.9% sodium chloride infusion 250 mL  250 mL IntraVENous PRN      QUEtiapine SR (SEROquel XR) tablet 50 mg  50 mg Oral DAILY 50 mg at 09/04/22 0846    piperacillin-tazobactam (ZOSYN) 3.375 g in 0.9% sodium chloride (MBP/ADV) 100 mL MBP  3.375 g IntraVENous Q8H 3.375 g at 09/04/22 0942    nicotine (NICODERM CQ) 14 mg/24 hr patch 1 Patch  1 Patch TransDERmal DAILY 1 Patch at 09/04/22 0841    sennosides (SENOKOT) 8.8 mg/5 mL syrup 8.8 mg  5 mL Per G Tube BID 8.8 mg at 09/02/22 1106    [Held by provider] heparin (porcine) injection 5,000 Units  5,000 Units SubCUTAneous Q8H 5,000 Units at 09/04/22 0603    polyethylene glycol (MIRALAX) packet 17 g  17 g Oral DAILY 17 g at 09/02/22 1104    aspirin chewable tablet 81 mg  81 mg Oral DAILY 81 mg at 09/04/22 0839    albuterol-ipratropium (DUO-NEB) 2.5 MG-0.5 MG/3 ML  3 mL Nebulization Q4H PRN 3 mL at 09/01/22 0540    [Held by provider] amiodarone (CORDARONE) tablet 200 mg  200 mg Oral DAILY      levothyroxine (SYNTHROID) tablet 150 mcg  150 mcg Oral 6am 150 mcg at 09/04/22 0603    rosuvastatin (CRESTOR) tablet 20 mg  20 mg Oral QHS 20 mg at 09/03/22 2224    [Held by provider] oxyCODONE-acetaminophen (PERCOCET) 5-325 mg per tablet 1 Tablet  1 Tablet Oral Q4H PRN 1 Tablet at 08/27/22 2253    hydrALAZINE (APRESOLINE) 20 mg/mL injection 10-20 mg  10-20 mg IntraVENous Q6H PRN 10 mg at 08/19/22 1545    [Held by provider] sacubitriL-valsartan (ENTRESTO) 24-26 mg tablet 1 Tablet  1 Tablet Oral BID 1 Tablet at 08/18/22 1321    bumetanide (BUMEX) tablet 1 mg  1 mg Oral DAILY PRN      atropine injection 1 mg  1 mg IntraVENous Q5MIN PRN           Intake/Output Summary (Last 24 hours) at 9/4/2022 1026  Last data filed at 9/4/2022 0928  Gross per 24 hour   Intake 340 ml   Output 650 ml   Net -310 ml       Physical Exam:  General:  alert, cooperative, no distress, appears stated age  Neck:  supple  Lungs:  clear to auscultation bilaterally  Heart:  regular rate and rhythm  Abdomen:  abdomen is soft without significant tenderness, masses, organomegaly or guarding  Extremities:  atraumatic, + edema    Visit Vitals  BP (!) 143/75   Pulse 67   Temp 97.3 °F (36.3 °C)   Resp 21   Ht 5' 9\" (1.753 m)   Wt 111 kg (244 lb 11.4 oz)   SpO2 100%   BMI 36.14 kg/m²       Data Review:     Labs: Results:       Chemistry Recent Labs     09/04/22  0552 09/03/22  0635 09/02/22  0212   * 161* 155*    138 137   K 4.0 3.9 4.2    102 102   CO2 27 29 26   BUN 31* 32* 28*   CREA 1.06 1.16 0.89   CA 8.9 9.0 8.8   MG 2.3 2.2 2.0   PHOS 3.4 3.6 3.5   AGAP 7 7 9   BUCR 29* 28* 31*      CBC w/Diff Recent Labs     09/04/22  0552 09/03/22  0635 09/02/22  1145   WBC 12.5 11.1 11.0   RBC 2.60* 2.76* 2.80*   HGB 7.3* 7.7* 7.8*   HCT 24.1* 25.1* 25.3*    328 306   GRANS 85* 88* 80* LYMPH 6* 5* 7*   EOS 0 0 0      Lipid Panel Lab Results   Component Value Date/Time    Cholesterol, total 130 09/03/2022 06:35 AM    HDL Cholesterol 29 (L) 09/03/2022 06:35 AM    LDL, calculated 78.6 09/03/2022 06:35 AM    VLDL, calculated 22.4 09/03/2022 06:35 AM    Triglyceride 112 09/03/2022 06:35 AM    CHOL/HDL Ratio 4.5 09/03/2022 06:35 AM      Thyroid Studies Lab Results   Component Value Date/Time    TSH 10.80 (H) 09/02/2022 11:45 AM          Signed By: Maru Macias PA-C     September 4, 2022

## 2022-09-04 NOTE — PROGRESS NOTES
Problem: Mobility Impaired (Adult and Pediatric)  Goal: *Acute Goals and Plan of Care (Insert Text)  Description: Physical Therapy Goals  Initiated 9/1/2022 and to be accomplished within 7 day(s)  1. Patient will move from supine to sit and sit to supine in bed with supervision/set-up. 2.  Patient will transfer from bed to chair and chair to bed with minimal assistance/contact guard assist using the least restrictive device. 3.  Patient will perform sit to stand with minimal assistance/contact guard assist.  4.  Patient will ambulate with minimal assistance/contact guard assist for 25 feet with the least restrictive device. 5.  Patient will ascend/descend 1 step with handrail(s) with minimal assistance/contact guard assist.    PLOF: Independent. Lives with spouse. One story home with 3 steps to enter. Outcome: Progressing Towards Goal     PHYSICAL THERAPY TREATMENT    Patient: Vincent Goodell (25 y.o. female)  Date: 9/4/2022  Diagnosis: Aortic aneurysm without rupture, unspecified portion of aorta (HCC) [I71.9]  Saccular aneurysm [I67.1] Saccular aneurysm  Procedure(s) (LRB):  WASHOUT RIGHT GROIN (Right) 16 Days Post-Op  Precautions: Fall, Skin  PLOF: see above     ASSESSMENT:  Pt received in bed in [facility], cleared by nursing to participate. Pt motivated for OOB this date, good sitting balance noted in prep for standing. Placed with RW in front, verbal and visual cues for hand placement for standing transition. Pt stood with min A from elevated bed height, educated on head up and trunk extension to midline once up. Pt able to take 5 ft of steps to turn and sit into recliner, min A for safety and steadying with the RW 2/2 decreased step clearance and shuffling. Pt given LE HEP for carryover between sessions, vitals WFL after activity. Educated pt on progressing to [facility] for toileting, agreeable.  Educated pt on noting getting up from recliner without assist, verbalized understanding, nursing notified of progress this date. Will continue to follow per POC. Progression toward goals:   [x]      Improving appropriately and progressing toward goals  []      Improving slowly and progressing toward goals  []      Not making progress toward goals and plan of care will be adjusted     PLAN:  Patient continues to benefit from skilled intervention to address the above impairments. Continue treatment per established plan of care. Further Equipment Recommendations for Discharge:  rolling walker, BSC, shower chair, wheelchair    AMPAC: Based on an AM-PAC score of 16/24 (or **/20 if omitting stairs) and their current functional mobility deficits, it is recommended that the patient have 5-7 sessions per week of Physical Therapy at d/c to increase the patient's independence. Currently, this patient demonstrates the potential endurance, and/or tolerance for 3 hours of therapy each day at d/c. This AMPAC score should be considered in conjunction with interdisciplinary team recommendations to determine the most appropriate discharge setting. Patient's social support, diagnosis, medical stability, and prior level of function should also be taken into consideration. SUBJECTIVE:   Patient stated I want to go home.     OBJECTIVE DATA SUMMARY:   Critical Behavior:  Neurologic State: Alert  Orientation Level: Oriented X4  Cognition: Follows commands  Safety/Judgement: Awareness of environment  Functional Mobility Training:  Bed Mobility:     Supine to Sit: Contact guard assistance;Bed Modified     Scooting: Contact guard assistance         Transfers:  Sit to Stand: Minimum assistance  Stand to Sit: Minimum assistance       Balance:  Sitting: Intact; With support  Sitting - Static: Good (unsupported)  Standing: Impaired; With support  Standing - Static: Fair  Standing - Dynamic : Fair    Ambulation/Gait Training:  Distance (ft): 5 Feet (ft)  Assistive Device: Walker, rolling  Ambulation - Level of Assistance: Minimal assistance        Gait Abnormalities: Decreased step clearance              Speed/Yareli: Pace decreased (<100 feet/min)  Step Length: Right shortened;Left shortened    Pain:  Pain level pre-treatment: 0/10  Pain level post-treatment: 0/10   Pain Intervention(s): Medication (see MAR); Rest, Ice, Repositioning  Response to intervention: Nurse notified, See doc flow    Activity Tolerance:   Good    Please refer to the flowsheet for vital signs taken during this treatment. After treatment:   [] Patient left in no apparent distress sitting up in chair  [x] Patient left in no apparent distress in bed  [x] Call bell left within reach  [x] Nursing notified  [] Caregiver present  [] Bed alarm activated  [] SCDs applied      COMMUNICATION/EDUCATION:   [x]         Role of Physical Therapy in the acute care setting. [x]         Fall prevention education was provided and the patient/caregiver indicated understanding. [x]         Patient/family have participated as able in working toward goals and plan of care. [x]         Patient/family agree to work toward stated goals and plan of care. []         Patient understands intent and goals of therapy, but is neutral about his/her participation. []         Patient is unable to participate in stated goals/plan of care: ongoing with therapy staff.  []         Other:        Rocío Ferrell   Time Calculation: 23 mins    Marsha Long AM-PAC® Basic Mobility Inpatient Short Form (6-Clicks) Version 2    How much HELP from another person does the patient currently need    (If the patient hasn't done an activity recently, how much help from another person do you think he/she would need if he/she tried?)   Total (Total A or Dep)   A Lot  (Mod to Max A)   A Little (Sup or Min A)   None (Mod I to I)   Turning from your back to your side while in a flat bed without using bedrails? [] 1 [] 2 [x] 3 [] 4   2.  Moving from lying on your back to sitting on the side of a flat bed without using bedrails? [] 1 [] 2 [x] 3 [] 4   3. Moving to and from a bed to a chair (including a wheelchair)? [] 1 [] 2 [x] 3 [] 4   4. Standing up from a chair using your arms (e.g., wheelchair, or bedside chair)? [] 1 [] 2 [x] 3 [] 4   5. Walking in hospital room? [] 1 [x] 2 [] 3 [] 4   6. Climbing 3-5 steps with a railing?+   [] 1 [x] 2 [] 3 [] 4   +If stair climbing cannot be assessed, skip item #6. Sum responses from items 1-5. Based on an AM-PAC score of 16/24 (or **/20 if omitting stairs) and their current functional mobility deficits, it is recommended that the patient have 5-7 sessions per week of Physical Therapy at d/c to increase the patient's independence. Currently, this patient demonstrates the potential endurance, and/or tolerance for 3 hours of therapy each day at d/c.

## 2022-09-04 NOTE — PROGRESS NOTES
Bedside shift change report given to Trkelsi Subramanian (oncoming nurse) by Ziggy Sorenson RN (offgoing nurse). Report included the following information SBAR, ED Summary, Procedure Summary, Intake/Output, MAR, Recent Results, and Cardiac Rhythm NSR .      Wound Prevention Checklist    Patient: Daljit Kay (9 y.o. female)  Date: 9/3/2022  Diagnosis: Aortic aneurysm without rupture, unspecified portion of aorta (HCC) [I71.9]  Saccular aneurysm [I67.1] Saccular aneurysm    Precautions: Fall, Skin       []  Heel prevention boots placed on patient    []  Patient turned q2h during shift    []  Lift team ordered    [x]  Patient on Armida bed/Specialty bed    []  Each Wound is documented during shift (Stage, Color, drainage, odor, measurements, and dressings)    [x]  Dual skin checks done at bedside during shift report with Ziggy Barber RN

## 2022-09-04 NOTE — PROGRESS NOTES
Bedside shift change report given to Trkelsi Subramanian (oncoming nurse) by Nga Pa RN (offgoing nurse). Report included the following information SBAR, Intake/Output, MAR, Recent Results, and Cardiac Rhythm NSR . Wound Prevention Checklist    Patient: Jules Sanchez (54 y.o. female)  Date: 9/4/2022  Diagnosis: Aortic aneurysm without rupture, unspecified portion of aorta (HCC) [I71.9]  Saccular aneurysm [I67.1] Saccular aneurysm    Precautions: Fall, Skin       []  Heel prevention boots placed on patient    []  Patient turned q2h during shift    []  Lift team ordered    [x]  Patient on Boley bed/Specialty bed    [x]  Each Wound is documented during shift (Stage, Color, drainage, odor, measurements, and dressings)    [x]  Dual skin checks done at bedside during shift report with Nga Mcghee RN     2676 Type and screen drawn. R groin is open to air with no visualized drainage at this time. 2130 Patient up to [facility] to urinate with x1 assist. Hygiene care and linen change performed. R groin site remains c/d/I.     2227 Blood transfusion started at 100 ml/hr. 8425 Blood transfusion completed. H/H due in 4 hours.

## 2022-09-04 NOTE — PROGRESS NOTES
New PT order received, pt currently on caseload and will be followed per POC.  Thank you for this referral. Miguel Ángel Brown, PT, DPT

## 2022-09-04 NOTE — PROGRESS NOTES
Bedside and Verbal shift change report given to TANNER Alcaraz (oncoming nurse) by Yasmin Maurice RN (offgoing nurse). Report included the following information SBAR, Kardex, Intake/Output, MAR, and Cardiac Rhythm NSR .          Wound Prevention Checklist    Patient: Joni Souza (70 y.o. female)  Date: 9/4/2022  Diagnosis: Aortic aneurysm without rupture, unspecified portion of aorta (HCC) [I71.9]  Saccular aneurysm [I67.1] Saccular aneurysm    Precautions: Fall, Skin       []  Heel prevention boots placed on patient    [x]  Patient turned q2h during shift    [x]  Lift team ordered    []  Patient on Armida bed/Specialty bed    [x]  Each Wound is documented during shift (Stage, Color, drainage, odor, measurements, and dressings)    [x]  Dual skin checks done at bedside during shift report with Priya Andres RN

## 2022-09-05 ENCOUNTER — HOME HEALTH ADMISSION (OUTPATIENT)
Dept: HOME HEALTH SERVICES | Facility: HOME HEALTH | Age: 62
End: 2022-09-05
Payer: MEDICARE

## 2022-09-05 VITALS
RESPIRATION RATE: 24 BRPM | BODY MASS INDEX: 35.27 KG/M2 | WEIGHT: 238.1 LBS | TEMPERATURE: 97.1 F | SYSTOLIC BLOOD PRESSURE: 160 MMHG | HEIGHT: 69 IN | DIASTOLIC BLOOD PRESSURE: 75 MMHG | HEART RATE: 66 BPM | OXYGEN SATURATION: 97 %

## 2022-09-05 LAB
ABO + RH BLD: NORMAL
ANION GAP SERPL CALC-SCNC: 9 MMOL/L (ref 3–18)
BASOPHILS # BLD: 0 K/UL (ref 0–0.1)
BASOPHILS NFR BLD: 0 % (ref 0–2)
BLD PROD TYP BPU: NORMAL
BLOOD GROUP ANTIBODIES SERPL: NORMAL
BPU ID: NORMAL
BUN SERPL-MCNC: 31 MG/DL (ref 7–18)
BUN/CREAT SERPL: 26 (ref 12–20)
CALCIUM SERPL-MCNC: 8.9 MG/DL (ref 8.5–10.1)
CALLED TO:,BCALL1: NORMAL
CHLORIDE SERPL-SCNC: 105 MMOL/L (ref 100–111)
CO2 SERPL-SCNC: 25 MMOL/L (ref 21–32)
CREAT SERPL-MCNC: 1.21 MG/DL (ref 0.6–1.3)
CROSSMATCH RESULT,%XM: NORMAL
DIFFERENTIAL METHOD BLD: ABNORMAL
EOSINOPHIL # BLD: 0 K/UL (ref 0–0.4)
EOSINOPHIL NFR BLD: 0 % (ref 0–5)
ERYTHROCYTE [DISTWIDTH] IN BLOOD BY AUTOMATED COUNT: 19.9 % (ref 11.6–14.5)
GLUCOSE BLD STRIP.AUTO-MCNC: 140 MG/DL (ref 70–110)
GLUCOSE BLD STRIP.AUTO-MCNC: 194 MG/DL (ref 70–110)
GLUCOSE SERPL-MCNC: 188 MG/DL (ref 74–99)
HCT VFR BLD AUTO: 30 % (ref 35–45)
HGB BLD-MCNC: 9.1 G/DL (ref 12–16)
IMM GRANULOCYTES # BLD AUTO: 0.2 K/UL (ref 0–0.04)
IMM GRANULOCYTES NFR BLD AUTO: 2 % (ref 0–0.5)
INR PPP: 1.2 (ref 0.8–1.2)
LYMPHOCYTES # BLD: 0.5 K/UL (ref 0.9–3.6)
LYMPHOCYTES NFR BLD: 4 % (ref 21–52)
MAGNESIUM SERPL-MCNC: 2.2 MG/DL (ref 1.6–2.6)
MCH RBC QN AUTO: 27.6 PG (ref 24–34)
MCHC RBC AUTO-ENTMCNC: 30.3 G/DL (ref 31–37)
MCV RBC AUTO: 90.9 FL (ref 78–100)
MONOCYTES # BLD: 0.4 K/UL (ref 0.05–1.2)
MONOCYTES NFR BLD: 3 % (ref 3–10)
NEUTS SEG # BLD: 11.1 K/UL (ref 1.8–8)
NEUTS SEG NFR BLD: 91 % (ref 40–73)
NRBC # BLD: 0.03 K/UL (ref 0–0.01)
NRBC BLD-RTO: 0.2 PER 100 WBC
PHOSPHATE SERPL-MCNC: 4 MG/DL (ref 2.5–4.9)
PLATELET # BLD AUTO: 277 K/UL (ref 135–420)
PMV BLD AUTO: 10.8 FL (ref 9.2–11.8)
POTASSIUM SERPL-SCNC: 3.9 MMOL/L (ref 3.5–5.5)
PROTHROMBIN TIME: 15.1 SEC (ref 11.5–15.2)
RBC # BLD AUTO: 3.3 M/UL (ref 4.2–5.3)
SODIUM SERPL-SCNC: 139 MMOL/L (ref 136–145)
SPECIMEN EXP DATE BLD: NORMAL
STATUS OF UNIT,%ST: NORMAL
T4 FREE SERPL-MCNC: 1.2 NG/DL (ref 0.7–1.5)
TSH SERPL DL<=0.05 MIU/L-ACNC: 6.97 UIU/ML (ref 0.36–3.74)
UNIT DIVISION, %UDIV: 0
WBC # BLD AUTO: 12.3 K/UL (ref 4.6–13.2)

## 2022-09-05 PROCEDURE — 84100 ASSAY OF PHOSPHORUS: CPT

## 2022-09-05 PROCEDURE — 74011250637 HC RX REV CODE- 250/637: Performed by: PHYSICIAN ASSISTANT

## 2022-09-05 PROCEDURE — 74011000250 HC RX REV CODE- 250: Performed by: INTERNAL MEDICINE

## 2022-09-05 PROCEDURE — 85610 PROTHROMBIN TIME: CPT

## 2022-09-05 PROCEDURE — 80048 BASIC METABOLIC PNL TOTAL CA: CPT

## 2022-09-05 PROCEDURE — 99239 HOSP IP/OBS DSCHRG MGMT >30: CPT | Performed by: INTERNAL MEDICINE

## 2022-09-05 PROCEDURE — 85025 COMPLETE CBC W/AUTO DIFF WBC: CPT

## 2022-09-05 PROCEDURE — 74011636637 HC RX REV CODE- 636/637: Performed by: INTERNAL MEDICINE

## 2022-09-05 PROCEDURE — 83735 ASSAY OF MAGNESIUM: CPT

## 2022-09-05 PROCEDURE — 82962 GLUCOSE BLOOD TEST: CPT

## 2022-09-05 PROCEDURE — 74011250637 HC RX REV CODE- 250/637: Performed by: SURGERY

## 2022-09-05 PROCEDURE — 84439 ASSAY OF FREE THYROXINE: CPT

## 2022-09-05 PROCEDURE — 74011250637 HC RX REV CODE- 250/637: Performed by: INTERNAL MEDICINE

## 2022-09-05 PROCEDURE — 74011250637 HC RX REV CODE- 250/637: Performed by: NURSE PRACTITIONER

## 2022-09-05 PROCEDURE — 94640 AIRWAY INHALATION TREATMENT: CPT

## 2022-09-05 PROCEDURE — 2709999900 HC NON-CHARGEABLE SUPPLY

## 2022-09-05 PROCEDURE — 74011000250 HC RX REV CODE- 250: Performed by: PHYSICIAN ASSISTANT

## 2022-09-05 PROCEDURE — 97535 SELF CARE MNGMENT TRAINING: CPT

## 2022-09-05 PROCEDURE — 77030038269 HC DRN EXT URIN PURWCK BARD -A

## 2022-09-05 PROCEDURE — 99232 SBSQ HOSP IP/OBS MODERATE 35: CPT | Performed by: INTERNAL MEDICINE

## 2022-09-05 PROCEDURE — 97116 GAIT TRAINING THERAPY: CPT

## 2022-09-05 PROCEDURE — 84443 ASSAY THYROID STIM HORMONE: CPT

## 2022-09-05 PROCEDURE — 36415 COLL VENOUS BLD VENIPUNCTURE: CPT

## 2022-09-05 PROCEDURE — 97530 THERAPEUTIC ACTIVITIES: CPT

## 2022-09-05 RX ORDER — BUDESONIDE AND FORMOTEROL FUMARATE DIHYDRATE 160; 4.5 UG/1; UG/1
2 AEROSOL RESPIRATORY (INHALATION) 2 TIMES DAILY
Qty: 1 EACH | Refills: 1 | Status: SHIPPED | OUTPATIENT
Start: 2022-09-05

## 2022-09-05 RX ORDER — PREDNISONE 10 MG/1
TABLET ORAL
Qty: 26 TABLET | Refills: 0 | Status: SHIPPED | OUTPATIENT
Start: 2022-09-05 | End: 2022-09-20 | Stop reason: ALTCHOICE

## 2022-09-05 RX ORDER — GUAIFENESIN 100 MG/5ML
81 LIQUID (ML) ORAL DAILY
Qty: 30 TABLET | Refills: 0 | Status: SHIPPED | OUTPATIENT
Start: 2022-09-06 | End: 2022-09-20 | Stop reason: ALTCHOICE

## 2022-09-05 RX ORDER — POLYETHYLENE GLYCOL 3350 17 G/17G
17 POWDER, FOR SOLUTION ORAL DAILY
Qty: 30 PACKET | Refills: 0 | Status: SHIPPED | OUTPATIENT
Start: 2022-09-06

## 2022-09-05 RX ORDER — FAMOTIDINE 20 MG/1
20 TABLET, FILM COATED ORAL DAILY
Qty: 15 TABLET | Refills: 0 | Status: SHIPPED | OUTPATIENT
Start: 2022-09-06

## 2022-09-05 RX ORDER — QUETIAPINE FUMARATE 50 MG/1
50 TABLET, EXTENDED RELEASE ORAL DAILY
Qty: 30 TABLET | Refills: 0 | Status: SHIPPED | OUTPATIENT
Start: 2022-09-06

## 2022-09-05 RX ORDER — BUMETANIDE 1 MG/1
1 TABLET ORAL AS NEEDED
Qty: 30 TABLET | Refills: 0 | Status: SHIPPED
Start: 2022-09-05

## 2022-09-05 RX ORDER — METOPROLOL SUCCINATE 25 MG/1
25 TABLET, EXTENDED RELEASE ORAL DAILY
Qty: 60 TABLET | Refills: 5 | Status: SHIPPED
Start: 2022-09-05

## 2022-09-05 RX ORDER — ROSUVASTATIN CALCIUM 20 MG/1
20 TABLET, COATED ORAL
Qty: 30 TABLET | Refills: 0 | Status: SHIPPED
Start: 2022-09-05

## 2022-09-05 RX ORDER — IPRATROPIUM BROMIDE AND ALBUTEROL SULFATE 2.5; .5 MG/3ML; MG/3ML
3 SOLUTION RESPIRATORY (INHALATION)
Status: DISCONTINUED | OUTPATIENT
Start: 2022-09-05 | End: 2022-09-05 | Stop reason: HOSPADM

## 2022-09-05 RX ORDER — IBUPROFEN 200 MG
1 TABLET ORAL DAILY
Qty: 30 PATCH | Refills: 0 | Status: SHIPPED | OUTPATIENT
Start: 2022-09-06 | End: 2022-10-06

## 2022-09-05 RX ADMIN — IPRATROPIUM BROMIDE AND ALBUTEROL SULFATE 3 ML: .5; 3 SOLUTION RESPIRATORY (INHALATION) at 08:36

## 2022-09-05 RX ADMIN — BUDESONIDE 1000 MCG: 1 SUSPENSION RESPIRATORY (INHALATION) at 08:36

## 2022-09-05 RX ADMIN — QUETIAPINE FUMARATE 50 MG: 50 TABLET, EXTENDED RELEASE ORAL at 08:20

## 2022-09-05 RX ADMIN — FAMOTIDINE 20 MG: 20 TABLET ORAL at 08:20

## 2022-09-05 RX ADMIN — ASPIRIN 81 MG CHEWABLE TABLET 81 MG: 81 TABLET CHEWABLE at 08:20

## 2022-09-05 RX ADMIN — IPRATROPIUM BROMIDE AND ALBUTEROL SULFATE 3 ML: 2.5; .5 SOLUTION RESPIRATORY (INHALATION) at 03:25

## 2022-09-05 RX ADMIN — LEVOTHYROXINE SODIUM 150 MCG: 150 TABLET ORAL at 06:18

## 2022-09-05 RX ADMIN — BUMETANIDE 0.5 MG: 1 TABLET ORAL at 08:20

## 2022-09-05 RX ADMIN — Medication 3 UNITS: at 08:17

## 2022-09-05 RX ADMIN — ARFORMOTEROL TARTRATE 15 MCG: 15 SOLUTION RESPIRATORY (INHALATION) at 08:36

## 2022-09-05 NOTE — PROGRESS NOTES
[facility] - Progress Note  Admit Date: 8/18/2022    Assessment:     -S/p asystole cardiac arrest, ROSC after ~9 min ACLS protocol, in setting of desaturation despite increase to HFNC  -Acute hypoxic hypercapnic respiratory failure, intubated 8/18 PM  S/p therapeutic bronchoscopy 8/21/2022 with removal of mucus plugs.  -Presented for endovascular thoracic aneurysm repair by Dr. Karen Patricio 8/18/2022, which was performed as planned  R groin hematoma, s/p washout by Dr. Karen Patricio 8/19  -Chronic HFrEF/ischemic cardiomyopathy, EF 30% in 02/2022  Echo 8/19/2022 with LVEF 45-50%  -CAD, s/p CABG x 4 in 2010. Nuclear stress test in 2019 showed inferior scar, no ischemia. Last cardiac cath in 2011 with findings as follows:  LM: Normal  LAD: Eccentric ostial 70-80%, mid to distal LAD is a very small-caliber vessel, probably about a 2 mm vessel. Competitive filling from LIMA. Diagonal: Ostial 40-50% proximal moderate disease. Ramus: Proximal 100% in stent. LCx/OM: OM2 mid 100%. Native LCx diffuse luminal irregularities without any obstructive stenosis. Less than 2 mm vessel. RCA: proximal and mid stent diffuse 20% ISR. Otherwise, no obstructive disease. RPLS 60-70% tubular stenosis which appears unchanged form prior angiogram about a year ago. Sequential SVG-diagonal, ramus, OM: patent without any significant obstructive stenosis. There appears to be venous valve system distally into the graft. Native vessel which includes diagonal, ramus and obtuse marginal beyond graft appears to be patent and a very small-caliber vessel, less than 2-mm. LIMA TO LAD: widely patent. Distally, LAD has no significant stenosis. This appears to be a less than 2-mm vessel.  -Paroxysmal afib, recently referred to Dr. Nazanin Carvajal for [facility] evaluation given Hx recurrent GI bleeding requiring transfusion and HAS-BLED score of 4.   On Amiodarone and Eliquis  -Anemia, Hgb as low as 6.5 on 8/29/2022  -CKD  -HTN  -DM  -HLD  -Hx thyroid disorder  -Hx breast cancer s/p mastectomy and chemotherapy  -Hx tobacco abuse     Primary cardiologist is Dr. Anthony Appiah:     She is quite frail and has had significant bleeding issues so holding off on anticoagulation. Her heart failure is compensated. Dispo planning. Discussed with Dr. Yobani Martinez patient, planning discharge today. Please call for questions. Subjective:     No new complaints.      Objective:      Patient Vitals for the past 8 hrs:   Temp Pulse Resp BP SpO2   09/05/22 0820 -- 68 -- (!) 156/73 --   09/05/22 0700 98.3 °F (36.8 °C) 70 24 (!) 153/75 97 %   09/05/22 0400 98.1 °F (36.7 °C) 69 24 (!) 144/72 96 %   09/05/22 0325 -- -- -- -- 95 %         Patient Vitals for the past 96 hrs:   Weight   09/05/22 0400 108 kg (238 lb 1.6 oz)   09/02/22 1601 111 kg (244 lb 11.4 oz)                    Intake/Output Summary (Last 24 hours) at 9/5/2022 0947  Last data filed at 9/5/2022 0934  Gross per 24 hour   Intake 851.17 ml   Output 1020 ml   Net -168.83 ml       Physical Exam:  General:  alert, cooperative, no distress, appears stated age  Neck:  nontender  Lungs:  clear to auscultation bilaterally  Heart:  regular rate and rhythm, S1, S2 normal, no murmur, click, rub or gallop  Abdomen:  abdomen is soft without significant tenderness, masses, organomegaly or guarding  Extremities:  extremities normal, atraumatic, no cyanosis or edema    Data Review:     Labs: Results:       Chemistry Recent Labs     09/05/22  0649 09/04/22  0552 09/03/22  0635   * 165* 161*    140 138   K 3.9 4.0 3.9    106 102   CO2 25 27 29   BUN 31* 31* 32*   CREA 1.21 1.06 1.16   CA 8.9 8.9 9.0   MG 2.2 2.3 2.2   PHOS 4.0 3.4 3.6   AGAP 9 7 7   BUCR 26* 29* 28*      CBC w/Diff Recent Labs     09/05/22  0649 09/04/22  1029 09/04/22  0552 09/03/22  0635   WBC 12.3  --  12.5 11.1   RBC 3.30*  --  2.60* 2.76*   HGB 9.1* 7.0* 7.3* 7.7*   HCT 30.0* 22.9* 24.1* 25.1*     --  317 328   GRANS 91*  -- 85* 88*   LYMPH 4*  --  6* 5*   EOS 0  --  0 0      Cardiac Enzymes No results found for: CPK, CK, CKMMB, CKMB, RCK3, CKMBT, CKNDX, CKND1, PETEY, TROPT, TROIQ, JOAQUIN, TROPT, TNIPOC, BNP, BNPP   Coagulation Recent Labs     09/05/22  0649   PTP 15.1   INR 1.2       Lipid Panel Lab Results   Component Value Date/Time    Cholesterol, total 130 09/03/2022 06:35 AM    HDL Cholesterol 29 (L) 09/03/2022 06:35 AM    LDL, calculated 78.6 09/03/2022 06:35 AM    VLDL, calculated 22.4 09/03/2022 06:35 AM    Triglyceride 112 09/03/2022 06:35 AM    CHOL/HDL Ratio 4.5 09/03/2022 06:35 AM      BNP No results found for: BNP, BNPP, XBNPT   Liver Enzymes No results for input(s): TP, ALB, TBIL, AP in the last 72 hours.     No lab exists for component: SGOT, GPT, DBIL   Digoxin    Thyroid Studies Lab Results   Component Value Date/Time    TSH 6.97 (H) 09/05/2022 06:49 AM          Signed By: Claudetta Batter, MD     September 5, 2022

## 2022-09-05 NOTE — PROGRESS NOTES
Discharge planning    Spoke with patient and spouse,Dante, concerning discharge plan. Both declined ARU and wants home health services. [place] of choice obtained for any local home health agency. Discharge order noted for today. Home Health referral placed in que for [facility] home health Met with patient and  spouseand are agreeable to the transition plan today. Transport has been arranged with spouse. Patient's  home health  orders have been forwarded to [facility] home health  agency via [employer]. Updated bedside RN, Ziggy Sorenson,  to the transition plan. Discharge information has been documented on the AVS.     Patient signed [facility] rolling [facility] and received walker at bedside. DME orders for bedside commode and shower chair was faxed to [facility] at [phone] via [employer]. Will be delivered to home after processed by [facility].       CHANG Carreon, RN  Pager # [phone]  Care Manager

## 2022-09-05 NOTE — PROGRESS NOTES
Out of bed just finished breakfast  Appears well today  No further bleed, transfusion noted  Groin is soft and intact no hematoma no redness  Incision is intact  Hopefully will build to discharge to home this week

## 2022-09-05 NOTE — PROGRESS NOTES
New OT orders received and acknowledged. Pt is currently on caseload and will continue to follow.  Thank you, Yamel Cho OTRL

## 2022-09-05 NOTE — PROGRESS NOTES
Hospitalist Progress Note    Subjective:   Daily Progress Note: 9/5/2022     Patient sitting up in a chair. No headaches or dizziness. Off oxygen. No more bleeding from right groin site. No nausea or vomiting. No new shortness of breath or cough. Patient states she would like to go home rather than nursing home. I spoke to patient's  over the phone. He also prefers patient coming home. He asked me to set up home health care as well as provide patient prescriptions for DME like rolling walker, bedside commode and shower chair.     Current Facility-Administered Medications   Medication Dose Route Frequency    albuterol-ipratropium (DUO-NEB) 2.5 MG-0.5 MG/3 ML  3 mL Nebulization Q6HWA RT    bumetanide (BUMEX) tablet 0.5 mg  0.5 mg Oral DAILY    0.9% sodium chloride infusion 250 mL  250 mL IntraVENous PRN    predniSONE (DELTASONE) tablet 40 mg  40 mg Oral DAILY WITH DINNER    arformoteroL (BROVANA) neb solution 15 mcg  15 mcg Nebulization BID RT    budesonide (PULMICORT) 1,000 mcg/2 mL nebulizer susp  1,000 mcg Nebulization BID RT    famotidine (PEPCID) tablet 20 mg  20 mg Oral DAILY    metoprolol succinate (TOPROL-XL) XL tablet 12.5 mg  12.5 mg Oral QHS    insulin lispro (HUMALOG) injection   SubCUTAneous AC&HS    glucose chewable tablet 16 g  4 Tablet Oral PRN    glucagon (GLUCAGEN) injection 1 mg  1 mg IntraMUSCular PRN    dextrose 10% infusion 0-250 mL  0-250 mL IntraVENous PRN    sodium chloride 3% hypertonic nebulizer soln  4 mL Nebulization BID RT    QUEtiapine SR (SEROquel XR) tablet 50 mg  50 mg Oral DAILY    nicotine (NICODERM CQ) 14 mg/24 hr patch 1 Patch  1 Patch TransDERmal DAILY    sennosides (SENOKOT) 8.8 mg/5 mL syrup 8.8 mg  5 mL Per G Tube BID    [Held by provider] heparin (porcine) injection 5,000 Units  5,000 Units SubCUTAneous Q8H    polyethylene glycol (MIRALAX) packet 17 g  17 g Oral DAILY    aspirin chewable tablet 81 mg  81 mg Oral DAILY    albuterol-ipratropium (DUO-NEB) 2.5 MG-0.5 MG/3 ML  3 mL Nebulization Q4H PRN    levothyroxine (SYNTHROID) tablet 150 mcg  150 mcg Oral 6am    rosuvastatin (CRESTOR) tablet 20 mg  20 mg Oral QHS    [Held by provider] oxyCODONE-acetaminophen (PERCOCET) 5-325 mg per tablet 1 Tablet  1 Tablet Oral Q4H PRN    hydrALAZINE (APRESOLINE) 20 mg/mL injection 10-20 mg  10-20 mg IntraVENous Q6H PRN    bumetanide (BUMEX) tablet 1 mg  1 mg Oral DAILY PRN    atropine injection 1 mg  1 mg IntraVENous Q5MIN PRN        Review of Systems  Pertinent items are noted in HPI. Objective:     Visit Vitals  BP (!) 156/73   Pulse 68   Temp 98.3 °F (36.8 °C)   Resp 24   Ht 5' 9\" (1.753 m)   Wt 108 kg (238 lb 1.6 oz)   SpO2 97%   BMI 35.16 kg/m²    O2 Flow Rate (L/min): 3 l/min O2 Device: None (Room air)    Temp (24hrs), Av °F (36.7 °C), Min:97.5 °F (36.4 °C), Max:98.5 °F (36.9 °C)       07 -  1900  In: 120 [P.O.:120]  Out: -    190 -  0700  In: 851.2 [P.O.:240; I.V.:300]  Out: 1320 [Urine:1320]      General appearance -awake well appearing, and in no distress  Chest -diminished air entry noted in bases, no wheezes  Heart - S1 and S2 normal  Abdomen - soft, nontender, nondistended, Bowel sounds present  Neurological - alert, oriented, normal speech, no focal findings noted while in bed  Musculoskeletal - no joint tenderness or erythema of knees bilaterally  Extremities -right leg pitting pedal edema noted      Data Review    Recent Results (from the past 24 hour(s))   GLUCOSE, POC    Collection Time: 22 11:37 AM   Result Value Ref Range    Glucose (POC) 95 70 - 110 mg/dL   RBC, ALLOCATE    Collection Time: 22  2:15 PM   Result Value Ref Range    HISTORY CHECKED?  Historical check performed    GLUCOSE, POC    Collection Time: 22  4:37 PM   Result Value Ref Range    Glucose (POC) 115 (H) 70 - 110 mg/dL   TYPE & SCREEN    Collection Time: 22  6:57 PM   Result Value Ref Range    Crossmatch Expiration 2022,2359     ABO/Rh(D) Theresa Steve NEGATIVE     Antibody screen NEG     CALLED TO: Inder Harrington [facility] AT 21:09 ON 09/04/2022 BY [facility]     Unit number [id number]     Blood component type RC LR,1     Unit division 00     Status of unit TRANSFUSED     Crossmatch result Compatible    GLUCOSE, POC    Collection Time: 09/04/22  9:01 PM   Result Value Ref Range    Glucose (POC) 160 (H) 70 - 110 mg/dL   MAGNESIUM    Collection Time: 09/05/22  6:49 AM   Result Value Ref Range    Magnesium 2.2 1.6 - 2.6 mg/dL   PHOSPHORUS    Collection Time: 09/05/22  6:49 AM   Result Value Ref Range    Phosphorus 4.0 2.5 - 4.9 MG/DL   CBC WITH AUTOMATED DIFF    Collection Time: 09/05/22  6:49 AM   Result Value Ref Range    WBC 12.3 4.6 - 13.2 K/uL    RBC 3.30 (L) 4.20 - 5.30 M/uL    HGB 9.1 (L) 12.0 - 16.0 g/dL    HCT 30.0 (L) 35.0 - 45.0 %    MCV 90.9 78.0 - 100.0 FL    MCH 27.6 24.0 - 34.0 PG    MCHC 30.3 (L) 31.0 - 37.0 g/dL    RDW 19.9 (H) 11.6 - 14.5 %    PLATELET 570 232 - 091 K/uL    MPV 10.8 9.2 - 11.8 FL    NRBC 0.2 (H) 0  WBC    ABSOLUTE NRBC 0.03 (H) 0.00 - 0.01 K/uL    NEUTROPHILS 91 (H) 40 - 73 %    LYMPHOCYTES 4 (L) 21 - 52 %    MONOCYTES 3 3 - 10 %    EOSINOPHILS 0 0 - 5 %    BASOPHILS 0 0 - 2 %    IMMATURE GRANULOCYTES 2 (H) 0.0 - 0.5 %    ABS. NEUTROPHILS 11.1 (H) 1.8 - 8.0 K/UL    ABS. LYMPHOCYTES 0.5 (L) 0.9 - 3.6 K/UL    ABS. MONOCYTES 0.4 0.05 - 1.2 K/UL    ABS. EOSINOPHILS 0.0 0.0 - 0.4 K/UL    ABS. BASOPHILS 0.0 0.0 - 0.1 K/UL    ABS. IMM.  GRANS. 0.2 (H) 0.00 - 0.04 K/UL    DF AUTOMATED     METABOLIC PANEL, BASIC    Collection Time: 09/05/22  6:49 AM   Result Value Ref Range    Sodium 139 136 - 145 mmol/L    Potassium 3.9 3.5 - 5.5 mmol/L    Chloride 105 100 - 111 mmol/L    CO2 25 21 - 32 mmol/L    Anion gap 9 3.0 - 18 mmol/L    Glucose 188 (H) 74 - 99 mg/dL    BUN 31 (H) 7.0 - 18 MG/DL    Creatinine 1.21 0.6 - 1.3 MG/DL    BUN/Creatinine ratio 26 (H) 12 - 20      GFR est AA 55 (L) >60 ml/min/1.73m2    GFR est non-AA 45 (L) >60 ml/min/1.73m2 Calcium 8.9 8.5 - 10.1 MG/DL   PROTHROMBIN TIME + INR    Collection Time: 09/05/22  6:49 AM   Result Value Ref Range    Prothrombin time 15.1 11.5 - 15.2 sec    INR 1.2 0.8 - 1.2     T4, FREE    Collection Time: 09/05/22  6:49 AM   Result Value Ref Range    T4, Free 1.2 0.7 - 1.5 NG/DL   TSH 3RD GENERATION    Collection Time: 09/05/22  6:49 AM   Result Value Ref Range    TSH 6.97 (H) 0.36 - 3.74 uIU/mL   GLUCOSE, POC    Collection Time: 09/05/22  7:22 AM   Result Value Ref Range    Glucose (POC) 194 (H) 70 - 110 mg/dL         Assessment/Plan:     Principal Problem:    Saccular aneurysm (8/18/2022)    ASSESSMENT:    1.  S/p cardiac arrest  2. Acute hypoxic and hypercapnic respiratory failure s/p extubation. Slowly improving. 3.  Acute metabolic encephalopathy, improving  4. 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 Post cardiac arrest seizure versus myoclonus, no recurrence  6. History of coronary artery disease and prior CABG  7. Chronic systolic heart failure with EF of 45 percentage, currently compensated  8. Paroxysmal A. fib, rate controlled  9. History of recurrent GI bleed  10. Stage II chronic kidney disease  11. Remote history of breast cancer status postmastectomy and chemotherapy  12. Hypothyroidism  13. Anemia of chronic medical disease s/p 1 unit  14. Dysphagia  15. Right groin incision site bleeding, resolved    PLAN:    Monitor off oxygen  Hemoglobin stable after 1 unit of blood transfusion  Continue PO steroid at home. Patient has a nebulizer machine. Will continue aspirin, Crestor and low-dose Toprol-XL with holding parameters. Cardiology input noted about not starting back patient on amiodarone, Entresto and Eliquis. Discussed with Dr. Adriana Rodriguez at bedside. Continue Synthroid for hypothyroidism  PT, OT and speech therapist to follow this patient      Barriers for discharge today: None    Anticipated date of discharge:  Today    Total time to take care of this patient was 30 minutes and more than 50% of time was spent counseling and coordinating care. Disclaimer: Sections of this note are dictated using utilizing voice recognition software, which may have resulted in some phonetic based errors in grammar and contents. Even though attempts were made to correct all the mistakes, some may have been missed, and remained in the body of the document. If questions arise, please contact our department.

## 2022-09-05 NOTE — PROGRESS NOTES
RESPIRATORY MEDICATION PROTOCOL ASSESSMENT      Patient  Anita Leiva     63 y.o.   female     9/5/2022  3:45 AM    Breath Sounds Pre Procedure:  Breath Sounds Bilateral: Clear              Left Breath Sounds: Coarse                             Breath Sounds Post Procedure: Breath Sounds Bilateral: Clear                                               Breathing pattern: Pre procedure  Breathing Pattern: Regular          Post procedure  Breathing Pattern: Regular    Cough: Pre procedure  Cough:  (None)               Post procedure Cough: Strong, Non-productive, Barking    Heart Rate: Pre procedure Pulse: 70           Post procedure Pulse: 68    Resp Rate: Pre procedure  Respirations: 12           Post procedure          Nebulizer Therapy: Current medications Aerosolized Medications: DuoNeb       Problem List:   Patient Active Problem List   Diagnosis Code    Cancer of breast, left C50.919    COPD (chronic obstructive pulmonary disease) ([facility]) J44.9    CAD (coronary artery disease) I25.10    Diabetes ([facility]) E11.9    Hypertension I10    Hyperlipidemia E78.5    Aneurysm ([facility]) I72.9    Breast CA ([facility]) C50.919    SOB (shortness of breath) R06.02    Saccular aneurysm I67.1       Patient alert and cooperative to use MDI: Not applicable    Home Respiratory Therapy Regimen/Frequency:  YES  Medication Duoneb     Device svn  Frequency q6prn    SEVERITY INDEX:    ITEM 0 1 2 3 4 Score   Respiratory Pattern and or Rate Regular  10-19 Regular  20-24   24-30    30-34 Severe SOB or   Greater than 35 0   Breath Sounds Clear Occasional Wheeze Mild Wheezing Moderate Wheezing  wheezing/Absent breath sounds 1   Shortness of Breath None Dyspnea on Exertion Dyspnea at Rest Moderate Shortness of Breath at Rest Severe Shortness of Breath - Limited Speech 1       Total Score:  2    * Scoring Guidelines  0-4 pts:  PRN-BID   5-7 pts:  BID, TID, QID  8-9 pts:  TID, QID, Q6  10-12 pts:  Q4-Q6  * - Guidelines used with clinical judgement. PRN Treatments can be ordered to supplement scheduled treatments. Regardless of score, frequency should not be less than normal home regimen.     Recommended Order/Frequency:  change frquency to Q6 while awake    Comments:          Respiratory Therapist: Tariq Mueller, RT

## 2022-09-05 NOTE — PROGRESS NOTES
Problem: Mobility Impaired (Adult and Pediatric)  Goal: *Acute Goals and Plan of Care (Insert Text)  Description: Physical Therapy Goals  Initiated 9/1/2022 and to be accomplished within 7 day(s)  1. Patient will move from supine to sit and sit to supine in bed with supervision/set-up. 2.  Patient will transfer from bed to chair and chair to bed with minimal assistance/contact guard assist using the least restrictive device. 3.  Patient will perform sit to stand with minimal assistance/contact guard assist.  4.  Patient will ambulate with minimal assistance/contact guard assist for 25 feet with the least restrictive device. 5.  Patient will ascend/descend 1 step with handrail(s) with minimal assistance/contact guard assist.    PLOF: Independent. Lives with spouse. One story home with 3 steps to enter. Outcome: Progressing Towards Goal     PHYSICAL THERAPY TREATMENT    Patient: Moshe Cardona (82 y.o. female)  Date: 9/5/2022  Diagnosis: Aortic aneurysm without rupture, unspecified portion of aorta (HCC) [I71.9]  Saccular aneurysm [I67.1] Saccular aneurysm  Procedure(s) (LRB):  WASHOUT RIGHT GROIN (Right) 17 Days Post-Op  Precautions: Fall, Skin  PLOF: see above     ASSESSMENT:  Pt received in recliner in NAD and agreeable. Pt stands to RW with CGA and verbal cues for anterior weight shifting this date. Pt tolerates approx 30 ft with RW and CGA that progressed to SBA, decreased speed and step clearance noted however pt is noted to have safe RW management with no LOB. At end of session, pt left in recliner with all needs met, pt wanting to go home and if that is the case recommend increased caregiver support with RW an  PT, pt agreeable. Will continue to follow during acute admission.    Progression toward goals:   [x]      Improving appropriately and progressing toward goals  []      Improving slowly and progressing toward goals  []      Not making progress toward goals and plan of care will be adjusted     PLAN:  Patient continues to benefit from skilled intervention to address the above impairments. Continue treatment per established plan of care. Further Equipment Recommendations for Discharge:  RW, [facility], shower chair     AMPAC: 18/24    Based on an AM-PAC score of 18/24 (or **/20 if omitting stairs) and their current functional mobility deficits, it is recommended that the patient have 2-3 sessions per week of Physical Therapy at d/c to increase the patient's independence. This AMPAC score should be considered in conjunction with interdisciplinary team recommendations to determine the most appropriate discharge setting. Patient's social support, diagnosis, medical stability, and prior level of function should also be taken into consideration. SUBJECTIVE:   Patient stated I want to go home.     OBJECTIVE DATA SUMMARY:   Critical Behavior:  Neurologic State: Alert  Orientation Level: Oriented X4  Cognition: Follows commands  Safety/Judgement: Awareness of environment  Functional Mobility Training:  Bed Mobility:  NT up in recliner this date      Transfers:  Sit to Stand: Contact guard assistance  Stand to Sit: Stand-by assistance    Balance:  Sitting: Intact  Standing: Impaired; With support  Standing - Static: Good  Standing - Dynamic : Fair    Ambulation/Gait Training:  Distance (ft): 30 Feet (ft)  Assistive Device: Walker, rolling  Ambulation - Level of Assistance: Contact guard assistance        Gait Abnormalities: Decreased step clearance       Speed/Yareli: Pace decreased (<100 feet/min)  Step Length: Right shortened;Left shortened    Pain:  Pain level pre-treatment: 0/10  Pain level post-treatment: 0/10   Pain Intervention(s): Medication (see MAR); Rest, Ice, Repositioning   Response to intervention: Nurse notified    Activity Tolerance:   Good    Please refer to the flowsheet for vital signs taken during this treatment.   After treatment:   [x] Patient left in no apparent distress sitting up in chair  [] Patient left in no apparent distress in bed  [x] Call bell left within reach  [x] Nursing notified  [] Caregiver present  [] Bed alarm activated  [] SCDs applied      COMMUNICATION/EDUCATION:   [x]         Role of Physical Therapy in the acute care setting. [x]         Fall prevention education was provided and the patient/caregiver indicated understanding. [x]         Patient/family have participated as able in working toward goals and plan of care. [x]         Patient/family agree to work toward stated goals and plan of care. []         Patient understands intent and goals of therapy, but is neutral about his/her participation. []         Patient is unable to participate in stated goals/plan of care: ongoing with therapy staff.  []         Other:        Millicent Tripp   Time Calculation: 16 mins    [facility] AM-PAC® Basic Mobility Inpatient Short Form (6-Clicks) Version 2    How much HELP from another person does the patient currently need    (If the patient hasn't done an activity recently, how much help from another person do you think he/she would need if he/she tried?)   Total (Total A or Dep)   A Lot  (Mod to Max A)   A Little (Sup or Min A)   None (Mod I to I)   Turning from your back to your side while in a flat bed without using bedrails? [] 1 [] 2 [x] 3 [] 4   2. Moving from lying on your back to sitting on the side of a flat bed without using bedrails? [] 1 [] 2 [x] 3 [] 4   3. Moving to and from a bed to a chair (including a wheelchair)? [] 1 [] 2 [x] 3 [] 4   4. Standing up from a chair using your arms (e.g., wheelchair, or bedside chair)? [] 1 [] 2 [x] 3 [] 4   5. Walking in hospital room? [] 1 [] 2 [x] 3 [] 4   6. Climbing 3-5 steps with a railing?+   [] 1 [] 2 [x] 3 [] 4   +If stair climbing cannot be assessed, skip item #6. Sum responses from items 1-5.        Based on an AM-PAC score of 18/24 (or **/20 if omitting stairs) and their current functional mobility deficits, it is recommended that the patient have 2-3 sessions per week of Physical Therapy at d/c to increase the patient's independence.

## 2022-09-05 NOTE — PROGRESS NOTES
Bedside and Verbal shift change report given to Abbi Barker (oncoming nurse) by Azael Powell RN (offgoing nurse). Report included the following information SBAR, Kardex, Intake/Output, MAR, and Cardiac Rhythm NSR .        Wound Prevention Checklist    Patient: Vincent Goodell (83 y.o. female)  Date: 9/5/2022  Diagnosis: Aortic aneurysm without rupture, unspecified portion of aorta (HCC) [I71.9]  Saccular aneurysm [I67.1] Saccular aneurysm    Precautions: Fall, Skin       []  Heel prevention boots placed on patient    []  Patient turned q2h during shift    []  Lift team ordered    []  Patient on [facility] bed/Specialty bed    [x]  Each Wound is documented during shift (Stage, Color, drainage, odor, measurements, and dressings)    [x]  Dual skin checks done at bedside during shift report with Carolyn Weeks RN

## 2022-09-05 NOTE — DISCHARGE SUMMARY
[address]  Face to Face Encounter    Patients Name: Saba Leiva    YOB: 1960    Primary Diagnosis:     1. S/p cardiac arrest  2. Acute hypoxic and hypercapnic respiratory failure s/p extubation. Slowly improving. 3.  Acute metabolic encephalopathy, improving  4. 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 Post cardiac arrest seizure versus myoclonus, no recurrence  6. History of coronary artery disease and prior CABG  7. Chronic systolic heart failure with EF of 45 percentage, currently compensated  8. Paroxysmal A. fib, rate controlled  9. History of recurrent GI bleed  10. Stage II chronic kidney disease  11. Remote history of breast cancer status postmastectomy and chemotherapy  12. Hypothyroidism  13. Anemia of chronic medical disease s/p 1 unit  14. Dysphagia  15. Right groin incision site bleeding, resolved    Date of Face to Face:   9/5/2022                                    Face to Face Encounter findings are related to primary reason for home care:   yes    1. I certify that the patient needs intermittent skilled nursing care, physical therapy and/or speech therapy. I will not be following this patient in the Community and Dr. Chip Blank MD   will be responsible for signing the [address] of Care. 2. Initial Orders for Care: Must be completed only if Face to Face MD will not be signing the [address] Skilled Nursing, Physical Therapy, and Occupational Therapy    3. I certify that this patient is homebound for the following reason(s): Requires the assistance of 1 or more persons to leave the home  and Only leaves the home for medical reasons or [employer] services and are infrequent and of short duration for other reasons     4.  I certify that this patient is under my care and that I, or a nurse practitioner or  [id number] working with me, had a Face-to-Face Encounter that meets the physician Face-to-Face Encounter requirements. Document the physical findings from the Face-to-Face Encounter that support the need for skilled services: Has new medications that requires skilled nursing teaching and monitoring for understanding and compliance  and Has new finding of weakness and altered mobility that requires skilled physical/occupational and/or speech therapy services for evaluation and interventions.      Dejuan Rdz MD  9/5/2022

## 2022-09-05 NOTE — DISCHARGE SUMMARY
Physician Discharge Summary       Patient: Parish Burk MRN: [id number]  SSN: [id number]    YOB: 1960  Age: 63 y.o. Sex: female    PCP: Chip Blank MD    Allergies: Shellfish derived    Admit date: 8/18/2022  Admitting Provider: Mercy Weiss MD    Discharge date: 9/5/2022  Discharging Provider: Kirsten Tobar MD    * Admission Diagnoses: Aortic aneurysm without rupture, unspecified portion of aorta (HCC) [I71.9]  Saccular aneurysm [I67.1]    * Discharge Diagnoses:    Hospital Problems as of 9/5/2022 Date Reviewed: 7/6/2022            Codes Class Noted - Resolved POA    * (Principal) Saccular aneurysm ICD-10-CM: I67.1  ICD-9-CM: 437.3  8/18/2022 - Present Unknown         1. S/p cardiac arrest  2. Acute hypoxic and hypercapnic respiratory failure s/p extubation. Slowly improving. 3.  Acute metabolic encephalopathy, improving  4. 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 Post cardiac arrest seizure versus myoclonus, no recurrence  6. History of coronary artery disease and prior CABG  7. Chronic systolic heart failure with EF of 45 percentage, currently compensated  8. Paroxysmal A. fib, rate controlled  9. History of recurrent GI bleed  10. Stage II chronic kidney disease  11. Remote history of breast cancer status postmastectomy and chemotherapy  12. Hypothyroidism  13. Anemia of chronic medical disease s/p 1 unit  14. Dysphagia  15. Right groin incision site bleeding, resolved    [facility] Course: Patient initially presented to hospital on August 18, 2022 for management of descending thoracic aortic saccular aneurysm repair. Procedure was performed on August 18 without any incident. Postoperatively, patient became hypoxic as well as hypercapnic and went into cardiac arrest.  ACLS protocol was a initiated and ROSC was achieved after 9 minutes.   Patient was subsequently transferred to ICU for further management of hypotension and bradycardia. Patient was extubated on August 22, 2022 and reintubated immediately for stridor and respiratory distress. Subsequently, she was extubated on August 31, 2022 and transferred to the floor. Patient was seen by cardiology service and they continued beta-blocker and aspirin only. No Eliquis, Entresto or amiodarone at this point. Patient was also seen by pulmonologist and recommended continuing nebulizer at home, and tapering dose of steroid. Patient had right groin incision site bleeding that got better afterwards. She required 1 unit of blood transfusion. She was seen by PT and OT recommended home health care placement. She has been cleared by vascular surgeon and cardiologist to be discharged home. Pulmonologist has signed off. Patient will be discharged home on the following medications today. * Procedures:   Procedure(s):  WASHOUT RIGHT GROIN      Consults: Cardiology, Pulmonary/Intensive care, and Vascular Surgery    Significant Diagnostic Studies: Multiple chest x-rays    Discharge Exam:  Please refer to my progress note from September 5, 2022 for further detail    * Discharge Condition: improved  * Disposition: Home    Discharge Medications:  Current Discharge Medication List        START taking these medications    Details   famotidine (PEPCID) 20 mg tablet Take 1 Tablet by mouth daily. Qty: 15 Tablet, Refills: 0  Start date: 9/6/2022      aspirin 81 mg chewable tablet Take 1 Tablet by mouth daily. Qty: 30 Tablet, Refills: 0  Start date: 9/6/2022      nicotine (NICODERM CQ) 14 mg/24 hr patch 1 Patch by TransDERmal route daily for 30 days. Qty: 30 Patch, Refills: 0  Start date: 9/6/2022, End date: 10/6/2022      polyethylene glycol (MIRALAX) 17 gram packet Take 1 Packet by mouth daily.   Qty: 30 Packet, Refills: 0  Start date: 9/6/2022      predniSONE (DELTASONE) 10 mg tablet 4 tablets po daily x 2 days then 3 tablets po daily x 3 days then 2 tablets po daily x 3 days then 1 tablet po daily x 3 days then stop  Qty: 26 Tablet, Refills: 0  Start date: 9/5/2022      QUEtiapine SR (SEROquel XR) 50 mg sr tablet Take 1 Tablet by mouth daily. Qty: 30 Tablet, Refills: 0  Start date: 9/6/2022      budesonide-formoteroL (Symbicort) 160-4.5 mcg/actuation HFAA Take 2 Puffs by inhalation two (2) times a day. Qty: 1 Each, Refills: 1  Start date: 9/5/2022           CONTINUE these medications which have CHANGED    Details   bumetanide (BUMEX) 1 mg tablet Take 1 Tablet by mouth as needed for PRN Reason (Other) (leg edema). Qty: 30 Tablet, Refills: 0  Start date: 9/5/2022      rosuvastatin (Crestor) 20 mg tablet Take 1 Tablet by mouth nightly. Qty: 30 Tablet, Refills: 0  Start date: 9/5/2022      metoprolol succinate (TOPROL-XL) 25 mg XL tablet Take 1 Tablet by mouth daily. Qty: 60 Tablet, Refills: 5  Start date: 9/5/2022           CONTINUE these medications which have NOT CHANGED    Details   esomeprazole (NEXIUM) 40 mg capsule Take  by mouth daily. metFORMIN (GLUMETZA ER) 500 mg TG24 24 hour tablet Take  by mouth two (2) times a day. albuterol-ipratropium (DUO-NEB) 2.5 mg-0.5 mg/3 ml nebu Take 3 mL by inhalation every six (6) hours as needed. Use every night      albuterol (PROVENTIL HFA, VENTOLIN HFA) 90 mcg/actuation inhaler Take 1 Puff by inhalation every four (4) hours as needed. levothyroxine (SYNTHROID) 150 mcg tablet Take  by mouth Daily (before breakfast). nitroglycerin (NITROSTAT) 0.4 mg SL tablet 1 Tablet by SubLINGual route every five (5) minutes as needed for Chest Pain. Qty: 25 Tablet, Refills: 5           STOP taking these medications       sacubitriL-valsartan (Entresto) 24-26 mg tablet Comments:   Reason for Stopping:         apixaban (Eliquis) 5 mg tablet Comments:   Reason for Stopping:         amiodarone (CORDARONE) 200 mg tablet Comments:   Reason for Stopping:               * Follow-up Care/Patient Instructions:   Activity: PT/OT per Home Health  Diet: Cardiac Diet  Wound Care: As directed    Follow-up Information       Follow up With Specialties Details Why Contact Info    None    None ([phone]) Patient stated that they have no PCP      [address] Follow up Your preferred agency, Chosen to continue managing health care needs [address]  [phone]          Follow-up Appointments   Procedures    FOLLOW UP VISIT Appointment in: Other ([facility]) With PCP in 5 days With dr. Marbella Smith in 10-14 days for COPD With dr. Emperatriz Mancini in 2 weeks for atrial fibrillation With dr. Bhavesh Mendez in 10 days for AAA repair     With PCP in 5 days  With dr. Marbella Smith in 10-14 days for COPD  With dr. Emperatriz Mancini in 2 weeks for atrial fibrillation   With dr. Bhavesh Mendez in 10 days for AAA repair     Standing Status:   Standing     Number of Occurrences:   1     Order Specific Question:   Appointment in     Answer: Other (Specify)       Total time of discharge greater than 35 minutes    Disclaimer: Sections of this note are dictated using utilizing voice recognition software, which may have resulted in some phonetic based errors in grammar and contents. Even though attempts were made to correct all the mistakes, some may have been missed, and remained in the body of the document. If questions arise, please contact our department.     Signed:  Eliezer Scheuermann, MD  9/5/2022  1:41 PM

## 2022-09-05 NOTE — PROGRESS NOTES
Problem: Self Care Deficits Care Plan (Adult)  Goal: *Acute Goals and Plan of Care (Insert Text)  Description: Occupational Therapy Goals  Initiated 9/1/2022 within 7 day(s). 1.  Patient will perform grooming with supervision/set-up while sitting at EOB with Good balance for >5 min. 2.  Patient will perform bed mobility for ADLs with supervision/set-up. 3.  Patient will perform upper body dressing/bathing with supervision/set-up. 4.  Patient will perform toilet transfers with minimal assistance/contact guard assist.  5.  Patient will perform all aspects of toileting with minimal assistance/contact guard assist.  6.  Patient will participate in upper extremity therapeutic exercise/activities with supervision/set-up for 8 minutes to improve endurance and UB strength needed for ADLs    7. Patient will utilize energy conservation techniques during functional activities with verbal cues. Prior Level of Function: Pt lives with spouse, independent with ADLs and functional mobility w/o AD. Outcome: Progressing Towards Goal   OCCUPATIONAL THERAPY TREATMENT    Patient: Sly Winston (44 y.o. female)  Date: 9/5/2022  Diagnosis: Aortic aneurysm without rupture, unspecified portion of aorta (HCC) [I71.9]  Saccular aneurysm [I67.1] Saccular aneurysm  Procedure(s) (LRB):  WASHOUT RIGHT GROIN (Right) 17 Days Post-Op  Precautions: Fall, Skin  PLOF: Pt lives with spouse, independent with ADLs and functional mobility w/o AD. Chart, occupational therapy assessment, plan of care, and goals were reviewed. ASSESSMENT:  Partial PT co tx to maximize pt safety and participation. Pt presented sitting upright in chair upon entry and agreeable to work with OT. Pt able to doff/lalitha her socks with Supervision utilizing leg cross technique @ chair level. STS transfer CGA with RW and maneuver  around room ~ 30 ft with CGA and RW, simulating BR mobility.  Pt returned back to reclining chair and was educated on EC/WS techniques to increase safety and (I) during all ADL tasks, pt verbalized understanding. She engaged in oral care with S/U @ chair level. She was left with B LE's elevated an all needs left within reach. RN made aware of pt's participation and position. Progression toward goals:  []          Improving appropriately and progressing toward goals  [x]          Improving slowly and progressing toward goals  []          Not making progress toward goals and plan of care will be adjusted     PLAN:  Patient continues to benefit from skilled intervention to address the above impairments. Continue treatment per established plan of care. Further Equipment Recommendations for Discharge:  Shower chair,  [facility],     AMPAC: Based on an AM-PAC score of 18/24 and their current ADL deficits; it is recommended that the patient have 2-3 sessions per week of Occupational Therapy at d/c to increase the patient's independence. This AMPAC score should be considered in conjunction with interdisciplinary team recommendations to determine the most appropriate discharge setting. Patient's social support, diagnosis, medical stability, and prior level of function should also be taken into consideration. SUBJECTIVE:   Patient stated I want to go home.     OBJECTIVE DATA SUMMARY:   Cognitive/Behavioral Status:  Neurologic State: Alert  Orientation Level: Oriented X4  Cognition: Follows commands  Safety/Judgement: Awareness of environment    Functional Mobility and Transfers for ADLs:      Transfers:  Sit to Stand: Contact guard assistance  Stand to Sit: Stand-by assistance    Balance:  Sitting: Intact  Standing: Impaired; With support  Standing - Static: Good  Standing - Dynamic : Fair    ADL Intervention:       Grooming  Position Performed: Seated in chair  Brushing Teeth: Set-up      Lower Body Dressing Assistance  Socks: Supervision  Leg Crossed Method Used: Yes  Position Performed: Seated in chair  Cues: Verbal cues provided    Pain:  Pain level pre-treatment: 0/10   Pain level post-treatment: 0/10    Activity Tolerance:    Fair   Please refer to the flowsheet for vital signs taken during this treatment. After treatment:   [x]  Patient left in no apparent distress sitting up in chair  []  Patient left in no apparent distress in bed  [x]  Call bell left within reach  [x]  Nursing notified  []  Caregiver present  []  Bed alarm activated    COMMUNICATION/EDUCATION:   [x] Role of Occupational Therapy in the acute care setting  [x] Home safety education was provided and the patient/caregiver indicated understanding. [x] Patient/family have participated as able in working towards goals and plan of care. [x] Patient/family agree to work toward stated goals and plan of care. [] Patient understands intent and goals of therapy, but is neutral about his/her participation. [] Patient is unable to participate in goal setting and plan of care. Thank you for this referral.  PEMA Zhu  Time Calculation: 23 mins    [employer] AM-PAC® Daily Activity Inpatient Short Form (6-Clicks)    How much HELP from another person does the patient currently need    (If the patient hasn't done an activity recently, how much help from another person do you think he/she would need if he/she tried?)   Total (Total A or Dep)   A Lot  (Mod to Max A)   A Little (Sup or Min A)   None (Mod I to I)   Putting on and taking off regular lower body clothing? [] 1 [] 2 [x] 3 [] 4   2. Bathing (including washing, rinsing,      drying)? [] 1 [] 2 [x] 3 [] 4   3. Toileting, which includes using toilet, bedpan or urinal?   [] 1 [] 2 [x] 3 [] 4   4. Putting on and taking off regular upper body clothing? [] 1 [] 2 [x] 3 [] 4   5. Taking care of personal grooming such as brushing teeth? [] 1 [] 2 [x] 3 [] 4   6. Eating meals?    [] 1 [] 2 [x] 3 [] 4

## 2022-09-05 NOTE — PROGRESS NOTES
Discharge planning     Reviewed chart. PT recommending ARU at this time. Needs insurance authorization for placement. CM will continue monitor and assist with transition of care needs.     CHANG Norris, RN  Pager # [phone]  Care Manager

## 2022-09-05 NOTE — PROGRESS NOTES
SLP Note:       Follow up attempted. Could not progress with ST intervention because patient:       []  Lethargic, unable to be alerted enough for safe PO intake  []  Refused participation  []  Off the unit  []  NPO for procedure  [x]  Other: RN covering d/c instructions. Pt preparing to leave.        Nic Rea M.S., CFY-SLP  Speech-Language Pathologist

## 2022-09-07 ENCOUNTER — HOME CARE VISIT (OUTPATIENT)
Dept: HOME HEALTH SERVICES | Facility: HOME HEALTH | Age: 62
End: 2022-09-07
Payer: MEDICARE

## 2022-09-07 ENCOUNTER — HOME CARE VISIT (OUTPATIENT)
Dept: SCHEDULING | Facility: HOME HEALTH | Age: 62
End: 2022-09-07
Payer: MEDICARE

## 2022-09-07 VITALS
HEART RATE: 60 BPM | RESPIRATION RATE: 14 BRPM | TEMPERATURE: 97 F | DIASTOLIC BLOOD PRESSURE: 63 MMHG | SYSTOLIC BLOOD PRESSURE: 124 MMHG | OXYGEN SATURATION: 98 %

## 2022-09-07 PROCEDURE — 400013 HH SOC

## 2022-09-07 PROCEDURE — 400018 HH-NO PAY CLAIM PROCEDURE

## 2022-09-07 PROCEDURE — G0299 HHS/HOSPICE OF RN EA 15 MIN: HCPCS

## 2022-09-07 PROCEDURE — 3331090002 HH PPS REVENUE DEBIT

## 2022-09-07 PROCEDURE — 3331090001 HH PPS REVENUE CREDIT

## 2022-09-07 NOTE — HOME HEALTH
Skilled services/Home bound verification:     Skilled Reason for admission/summary of clinical condition: s/p AAA, CHF,COPD,AFIB,DM education, teaching and management. This patient is homebound for the following reasons Requires considerable and taxing effort to leave the home , Requires the assistance of 1 or more persons to leave the home  and Only leaves the home for medical reasons or [employer] services and are infrequent and of short duration for other reasons . Caregiver: spouse/ relative. Caregiver assists and needs training with ADLS/IADLS/ medication management, transfers. transportation    Medications reconciled and all medications are available in the home this visit. The following education was provided regarding medications: reviewed all medications purpose, side effects and frequency. Medications  are to early to assess at this time. High risk medication teaching regarding anticoagulants, hyperglycemic agents or opoid narcotics performed (specify) metformin reviewed side effects s/sx of hypoglycemia. VERBALIZED UNDERSTANDING BUT NEEDS REINFORCEMENT           Home health supplies by type and quantity ordered/delivered this visit include: N/A    Patient education provided this visit to include: see interventions tab. Patient level of understanding of education provided:  see intervention tab for level of understanding. [facility] Education Provided: N/A  Patient response to procedure performed:  N/A    Home exercise program/Homework provided: CONT ALL MEDICATIONS AS PRESCRIBED, DIET AS PRESCRIBED, IMPLEMENT FALL/INFECTION CONTROL,MONITOR FOR ABNORMAL S/SX CHF/COPD/DM (LISTED IN INTERVENTION TAB) Monika Anuj TO MD IMMEDIATELY, AND PERFORM DAILY WEIGHTS. KEEP ALL FOLLOW UP APPTS AS PRESCRIBED. READ ALL TEACHING PACKETS FOR EDUCATION OF DISEASE PROCESS & TO PREVENT COMPLICATIONS. Pt/Caregiver instructed on plan of care and are agreeable to plan of care at this time. Physician DR Stephenie Jeter notified of patient admission to home health and plan of care including anticipated frequency of 2 WK2, 1 WK 7 and treatments/interventions/modalities of S/P AAA, COPD, CHF,AFIB,DM. Discharge planning discussed with patient and caregiver. Discharge planning as follows: Will discharge when all s/p AAA REPAIR,COPD,AFIB,CHF,DM disease process, complication and dietary goals are met and understands all medication purpose/frequencies/side effects. Pt/Caregiver did verbalize understanding of discharge planning. Next MD appointment 9/20/2022 date) with Dr Janes Abraham MD/NP/PA. Patient/caregiver encouraged/instructed to keep appointment as lack of follow through with physician appointment could result in discontinuation of home care services for non-compliance.

## 2022-09-08 ENCOUNTER — HOME CARE VISIT (OUTPATIENT)
Dept: SCHEDULING | Facility: HOME HEALTH | Age: 62
End: 2022-09-08
Payer: MEDICARE

## 2022-09-08 ENCOUNTER — HOME CARE VISIT (OUTPATIENT)
Dept: HOME HEALTH SERVICES | Facility: HOME HEALTH | Age: 62
End: 2022-09-08
Payer: MEDICARE

## 2022-09-08 VITALS
RESPIRATION RATE: 18 BRPM | TEMPERATURE: 98.9 F | OXYGEN SATURATION: 98 % | DIASTOLIC BLOOD PRESSURE: 65 MMHG | HEART RATE: 57 BPM | SYSTOLIC BLOOD PRESSURE: 130 MMHG

## 2022-09-08 PROCEDURE — G0151 HHCP-SERV OF PT,EA 15 MIN: HCPCS

## 2022-09-08 PROCEDURE — 3331090002 HH PPS REVENUE DEBIT

## 2022-09-08 PROCEDURE — 3331090001 HH PPS REVENUE CREDIT

## 2022-09-08 NOTE — Clinical Note
Trena Saucedoanahy  [id number]  [birth year]  S/p AAA repair with 13 days of intubation     1 w 1  2 w 3  1 w 1    Please let me know if she is ready for DC early   She's motivated and has great family support and may rehab pretty fast

## 2022-09-09 PROCEDURE — 3331090001 HH PPS REVENUE CREDIT

## 2022-09-09 PROCEDURE — 3331090002 HH PPS REVENUE DEBIT

## 2022-09-09 NOTE — HOME HEALTH
S: patient reports that they found the aortic aneurysm as they were doing work ups for a watchman procedure - so they went to repair it and she was on the ventilator for 13 days  no falls reported   O: PAIN: right leg ranges from 1 to 5/10   educated patient on use of rest and elevation for pain and edema  patient to apply ice to the R LE/groin as needed  for pain and swelling control per MD protocol using a barrier to protect the skin. Patient to monitor the skin for any adverse effects such as color changes, irritation, mottled appearance. Patient to use ice for 20 minutes 4-5 times a day as needed  WOUND:not observed - per nursing assessment    ROM: B hip flexion, abduction, and adduction WFL   B knee flexion/extension WFL   STRENGTH: R knee ext 4-/5, R knee flexion 4-/5, R hip flex 4-/5, R hip abd/adduction 4-/5  L knee flexion and extension 5/5, L hip flexion/abduction and adduction 5/5  BED MOBILITY: patient was able to complete sit to supine and supine to sit with independence- she attempted to us her UEs tp pick the LEs into the bed as she did not \"think she could lift her legs in\" - when prompted to try she was able to get the LEs to get into the bed without UE assist and she was encouraged to always try before helping the LEs  EQUIPMENT: rollator , manual w/c, FWW, bedside commode   TRANSFERS: patient completed sit to stand from the bar height kitchen chair with UE push off and use of the walker for initial standing balance.  she also demonstrated transfers to the toilet (with toilet frame) and in/out of the tub shower per her request - needed SBA to complete   patient has a low recliner chair that she wants to be able to sit in - attempted to complete sit to stand from this chair but she required max A x 2 to initiate the transition from that low surface and then min to mod to complete   GAIT: patient is ambulating in the home with the rollator walker for 25 feet x 2 with SBA - she demonstrates safe walker advancement, continuous stepping pattern with equal step length and good foot clearance . STEP: patient demonstrated egressing and entering the home via the front door with CGA and cues- she was able to go down the steps holding one railing and leading with the right LE, going up with CGA leading with the left LE marking time. BALANCE: tinetti 19/28 moderate fall risk   RESPONSE TO TREATMENT:patient demonstrated a positive outcome post treatment and reported a reduction in pain, increased comfort and increased confidence with transfers and mobility. Patient reported good understanding of the HEP and reports confidence in ability to complete the program as prescribed by PT  RESPONSE TO EDUCATION: patient was educated on: safety, HEP, fall risk  Patient expressed understanding of all education provided and was able to repeat back education, precautions, and HEP. A:ASSESSMENT AND PROGRESS TOWARD GOALS:  Patient demonstrated a positive result to therapy this date as evidenced by an improvement in gait pattern with cues, decreased pain with exercise and walking and patient expressing an understanding of the rehab plan due to therapy verbal and written instructions. Goals established for increased independence in the home, safe mobility in the home, improvement in strength  - all designed to reduce fall risk and progress toward independence. Patient will benefit from continued PT intervention to progress toward meeting all established goals    P:.continue with progression of mobility, ther ex for strength, endurance, provide education to patient and caregivers to maximize the recovery and reduce the fall risk to progress toward a return to independent function    PMH/Summary of clinical condition: s/p AAA repair with 13 days on ventilator   DM (does not check sugar as she does not have a glucometer)  per epic \"1. S/p cardiac arrest    2. Acute hypoxic and hypercapnic respiratory failure s/p extubation.   Slowly improving. 3.  Acute metabolic encephalopathy, improving    4. Saccular aneurysm of the descending thoracic aorta s/p stenting    5. Post cardiac arrest seizure versus myoclonus, no recurrence    6. History of coronary artery disease and prior CABG    7. Chronic systolic heart failure with EF of 45 percentage, currently compensated    8. Paroxysmal A. fib, rate controlled    9. History of recurrent GI bleed    10. Stage II chronic kidney disease    11. Remote history of breast cancer status postmastectomy and chemotherapy    12. Hypothyroidism    13. Anemia of chronic medical disease s/p 1 unit    14. Dysphagia    15. Right groi n incision site bleeding, resolved\"    Medications review completed. medications are effective at this time    social history/ caregivers: patient lives with her  in a one story house with steps to enter and egress the home - her daughter and granddaughter are assisting with her care as well. The  is the main caregiver and assists with all meals, medications, ADLs, mobility and transportation     Pt/Caregiver instructed on plan of care and are agreeable to plan of care at this time. Plan of care and admission to home health status called to attending Marylen Neighbors, MD   upcoming appointments  PCP 10/4/22  cardiologist 9/20/22  vascular surgeon 9/13/22    Discharge planning discussed with patient and caregiver. Discharge planning as follows: DC to self and family under MD supervision when all goals are met or maximum potential is reached  Pt/Caregiver did verbalize understanding. Patient education provided this visit: safety, HEP, fall risk     Home exercise program: walker with the rollator hourly while awakre   follow each walk by 5 deep breaths     Continued need for the following skills: MD referral for HHPT following recent hospital stay.  HHPT is medically necessary to address  dx and clinical findings: decreased strength, impaired gait, decreased ability w stair negotiation, increased swelling, decreased transfer status, decreased endurance, decreased balance and decreased safety, increased pain in order to improve functional mobility/quality of life, decrease burden of care, reduce risk for re-hospitalization, work towards patient's personal goals of return to PLOF w decrease risk for falls.

## 2022-09-10 ENCOUNTER — HOME CARE VISIT (OUTPATIENT)
Dept: SCHEDULING | Facility: HOME HEALTH | Age: 62
End: 2022-09-10
Payer: MEDICARE

## 2022-09-10 PROCEDURE — 3331090001 HH PPS REVENUE CREDIT

## 2022-09-10 PROCEDURE — G0299 HHS/HOSPICE OF RN EA 15 MIN: HCPCS

## 2022-09-10 PROCEDURE — 3331090002 HH PPS REVENUE DEBIT

## 2022-09-11 PROCEDURE — 3331090002 HH PPS REVENUE DEBIT

## 2022-09-11 PROCEDURE — 3331090001 HH PPS REVENUE CREDIT

## 2022-09-12 ENCOUNTER — HOME CARE VISIT (OUTPATIENT)
Dept: SCHEDULING | Facility: HOME HEALTH | Age: 62
End: 2022-09-12
Payer: MEDICARE

## 2022-09-12 ENCOUNTER — HOME CARE VISIT (OUTPATIENT)
Dept: HOME HEALTH SERVICES | Facility: HOME HEALTH | Age: 62
End: 2022-09-12
Payer: MEDICARE

## 2022-09-12 VITALS
SYSTOLIC BLOOD PRESSURE: 128 MMHG | HEART RATE: 68 BPM | OXYGEN SATURATION: 98 % | DIASTOLIC BLOOD PRESSURE: 74 MMHG | TEMPERATURE: 97.8 F | RESPIRATION RATE: 14 BRPM

## 2022-09-12 PROCEDURE — 3331090002 HH PPS REVENUE DEBIT

## 2022-09-12 PROCEDURE — 3331090001 HH PPS REVENUE CREDIT

## 2022-09-12 PROCEDURE — G0152 HHCP-SERV OF OT,EA 15 MIN: HCPCS

## 2022-09-12 NOTE — HOME HEALTH
Caregiver involvement:  Assists with ADLs, Medication management, Transportation to appointments, Meal prep and assists with ambulation. Medications reconciled and all medications are available in the home this visit. Medications  are effective at this time. Home health supplies by type and quantity ordered/delivered this visit include: n/a    Patient Education: We discussed the need for daily weights to observe for increase in fluid retention and to call MD if > 2 lb weight gain in 24 hours. Tay Polite over the importance of keeping bowels moving regularly, eating high fiber foods and drinking lots of water, stool softners and laxatives ie: Miralax for constipation. Patient is a fall risk, went over the need of having someone with her when ambulating, keep hallways and living areas free of clutter and throw rugs. Continue a heart Healthy diet. Watch out for high sodium foods, read labels. Observe for signs of infection. Monitor B/P, take meds as ordered and f/u with PCP. Read labels of foods, stay away from high sodium canned foods and processed meats. Discussed the importance of staying well hydrated with water 6-8 glasses a day. Progress toward goals: Bruising on Right leg is beginning to go away, swelling is minimal, patient ambulating with walker. Right groin, No signs of hematoma, puncture with no signs of infection. Home exercise program: Weigh  daily and report weight gain of > 2lbs in 24 hours to PCP. ENCOURAGED PATIENT TO HAVE PROTEIN WITH EACH MEAL TO PROMOTE WOUND HEALING. Discussed s/s of infection to monitor for, s/s of UTI, who to report to/when. Instructed cg to notify staff/md/seek tx if complications occur. Patient instructed to maintain clear pathways in home and to minimize clutter to prevent falls from occurring/minimize fall potential.   Patient needing a well balanced diet with the 5 food groups, patient to increase fiber in diet, fresh fruits and vegetables, whole grains and increase water intake. Maintain healthy low sodium diet, Continue to monitor B/P and observe for signs of infection. Work with PT to increase strength and f/u with PCP. I went over the importance of taking all prescriptions as ordered. I discussed how to avoid extra sodium in her diet. We discussed the signs of infection and when to call MD.  We discussed the high risk for falls and ways to prevent falls in the future. We discussed taking B/P daily and keeping a log. We also discussed the need of a heart healthy diet, and the need to change positions frequently. Gaining strength with PT for increased mobility. Continued need for the following skills: Nursing, Physical Therapy and Occupational Therapy    The following discharge planning was discussed with the pt/caregiver: Will discharge from nursing when education is complete and patient is medically stable.

## 2022-09-13 ENCOUNTER — HOME CARE VISIT (OUTPATIENT)
Dept: HOME HEALTH SERVICES | Facility: HOME HEALTH | Age: 62
End: 2022-09-13
Payer: MEDICARE

## 2022-09-13 ENCOUNTER — HOME CARE VISIT (OUTPATIENT)
Dept: SCHEDULING | Facility: HOME HEALTH | Age: 62
End: 2022-09-13
Payer: MEDICARE

## 2022-09-13 VITALS
HEART RATE: 59 BPM | TEMPERATURE: 97.8 F | OXYGEN SATURATION: 99 % | DIASTOLIC BLOOD PRESSURE: 74 MMHG | SYSTOLIC BLOOD PRESSURE: 152 MMHG

## 2022-09-13 VITALS
TEMPERATURE: 97.9 F | RESPIRATION RATE: 16 BRPM | HEART RATE: 58 BPM | OXYGEN SATURATION: 97 % | DIASTOLIC BLOOD PRESSURE: 62 MMHG | SYSTOLIC BLOOD PRESSURE: 140 MMHG

## 2022-09-13 PROCEDURE — 3331090002 HH PPS REVENUE DEBIT

## 2022-09-13 PROCEDURE — 3331090001 HH PPS REVENUE CREDIT

## 2022-09-13 PROCEDURE — G0157 HHC PT ASSISTANT EA 15: HCPCS

## 2022-09-13 NOTE — HOME HEALTH
This nurse called patient yesterday to schedule visit for today and patient cancelled for stating she has two appointment and if visit could be moved and this nurse informed patient that she is already scheduled for Thursday. Patient replied okay and stated she would just wait until Thursday and to give her a call with the time. This nurse replied okay and call ended.

## 2022-09-13 NOTE — HOME HEALTH
SUBJECTIVE: \"I'm doing pretty good. I think I'm getting back to where I was. \"  . PAIN: see pain assessment  OBJECTIVE: see interventions  . NEXT MD APPT: later today with vascular, 9/20/22 with PCP  . CAREGIVER ASSISTANCE NEEDED FOR: The pt lives with her  and her daughter lives in the neighborhood. pt is requiring assistance for transportation, shopping, meals, medication management, safety. .  ASSESSMENT AND PROGRESS TOWARD GOALS: The pt presents with weakness, decreased balance and decreased endurance. She will benefit from continued HHPT HHPT to increase strength, balance and endurance to return to PLF. PATIENT RESPONSE TO TREATMENT: no increase in pain with activity, reports of decreased endurance. PATIENT LEVEL OF UNDERSTANDING OF EDUCATION: Good - the pt able to teach back improved elevation positoning post education. .  CONTINUED NEED FOR THE FOLLOWING SKILLS: HH PT is medically necessary to address pain, decreased ROM, decreased strength, increased swelling, impaired bed mobility, decreased independence with functional transfers, impaired gait, impaired stair negotiation, and impaired balance in order to improve functional independence, quality of life, return to PLOF, reduce the risk for falls, and reduce pain. Mary LAST COMPLETED ON:  Greg Mitchell, PT 9/12/22  . PLAN: Plan to review the HEP and address outside ambulation next visit. DISCHARGE PLANNING DISCUSSED: Discharge to self and family under MD supervision once all goals have been met or patient has reached maximum potential. Discussed with the pt the remaining HHPT POC of 1 visit this week then 2w2, 1w1 or DC early if goals are met prior to that. She is in agreement to the POC at this time.

## 2022-09-13 NOTE — CASE COMMUNICATION
OT evaluation completed 9.12.22. OT instructed pt importance, benefit, and purpose short term skilled OT for safety training functional transfers and while performing ADL/IADL tasks, balance training, endurance/activity tolerance training, and functional strengthening for improved safety, fall prevention, improved functional level, and decrease caregiver burden however pt continued to decline. pt requires setup A to min A perform ADL/IA DL tasks and S to SBA for ambulation and transfers execute ADL/IADL tasks. pt presents with BUE AROM WFL and 4+ to 5/5 MMT grossly. pt aware contact MD for OT referral if the need arises. D/C OT at this time per pt decline. pt agrees D/C. D/C pt to care of Dr Nas Catherine.

## 2022-09-13 NOTE — CASE COMMUNICATION
Therapy Functional Score Assessment  Question   Score   Grooming  1       Upper Dressing 1      Lower Dressing 2      Bathing  3      Toilet Transfer  1    Transfer  1            Ambulation  3   Dyspnea                     2       Pain Interfering with activity 3  Est number therapy visits      1

## 2022-09-13 NOTE — HOME HEALTH
Clinical Condition per EPIC: pt 64 yo female s/p AAA repair, 13 days on ventilator. \"List of Comorbidities: 1. S/p cardiac arrest   2. Acute hypoxic and hypercapnic respiratory failure s/p extubation. Slowly improving. 3.  Acute metabolic encephalopathy, improving   4. Saccular aneurysm of the descending thoracic aorta s/p stenting   5. Post cardiac arrest seizure versus myoclonus, no recurrence   6. History of coronary artery disease and prior CABG   7. Chronic systolic heart failure with EF of 45 percentage, currently compensated   8. Paroxysmal A. fib, rate controlled   9. History of recurrent GI bleed   10. Stage II chronic kidney disease   11. Remote history of breast cancer status postmastectomy and chemotherapy   12. Hypothyroidism   13. Anemia of chronic medical disease s/p 1 unit   14. Dysphagia\"  . SUBJECTIVE:  Pt stated \"I'm doing pretty good overall. I honestly don't think I want this. I'm overwhelmed with everything else that's going on. I have too many doctor's appointments and people coming to the house. My daughter and  help me if I need anything. I really don't think I want this. \" pt sitting chair upon OT arrival. pt agreed tx. OT instructed pt benefit short term OT however pt continued to politely decline. CAREGIVER INVOLVEMENT: pt family provides A ADL tasks and transfers PRN, performs all IADL tasks, shopping, transportation, and A with medications. MEDICATION RECONCILIATION: OT reconcilled medications with pt with no changes   DME ORDERED/RECOMMENEDED: NA, pt has rollator walker   . OBJECTIVE:  BATHING: pt demonstrated bathing standing tub shower distant S/SBA UB and ирина area bathing, mod A LB bathing. pt reports is not comfortable bending at waist to perform LB bathing from knees to B feet. pt reports sits toilet to perform LB bathing bending greater than 90* at waist to B feet.  OT instructed pt regarding shower chair for increased safety/fall prevention and independence LB bathing via tub shower however pt declined shower chair. TOILETING: SBA for safety while standing. UB DRESSING: setup A   LB DRESSING: min A due to impaired balance  GROOMING: setup A  FEEDING: I   .  pt reports Modified Akshat RPE 0/10 after performing ambulation, transfers, and ADL assessment. .  OT instructed/demonstrated pt the following with good understanding:    - energy conservation while performing bathing, dressing, and setup such as set clothing out night before, gather items required to perform task in one trip, sit while performing tasks, take rest breaks as needed, perform shower/bathing on days with no other appointments or activities scheduled, etc.     - pt is to perform bathing/dressing tasks sitting, when possible, for safety/fall prevention.     - while sitting perform weight shift technique bringing LE contra laterally onto opposite LE while performing LB bathing/dressing for safety and fall prevention. pt unable to perform RLE due to soreness. -ADL training performed for improved body mechanics, safety, and technique for reduced risk of falls and improved functional level and participation of tasks. [facility] IADL: NT  .  BALANCE:  STATIC STANDING: fair+  DYNAMIC STANDING: fair  . AMBULATION: pt performed ambulation throughout house without AD with distant S/SBA reciprocal pattern with decreased foot clearance. .  EOB/BED TRANSFER: supine to/from sit SBA, sit/stand EOB S  CHAIR: sit/stand S  TOILET with BSC frame over toilet: distant S use BUE push off  TUB SHOWER: SBA, pt declined shower chair. pt reports daughter provides S while pt performing tub shower transfer for safety/fall prevention. .  -gait and transfer training performed for improved body mechanics and technique for fall prevention and safety while performing ADL/IADL tasks. [facility] PATIENT RESPONSE TO TREATMENT:  pt reports no increased pain or discomfort with activity throughout tx. [facility]   PATIENT EDUCATION PROVIDED THIS VISIT: UB HEP, OT role, energy conservation, fall prevention/safety training ADL/IADL tasks, continue diet and medications as instructed per MD, consult MD or urgent care for medical assistance as opposed to ER unless situation emergent. PATIENT LEVEL OF UNDERSTANDING OF EDUCATION PROVIDED: pt verbalized good understanding energy conservation and UB HEP. REHAB POTENTIAL: pt declined OT. HOME EXERCISE PROGRAM: Written HEP of the following issued. pt instructed/demonstrated BUE shoulder press, shoulder flexion, shoulder abduction, shoulder extension, chest press, elbow flexion/extension x 10, x 2 per day. pt demonstrated I UB HEP. UB HEP for pt to maintain functional endurance and strength to perform ADL/IADL tasks and ambulation/transfers to perform tasks with safety and for fall prevention. ASSESSMENT: OT evaluation completed 9.12.22. OT instructed pt importance, benefit, and purpose short term skilled OT for safety training functional transfers and while performing ADL/IADL tasks, balance training, endurance/activity tolerance training, and functional strengthening for improved safety, fall prevention, improved functional level, and decrease caregiver burden however pt continued to decline. pt requires setup A to min A perform ADL/IADL tasks and S to SBA for ambulation and transfers execute ADL/IADL tasks. pt presents with BUE AROM WFL and 4+ to 5/5 MMT grossly. pt aware contact MD for OT referral if the need arises. D/C OT at this time per pt decline. pt agrees D/C. D/C pt to care of Dr Logan Hernández. PLAN: D/C OT per pt decline. DISCHARGE PLANNING DISCUSSED WITH PT/CAREGIVER: D/C pt to self and family care under MD supervision. pt and caregiver verbalized understanding and agree D/C.

## 2022-09-14 ENCOUNTER — TELEPHONE (OUTPATIENT)
Dept: CARDIOLOGY CLINIC | Age: 62
End: 2022-09-14

## 2022-09-14 ENCOUNTER — HOME CARE VISIT (OUTPATIENT)
Dept: SCHEDULING | Facility: HOME HEALTH | Age: 62
End: 2022-09-14
Payer: MEDICARE

## 2022-09-14 VITALS
RESPIRATION RATE: 18 BRPM | TEMPERATURE: 97.6 F | OXYGEN SATURATION: 97 % | HEART RATE: 88 BPM | DIASTOLIC BLOOD PRESSURE: 80 MMHG | SYSTOLIC BLOOD PRESSURE: 116 MMHG

## 2022-09-14 PROCEDURE — 3331090002 HH PPS REVENUE DEBIT

## 2022-09-14 PROCEDURE — G0299 HHS/HOSPICE OF RN EA 15 MIN: HCPCS

## 2022-09-14 PROCEDURE — 3331090001 HH PPS REVENUE CREDIT

## 2022-09-14 NOTE — TELEPHONE ENCOUNTER
[facility]. Two patient Identifiers confirmed. Advised pt per notes below. Pt verbalized understanding.

## 2022-09-14 NOTE — TELEPHONE ENCOUNTER
[employer] Two patient Identifiers confirmed. Advised medication was ready to ship they just needed pt to call and confirm address and go over the process for how they schedule. Will advise pt.

## 2022-09-14 NOTE — TELEPHONE ENCOUNTER
Contacted Dr Arlene Torres office spoke with [facility]. Two patient Identifiers confirmed. Advised er notes reviewed pt was to restart eliquis. She stated she would fax providers ov note to [phone] for review. Will review with provider. No other issues noted.

## 2022-09-14 NOTE — TELEPHONE ENCOUNTER
Patient has been in the hospital for some procedure complications. Patient was taken off eliquis but is not b advised to restart it. Patient says that the [facility] mail order pharmacy has been trying to reach out to us regarding her eliquis for authorization. Patient is completely out of medication. [phone]. Patient also has a question about how many times a day she should be taking her entresto.  Tried to look in med list but it was not there

## 2022-09-14 NOTE — TELEPHONE ENCOUNTER
Contacted pt at [facility] number. Two patient Identifiers confirmed. Advised pt per notes below. Pt stated she was evaluated  by Dr Memo Shankar yesterday and was advised to continue eliquis. Will contact Dr Memo Shankar office to review. Pt verbalized understanding.        Re: Reviewed hospital note and saw that pt was taken off amiodorone, eliquis and entreso at discharge

## 2022-09-15 ENCOUNTER — HOME CARE VISIT (OUTPATIENT)
Dept: SCHEDULING | Facility: HOME HEALTH | Age: 62
End: 2022-09-15
Payer: MEDICARE

## 2022-09-15 VITALS
DIASTOLIC BLOOD PRESSURE: 78 MMHG | HEART RATE: 78 BPM | SYSTOLIC BLOOD PRESSURE: 142 MMHG | RESPIRATION RATE: 18 BRPM | TEMPERATURE: 97.1 F | OXYGEN SATURATION: 95 %

## 2022-09-15 PROCEDURE — 3331090001 HH PPS REVENUE CREDIT

## 2022-09-15 PROCEDURE — G0300 HHS/HOSPICE OF LPN EA 15 MIN: HCPCS

## 2022-09-15 PROCEDURE — 3331090002 HH PPS REVENUE DEBIT

## 2022-09-15 NOTE — HOME HEALTH
Skilled reason for visit: s/p AAA,DM,AFIB,CHF,COPD AND WOUND CARE /TEACHING    Caregiver involvement: DAUGHTER OR SPOUSE TAKES TO APPTS. Medications reviewed and all medications are available in the home this visit. The following education was provided regarding medications:  WAITING ON ELIQUIS TO BE DELIVERED TO HOME AND amiodarone  WILL  FROM PHARMACY. SEE INTERVENTION TAB FOR EDUCATION. MD notified of any discrepancies/look a-like medications/medication interactions:   Medications are effective at this time except medication that's not in the home. Home health supplies by type and quantity ordered/delivered this visit include: wound care supplies in home and ordered    Patient education provided this visit: see interventions tab. Patient level of understanding of education provided:  see intervention tab for level of understanding. Skilled Care Performed this visit: was seen in Dr Guillermo Mitchell office and right groin wound/incision has declined and now draining. Per Ann Lucas at office to pack with betadine wet to dry/DSD today and tomorrow NS or Dakins wet to dry/DSD until wound vac arrives. Dr Guillermo Mitchell office ordered wound vac. Patient response to procedure performed:  tolerated wound care without any complaints of pain. Agency Progress toward goals:  see intervention tab for PROGRESSING TOWARD GOALS. Patient's Progress towards personal goals:  see intervention tab for PROGRESSING TOWARD GOALS. Home exercise program: keep wound dressing dry and intact. reviewed wound care and to perform if dressing becomes saturated or removed. take all medications as prescribed. Continued need for the following skills: Nursing    Plan for next visit: wound care and medication teaching. Patient and/or caregiver notified and agrees to changes in the Plan of Care YES/NO/NA: N/A      The following discharge planning was discussed with the pt/caregiver:  Will discharge when all s/p AAA, AFIB,COPD,DM,CHF disease process, complication and dietary goals are met and understands all medication purpose/frequencies/side effects

## 2022-09-15 NOTE — HOME HEALTH
Skilled reason for visit: Diabetic education and Wound Care Treatment     Caregiver involvement: Pt independent with care      Medications reviewed and all medications are available in the home this visit. The following education was provided regarding medications:  MO LOPES notified of any discrepancies/look a-like medications/medication interactions: MO  Medications are effecrtive at this time. Home health supplies by type and quantity ordered/delivered this visit include:  Supplies available   Patient education provided this visit: Reviewed ADA diet: watch carbohydrates, sugars and starches. reviewed monitoring BS daily and record results. REVIEWED DM AND COMPLICATIONS: REVIEWED HYPO/HYPERGLYCEMIA. HYPOGLYCEMIA (BS LESS THAN 70): SWEATING, JITTERY, NERVOUS, ANXIETY, HUNGER, CONFUSION, OR FATIGUE. EAT 15 GRAM OF CARBHOYDRATES AND RECHECK BS EVERY 15 MIN. IF BS DOESNT INCREASE SEEK MEDICAL TREATMENT IMMEDIATELY. HYPERGLYCEMIA (BS GREATER THAN 200):  HEADACHE, FREQUENT URINATION, BLURRED VISION, FRUITY SMELLING URINE, DRY MOUTH,FATIGUE, OR CONFUSION      Sharps education provided: Clinician instructed patient/CG on proper disposal of sharps: Containers should be made of hard plastic, be puncture-resistant and leakproof,   such as a laundry detergent or bleach bottle.  When the container is ¾ full, it should be sealed with tape and labeled   DO NOT RECYCLE prior to discarding in the regular trash.        Patient level of understanding of education provided: Verónica Wood all understanding to above education    Skilled Care Performed this visit: Education and Wound Care    Patient response to procedure performed:  NO pain during dressing change     Agency Progress toward goals: Progressing toward interventions above      Patient's Progress towards personal goals: when patient reaches goals and medication is managed, and disease processes are understood patient agrees and understand that discharge will take place.

## 2022-09-16 ENCOUNTER — HOME CARE VISIT (OUTPATIENT)
Dept: HOME HEALTH SERVICES | Facility: HOME HEALTH | Age: 62
End: 2022-09-16
Payer: MEDICARE

## 2022-09-16 ENCOUNTER — HOME CARE VISIT (OUTPATIENT)
Dept: SCHEDULING | Facility: HOME HEALTH | Age: 62
End: 2022-09-16
Payer: MEDICARE

## 2022-09-16 PROCEDURE — 3331090002 HH PPS REVENUE DEBIT

## 2022-09-16 PROCEDURE — 3331090001 HH PPS REVENUE CREDIT

## 2022-09-16 PROCEDURE — G0300 HHS/HOSPICE OF LPN EA 15 MIN: HCPCS

## 2022-09-16 NOTE — Clinical Note
Patient was being seen for home care physical therapy after a prolonged hospitalization for AAA repair with prolonged intubation . She demonstrates reduced strength, independence with mobility, reduced balance, endurance and safety. She was seen for one visit after the PT evaluation and has since requested a discharge from home care PT at this time. She feels overwhelmed and has decided to cancel PT at this time. She reports that she will continue to work on therapeutic exercise and mobility and will continued to allow nursing to follow her recovery at this time. She has been educated on a HEP and on safety with mobility and transfers.  She is being discharged from PT at this time due to patient request.

## 2022-09-16 NOTE — HOME HEALTH
Skilled reason for visit: Wound Care Treatment and Education     Caregiver involvement: Pt independent with care      Medications reviewed and all medications are available in the home this visit. The following education was provided regarding medications:  MO LOPES notified of any discrepancies/look a-like medications/medication interactions: MO  Medications are effecrtive at this time. Home health supplies by type and quantity ordered/delivered this visit include:  Supplies available   Patient education provided this visit: [facility] office and the Vac is suppose to be delivered on Monday and they are going to call in an Antibiotic. Nurse educated pt n s/s of infection and when to go to the ER, Pt verbalized understanding. Nurse educated pt on the process of how the wound vac is placed and what to exspect. Pt received her Eliquis via the mail yesterday and will start it today. Spoke with Nick Abbott at [facility] she is going to look at the order and get back to me. Scott education provided: MO  Patient level of understanding of education provided: Antonio Goldstein all understanding to above education    Skilled Care Performed this visit: Education and Wound Care    Patient response to procedure performed:  NO pain during dressing change     Agency Progress toward goals: Progressing toward interventions above      Patient's Progress towards personal goals: when patient reaches goals and medication is managed, and disease processes are understood patient agrees and understand that discharge will take place.

## 2022-09-17 ENCOUNTER — HOME CARE VISIT (OUTPATIENT)
Dept: SCHEDULING | Facility: HOME HEALTH | Age: 62
End: 2022-09-17
Payer: MEDICARE

## 2022-09-17 PROCEDURE — 3331090002 HH PPS REVENUE DEBIT

## 2022-09-17 PROCEDURE — G0299 HHS/HOSPICE OF RN EA 15 MIN: HCPCS

## 2022-09-17 PROCEDURE — 3331090001 HH PPS REVENUE CREDIT

## 2022-09-18 ENCOUNTER — HOME CARE VISIT (OUTPATIENT)
Dept: SCHEDULING | Facility: HOME HEALTH | Age: 62
End: 2022-09-18
Payer: MEDICARE

## 2022-09-18 PROCEDURE — G0299 HHS/HOSPICE OF RN EA 15 MIN: HCPCS

## 2022-09-18 PROCEDURE — 3331090002 HH PPS REVENUE DEBIT

## 2022-09-18 PROCEDURE — 3331090001 HH PPS REVENUE CREDIT

## 2022-09-19 ENCOUNTER — HOME CARE VISIT (OUTPATIENT)
Dept: SCHEDULING | Facility: HOME HEALTH | Age: 62
End: 2022-09-19
Payer: MEDICARE

## 2022-09-19 VITALS
DIASTOLIC BLOOD PRESSURE: 70 MMHG | TEMPERATURE: 98.4 F | RESPIRATION RATE: 14 BRPM | TEMPERATURE: 98 F | SYSTOLIC BLOOD PRESSURE: 118 MMHG | OXYGEN SATURATION: 99 % | SYSTOLIC BLOOD PRESSURE: 130 MMHG | HEART RATE: 60 BPM | DIASTOLIC BLOOD PRESSURE: 72 MMHG | RESPIRATION RATE: 16 BRPM | OXYGEN SATURATION: 98 % | HEART RATE: 62 BPM

## 2022-09-19 VITALS
OXYGEN SATURATION: 98 % | SYSTOLIC BLOOD PRESSURE: 132 MMHG | RESPIRATION RATE: 18 BRPM | TEMPERATURE: 97.1 F | DIASTOLIC BLOOD PRESSURE: 70 MMHG | HEART RATE: 78 BPM

## 2022-09-19 PROCEDURE — 3331090002 HH PPS REVENUE DEBIT

## 2022-09-19 PROCEDURE — 3331090001 HH PPS REVENUE CREDIT

## 2022-09-19 PROCEDURE — G0300 HHS/HOSPICE OF LPN EA 15 MIN: HCPCS

## 2022-09-19 NOTE — HOME HEALTH
Caregiver involvement:  Assists with ADLs, Medication management, Transportation to appointments, Meal prep and assists with ambulation. Medications reconciled and all medications are available in the home this visit. Medications  are effective at this time. Home health supplies by type and quantity ordered/delivered this visit include: n/a    Patient Education: We discussed the need for daily weights to observe for increase in fluid retention and to call MD if > 2 lb weight gain in 24 hours. Mily Land over the importance of keeping bowels moving regularly, eating high fiber foods and drinking lots of water, stool softners and laxatives ie: Miralax for constipation. Patient is a fall risk, went over the need of having someone with her when ambulating, keep hallways and living areas free of clutter and throw rugs. Continue a heart Healthy diet. Watch out for high sodium foods, read labels. Observe for signs of infection. Monitor B/P, take meds as ordered and f/u with PCP. Read labels of foods, stay away from high sodium canned foods and processed meats. Discussed the importance of staying well hydrated with water 6-8 glasses a day. Progress toward goals: /wiound continues to drain clear material, wet to dry with N/S continues until wound vac is delivered. Home exercise program: Weigh  daily and report weight gain of > 2lbs in 24 hours to PCP. ENCOURAGED PATIENT TO HAVE PROTEIN WITH EACH MEAL TO PROMOTE WOUND HEALING. Discussed s/s of infection to monitor for, s/s of UTI, who to report to/when. Instructed cg to notify staff/md/seek tx if complications occur. Patient instructed to maintain clear pathways in home and to minimize clutter to prevent falls from occurring/minimize fall potential.   Patient needing a well balanced diet with the 5 food groups, patient to increase fiber in diet, fresh fruits and vegetables, whole grains and increase water intake. Maintain healthy low sodium diet, Continue to monitor B/P and observe for signs of infection. Work with PT to increase strength and f/u with PCP. I went over the importance of taking all prescriptions as ordered. I discussed how to avoid extra sodium in her diet. We discussed the signs of infection and when to call MD.  We discussed the high risk for falls and ways to prevent falls in the future. We discussed taking B/P daily and keeping a log. We also discussed the need of a heart healthy diet, and the need to change positions frequently. Gaining strength with PT for increased mobility. Continued need for the following skills: Nursing, Physical Therapy and Occupational Therapy    The following discharge planning was discussed with the pt/caregiver: Will discharge from nursing when education is complete and patient is medically stable.

## 2022-09-19 NOTE — HOME HEALTH
Skilled reason for visit:  Wound Care Treatment and education on wound vac     Caregiver involvement: Pt independent with care      Medications reviewed and all medications are available in the home this visit. The following education was provided regarding medications:  MO LOPES notified of any discrepancies/look a-like medications/medication interactions: MO  Medications are effecrtive at this time. Home health supplies by type and quantity ordered/delivered this visit include:  Supplies available   Patient education provided this visit: Pts wound vac arrived nurse and pt went over paperwork filled it out sogned it and sent it back to [facility]. Pts daughter was there at time of visit and watched nurse change the wound vac. Nurse educated pt and daughter on how to reset the Vac if needed, how to change the canister as well as how to do a wet to dry if the vack ws to come off or malfunction. . Measurements were completed and added to the chart. Scott education provided: MO    Patient level of understanding of education provided: Katherin Bernstein all understanding to above education    Skilled Care Performed this visit: Education and Wound Care    Patient response to procedure performed:  NO pain during dressing change     Agency Progress toward goals: Progressing toward interventions above      Patient's Progress towards personal goals: when patient reaches goals and medication is managed, and disease processes are understood patient agrees and understand that discharge will take place.

## 2022-09-19 NOTE — HOME HEALTH
Caregiver involvement:  Assists with ADLs, Medication management, Transportation to appointments, Meal prep and assists with ambulation. Medications reconciled and all medications are available in the home this visit. Medications  are effective at this time. Home health supplies by type and quantity ordered/delivered this visit include: n/a    Patient Education: We discussed the need for daily weights to observe for increase in fluid retention and to call MD if > 2 lb weight gain in 24 hours. Pili Pulling over the importance of keeping bowels moving regularly, eating high fiber foods and drinking lots of water, stool softners and laxatives ie: Miralax for constipation. Patient is a fall risk, went over the need of having someone with her when ambulating, keep hallways and living areas free of clutter and throw rugs. Continue a heart Healthy diet. Watch out for high sodium foods, read labels. Observe for signs of infection. Monitor B/P, take meds as ordered and f/u with PCP. Read labels of foods, stay away from high sodium canned foods and processed meats. Discussed the importance of staying well hydrated with water 6-8 glasses a day. Progress toward goals: Bruising on Right leg is beginning to go away, swelling is minimal, patient ambulating with walker. Right groin, No signs of hematoma, open wound right groin 7x2x4.5, wet to dry applied while waiting for wound vac. Home exercise program: Weigh  daily and report weight gain of > 2lbs in 24 hours to PCP. ENCOURAGED PATIENT TO HAVE PROTEIN WITH EACH MEAL TO PROMOTE WOUND HEALING. Discussed s/s of infection to monitor for, s/s of UTI, who to report to/when. Instructed cg to notify staff/md/seek tx if complications occur. Patient instructed to maintain clear pathways in home and to minimize clutter to prevent falls from occurring/minimize fall potential.   Patient needing a well balanced diet with the 5 food groups, patient to increase fiber in diet, fresh fruits and vegetables, whole grains and increase water intake. Maintain healthy low sodium diet, Continue to monitor B/P and observe for signs of infection. Work with PT to increase strength and f/u with PCP. I went over the importance of taking all prescriptions as ordered. I discussed how to avoid extra sodium in her diet. We discussed the signs of infection and when to call MD.  We discussed the high risk for falls and ways to prevent falls in the future. We discussed taking B/P daily and keeping a log. We also discussed the need of a heart healthy diet, and the need to change positions frequently. Gaining strength with PT for increased mobility. Continued need for the following skills: Nursing, Physical Therapy and Occupational Therapy    The following discharge planning was discussed with the pt/caregiver: Will discharge from nursing when education is complete and patient is medically stable.

## 2022-09-20 ENCOUNTER — OFFICE VISIT (OUTPATIENT)
Dept: CARDIOLOGY CLINIC | Age: 62
End: 2022-09-20
Payer: MEDICARE

## 2022-09-20 VITALS
WEIGHT: 209 LBS | BODY MASS INDEX: 30.86 KG/M2 | DIASTOLIC BLOOD PRESSURE: 82 MMHG | SYSTOLIC BLOOD PRESSURE: 138 MMHG | HEART RATE: 65 BPM | OXYGEN SATURATION: 97 % | TEMPERATURE: 97.3 F

## 2022-09-20 DIAGNOSIS — I25.5 ISCHEMIC CARDIOMYOPATHY: ICD-10-CM

## 2022-09-20 DIAGNOSIS — I10 ESSENTIAL HYPERTENSION WITH GOAL BLOOD PRESSURE LESS THAN 140/90: Primary | ICD-10-CM

## 2022-09-20 DIAGNOSIS — E78.00 PURE HYPERCHOLESTEROLEMIA: ICD-10-CM

## 2022-09-20 DIAGNOSIS — I25.10 CORONARY ARTERY DISEASE DUE TO LIPID RICH PLAQUE: ICD-10-CM

## 2022-09-20 DIAGNOSIS — I25.83 CORONARY ARTERY DISEASE DUE TO LIPID RICH PLAQUE: ICD-10-CM

## 2022-09-20 PROCEDURE — 99214 OFFICE O/P EST MOD 30 MIN: CPT | Performed by: INTERNAL MEDICINE

## 2022-09-20 PROCEDURE — G8510 SCR DEP NEG, NO PLAN REQD: HCPCS | Performed by: INTERNAL MEDICINE

## 2022-09-20 PROCEDURE — G8417 CALC BMI ABV UP PARAM F/U: HCPCS | Performed by: INTERNAL MEDICINE

## 2022-09-20 PROCEDURE — G8428 CUR MEDS NOT DOCUMENT: HCPCS | Performed by: INTERNAL MEDICINE

## 2022-09-20 PROCEDURE — G8752 SYS BP LESS 140: HCPCS | Performed by: INTERNAL MEDICINE

## 2022-09-20 PROCEDURE — 1111F DSCHRG MED/CURRENT MED MERGE: CPT | Performed by: INTERNAL MEDICINE

## 2022-09-20 PROCEDURE — 3331090002 HH PPS REVENUE DEBIT

## 2022-09-20 PROCEDURE — 3017F COLORECTAL CA SCREEN DOC REV: CPT | Performed by: INTERNAL MEDICINE

## 2022-09-20 PROCEDURE — 3331090001 HH PPS REVENUE CREDIT

## 2022-09-20 PROCEDURE — G9899 SCRN MAM PERF RSLTS DOC: HCPCS | Performed by: INTERNAL MEDICINE

## 2022-09-20 PROCEDURE — G8754 DIAS BP LESS 90: HCPCS | Performed by: INTERNAL MEDICINE

## 2022-09-20 RX ORDER — AMIODARONE HYDROCHLORIDE 200 MG/1
200 TABLET ORAL EVERY OTHER DAY
Qty: 90 TABLET | Refills: 1 | Status: SHIPPED | OUTPATIENT
Start: 2022-09-20 | End: 2022-10-28 | Stop reason: SDUPTHER

## 2022-09-20 RX ORDER — SACUBITRIL AND VALSARTAN 24; 26 MG/1; MG/1
1 TABLET, FILM COATED ORAL 2 TIMES DAILY
COMMUNITY

## 2022-09-20 RX ORDER — AMIODARONE HYDROCHLORIDE 200 MG/1
200 TABLET ORAL EVERY OTHER DAY
COMMUNITY
End: 2022-09-20 | Stop reason: SDUPTHER

## 2022-09-20 NOTE — LETTER
9/20/2022    Patient: Anita Leiva   YOB: 1960   Date of Visit: 9/20/2022     Sujata Falcon MD  [phone]  [address] [address]  Via Fax: [phone]    Dear Sujata Falcon MD,      Thank you for referring Ms. Alexey Dodson to [address] [place] [address] for evaluation. My notes for this consultation are attached. If you have questions, please do not hesitate to call me. I look forward to following your patient along with you.       Sincerely,    Juarez Linton MD

## 2022-09-20 NOTE — PROGRESS NOTES
is 63 y. o.female with Coronary artery disease status post CABG in November 2010. Cardiomyopathy, hypertension, paroxysmal atrial fibrillation, hyperlipidemia, diabetes, breast cancer status post mastectomy and chemotherapy. She also had thoracic aortic aneurysm status post EVAR in 08/2022. She also has anxiety and depression disorder. She has  tobacco abuse disorder. Ms. Ed Brown is here today for follow-up appointment for her CAD, cardiomyopathy, atrial fibrillation. Patient was admitted to [facility] for repair of thoracic aortic aneurysm. Presented for endovascular thoracic aneurysm repair by Dr. Tim Aguilar 8/18/2022, which was performed as planned. R groin hematoma, s/p washout by Dr. Tim Aguilar 8/19  During hospitalization patient had acute encephalopathy as well as hypoxic and hypercapnic respiratory failure requiring intubation. And eventually extubated and discharged home. Because of bradycardia, his amiodarone was discontinued during hospitalization. Patient is slowly recovering. She has right groin wound VAC. She is using walker. She denies any chest pain or chest tightness. She is smoking only 3 or 4 cigarettes a day. She has been instructed to start taking Eliquis again by vascular team.  She is not taking aspirin. She has stopped Entresto since hospital discharge.   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    Anxiety     Breast CA ([address] 12/2009    S/P Lt Mastectomy and Chemo    CAD (coronary artery disease) 08/30/2010    S/P CABG X 4 ([address] to LAD, Sequential SVG to D2, Ramus, OM) 01/2011    Cardiomyopathy ([address]     LVEF 35-40 % (08/20) 60% (08/14), 40% (2011)    Chemotherapy 04/2010    for 4 months    CHF (congestive heart failure) (HCC)     Chronic kidney disease stage 2 per cardiac note cc    Chronic pain     COPD (chronic obstructive pulmonary disease) [facility] 08/30/2010    Depression     Diabetes (HCC)     GERD (gastroesophageal reflux disease)     GI bleed     History of blood transfusion     2010 and 1/2022    Hx of heart artery stent 2010    x 10  (4, then 4, then 2)    Hyperlipidemia     Hypertension     MI (myocardial infarction) ([facility] 2009    x 3    Schatzki's ring     S/P EGD and Dilation (07/16)    Thyroid disease     hypothyroid    Tobacco abuse      PSH:  Past Surgical History:   Procedure Laterality Date    COLONOSCOPY N/A 5/3/2017    COLON  performed by Radha Garcias MD at [facility] ENDOSCOPY    COLONOSCOPY N/A 2/15/2021    COLONOSCOPY performed by Andrew Garnett MD at [facility] ENDOSCOPY    HX APPENDECTOMY      HX CHOLECYSTECTOMY      HX GYN      tubal ligation    HX HEART CATHETERIZATION  11/11/09; 10/21/09    HX HEART CATHETERIZATION  10/15/10; 10/06/09    left heart    HX HEART CATHETERIZATION  11/7/2011    left heart cath, no new findings    HX OTHER SURGICAL  4/6/2010    Insertion right internal jugular single lumen MediPort. HX RADICAL MASTECTOMY Left 2/2010    left, with chemo    NM BREAST SURGERY PROCEDURE UNLISTED  2/22/10    left w/sentinel node bx    NM CABG, ARTERY-VEIN, FOUR  12/2010    NM CARDIAC SURG PROCEDURE UNLIST      stent placement before CABG    NM CHEST SURGERY PROCEDURE UNLISTED Right     Prior port placement     MEDS:  Current Outpatient Medications on File Prior to Visit   Medication Sig Dispense Refill    apixaban (ELIQUIS) 5 mg tablet Take 5 mg by mouth two (2) times a day. bumetanide (BUMEX) 1 mg tablet Take 1 Tablet by mouth as needed for PRN Reason (Other) (leg edema). (Patient taking differently: Take 1 mg by mouth daily.) 30 Tablet 0    rosuvastatin (Crestor) 20 mg tablet Take 1 Tablet by mouth nightly. 30 Tablet 0    metoprolol succinate (TOPROL-XL) 25 mg XL tablet Take 1 Tablet by mouth daily.  60 Tablet 5 nitroglycerin (NITROSTAT) 0.4 mg SL tablet 1 Tablet by SubLINGual route every five (5) minutes as needed for Chest Pain. (Patient taking differently: 0.4 mg by SubLINGual route every five (5) minutes as needed for Chest Pain. dont exceed 3 doses.) 25 Tablet 5    amoxicillin (AMOXIL) 875 mg tablet Take 875 mg by mouth two (2) times a day. acetaminophen (TYLENOL) 500 mg tablet Take 500 mg by mouth every six (6) hours as needed for Fever or Pain.      famotidine (PEPCID) 20 mg tablet Take 1 Tablet by mouth daily. 15 Tablet 0    nicotine (NICODERM CQ) 14 mg/24 hr patch 1 Patch by TransDERmal route daily for 30 days. 30 Patch 0    polyethylene glycol (MIRALAX) 17 gram packet Take 1 Packet by mouth daily. 30 Packet 0    QUEtiapine SR (SEROquel XR) 50 mg sr tablet Take 1 Tablet by mouth daily. (Patient taking differently: Take 1 Tablet by mouth nightly as needed for PRN Reason (Other) (sleep). ) 30 Tablet 0    budesonide-formoteroL (Symbicort) 160-4.5 mcg/actuation HFAA Take 2 Puffs by inhalation two (2) times a day. 1 Each 1    esomeprazole (NEXIUM) 40 mg capsule Take 40 mg by mouth daily. metFORMIN (GLUMETZA ER) 500 mg TG24 24 hour tablet Take 500 mg by mouth two (2) times a day. albuterol-ipratropium (DUO-NEB) 2.5 mg-0.5 mg/3 ml nebu Take 3 mL by inhalation every six (6) hours as needed for Cough, Respiratory Distress, Shortness of Breath or Wheezing. Use every night      albuterol (PROVENTIL HFA, VENTOLIN HFA) 90 mcg/actuation inhaler Take 1 Puff by inhalation every four (4) hours as needed for Cough, Respiratory Distress, Shortness of Breath or Wheezing. levothyroxine (SYNTHROID) 150 mcg tablet Take 150 mcg by mouth Daily (before breakfast). No current facility-administered medications on file prior to visit.      Alleres and Sensitivities:  Allergies   Allergen Reactions    Shellfish Derived Hives     Eyes and Throat swelling       Family History:  Family History   Problem Relation Age of Onset    Hypertension Mother     Diabetes Mother     Breast Problems Mother         fiber cystic    Hypertension Other      Social History:  Social History     Tobacco Use    Smoking status: Every Day     Packs/day: 1.00     Years: 40.00     Pack years: 40.00     Types: Cigarettes    Smokeless tobacco: Never   Vaping Use    Vaping Use: Never used   Substance Use Topics    Alcohol use: No    Drug use: No     Physical Exam:  BP Readings from Last 3 Encounters:   09/20/22 138/82   09/19/22 132/70   09/18/22 118/70     Pulse Readings from Last 3 Encounters:   09/20/22 65   09/19/22 78   09/18/22 62     Wt Readings from Last 3 Encounters:   09/20/22 94.8 kg (209 lb)   09/05/22 108 kg (238 lb 1.6 oz)   07/19/22 102.1 kg (225 lb)     Constitutional: Oriented to person, place, and time. HENT: Head: Normocephalic and atraumatic. Neck: No JVD present. Cardiovascular: Regular rhythm. No, gallop or rubs appreciated. OLIVIA 2/6 base of heart, carotid radiation  Lung[de-identified] Breath sounds normal. No respiratory distress. No ronchi or rales appreciated  Abdominal: No tenderness. No rebound and no guarding. Musculoskeletal: Trace LE edema     Review of Data:  EKG: (06/2011)Sinus rhythm at 73 bpm. No Dynamic ST changes of ischemia. No Significant Q waves. EKG (6/1/12) : Sinus rhythm at 66 beats per minute. No dynamic ST changes of ischemia. Some nonspecific T wave inversion in precordial leads. Unchanged from prior EKG.     LABS:   Lab Results   Component Value Date/Time    Sodium 139 09/05/2022 06:49 AM    Potassium 3.9 09/05/2022 06:49 AM    Chloride 105 09/05/2022 06:49 AM    CO2 25 09/05/2022 06:49 AM    Glucose 188 (H) 09/05/2022 06:49 AM    BUN 31 (H) 09/05/2022 06:49 AM    Creatinine 1.21 09/05/2022 06:49 AM     Lab Results   Component Value Date/Time    Cholesterol, total 130 09/03/2022 06:35 AM    HDL Cholesterol 29 (L) 09/03/2022 06:35 AM    LDL, calculated 78.6 09/03/2022 06:35 AM    Triglyceride 112 09/03/2022 06:35 AM    CHOL/HDL Ratio 4.5 09/03/2022 06:35 AM       No components found for: GPT    Lab Results   Component Value Date/Time    Hemoglobin A1c 6.1 (H) 08/18/2022 07:12 AM     FLP: (2/2012) , , LDL 51, HDL 22    ECHO:(04/2011)  LVEF 40%, Global hypokinesis    HOLTER (11/19)  Cristina Leiva was in Normal Sinus. The average heart rate, excluding ectopy, was 54 BPM with a minimum of 48 BPM at 04:54 D3 and a maximum of 75 BPM at 05:14 D2. Heart beats, including ectopy, totaled 030802 beats. VENTRICULAR ECTOPICS totaled 225 averaging 4.8 per hour with 222 single, 0 paired, 0 trigeminy and 0 R on T. There was 1 VENTRICULAR TACHYCARDIA with 3 beats 03:34 D3 at a rate of 66 BPM.  SUPRAVENTRICULAR ECTOPICS totaled 308 averaging 6.6 per hour ,with 265 single and 24 paired beats. Patient diary was submitted symptoms noted, no patient triggered events noted. Reported \"Shortness of breath\". Rhythm was sinus and HR less than 100 bpm    ECHO (08/20)  Left Ventricle  Normal cavity size. Mild septal wall hypertrophy. Wall motion: normal. Moderate systolic dysfunction. The estimated ejection fraction is 35 - 40%. Visually measured ejection fraction. There is severe (grade 3) left ventricular diastolic dysfunction. Wall Scoring  The left ventricular wall motion is globally hypokinetic. Left Atrium  Dilated left atrium. Left Atrium volume index is 40.34 mL/m2. Right Ventricle  Normal cavity size and global systolic function. Right Atrium  Mildly dilated right atrium. Aortic Valve  Trileaflet valve structure, no stenosis and no regurgitation. Mitral Valve  No stenosis. Mitral valve non-specific thickening. Mild to moderate regurgitation. Tricuspid Valve  Normal valve structure and no stenosis. Mild regurgitation. Pulmonic Valve  Pulmonic valve not well visualized. No stenosis. Trace regurgitation. Aorta  Normal aortic root.     Pulmonary Artery  Pulmonary arterial systolic pressure (PASP) is 56 mmHg. Pulmonary hypertension found to be moderate. Nuclear stress test 05/10/19  Baseline ECG: Sinus rhythm, non-specific ST-T wave abnormalities. Inconclusive stress test.  Gated SPECT: Left ventricular function post-stress was abnormal. Calculated ejection fraction is 44%. Inferior/inferolateral hypokinesis  Left ventricular perfusion is abnormal.  Myocardial perfusion imaging defect 1: There is a defect that is moderate in size with a moderate reduction in uptake present in the inferior location(s) that is non-reversible. There is abnormal wall motion in the defect area. The defect appears to be infarction. There was no convincing evidence of significant reversible defect to suggest on going major ischemia. Abnormal myocardial perfusion imaging. Fixed defect consistent with prior myocardial infarction. CARDIAC CATH(11/2011)  1. LM: Normal.  2. LAD: Eccentric ostial 70-80%, mid to distal LAD is a very small-caliber vessel, probably about a 2-mm vessel. Competitive filing from [facility]   3. DIAGONAL: Ostial 40-50% proximal moderate disease. 4. RAMUS: Proximal 100% in stent. 5. LCx/OM: OM2 mid 100%, . Native LCx diffuse luminal irregularities without any obstructive stenosis. less than 2-mm vessel. 7. RCA: Prox and Mid stent diffuse 20% in-stent restenosis. Otherwise, no obstructive disease. RPLS 60-70% tubular stenosis which appears unchanged from prior angiogram about a year ago. 8. SEQUENTIAL SVG TO THE DIAGONA, RAMUS, OM:  patent without any significant obstructive stenosis. There appears to be venous valve system distally into the graft. Native vessel which includes diagonal, ramus and obtuse marginal beyond graft appears to be patent and a very small-caliber vessel, less than 2-mm. 9. LIMA TO LAD: widely patent. Distally, LAD has no significant stenosis. This appears to be a less than 2-mm vessel.     Impression / Plan:  Ms. Kera Baker is a pleasant 63 y.o. female who is here for the follow up appointment. Coronary artery disease:    Four vessel CABG in 2010. On  Statin, BB. She is on Eliquis for stroke prophylaxis. Aspirin was discontinued because of recurrent anemia  Nuclear stress test May 2019 which showed inferior scar, no ischemia. Continue with medical management. Suboptimal candidate for invasive management at this time because of recurrent anemia requiring blood transfusion    Cardiomyopathy:  Remote history of ejection fraction of 45%. Ejection fraction 50-55% in December 2018 . Last LVEF 40% in October 2019 in setting of A. Fib  LVEF 35-40 percent in August 2020. LVEF 30% by echo in 01/2022  Echo in 08/2022 with LVEF 45-50%  Patient was on lisinopril however this was discontinued in 10/2021 because of elevated creatinine up to 1.8. Currently patient is taking Bumex, metoprolol. Will restart Entresto after recent hospital discharge. She will keep checking blood pressure at home regularly    Atrial fibrillation:  Diagnosed in October 2018. On sinus rhythm on exam.   Patient has been restarted again on Eliquis after recent vascular surgery. Currently taking beta-blocker. She was on amiodarone however because of bradycardia during her recent hospitalization this was discontinued. I will restart amiodarone Monday Wednesday Friday again 200 mg. Patient had recurrent anemia requiring blood transfusion in 2021 and again in 01/2022. According to patient she had EGD and colonoscopy last year which were unremarkable. She is moderate risk for stroke because of moderate chads 2 vascular score. Watchman was considered however with recent vascular event, she would like to wait. Continue with Eliquis. We will revisit this device implantation in the future. Patient does not want watchman device at this time    Hypertension: /82. Currently on beta-blocker. Have asked patient to restart Entresto and keep checking blood pressure at home.     Thoracic aneurysm  Presented for endovascular thoracic aneurysm repair by Dr. Stacie Howard 8/18/2022, which was performed as planned however patient had right groin R groin hematoma, s/p washout by Dr. Stacie Howard 8/19/2022. Currently has been wound VAC. Will defer management to vascular team    Hyperlipidemia:  Continue Crestor 20 mg daily. Last LDL 28.    DM Goal A1C less than 7 from CV standpoint. Defer management to PCP. Goal hemoglobin A1c less than 7 is recommended from cardiology standpoint    This plan was discussed with Ms. Foreign Pichardo in detail, who is in agreement. Please do not hesitate to call me if you have any questions or concerns.

## 2022-09-20 NOTE — PATIENT INSTRUCTIONS
New Medication/Medication Changes  Restart Entresto 24-26 twice daily   Take amiodarone 200 mg every other day       **please allow 24-48 hrs for medication to be escribed to pharmacy** If you need any refills on medications please contact your pharmacy so that the request can be escribed to the provider for review.

## 2022-09-20 NOTE — PROGRESS NOTES
Identified pt with two pt identifiers(name and ). Reviewed record in preparation for visit and have obtained necessary documentation. Vincent Goodell presents today for   Chief Complaint   Patient presents with    Follow-up       Pt denies  DIZZINESS, SOB, CHEST PAIN/ PRESSURE, FATIGUE/WEAKNESS, HEADACHES, SWELLING. Angel Leiva preferred language for health care discussion is english/other. Personal Protective Equipment:   Personal Protective Equipment was used including: mask-surgical and hands-gloves. Patient was placed on no precaution(s). Patient was masked. Precautions:   Patient currently on None  Patient currently roomed with door closed. Is someone accompanying this pt? yes    Is the patient using any DME equipment during OV? Yes. rolator    Depression Screening:  3 most recent PHQ Screens 2022   Little interest or pleasure in doing things Not at all   Feeling down, depressed, irritable, or hopeless Not at all   Total Score PHQ 2 0       Learning Assessment:  Learning Assessment 10/16/2014   PRIMARY LEARNER Patient   HIGHEST LEVEL OF EDUCATION - PRIMARY LEARNER  GRADUATED HIGH SCHOOL OR GED   BARRIERS PRIMARY LEARNER NONE   CO-LEARNER CAREGIVER No   PRIMARY LANGUAGE ENGLISH   LEARNER PREFERENCE PRIMARY VIDEOS   ANSWERED BY Fredi Leiva   RELATIONSHIP SELF       Abuse Screening:  Abuse Screening Questionnaire 3/7/2022   Do you ever feel afraid of your partner? N   Are you in a relationship with someone who physically or mentally threatens you? N   Is it safe for you to go home? Y       Fall Risk  No flowsheet data found. Pt currently taking Anticoagulant therapy? Yes   Pt currently taking Antiplatelet therapy? No     Coordination of Care:  1. Have you been to the ER, urgent care clinic since your last visit? Hospitalized since your last visit? No     2.  Have you seen or consulted any other health care providers outside of the [address] [place] [address] since your last visit? Include any pap smears or colon screening. yes      Please see Red banners under Allergies and Med Rec to remove outside inquires. All correct information has been verified with patient and added to chart.      Medication's patient's would liked removed has been marked not taking to be removed per Verbal order and read back per Robert Heller MD

## 2022-09-21 ENCOUNTER — HOME CARE VISIT (OUTPATIENT)
Dept: SCHEDULING | Facility: HOME HEALTH | Age: 62
End: 2022-09-21
Payer: MEDICARE

## 2022-09-21 PROCEDURE — G0300 HHS/HOSPICE OF LPN EA 15 MIN: HCPCS

## 2022-09-21 PROCEDURE — 3331090001 HH PPS REVENUE CREDIT

## 2022-09-21 PROCEDURE — 3331090002 HH PPS REVENUE DEBIT

## 2022-09-21 NOTE — HOME HEALTH
Skilled reason for visit:  Wound Care Treatment     Caregiver involvement: Pt independent with care      Medications reviewed and all medications are available in the home this visit. The following education was provided regarding medications:  MO LOPES notified of any discrepancies/look a-like medications/medication interactions: MO  Medications are effecrtive at this time. Home health supplies by type and quantity ordered/delivered this visit include:  Supplies available   Patient education provided this visit: Wound vac in place and functing with no issues at this time. Pt verbalized understanding of how to do wet to dry if vac comes off between visits. Sharps education provided: MO  Patient level of understanding of education provided: Pelon Agenda all understanding to above education    Skilled Care Performed this visit: Education and Wound Care    Patient response to procedure performed:  NO pain during dressing change     Agency Progress toward goals: Progressing toward interventions above      Patient's Progress towards personal goals: when patient reaches goals and medication is managed, and disease processes are understood patient agrees and understand that discharge will take place.

## 2022-09-22 VITALS
TEMPERATURE: 97.1 F | OXYGEN SATURATION: 98 % | DIASTOLIC BLOOD PRESSURE: 70 MMHG | RESPIRATION RATE: 18 BRPM | HEART RATE: 78 BPM | SYSTOLIC BLOOD PRESSURE: 128 MMHG

## 2022-09-22 PROCEDURE — 3331090001 HH PPS REVENUE CREDIT

## 2022-09-22 PROCEDURE — 3331090002 HH PPS REVENUE DEBIT

## 2022-09-23 ENCOUNTER — HOME CARE VISIT (OUTPATIENT)
Dept: SCHEDULING | Facility: HOME HEALTH | Age: 62
End: 2022-09-23
Payer: MEDICARE

## 2022-09-23 PROCEDURE — 3331090001 HH PPS REVENUE CREDIT

## 2022-09-23 PROCEDURE — G0300 HHS/HOSPICE OF LPN EA 15 MIN: HCPCS

## 2022-09-23 PROCEDURE — 3331090002 HH PPS REVENUE DEBIT

## 2022-09-24 PROCEDURE — 3331090002 HH PPS REVENUE DEBIT

## 2022-09-24 PROCEDURE — 3331090001 HH PPS REVENUE CREDIT

## 2022-09-25 PROCEDURE — 3331090001 HH PPS REVENUE CREDIT

## 2022-09-25 PROCEDURE — 3331090002 HH PPS REVENUE DEBIT

## 2022-09-26 ENCOUNTER — HOME CARE VISIT (OUTPATIENT)
Dept: SCHEDULING | Facility: HOME HEALTH | Age: 62
End: 2022-09-26
Payer: MEDICARE

## 2022-09-26 PROCEDURE — 3331090002 HH PPS REVENUE DEBIT

## 2022-09-26 PROCEDURE — 3331090001 HH PPS REVENUE CREDIT

## 2022-09-26 PROCEDURE — G0300 HHS/HOSPICE OF LPN EA 15 MIN: HCPCS

## 2022-09-26 NOTE — HOME HEALTH
Skilled reason for visit: Wound Care Treatment     Caregiver involvement: Pt independent with care      Medications reviewed and all medications are available in the home this visit. The following education was provided regarding medications:  MO LOPES notified of any discrepancies/look a-like medications/medication interactions: MO  Medications are effecrtive at this time. Home health supplies by type and quantity ordered/delivered this visit include:  Supplies available       Patient education provided this visit: Wound care completed as ordered with no pain voiced. Measurement and images taken with consent . Set schedule for the rest of the week. Joses education provided: MO    Patient level of understanding of education provided: Faina Gongora all understanding to above education    Skilled Care Performed this visit: Education and Wound Care    Patient response to procedure performed:  NO pain during dressing change     Agency Progress toward goals: Progressing toward interventions above      Patient's Progress towards personal goals: when patient reaches goals and medication is managed, and disease processes are understood patient agrees and understand that discharge will take place.

## 2022-09-27 PROCEDURE — 3331090002 HH PPS REVENUE DEBIT

## 2022-09-27 PROCEDURE — 3331090001 HH PPS REVENUE CREDIT

## 2022-09-28 ENCOUNTER — HOME CARE VISIT (OUTPATIENT)
Dept: SCHEDULING | Facility: HOME HEALTH | Age: 62
End: 2022-09-28
Payer: MEDICARE

## 2022-09-28 VITALS
SYSTOLIC BLOOD PRESSURE: 126 MMHG | RESPIRATION RATE: 18 BRPM | HEART RATE: 67 BPM | TEMPERATURE: 97.1 F | DIASTOLIC BLOOD PRESSURE: 70 MMHG | OXYGEN SATURATION: 98 %

## 2022-09-28 PROCEDURE — 3331090002 HH PPS REVENUE DEBIT

## 2022-09-28 PROCEDURE — G0300 HHS/HOSPICE OF LPN EA 15 MIN: HCPCS

## 2022-09-28 PROCEDURE — 3331090001 HH PPS REVENUE CREDIT

## 2022-09-28 NOTE — HOME HEALTH
Skilled reason for visit: Wound Care Treatment and Education     Caregiver involvement: Pt independent with care      Medications reviewed and all medications are available in the home this visit. The following education was provided regarding medications:  MO LOPES notified of any discrepancies/look a-like medications/medication interactions: MO  Medications are effecrtive at this time. Home health supplies by type and quantity ordered/delivered this visit include:  Supplies available   Patient education provided this visit: Pt is not checking her glucose levels due to taking oral medication at this time. Wound care completed as ordered. WOund vac in tac no issues voiced from pt at this time. Pt is increasing her protein to help with wound healing. Pt is no longer taking Antibiotics. Patient level of understanding of education provided: Alondra Salas all understanding to above education    Skilled Care Performed this visit: Education and Wound Care    Patient response to procedure performed:  NO pain during dressing change     Agency Progress toward goals: Progressing toward interventions above      Patient's Progress towards personal goals: when patient reaches goals and medication is managed, and disease processes are understood patient agrees and understand that discharge will take place.

## 2022-09-29 PROCEDURE — 3331090002 HH PPS REVENUE DEBIT

## 2022-09-29 PROCEDURE — 3331090001 HH PPS REVENUE CREDIT

## 2022-09-30 ENCOUNTER — HOME CARE VISIT (OUTPATIENT)
Dept: SCHEDULING | Facility: HOME HEALTH | Age: 62
End: 2022-09-30
Payer: MEDICARE

## 2022-09-30 PROCEDURE — G0300 HHS/HOSPICE OF LPN EA 15 MIN: HCPCS

## 2022-09-30 PROCEDURE — 3331090002 HH PPS REVENUE DEBIT

## 2022-09-30 PROCEDURE — 3331090001 HH PPS REVENUE CREDIT

## 2022-09-30 NOTE — HOME HEALTH
Skilled reason for visit:  Wound Vac Treatment     Caregiver involvement: Pt independent with care      Medications reviewed and all medications are available in the home this visit. The following education was provided regarding medications:  MO  MD notified of any discrepancies/look a-like medications/medication interactions: MO  Medications are effecrtive at this time. Home health supplies by type and quantity ordered/delivered this visit include:  Supplies available   Patient education provided this visit:  Reviewed to reported any redness, swelling, warmth, fever, chills, increased heart/respiratory rate, uncontrolled pain/tenderness, drainage:green/yellow/brown, or odor to MD immediately. Report any s/sx of abnormal bleeding to MD immediately/seek medical treatment for any: black tary stools, dark/tea/blood, hemtoma, dizziness, frequent gum/nose bleeds, unusal brusing, or prolong bleeding. Wound vac changed as ordered. Scott education provided: MO    Patient level of understanding of education provided: Arlene Robins all understanding to above education    Skilled Care Performed this visit: Education and Wound Care    Patient response to procedure performed:  NO pain during dressing change     Agency Progress toward goals: Progressing toward interventions above      Patient's Progress towards personal goals: when patient reaches goals and medication is managed, and disease processes are understood patient agrees and understand that discharge will take place.

## 2022-10-01 PROCEDURE — 3331090002 HH PPS REVENUE DEBIT

## 2022-10-01 PROCEDURE — 3331090001 HH PPS REVENUE CREDIT

## 2022-10-02 PROCEDURE — 3331090001 HH PPS REVENUE CREDIT

## 2022-10-02 PROCEDURE — 3331090002 HH PPS REVENUE DEBIT

## 2022-10-03 ENCOUNTER — HOME CARE VISIT (OUTPATIENT)
Dept: SCHEDULING | Facility: HOME HEALTH | Age: 62
End: 2022-10-03
Payer: MEDICARE

## 2022-10-03 VITALS
HEART RATE: 87 BPM | OXYGEN SATURATION: 98 % | TEMPERATURE: 97.1 F | SYSTOLIC BLOOD PRESSURE: 140 MMHG | DIASTOLIC BLOOD PRESSURE: 74 MMHG | RESPIRATION RATE: 18 BRPM

## 2022-10-03 PROCEDURE — G0300 HHS/HOSPICE OF LPN EA 15 MIN: HCPCS

## 2022-10-03 PROCEDURE — 3331090001 HH PPS REVENUE CREDIT

## 2022-10-03 PROCEDURE — 3331090002 HH PPS REVENUE DEBIT

## 2022-10-03 NOTE — HOME HEALTH
Skilled reason for visit: Wound Care Treatment and education     Caregiver involvement: Pt independent with care except wound vac       Medications reviewed and all medications are available in the home this visit. The following education was provided regarding medications:  MO LOPES notified of any discrepancies/look a-like medications/medication interactions: MO  Medications are effecrtive at this time. Home health supplies by type and quantity ordered/delivered this visit include:  Supplies available   Patient education provided this visit:  Reviewed to reported any redness, swelling, warmth, fever, chills, increased heart/respiratory rate, uncontrolled pain/tenderness, drainage:green/yellow/brown, or odor to MD immediately. Report any s/sx of abnormal bleeding to MD immediately/seek medical treatment for any: black tary stools, dark/tea/blood, hemtoma, dizziness, frequent gum/nose bleeds, unusal brusing, or prolong bleeding. Sharps education provided: MO     Patient level of understanding of education provided: Dilma Lala all understanding to above education    Skilled Care Performed this visit: Education and Wound Care    Patient response to procedure performed:  NO pain during dressing change     Agency Progress toward goals: Progressing toward interventions above      Patient's Progress towards personal goals: when patient reaches goals and medication is managed, and disease processes are understood patient agrees and understand that discharge will take place.

## 2022-10-04 PROCEDURE — 3331090002 HH PPS REVENUE DEBIT

## 2022-10-04 PROCEDURE — 3331090001 HH PPS REVENUE CREDIT

## 2022-10-05 ENCOUNTER — HOME CARE VISIT (OUTPATIENT)
Dept: SCHEDULING | Facility: HOME HEALTH | Age: 62
End: 2022-10-05
Payer: MEDICARE

## 2022-10-05 VITALS
DIASTOLIC BLOOD PRESSURE: 76 MMHG | SYSTOLIC BLOOD PRESSURE: 120 MMHG | HEART RATE: 69 BPM | OXYGEN SATURATION: 98 % | RESPIRATION RATE: 18 BRPM | TEMPERATURE: 97.1 F

## 2022-10-05 PROCEDURE — G0300 HHS/HOSPICE OF LPN EA 15 MIN: HCPCS

## 2022-10-05 PROCEDURE — 3331090001 HH PPS REVENUE CREDIT

## 2022-10-05 PROCEDURE — 3331090002 HH PPS REVENUE DEBIT

## 2022-10-05 NOTE — HOME HEALTH
Skilled reason for visit:  Wound Care Treatment and education     Caregiver involvement: Pt independent with care except with wound care due to wound vac      Medications reviewed and all medications are available in the home this visit. The following education was provided regarding medications:  MO LOPES notified of any discrepancies/look a-like medications/medication interactions: MO  Medications are effecrtive at this time. Home health supplies by type and quantity ordered/delivered this visit include:  Supplies available   Patient education provided this visit:  Reviewed to reported any redness, swelling, warmth, fever, chills, increased heart/respiratory rate, uncontrolled pain/tenderness, drainage:green/yellow/brown, or odor to MD immediately. Report any s/sx of abnormal bleeding to MD immediately/seek medical treatment for any: black tary stools, dark/tea/blood, hemtoma, dizziness, frequent gum/nose bleeds, unusal brusing, or prolong bleeding. Pt due to see MD on friday tomás call if no vac replaced then nursing will make visit to replace vac for the weekend,    Sharps education provided: MO     Patient level of understanding of education provided: Becky Hernandez all understanding to above education    Skilled Care Performed this visit: Education and Wound Care    Patient response to procedure performed:  NO pain during dressing change     Agency Progress toward goals: Progressing toward interventions above      Patient's Progress towards personal goals: when patient reaches goals and medication is managed, and disease processes are understood patient agrees and understand that discharge will take place.

## 2022-10-06 PROCEDURE — 3331090001 HH PPS REVENUE CREDIT

## 2022-10-06 PROCEDURE — 3331090002 HH PPS REVENUE DEBIT

## 2022-10-07 ENCOUNTER — HOME CARE VISIT (OUTPATIENT)
Dept: HOME HEALTH SERVICES | Facility: HOME HEALTH | Age: 62
End: 2022-10-07
Payer: MEDICARE

## 2022-10-07 PROCEDURE — 3331090002 HH PPS REVENUE DEBIT

## 2022-10-07 PROCEDURE — 3331090001 HH PPS REVENUE CREDIT

## 2022-10-08 PROCEDURE — 3331090001 HH PPS REVENUE CREDIT

## 2022-10-08 PROCEDURE — 3331090002 HH PPS REVENUE DEBIT

## 2022-10-09 PROCEDURE — 3331090001 HH PPS REVENUE CREDIT

## 2022-10-09 PROCEDURE — 3331090002 HH PPS REVENUE DEBIT

## 2022-10-10 ENCOUNTER — HOME CARE VISIT (OUTPATIENT)
Dept: SCHEDULING | Facility: HOME HEALTH | Age: 62
End: 2022-10-10
Payer: MEDICARE

## 2022-10-10 VITALS
DIASTOLIC BLOOD PRESSURE: 70 MMHG | SYSTOLIC BLOOD PRESSURE: 128 MMHG | OXYGEN SATURATION: 97 % | TEMPERATURE: 97.1 F | HEART RATE: 78 BPM | RESPIRATION RATE: 18 BRPM

## 2022-10-10 PROCEDURE — G0300 HHS/HOSPICE OF LPN EA 15 MIN: HCPCS

## 2022-10-10 PROCEDURE — 400013 HH SOC

## 2022-10-10 PROCEDURE — 3331090002 HH PPS REVENUE DEBIT

## 2022-10-10 PROCEDURE — 3331090001 HH PPS REVENUE CREDIT

## 2022-10-10 NOTE — HOME HEALTH
Skilled reason for visit: Wound Care Treatment     Caregiver involvement: Pt independent with care      Medications reviewed and all medications are available in the home this visit. The following education was provided regarding medications:  MO  MD notified of any discrepancies/look a-like medications/medication interactions: MO  Medications are effecrtive at this time. Home health supplies by type and quantity ordered/delivered this visit include:  Supplies available     Patient education provided this visit:  Reviewed to reported any redness, swelling, warmth, fever, chills, increased heart/respiratory rate, uncontrolled pain/tenderness, drainage:green/yellow/brown, or odor to MD immediately. Wound care completed as ordered images taken and pt is aware of how to help with the healing. Scott education provided: MO     Patient level of understanding of education provided: Yanci Melo all understanding to above education    Skilled Care Performed this visit: Education and Wound Care    Patient response to procedure performed:  NO pain during dressing change     Agency Progress toward goals: Progressing toward interventions above      Patient's Progress towards personal goals: when patient reaches goals and medication is managed, and disease processes are understood patient agrees and understand that discharge will take place.

## 2022-10-11 PROCEDURE — 3331090002 HH PPS REVENUE DEBIT

## 2022-10-11 PROCEDURE — 3331090001 HH PPS REVENUE CREDIT

## 2022-10-12 ENCOUNTER — HOME CARE VISIT (OUTPATIENT)
Dept: SCHEDULING | Facility: HOME HEALTH | Age: 62
End: 2022-10-12
Payer: MEDICARE

## 2022-10-12 VITALS
TEMPERATURE: 98.1 F | HEART RATE: 78 BPM | SYSTOLIC BLOOD PRESSURE: 140 MMHG | RESPIRATION RATE: 18 BRPM | OXYGEN SATURATION: 98 % | DIASTOLIC BLOOD PRESSURE: 74 MMHG

## 2022-10-12 PROCEDURE — G0300 HHS/HOSPICE OF LPN EA 15 MIN: HCPCS

## 2022-10-12 PROCEDURE — 3331090001 HH PPS REVENUE CREDIT

## 2022-10-12 PROCEDURE — 3331090002 HH PPS REVENUE DEBIT

## 2022-10-12 NOTE — HOME HEALTH
Skilled reason for visit: Wound Care Treatment     Caregiver involvement: Pt independent with care      Medications reviewed and all medications are available in the home this visit. The following education was provided regarding medications:  MO  MD notified of any discrepancies/look a-like medications/medication interactions: MO  Medications are effecrtive at this time. Home health supplies by type and quantity ordered/delivered this visit include:  Supplies available   Patient education provided this visit: Report any s/sx of abnormal bleeding to MD immediately/seek medical treatment for any: black tary stools, dark/tea/blood, hemtoma, dizziness, frequent gum/nose bleeds, unusal brusing, or prolong bleeding. reviewed monitoring BS daily and record results. Reviewed to reported any redness, swelling, warmth, fever, chills, increased heart/respiratory rate, uncontrolled pain/tenderness, drainage:green/yellow/brown, or odor to MD immediately. Scott education provided: MO    Patient level of understanding of education provided: Carl Burt all understanding to above education    Skilled Care Performed this visit: Education and Wound Care    Patient response to procedure performed:  NO pain during dressing change     Agency Progress toward goals: Progressing toward interventions above      Patient's Progress towards personal goals: when patient reaches goals and medication is managed, and disease processes are understood patient agrees and understand that discharge will take place.

## 2022-10-13 PROCEDURE — 3331090002 HH PPS REVENUE DEBIT

## 2022-10-13 PROCEDURE — 3331090001 HH PPS REVENUE CREDIT

## 2022-10-14 ENCOUNTER — HOME CARE VISIT (OUTPATIENT)
Dept: SCHEDULING | Facility: HOME HEALTH | Age: 62
End: 2022-10-14
Payer: MEDICARE

## 2022-10-14 PROCEDURE — 3331090001 HH PPS REVENUE CREDIT

## 2022-10-14 PROCEDURE — 3331090002 HH PPS REVENUE DEBIT

## 2022-10-14 PROCEDURE — G0300 HHS/HOSPICE OF LPN EA 15 MIN: HCPCS

## 2022-10-15 VITALS
RESPIRATION RATE: 18 BRPM | HEART RATE: 78 BPM | OXYGEN SATURATION: 98 % | SYSTOLIC BLOOD PRESSURE: 130 MMHG | DIASTOLIC BLOOD PRESSURE: 70 MMHG | TEMPERATURE: 97.3 F

## 2022-10-15 PROCEDURE — 3331090001 HH PPS REVENUE CREDIT

## 2022-10-15 PROCEDURE — 3331090002 HH PPS REVENUE DEBIT

## 2022-10-16 PROCEDURE — 3331090002 HH PPS REVENUE DEBIT

## 2022-10-16 PROCEDURE — 3331090001 HH PPS REVENUE CREDIT

## 2022-10-16 NOTE — HOME HEALTH
Skilled reason for visit:  Wound Care Treatment and education     Caregiver involvement: Pt independent with care      Medications reviewed and all medications are available in the home this visit. The following education was provided regarding medications:  MO LOPES notified of any discrepancies/look a-like medications/medication interactions: MO  Medications are effecrtive at this time. Home health supplies by type and quantity ordered/delivered this visit include:  Supplies available     Patient education provided this visit:  Reviewed to reported any redness, swelling, warmth, fever, chills, increased heart/respiratory rate, uncontrolled pain/tenderness, drainage:green/yellow/brown, or odor to MD immediately. Wound care completed as ordered. Wound is showing signs of improvement as evident in decerase in drainage and size. Called MD office spoke with Pallavi about wound given VO to DC wound VAc and start new wound treatment . OK to decrease visits to 2x a week and plan for discharge at End of Episode. Pt is aware and Images were taken tody at visit. Scott education provided: MO.        Patient level of understanding of education provided: Meenakshi First all understanding to above education    Skilled Care Performed this visit: Education and Wound Care    Patient response to procedure performed:  NO pain during dressing change     Agency Progress toward goals: Progressing toward interventions above      Patient's Progress towards personal goals: when patient reaches goals and medication is managed, and disease processes are understood patient agrees and understand that discharge will take place.

## 2022-10-17 ENCOUNTER — HOME CARE VISIT (OUTPATIENT)
Dept: SCHEDULING | Facility: HOME HEALTH | Age: 62
End: 2022-10-17
Payer: MEDICARE

## 2022-10-17 PROCEDURE — G0300 HHS/HOSPICE OF LPN EA 15 MIN: HCPCS

## 2022-10-17 PROCEDURE — 3331090001 HH PPS REVENUE CREDIT

## 2022-10-17 PROCEDURE — 3331090002 HH PPS REVENUE DEBIT

## 2022-10-17 NOTE — HOME HEALTH
Skilled reason for visit: Wound Care Treatment and Education     Caregiver involvement: Pt independent with care      Medications reviewed and all medications are available in the home this visit. The following education was provided regarding medications:  MO LOPES notified of any discrepancies/look a-like medications/medication interactions: MO  Medications are effecrtive at this time. Home health supplies by type and quantity ordered/delivered this visit include:  Supplies available   Patient education provided this visit:  Reviewed to reported any redness, swelling, warmth, fever, chills, increased heart/respiratory rate, uncontrolled pain/tenderness, drainage:green/yellow/brown, or odor to MD immediately. Measurements and pictures taken wound is showing signs of improvement as evident in decrese in size. Scott education provided: MO    Patient level of understanding of education provided: Emir Ravi all understanding to above education    Skilled Care Performed this visit: Education and Wound Care    Patient response to procedure performed:  NO pain during dressing change     Agency Progress toward goals: Progressing toward interventions above      Patient's Progress towards personal goals: when patient reaches goals and medication is managed, and disease processes are understood patient agrees and understand that discharge will take place.

## 2022-10-18 PROCEDURE — 3331090002 HH PPS REVENUE DEBIT

## 2022-10-18 PROCEDURE — 3331090001 HH PPS REVENUE CREDIT

## 2022-10-19 PROCEDURE — 3331090002 HH PPS REVENUE DEBIT

## 2022-10-19 PROCEDURE — 3331090001 HH PPS REVENUE CREDIT

## 2022-10-20 ENCOUNTER — HOME CARE VISIT (OUTPATIENT)
Dept: SCHEDULING | Facility: HOME HEALTH | Age: 62
End: 2022-10-20
Payer: MEDICARE

## 2022-10-20 VITALS
SYSTOLIC BLOOD PRESSURE: 128 MMHG | RESPIRATION RATE: 18 BRPM | DIASTOLIC BLOOD PRESSURE: 72 MMHG | OXYGEN SATURATION: 98 % | TEMPERATURE: 97.1 F | HEART RATE: 67 BPM

## 2022-10-20 PROCEDURE — 3331090002 HH PPS REVENUE DEBIT

## 2022-10-20 PROCEDURE — G0300 HHS/HOSPICE OF LPN EA 15 MIN: HCPCS

## 2022-10-20 PROCEDURE — 3331090001 HH PPS REVENUE CREDIT

## 2022-10-20 NOTE — HOME HEALTH
Skilled reason for visit: Diabetic education and Wound Care Treatment     Caregiver involvement: Pt independent with care except with wound care due to location       Medications reviewed and all medications are available in the home this visit. The following education was provided regarding medications:  MO LOPES notified of any discrepancies/look a-like medications/medication interactions: MO  Medications are effecrtive at this time. Home health supplies by type and quantity ordered/delivered this visit include:  Supplies available   Patient education provided this visit:  Reviewed to reported any redness, swelling, warmth, fever, chills, increased heart/respiratory rate, uncontrolled pain/tenderness, drainage:green/yellow/brown, or odor to MD immediately. Wound care completed as ordered wound is almost healed s evident indecrese in size    Sharps education provided: MO        Patient level of understanding of education provided: Shadi Frances all understanding to above education    Skilled Care Performed this visit: Education and Wound Care    Patient response to procedure performed:  NO pain during dressing change     Agency Progress toward goals: Progressing toward interventions above      Patient's Progress towards personal goals: when patient reaches goals and medication is managed, and disease processes are understood patient agrees and understand that discharge will take place.

## 2022-10-21 PROCEDURE — 3331090001 HH PPS REVENUE CREDIT

## 2022-10-21 PROCEDURE — 3331090002 HH PPS REVENUE DEBIT

## 2022-10-22 PROCEDURE — 3331090002 HH PPS REVENUE DEBIT

## 2022-10-22 PROCEDURE — 3331090001 HH PPS REVENUE CREDIT

## 2022-10-23 PROCEDURE — 3331090002 HH PPS REVENUE DEBIT

## 2022-10-23 PROCEDURE — 3331090001 HH PPS REVENUE CREDIT

## 2022-10-24 ENCOUNTER — HOME CARE VISIT (OUTPATIENT)
Dept: SCHEDULING | Facility: HOME HEALTH | Age: 62
End: 2022-10-24
Payer: MEDICARE

## 2022-10-24 VITALS
TEMPERATURE: 97.1 F | RESPIRATION RATE: 18 BRPM | DIASTOLIC BLOOD PRESSURE: 68 MMHG | HEART RATE: 78 BPM | OXYGEN SATURATION: 98 % | SYSTOLIC BLOOD PRESSURE: 118 MMHG

## 2022-10-24 PROCEDURE — G0300 HHS/HOSPICE OF LPN EA 15 MIN: HCPCS

## 2022-10-24 PROCEDURE — 3331090001 HH PPS REVENUE CREDIT

## 2022-10-24 PROCEDURE — 3331090002 HH PPS REVENUE DEBIT

## 2022-10-24 NOTE — HOME HEALTH
Skilled reason for visit: Wound Care Treatment  and erducation    Caregiver involvement: Pt independent with care with the exception of wound treatment due to location       Medications reviewed and all medications are available in the home this visit. The following education was provided regarding medications:  MO LOPES notified of any discrepancies/look a-like medications/medication interactions: MO  Medications are effecrtive at this time. Home health supplies by type and quantity ordered/delivered this visit include:  Supplies available   Patient education provided this visit:  Reviewed to reported any redness, swelling, warmth, fever, chills, increased heart/respiratory rate, uncontrolled pain/tenderness, drainage:green/yellow/brown, or odor to MD immediately. Wound is healed at this time, WIll monitor site until end of episode. Scott education provided: MO      Patient level of understanding of education provided: Austin Grimes all understanding to above education    Skilled Care Performed this visit: Education and Wound Care    Patient response to procedure performed: Minimal pain during dressing change     Agency Progress toward goals: Progressing toward interventions above      Patient's Progress towards personal goals: when patient reaches goals and medication is managed, and disease processes are understood patient agrees and understand that discharge will take place.

## 2022-10-25 PROCEDURE — 3331090002 HH PPS REVENUE DEBIT

## 2022-10-25 PROCEDURE — 3331090001 HH PPS REVENUE CREDIT

## 2022-10-26 ENCOUNTER — TELEPHONE (OUTPATIENT)
Dept: CARDIOLOGY CLINIC | Age: 62
End: 2022-10-26

## 2022-10-26 PROCEDURE — 3331090002 HH PPS REVENUE DEBIT

## 2022-10-26 PROCEDURE — 3331090001 HH PPS REVENUE CREDIT

## 2022-10-26 NOTE — TELEPHONE ENCOUNTER
Patient wants to know if she can go back to taking her amiodarone everyday instead 3 times a week .  If Samia Richardson says it is okay then please put in a refill of the meds

## 2022-10-26 NOTE — TELEPHONE ENCOUNTER
Contacted pt at [facility] number. Two patient Identifiers confirmed. Advised pt will review with provider post clinic tomorrow and advise on his recommendations. Pt verbalized understanding.

## 2022-10-27 ENCOUNTER — HOME CARE VISIT (OUTPATIENT)
Dept: SCHEDULING | Facility: HOME HEALTH | Age: 62
End: 2022-10-27
Payer: MEDICARE

## 2022-10-27 VITALS
DIASTOLIC BLOOD PRESSURE: 74 MMHG | TEMPERATURE: 97.1 F | HEART RATE: 67 BPM | SYSTOLIC BLOOD PRESSURE: 128 MMHG | RESPIRATION RATE: 18 BRPM | OXYGEN SATURATION: 97 %

## 2022-10-27 PROCEDURE — 3331090001 HH PPS REVENUE CREDIT

## 2022-10-27 PROCEDURE — G0300 HHS/HOSPICE OF LPN EA 15 MIN: HCPCS

## 2022-10-27 PROCEDURE — 3331090002 HH PPS REVENUE DEBIT

## 2022-10-27 NOTE — TELEPHONE ENCOUNTER
Contacted pt at [facility] number. Two patient Identifiers confirmed. Patient stated she had been having palpitations and would like to start taking the amiodarone 200 mg daily. Pharmacy: [facility] [place] [facility]. Pt verbalized understanding.

## 2022-10-28 PROCEDURE — 3331090002 HH PPS REVENUE DEBIT

## 2022-10-28 PROCEDURE — 3331090001 HH PPS REVENUE CREDIT

## 2022-10-28 RX ORDER — AMIODARONE HYDROCHLORIDE 200 MG/1
200 TABLET ORAL DAILY
Qty: 90 TABLET | Refills: 5 | Status: SHIPPED | OUTPATIENT
Start: 2022-10-28

## 2022-10-28 NOTE — TELEPHONE ENCOUNTER
PCP: Abiola Bartlett MD    Last appt: 9/20/2022  Future Appointments   Date Time Provider Jonh Chávez   10/31/2022  9:00 AM Minna Tapia [address] [facility]   11/4/2022 To Be Determined Gerhardt Guillaume, RN [facility] [address] [facility]   3/21/2023 11:30 AM Lizabeth Benavides MD [facility] BS AMB       Requested Prescriptions     Pending Prescriptions Disp Refills    amiodarone (CORDARONE) 200 mg tablet 90 Tablet 5     Sig: Take 1 Tablet by mouth daily.

## 2022-10-28 NOTE — TELEPHONE ENCOUNTER
Verbal order with read back Phil Linda MD.  Can take amiodarone  200 mg daily      Medication already pended to provider

## 2022-10-28 NOTE — HOME HEALTH
Skilled reason for visit: Wound Care Treatment  and education    Caregiver involvement: Pt independent with care with the exception of wound treatment due to location       Medications reviewed and all medications are available in the home this visit. The following education was provided regarding medications:  MO LOPES notified of any discrepancies/look a-like medications/medication interactions: MO  Medications are effecrtive at this time. Home health supplies by type and quantity ordered/delivered this visit include:  Supplies available   Patient education provided this visit:  Reviewed to reported any redness, swelling, warmth, fever, chills, increased heart/respiratory rate, uncontrolled pain/tenderness, drainage:green/yellow/brown, or odor to MD immediately. Pt is due to see MD on tuesday for laura from surgery. Wound is granulating well with no s/s of infection noted. Sharps education provided: MO    Patient level of understanding of education provided: Francisco J Soto all understanding to above education    Skilled Care Performed this visit: Education and Wound Care    Patient response to procedure performed: Minimal pain during dressing change     Agency Progress toward goals: Progressing toward interventions above      Patient's Progress towards personal goals: when patient reaches goals and medication is managed, and disease processes are understood patient agrees and understand that discharge will take place.

## 2022-10-28 NOTE — TELEPHONE ENCOUNTER
Contacted pt at [facility] number. Two patient Identifiers confirmed. Advised pt per Dr Darlene Bernstein. Pt verbalized understanding.

## 2022-10-29 PROCEDURE — 3331090001 HH PPS REVENUE CREDIT

## 2022-10-29 PROCEDURE — 3331090002 HH PPS REVENUE DEBIT

## 2022-10-30 PROCEDURE — 3331090001 HH PPS REVENUE CREDIT

## 2022-10-30 PROCEDURE — 3331090002 HH PPS REVENUE DEBIT

## 2022-10-31 ENCOUNTER — HOME CARE VISIT (OUTPATIENT)
Dept: SCHEDULING | Facility: HOME HEALTH | Age: 62
End: 2022-10-31
Payer: MEDICARE

## 2022-10-31 VITALS
HEART RATE: 78 BPM | OXYGEN SATURATION: 95 % | TEMPERATURE: 97.1 F | DIASTOLIC BLOOD PRESSURE: 76 MMHG | RESPIRATION RATE: 18 BRPM | SYSTOLIC BLOOD PRESSURE: 132 MMHG

## 2022-10-31 PROCEDURE — 3331090001 HH PPS REVENUE CREDIT

## 2022-10-31 PROCEDURE — G0300 HHS/HOSPICE OF LPN EA 15 MIN: HCPCS

## 2022-10-31 PROCEDURE — 3331090002 HH PPS REVENUE DEBIT

## 2022-10-31 NOTE — HOME HEALTH
Skilled reason for visit: Education     Caregiver involvement: Pt independent with care with the exception of wound treatment due to location       Medications reviewed and all medications are available in the home this visit. The following education was provided regarding medications:  MO LOPES notified of any discrepancies/look a-like medications/medication interactions: MO  Medications are effecrtive at this time. Home health supplies by type and quantity ordered/delivered this visit include:  Supplies available   Patient education provided this visit:  Wound is healed and pt is due to see MD tomorrow and will be discharged from home health care services on thursday. Sharps education provided: MO      Patient level of understanding of education provided: Leopold Primes all understanding to above education    Skilled Care Performed this visit: Education    Patient response to procedure performed: MO     Agency Progress toward goals: Progressing toward interventions above      Patient's Progress towards personal goals: when patient reaches goals and medication is managed, and disease processes are understood patient agrees and understand that discharge will take place.

## 2022-11-01 PROCEDURE — 3331090001 HH PPS REVENUE CREDIT

## 2022-11-01 PROCEDURE — 3331090002 HH PPS REVENUE DEBIT

## 2022-11-02 PROCEDURE — 3331090002 HH PPS REVENUE DEBIT

## 2022-11-02 PROCEDURE — 3331090001 HH PPS REVENUE CREDIT

## 2022-11-03 PROCEDURE — 3331090002 HH PPS REVENUE DEBIT

## 2022-11-03 PROCEDURE — 3331090001 HH PPS REVENUE CREDIT

## 2022-11-04 ENCOUNTER — HOME CARE VISIT (OUTPATIENT)
Dept: SCHEDULING | Facility: HOME HEALTH | Age: 62
End: 2022-11-04
Payer: MEDICARE

## 2022-11-04 VITALS
HEART RATE: 66 BPM | TEMPERATURE: 98 F | WEIGHT: 182 LBS | SYSTOLIC BLOOD PRESSURE: 132 MMHG | DIASTOLIC BLOOD PRESSURE: 74 MMHG | OXYGEN SATURATION: 98 % | RESPIRATION RATE: 17 BRPM | BODY MASS INDEX: 26.88 KG/M2

## 2022-11-04 PROCEDURE — 3331090001 HH PPS REVENUE CREDIT

## 2022-11-04 PROCEDURE — 3331090002 HH PPS REVENUE DEBIT

## 2022-11-04 PROCEDURE — G0299 HHS/HOSPICE OF RN EA 15 MIN: HCPCS

## 2022-11-04 NOTE — HOME HEALTH
Skilled reason for visit:final discharge    Caregiver involvement: independent with all adls/iadls. Medications reviewed and all medications are available in the home this visit. The following education was provided regarding medications:  reviewed all medications in the home with bottles. has list of medications in the home. MD notified of any discrepancies/look a-like medications/medication interactions:   Medications are effective at this time. Home health supplies by type and quantity ordered/delivered this visit include: wound care supplies left in home. Patient education provided this visit: see interventions tab. Sharps education provided: Clinician instructed patient/CG on proper disposal of sharps: Containers should be made of hard plastic, be puncture-resistant and leakproof,   such as a laundry detergent or bleach bottle.  When the container is ¾ full, it should be sealed with tape and labeled   DO NOT RECYCLE prior to discarding in the regular trash.  verbalized understanding and repeat back      Patient level of understanding of education provided: verbalized understanding all discharge teaching. Agency Progress toward goals: all goals met    Patient's Progress towards personal goals: all goals met    Pt.clinically discharged and documentation finalized for completion of agency discharge.

## 2022-12-09 NOTE — TELEPHONE ENCOUNTER
PCP: Melba Meadows MD    Last appt: 9/20/2022  Future Appointments   Date Time Provider Jonh Chávez   12/15/2022  3:00 PM Pierre Covington MD [facility] BS AMB   3/21/2023 11:30 AM Pierre Covingotn MD [facility] BS AMB       Requested Prescriptions     Pending Prescriptions Disp Refills    apixaban (ELIQUIS) 5 mg tablet 180 Tablet 3     Sig: Take 1 Tablet by mouth two (2) times a day.            Other Comments:PAP

## 2022-12-15 ENCOUNTER — OFFICE VISIT (OUTPATIENT)
Dept: CARDIOLOGY CLINIC | Age: 62
End: 2022-12-15
Payer: MEDICARE

## 2022-12-15 VITALS
BODY MASS INDEX: 27.47 KG/M2 | OXYGEN SATURATION: 100 % | WEIGHT: 186 LBS | DIASTOLIC BLOOD PRESSURE: 80 MMHG | HEART RATE: 62 BPM | SYSTOLIC BLOOD PRESSURE: 152 MMHG

## 2022-12-15 DIAGNOSIS — I48.0 PAF (PAROXYSMAL ATRIAL FIBRILLATION) (HCC): ICD-10-CM

## 2022-12-15 DIAGNOSIS — I25.5 ISCHEMIC CARDIOMYOPATHY: ICD-10-CM

## 2022-12-15 DIAGNOSIS — I25.83 CORONARY ARTERY DISEASE DUE TO LIPID RICH PLAQUE: Primary | ICD-10-CM

## 2022-12-15 DIAGNOSIS — I10 ESSENTIAL HYPERTENSION WITH GOAL BLOOD PRESSURE LESS THAN 140/90: ICD-10-CM

## 2022-12-15 DIAGNOSIS — E78.00 PURE HYPERCHOLESTEROLEMIA: ICD-10-CM

## 2022-12-15 DIAGNOSIS — I25.10 CORONARY ARTERY DISEASE DUE TO LIPID RICH PLAQUE: Primary | ICD-10-CM

## 2022-12-15 NOTE — PROGRESS NOTES
is 63 y. o.female with Coronary artery disease status post CABG in November 2010. Cardiomyopathy, hypertension, paroxysmal atrial fibrillation, hyperlipidemia, diabetes, breast cancer status post mastectomy and chemotherapy. She also had thoracic aortic aneurysm status post EVAR in 08/2022. She also has anxiety and depression disorder. She has  tobacco abuse disorder. Ms. France Hartmann is here today for follow-up appointment for her CAD, cardiomyopathy, atrial fibrillation. Patient was admitted to [facility] for repair of thoracic aortic aneurysm. Presented for endovascular thoracic aneurysm repair by Dr. Daysi Ocasio 8/18/2022, which was performed as planned. R groin hematoma, s/p washout by Dr. Tavarez [facility] 8/19  Patient was admitted to hospital in 11/2022 with chest pain. Patient underwent LHC because of abnormal stress test.  LHC in 11/2022 showed native vessel CAD but patent LIMA to LAD and patent triple sequential vein graft. Medical management was recommended    Patient is here today for cardiac evaluation. She denies any cardiac complaint. She is feeling okay. She has cut down smoking to half a pack. She denies any chest pain or chest tightness. She has no specific new complaint at this time.     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    Anxiety     Breast CA ([address] 12/2009    S/P Lt Mastectomy and Chemo    CAD (coronary artery disease) 08/30/2010    S/P CABG X 4 ([address] to LAD, Sequential SVG to D2, Ramus, OM) 01/2011    Cardiomyopathy ([address]     LVEF 35-40 % (08/20) 60% (08/14), 40% (2011)    Chemotherapy 04/2010    for 4 months    CHF (congestive heart failure) (HCC)     Chronic kidney disease     stage 2 per cardiac note cc    Chronic pain     COPD (chronic obstructive pulmonary disease) ([address] 08/30/2010    Depression Diabetes ([facility]     GERD (gastroesophageal reflux disease)     GI bleed     History of blood transfusion     2010 and 1/2022    Hx of heart artery stent 2010    x 10  (4, then 4, then 2)    Hyperlipidemia     Hypertension     MI (myocardial infarction) ([facility] 2009    x 3    Schatzki's ring     S/P EGD and Dilation (07/16)    Thyroid disease     hypothyroid    Tobacco abuse      PSH:  Past Surgical History:   Procedure Laterality Date    COLONOSCOPY N/A 5/3/2017    COLON  performed by Marlene Perez MD at [facility] ENDOSCOPY    COLONOSCOPY N/A 2/15/2021    COLONOSCOPY performed by Elena Santizo MD at [facility] ENDOSCOPY    HX APPENDECTOMY      HX CHOLECYSTECTOMY      HX GYN      tubal ligation    HX HEART CATHETERIZATION  11/11/09; 10/21/09    HX HEART CATHETERIZATION  10/15/10; 10/06/09    left heart    HX HEART CATHETERIZATION  11/7/2011    left heart cath, no new findings    HX OTHER SURGICAL  4/6/2010    Insertion right internal jugular single lumen MediPort. HX RADICAL MASTECTOMY Left 2/2010    left, with chemo    NM BREAST SURGERY PROCEDURE UNLISTED  2/22/10    left w/sentinel node bx    NM CABG, ARTERY-VEIN, FOUR  12/2010    NM CARDIAC SURG PROCEDURE UNLIST      stent placement before CABG    NM CHEST SURGERY PROCEDURE UNLISTED Right     Prior port placement     MEDS:  Current Outpatient Medications on File Prior to Visit   Medication Sig Dispense Refill    apixaban (ELIQUIS) 5 mg tablet Take 1 Tablet by mouth two (2) times a day. 180 Tablet 3    sacubitriL-valsartan (Entresto) 24-26 mg tablet Take 1 Tablet by mouth two (2) times a day. bumetanide (BUMEX) 1 mg tablet Take 1 Tablet by mouth as needed for PRN Reason (Other) (leg edema). (Patient taking differently: Take 1 mg by mouth daily.) 30 Tablet 0    rosuvastatin (Crestor) 20 mg tablet Take 1 Tablet by mouth nightly. 30 Tablet 0    metoprolol succinate (TOPROL-XL) 25 mg XL tablet Take 1 Tablet by mouth daily.  60 Tablet 5    famotidine (PEPCID) 20 mg tablet Take 1 Tablet by mouth daily. 15 Tablet 0    nitroglycerin (NITROSTAT) 0.4 mg SL tablet 1 Tablet by SubLINGual route every five (5) minutes as needed for Chest Pain. (Patient taking differently: 0.4 mg by SubLINGual route every five (5) minutes as needed for Chest Pain. dont exceed 3 doses.) 25 Tablet 5    levothyroxine (SYNTHROID) 175 mcg tablet Take 175 mcg by mouth Daily (before breakfast). nystatin (MYCOSTATIN) topical cream Apply 30 g to affected area two (2) times daily as needed for Itching or Skin Irritation. amiodarone (CORDARONE) 200 mg tablet Take 1 Tablet by mouth daily. (Patient not taking: Reported on 12/15/2022) 90 Tablet 5    amoxicillin (AMOXIL) 875 mg tablet Take 875 mg by mouth two (2) times a day. acetaminophen (TYLENOL) 500 mg tablet Take 500 mg by mouth every six (6) hours as needed for Fever or Pain.      polyethylene glycol (MIRALAX) 17 gram packet Take 1 Packet by mouth daily. 30 Packet 0    QUEtiapine SR (SEROquel XR) 50 mg sr tablet Take 1 Tablet by mouth daily. (Patient taking differently: Take 50 mg by mouth nightly as needed for PRN Reason (Other) (sleep). ) 30 Tablet 0    budesonide-formoteroL (Symbicort) 160-4.5 mcg/actuation HFAA Take 2 Puffs by inhalation two (2) times a day. 1 Each 1    esomeprazole (NEXIUM) 40 mg capsule Take 40 mg by mouth daily. metFORMIN (GLUMETZA ER) 500 mg TG24 24 hour tablet Take 500 mg by mouth two (2) times a day. albuterol-ipratropium (DUO-NEB) 2.5 mg-0.5 mg/3 ml nebu Take 3 mL by inhalation every six (6) hours as needed for Cough, Respiratory Distress, Shortness of Breath or Wheezing. Use every night      albuterol (PROVENTIL HFA, VENTOLIN HFA) 90 mcg/actuation inhaler Take 1 Puff by inhalation every four (4) hours as needed for Cough, Respiratory Distress, Shortness of Breath or Wheezing. levothyroxine (SYNTHROID) 150 mcg tablet Take 150 mcg by mouth Daily (before breakfast).          No current facility-administered medications on file prior to visit. Alleres and Sensitivities:  Allergies   Allergen Reactions    Shellfish Derived Hives     Eyes and Throat swelling       Family History:  Family History   Problem Relation Age of Onset    Hypertension Mother     Diabetes Mother     Breast Problems Mother         fiber cystic    Hypertension Other      Social History:  Social History     Tobacco Use    Smoking status: Every Day     Packs/day: 1.00     Years: 40.00     Pack years: 40.00     Types: Cigarettes    Smokeless tobacco: Never   Vaping Use    Vaping Use: Never used   Substance Use Topics    Alcohol use: No    Drug use: No     Physical Exam:  BP Readings from Last 3 Encounters:   12/15/22 (!) 171/78   11/04/22 132/74   10/31/22 132/76     Pulse Readings from Last 3 Encounters:   12/15/22 (!) 58   11/04/22 66   10/31/22 78     Wt Readings from Last 3 Encounters:   12/15/22 84.4 kg (186 lb)   11/04/22 82.6 kg (182 lb)   09/20/22 94.8 kg (209 lb)     Constitutional: Oriented to person, place, and time. HENT: Head: Normocephalic and atraumatic. Neck: No JVD present. Cardiovascular: Regular rhythm. No, gallop or rubs appreciated. OLIVIA 2/6 base of heart, carotid radiation  Lung[de-identified] Breath sounds normal. No respiratory distress. No ronchi or rales appreciated  Abdominal: No tenderness. No rebound and no guarding. Musculoskeletal: No LE edema     Review of Data:  EKG: (06/2011)Sinus rhythm at 73 bpm. No Dynamic ST changes of ischemia. No Significant Q waves. EKG (6/1/12) : Sinus rhythm at 66 beats per minute. No dynamic ST changes of ischemia. Some nonspecific T wave inversion in precordial leads. Unchanged from prior EKG.     LABS:   Lab Results   Component Value Date/Time    Sodium 139 09/05/2022 06:49 AM    Potassium 3.9 09/05/2022 06:49 AM    Chloride 105 09/05/2022 06:49 AM    CO2 25 09/05/2022 06:49 AM    Glucose 188 (H) 09/05/2022 06:49 AM    BUN 31 (H) 09/05/2022 06:49 AM    Creatinine 1.21 09/05/2022 06:49 AM Lab Results   Component Value Date/Time    Cholesterol, total 130 09/03/2022 06:35 AM    HDL Cholesterol 29 (L) 09/03/2022 06:35 AM    LDL, calculated 78.6 09/03/2022 06:35 AM    Triglyceride 112 09/03/2022 06:35 AM    CHOL/HDL Ratio 4.5 09/03/2022 06:35 AM       No components found for: GPT    Lab Results   Component Value Date/Time    Hemoglobin A1c 6.1 (H) 08/18/2022 07:12 AM     FLP: (2/2012) , , LDL 51, HDL 22    ECHO:(04/2011)  LVEF 40%, Global hypokinesis    HOLTER (11/19)  Nakul Leiva was in Normal Sinus. The average heart rate, excluding ectopy, was 54 BPM with a minimum of 48 BPM at 04:54 D3 and a maximum of 75 BPM at 05:14 D2. Heart beats, including ectopy, totaled 806830 beats. VENTRICULAR ECTOPICS totaled 225 averaging 4.8 per hour with 222 single, 0 paired, 0 trigeminy and 0 R on T. There was 1 VENTRICULAR TACHYCARDIA with 3 beats 03:34 D3 at a rate of 66 BPM.  SUPRAVENTRICULAR ECTOPICS totaled 308 averaging 6.6 per hour ,with 265 single and 24 paired beats. Patient diary was submitted symptoms noted, no patient triggered events noted. Reported \"Shortness of breath\". Rhythm was sinus and HR less than 100 bpm    ECHO (11/2022)  CONCLUSIONS     * Left ventricular systolic function is reduced with an ejection fraction of 44 % by Mayfield's biplane. * Global hypokinesis of the left ventricle. * Left ventricular chamber volume is severely enlarged. * There is concentric left ventricular hypertrophy with a moderately thickened septal wall and severely thickened posterior wall. * Grade I left ventricular diastolic dysfunction. * Right ventricular systolic function is reduced with TAPSE measuring 1.26 cm. * Right ventricular chamber dimension is mildly enlarged. * Left atrial chamber is mildly enlarged with a left atrial volume index of 39.11 ml/m^2.      * Unable to estimate pulmonary arterial pressure due to inadequate tricuspid regurgitant Doppler waveforms. STRESS TEST (2022)  CONCLUSIONS     * Myocardial perfusion imaging is abnormal.     * This is consistent with inferior wall infarction /and lateral wall ischemia. * ABNormal left ventricular function with calculated LVEF 32 %. * Stress SPECT tomographic images reveal large size area of decreased activity in the mid inferior and apical inferior segment(s), which is severe   in intensity and defect is fixed in rest images. There is also decrease activityint her lateral wall whic improves on the rest images consistent with ischemia. * Gated wall motion study shows hypokinesis of mid inferior. CARDIAC CATH(2022)  Left Main: WNL   Left Anterior Descendin%   Left Circumflex 100%   Right Coronary Artery: PATENT STENTS 50% OSTIAL LV BRANCH   LIMA PATENT   TRIPLE SEQUENTIAL VEIN GRAFT PATENT   Left ventriculogram: EF 40%   Imp/Plan: MEDICAL RX      Impression / Plan:  Ms. Jens Saenz is a pleasant 63 y.o. female who is here for the follow up appointment. Coronary artery disease:    Four vessel CABG in . Nuclear stress test May 2019 which showed inferior scar, no ischemia. Continue with medical management. Suboptimal candidate for invasive management at this time because of recurrent anemia requiring blood transfusion  NST in 2022, intermediate risk  LHC in 2022 with severe native vessel disease but patent LIMA to LAD and triple sequential vein graft was also patent. Medical management    Currently without any chest pain or chest tightness  On  Statin, BB. She is on Eliquis for stroke prophylaxis. Aspirin was discontinued because of recurrent anemia    Cardiomyopathy:  Remote history of ejection fraction of 45%. Ejection fraction 50-55% in 2018 . Last LVEF 40% in 2019 in setting of A. Fib  LVEF 35-40 percent in 2020.   Echo in 2022 with LVEF 45-50%  Echo in 2022 with EF 44%  Patient was on lisinopril however this was discontinued in 10/2021 because of elevated creatinine up to 1.8. Currently patient is taking Bumex, metoprolol and Entresto. Atrial fibrillation:  Diagnosed in October 2018. Patient had recurrent anemia requiring blood transfusion in 2021 and again in 01/2022. She is moderate risk for stroke because of moderate chads 2 vascular score. on metoprolol and apixaban. Continue same  Amiodarone was discontinued because of bradycardia and prolonged QT in 11/2022  Watchman was considered however with recent vascular event, she would like to wait. Continue with Eliquis. We will revisit this device implantation in the future. Patient does not want watchman device at this time     Hypertension: /78. Repeat blood pressure was 148/62. Jacinta Crumble Continue metoprolol and Entresto. Thoracic aneurysm  Presented for endovascular thoracic aneurysm repair by Dr. John Berumen 8/18/2022, which was performed as planned however patient had right groin R groin hematoma, s/p washout by Dr. John Berumen 8/19/2022. Will defer management to vascular team    Hyperlipidemia:  Continue Crestor 20 mg daily. Last LDL 28.    DM Goal A1C less than 7 from CV standpoint. Defer management to PCP. Goal hemoglobin A1c less than 7 is recommended from cardiology standpoint    This plan was discussed with Ms. Savanna Sandoval in detail, who is in agreement. Please do not hesitate to call me if you have any questions or concerns.

## 2022-12-15 NOTE — PROGRESS NOTES
Identified pt with two pt identifiers(name and ). Reviewed record in preparation for visit and have obtained necessary documentation. Varsha Villarreal presents today for   Chief Complaint   Patient presents with    Follow-up       Pt denies DIZZINESS, SOB, CHEST PAIN/ PRESSURE, FATIGUE/WEAKNESS, HEADACHES, SWELLING. Chadwick Runner Longtin preferred language for health care discussion is english/other. Personal Protective Equipment:   Personal Protective Equipment was used including: mask-surgical and hands-gloves. Patient was placed on no precaution(s). Patient was masked. Precautions:   Patient currently on None  Patient currently roomed with door closed. Is someone accompanying this pt? no    Is the patient using any DME equipment during [address]? no    Depression Screening:  3 most recent PHQ Screens 2022   Little interest or pleasure in doing things Not at all   Feeling down, depressed, irritable, or hopeless Not at all   Total Score PHQ 2 0       Learning Assessment:  Learning Assessment 10/16/2014   PRIMARY LEARNER Patient   HIGHEST LEVEL OF EDUCATION - PRIMARY LEARNER  GRADUATED HIGH SCHOOL OR GED   BARRIERS PRIMARY LEARNER NONE   CO-LEARNER CAREGIVER No   PRIMARY LANGUAGE ENGLISH   LEARNER PREFERENCE PRIMARY VIDEOS   ANSWERED BY Luis A Leiva   RELATIONSHIP SELF       Abuse Screening:  Abuse Screening Questionnaire 3/7/2022   Do you ever feel afraid of your partner? N   Are you in a relationship with someone who physically or mentally threatens you? N   Is it safe for you to go home? Y       Fall Risk  No flowsheet data found. Pt currently taking Anticoagulant therapy? yes  Pt currently taking Antiplatelet therapy? no    Coordination of Care:  1. Have you been to the ER, urgent care clinic since your last visit? Hospitalized since your last visit? Yes,  for STEMI  no  2.  Have you seen or consulted any other health care providers outside of the [address] [place] [address] since your last visit? Include any pap smears or colon screening. Please see Red banners under Allergies and Med Rec to remove outside inquires. All correct information has been verified with patient and added to chart.      Medication's patient's would liked removed has been marked not taking to be removed per Verbal order and read back per Dariel Da Silva MD

## 2022-12-15 NOTE — LETTER
12/15/2022    Patient: Sunita Leiva   YOB: 1960   Date of Visit: 12/15/2022     Yvette Higginbotham MD  [address] [address]  Via Fax: [phone]    Dear Yvette Higginbotham MD,      Thank you for referring Ms. Ramakrishna Lindsey to [address] [place] [address] for evaluation. My notes for this consultation are attached. If you have questions, please do not hesitate to call me. I look forward to following your patient along with you.       Sincerely,    Zoila Burciaga MD

## 2022-12-29 ENCOUNTER — TELEPHONE (OUTPATIENT)
Dept: CARDIOLOGY CLINIC | Age: 62
End: 2022-12-29

## 2022-12-29 NOTE — TELEPHONE ENCOUNTER
Pt in office with . Advised  to tell pt that application was sent and noted to start processing first of January 2023. Pts  verbalized understanding.

## 2023-01-16 RX ORDER — ROSUVASTATIN CALCIUM 20 MG/1
TABLET, COATED ORAL
Qty: 90 TABLET | Refills: 3 | Status: SHIPPED | OUTPATIENT
Start: 2023-01-16

## 2023-01-31 DIAGNOSIS — Z12.31 ENCOUNTER FOR SCREENING MAMMOGRAM FOR BREAST CANCER: Primary | ICD-10-CM

## 2023-02-03 DIAGNOSIS — Z12.31 ENCOUNTER FOR SCREENING MAMMOGRAM FOR BREAST CANCER: Primary | ICD-10-CM

## 2023-02-06 DIAGNOSIS — Z12.31 ENCOUNTER FOR SCREENING MAMMOGRAM FOR BREAST CANCER: Primary | ICD-10-CM

## 2023-02-23 RX ORDER — SACUBITRIL AND VALSARTAN 24; 26 MG/1; MG/1
1 TABLET, FILM COATED ORAL 2 TIMES DAILY
COMMUNITY
End: 2023-02-24 | Stop reason: SDUPTHER

## 2023-02-23 NOTE — TELEPHONE ENCOUNTER
PCP: Stephany Forman MD    Last appt: [unfilled]  Future Appointments   Date Time Provider Haris Salinas   6/13/2023  2:15 PM Larry Mcguire MD [facility] BS AMB       Requested Prescriptions     Pending Prescriptions Disp Refills    sacubitril-valsartan (ENTRESTO) 24-26 MG per tablet 180 tablet 1     Sig: Take 1 tablet by mouth 2 times daily

## 2023-02-24 RX ORDER — SACUBITRIL AND VALSARTAN 24; 26 MG/1; MG/1
1 TABLET, FILM COATED ORAL 2 TIMES DAILY
Qty: 180 TABLET | Refills: 2 | OUTPATIENT
Start: 2023-02-24

## 2023-03-10 RX ORDER — SACUBITRIL AND VALSARTAN 24; 26 MG/1; MG/1
1 TABLET, FILM COATED ORAL 2 TIMES DAILY
Qty: 180 TABLET | Refills: 3 | Status: SHIPPED | OUTPATIENT
Start: 2023-03-10

## 2023-03-10 NOTE — TELEPHONE ENCOUNTER
PCP: Ericka Phillips MD    Last appt: [unfilled]  Future Appointments   Date Time Provider Haris Salinas   6/13/2023  2:15 PM Larry Valera MD [facility] BS AMB       Requested Prescriptions     Pending Prescriptions Disp Refills    sacubitril-valsartan (ENTRESTO) 24-26 MG per tablet 180 tablet 3     Sig: Take 1 tablet by mouth 2 times daily

## 2023-05-15 ENCOUNTER — TELEPHONE (OUTPATIENT)
Age: 63
End: 2023-05-15

## 2023-05-16 NOTE — TELEPHONE ENCOUNTER
MD Kelvin Jloly, [facility]  Caller: Unspecified (Today,  9:11 AM)  If BP is running high persistently, increase the dose of Entresto and double it up   Keep checking blood pressure and call us back

## 2023-05-16 NOTE — TELEPHONE ENCOUNTER
Patient called again this morning in regards to her BP. It was 213/91 this morning. She was curious to see if we could increase her Toprol and she was wondering about her Amio if she could restart it. Please advise. I explained to her to go to the ED if her pressure stays elevated before getting a response back.

## 2023-05-16 NOTE — TELEPHONE ENCOUNTER
Spoke to patient. Patient will take 2 tabs BID until she gets her new script of 49/51mg of Entresto. Explained to keep checking BP while taking Entresto. Will try to reach out Thursday, if not Friday for an update.

## 2023-05-22 ENCOUNTER — TELEPHONE (OUTPATIENT)
Age: 63
End: 2023-05-22

## 2023-05-22 NOTE — TELEPHONE ENCOUNTER
Contacted pt at [facility]. Two patient Identifiers confirmed. Pt stated her BP is elevated int he am but drops in the afternoon. Patient state she is taking toprol 25 mg ER in the am. In the pm pt stated her /80 and 133/73 with HR rate around 60-65. In the am 208/101 and 207/97. Informed pt I would reviewed with provider and advise. Pt stated she would like a tem p supply sent to local pharmacy and regular refills to [facility].

## 2023-05-22 NOTE — TELEPHONE ENCOUNTER
Pt is calling to get information in regards to entresto and BP. She stated that in the morning her BP is high but during the day it improves just a little bit. Please call.

## 2023-05-23 NOTE — TELEPHONE ENCOUNTER
Attempted to contact pt at  number, no answer. Mailbox not set up yet. Will continue to try to contact pt.

## 2023-05-25 RX ORDER — METOPROLOL SUCCINATE 25 MG/1
25 TABLET, EXTENDED RELEASE ORAL 2 TIMES DAILY
Qty: 60 TABLET | Refills: 5 | Status: SHIPPED | OUTPATIENT
Start: 2023-05-25

## 2023-05-25 NOTE — TELEPHONE ENCOUNTER
Contacted pt at [facility] number. Two patient Identifiers confirmed. Advised pt per . Pt verbalized understanding.

## 2023-06-08 ENCOUNTER — OFFICE VISIT (OUTPATIENT)
Age: 63
End: 2023-06-08
Payer: COMMERCIAL

## 2023-06-08 VITALS
SYSTOLIC BLOOD PRESSURE: 180 MMHG | HEART RATE: 55 BPM | WEIGHT: 185 LBS | OXYGEN SATURATION: 98 % | DIASTOLIC BLOOD PRESSURE: 92 MMHG | BODY MASS INDEX: 27.32 KG/M2

## 2023-06-08 DIAGNOSIS — I25.10 ATHEROSCLEROSIS OF NATIVE CORONARY ARTERY WITHOUT ANGINA PECTORIS, UNSPECIFIED WHETHER NATIVE OR TRANSPLANTED HEART: ICD-10-CM

## 2023-06-08 DIAGNOSIS — I25.83 CORONARY ATHEROSCLEROSIS DUE TO LIPID RICH PLAQUE (CODE): Primary | ICD-10-CM

## 2023-06-08 DIAGNOSIS — I70.1 RENAL ARTERY STENOSIS (HCC): ICD-10-CM

## 2023-06-08 PROCEDURE — 3080F DIAST BP >= 90 MM HG: CPT | Performed by: INTERNAL MEDICINE

## 2023-06-08 PROCEDURE — 99214 OFFICE O/P EST MOD 30 MIN: CPT | Performed by: INTERNAL MEDICINE

## 2023-06-08 PROCEDURE — 3077F SYST BP >= 140 MM HG: CPT | Performed by: INTERNAL MEDICINE

## 2023-06-08 ASSESSMENT — PATIENT HEALTH QUESTIONNAIRE - PHQ9
SUM OF ALL RESPONSES TO PHQ9 QUESTIONS 1 & 2: 0
2. FEELING DOWN, DEPRESSED OR HOPELESS: 0
SUM OF ALL RESPONSES TO PHQ QUESTIONS 1-9: 0
1. LITTLE INTEREST OR PLEASURE IN DOING THINGS: 0
SUM OF ALL RESPONSES TO PHQ QUESTIONS 1-9: 0

## 2023-06-08 NOTE — PROGRESS NOTES
Identified pt with two pt identifiers(name and ). Reviewed record in preparation for visit and have obtained necessary documentation. Ary Lam presents today for   Chief Complaint   Patient presents with    Follow-up     6 month        Pt denies  DIZZINESS, SOB, CHEST PAIN/ PRESSURE, FATIGUE/WEAKNESS, HEADACHES, SWELLING. Kristina Vasquez preferred language for health care discussion is english/other. Precautions:   Patient currently on None  Patient currently roomed with door closed. Is someone accompanying this pt?  no    Is the patient using any DME equipment during [address]? No     Depression Screening:  completed    Abuse Screening:  completed        Pt currently taking Anticoagulant therapy? yes  Pt currently taking Antiplatelet therapy? No     Coordination of Care:  1. Have you been to the ER, urgent care clinic since your last visit? Hospitalized since your last visit? No     2. Have you seen or consulted any other health care providers outside of the [address] [place] [address] since your last visit? Include any pap smears or colon screening.  No

## 2023-06-08 NOTE — PATIENT INSTRUCTIONS
Testing   Echo limited in 6 months       **call office 3-5 days after testing is completed for results** Please ensure testing is completed prior to scheduled follow up appointment. If it is not completed your appointment may be rescheduled**    New Medication/Medication Changes  Entreso 97/103 tab twice a day    **please allow 24-48 hrs for medication to be escribed to pharmacy** If you need any refills on medications please contact your pharmacy so that the request can be escribed to the provider for review seven to 10 days prior to being out of medication.       Other Testing  Renal Doppler

## 2023-06-08 NOTE — PROGRESS NOTES
[facility]    Krupa Garcia is 63 y.o. female with Coronary artery disease status post CABG in November 2010. Cardiomyopathy, hypertension, paroxysmal atrial fibrillation, hyperlipidemia, diabetes, breast cancer status post mastectomy and chemotherapy. She also had thoracic aortic aneurysm status post EVAR in 08/2022. She also has anxiety and depression disorder. She has  tobacco abuse disorder. Ms. Verónica Orona is here today for follow-up appointment for her CAD, cardiomyopathy, atrial fibrillation. [facility] for repair of thoracic aortic aneurysm. Presented for endovascular thoracic aneurysm repair by Dr. Marcella Huitron 8/18/2022, which was performed as planned. R groin hematoma, s/p washout by Dr. Marcella Huitron 8/19  Patient was admitted to hospital in 11/2022 with chest pain. Patient underwent LHC because of abnormal stress test.  LHC in 11/2022 showed native vessel CAD but patent LIMA to LAD and patent triple sequential vein graft. Medical management was recommended    Patient is here today for cardiac evaluation. She denies any cardiac complaint. She is feeling okay. Unfortunately she continues to smoke. No symptoms to suggest angina or heart failure  She has been having some headache and high blood pressure reading as high as 669 mmHg systolic.   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

## 2023-09-06 ENCOUNTER — TELEPHONE (OUTPATIENT)
Age: 63
End: 2023-09-06

## 2023-09-06 NOTE — TELEPHONE ENCOUNTER
. Ashley Beavers has a question about a certain medication that she's been taking on and off lately and she seems to have had some flare ups recently. She want's to know if Dr. Waldo Carter would like her to resume taking it or to stay off. The Apixaban.

## 2023-09-07 NOTE — TELEPHONE ENCOUNTER
Contacted pt at [facility] number. Two patient Identifiers confirmed. Pt stated she has started back having s/s of atrial fib at night \"heart pounding. \" She is wondering if she should restart amiodarone. Will review with Dr Maryjo Shultz. Pt stated she does not have availability tomorrow due to her husbands appts but will try to come in tomorrow for ekg.

## 2023-09-08 ENCOUNTER — OFFICE VISIT (OUTPATIENT)
Age: 63
End: 2023-09-08
Payer: COMMERCIAL

## 2023-09-08 DIAGNOSIS — I25.83 CORONARY ATHEROSCLEROSIS DUE TO LIPID RICH PLAQUE (CODE): Primary | ICD-10-CM

## 2023-09-08 DIAGNOSIS — I48.0 PAROXYSMAL ATRIAL FIBRILLATION (HCC): ICD-10-CM

## 2023-09-08 PROCEDURE — 93000 ELECTROCARDIOGRAM COMPLETE: CPT | Performed by: INTERNAL MEDICINE

## 2023-09-08 NOTE — PROGRESS NOTES
Identified pt with two pt identifiers(name and ). Reviewed record in preparation for visit and have obtained necessary documentation. Jc Vu presents today for   Chief Complaint   Patient presents with    EKG Only       Pt not in atrial fib.

## 2023-09-13 DIAGNOSIS — I25.83 CORONARY ATHEROSCLEROSIS DUE TO LIPID RICH PLAQUE (CODE): Primary | ICD-10-CM

## 2023-09-13 DIAGNOSIS — I48.0 PAROXYSMAL ATRIAL FIBRILLATION (HCC): ICD-10-CM

## 2024-02-02 ENCOUNTER — TELEPHONE (OUTPATIENT)
Age: 64
End: 2024-02-02

## 2024-02-02 NOTE — TELEPHONE ENCOUNTER
Went to primary doctor and was put on hydralizine 25mg 3times a day. She wants to know if it's ok with Dr. Murillo to take this for her blood pressure

## 2024-02-06 NOTE — TELEPHONE ENCOUNTER
Contacted pt at  number. Two patient Identifiers confirmed. Informed pt per Dr Murillo. Pt verbalized understanding.

## 2024-04-15 ENCOUNTER — TELEPHONE (OUTPATIENT)
Age: 64
End: 2024-04-15

## 2024-04-15 NOTE — TELEPHONE ENCOUNTER
----- Message from Larry FINNEY MD sent at 4/11/2024  8:16 AM EDT -----  Please have her come see EG next week    She has difficult to control HTN and she has CTA last year showing bilateral renal artery stenosis    Needs medication adnustment and discussion regarding vascular consult.     Thanks

## 2024-04-19 NOTE — TELEPHONE ENCOUNTER
Attempted to contact pt at  number, no answer. Lvm for pt to return call to office at [phone]. Will continue to try to contact pt.

## 2024-05-02 ENCOUNTER — TELEPHONE (OUTPATIENT)
Age: 64
End: 2024-05-02

## 2024-05-03 NOTE — TELEPHONE ENCOUNTER
Contacted pt at  number.  Two patient Identifiers confirmed. Pt assistance paperwork to be left at [facility] desk for patient p/u. Pts verbalized understanding.

## 2024-05-16 ENCOUNTER — OFFICE VISIT (OUTPATIENT)
Age: 64
End: 2024-05-16

## 2024-05-16 VITALS
WEIGHT: 196 LBS | HEART RATE: 62 BPM | DIASTOLIC BLOOD PRESSURE: 70 MMHG | SYSTOLIC BLOOD PRESSURE: 176 MMHG | BODY MASS INDEX: 28.94 KG/M2 | OXYGEN SATURATION: 98 %

## 2024-05-16 DIAGNOSIS — I70.1 RENAL ARTERY STENOSIS (HCC): ICD-10-CM

## 2024-05-16 DIAGNOSIS — I25.83 CORONARY ATHEROSCLEROSIS DUE TO LIPID RICH PLAQUE (CODE): ICD-10-CM

## 2024-05-16 DIAGNOSIS — Z79.01 LONG TERM (CURRENT) USE OF ANTICOAGULANTS: ICD-10-CM

## 2024-05-16 DIAGNOSIS — F17.200 TOBACCO DEPENDENCE: ICD-10-CM

## 2024-05-16 DIAGNOSIS — I25.5 ISCHEMIC CARDIOMYOPATHY: ICD-10-CM

## 2024-05-16 DIAGNOSIS — I77.1 ARTERIAL STENOSIS (HCC): ICD-10-CM

## 2024-05-16 DIAGNOSIS — R01.1 HEART MURMUR: Primary | ICD-10-CM

## 2024-05-16 DIAGNOSIS — I15.0 RENOVASCULAR HYPERTENSION: ICD-10-CM

## 2024-05-16 DIAGNOSIS — I48.0 PAROXYSMAL ATRIAL FIBRILLATION (HCC): ICD-10-CM

## 2024-05-16 DIAGNOSIS — I10 HYPERTENSION, UNSPECIFIED TYPE: ICD-10-CM

## 2024-05-16 RX ORDER — CARVEDILOL 3.12 MG/1
3.12 TABLET ORAL 2 TIMES DAILY
Qty: 60 TABLET | Refills: 3 | Status: SHIPPED | OUTPATIENT
Start: 2024-05-16

## 2024-05-16 RX ORDER — HYDRALAZINE HYDROCHLORIDE 50 MG/1
50 TABLET, FILM COATED ORAL 3 TIMES DAILY
COMMUNITY

## 2024-05-16 NOTE — PATIENT INSTRUCTIONS
Testing   Echo  **call office 3-5 days after testing is completed for results** Please ensure testing is completed prior to scheduled follow up appointment. If it is not completed your appointment may be rescheduled**    New Medication/Medication Changes  Stop toprol   Start coreg 3.125 mg tab twice a day    **please allow 24-48 hrs for medication to be escribed to pharmacy** If you need any refills on medications please contact your pharmacy so that the request can be escribed to the provider for review seven to 10 days prior to being out of medication.      Other   BP x 1 month  Referral to Vascular

## 2024-05-16 NOTE — PROGRESS NOTES
Identified pt with two pt identifiers(name and ). Reviewed record in preparation for visit and have obtained necessary documentation.    Liliana Vasquez presents today for   Chief Complaint   Patient presents with    Follow-up     elevated       Pt denied  DIZZINESS, SOB, CHEST PAIN/ PRESSURE, FATIGUE/WEAKNESS, HEADACHES, SWELLING.             Liliana Vasquez preferred language for health care discussion is english/other.    Personal Protective Equipment:   Personal Protective Equipment was used including: mask-surgical and hands-gloves. Patient was placed on no precaution(s). Patient was masked.    Precautions:   Patient currently on None  Patient currently roomed with door closed.    Is someone accompanying this pt? no    Is the patient using any DME equipment during OV? No     Depression Screenin/8/2023    10:17 AM 2022    11:11 AM 2022     8:57 AM 3/31/2022     8:16 AM   PHQ-9 Questionaire   Little interest or pleasure in doing things 0 0 0 0   Feeling down, depressed, or hopeless 0 0 0 0   PHQ-9 Total Score 0 0 0 0        Learning Assessment:  No question data found.    Abuse Screening:  Completed      Fall Risk  Completed      Pt currently taking Anticoagulant /Antiplatelet therapy? Eliquis and aspirin    Coordination of Care:  1. Have you been to the ER, urgent care clinic since your last visit? Hospitalized since your last visit? no    2. Have you seen or consulted any other health care providers outside of the [facility] since your last visit? Include any pap smears or colon screening. no      Please see Red banners under Allergies and Med Rec to remove outside inquires. All correct information has been verified with patient and added to chart.     Medication's patient's would liked removed has been marked not taking to be removed per Verbal order and read back per Wendy Love PA-C  
[facility]     Please note: This document has been produced using voice recognition software. Unrecognized errors in transcription may be present.

## 2024-05-20 ENCOUNTER — TELEPHONE (OUTPATIENT)
Age: 64
End: 2024-05-20

## 2024-05-20 NOTE — TELEPHONE ENCOUNTER
Patient was in last week and had a new prescription prescribed to her and she is wanting more information about it and would like a call from the nurse

## 2024-05-21 NOTE — TELEPHONE ENCOUNTER
Contacted pt at  number. Two patient Identifiers confirmed. Pt stated she was concerned about coreg. Issue resolved.   Pt verbalized understanding.

## 2024-05-29 ENCOUNTER — TELEPHONE (OUTPATIENT)
Age: 64
End: 2024-05-29

## 2024-05-31 NOTE — TELEPHONE ENCOUNTER
Contacted pt at  number.  Two patient Identifiers confirmed. Pt stated that her headaches have resolved. Inforned pt to increase water intake around the time she take medication.  Pt verbalized understanding.     
Patient called and said she has pounding headaches all day long and her heart rate is higher than normal.  She is frightened that the medications are not working and would like a call from the nurse to figure out what's going on or what she can do to remedy the situation.  
Stable

## 2024-06-18 ENCOUNTER — OFFICE VISIT (OUTPATIENT)
Age: 64
End: 2024-06-18

## 2024-06-18 VITALS
SYSTOLIC BLOOD PRESSURE: 166 MMHG | OXYGEN SATURATION: 99 % | WEIGHT: 198 LBS | BODY MASS INDEX: 29.24 KG/M2 | HEART RATE: 69 BPM | DIASTOLIC BLOOD PRESSURE: 70 MMHG

## 2024-06-18 DIAGNOSIS — I25.5 ISCHEMIC CARDIOMYOPATHY: ICD-10-CM

## 2024-06-18 DIAGNOSIS — I70.1 RENAL ARTERY STENOSIS (HCC): ICD-10-CM

## 2024-06-18 DIAGNOSIS — F17.200 TOBACCO DEPENDENCE: ICD-10-CM

## 2024-06-18 DIAGNOSIS — I48.0 PAROXYSMAL ATRIAL FIBRILLATION (HCC): ICD-10-CM

## 2024-06-18 DIAGNOSIS — I15.1 HYPERTENSION SECONDARY TO OTHER RENAL DISORDERS: ICD-10-CM

## 2024-06-18 DIAGNOSIS — R01.1 HEART MURMUR: ICD-10-CM

## 2024-06-18 DIAGNOSIS — E78.5 HYPERLIPIDEMIA, UNSPECIFIED HYPERLIPIDEMIA TYPE: Primary | ICD-10-CM

## 2024-06-18 DIAGNOSIS — I25.10 CORONARY ARTERY DISEASE INVOLVING NATIVE CORONARY ARTERY OF NATIVE HEART WITHOUT ANGINA PECTORIS: ICD-10-CM

## 2024-06-18 RX ORDER — HYDRALAZINE HYDROCHLORIDE 50 MG/1
75 TABLET, FILM COATED ORAL 3 TIMES DAILY
Qty: 405 TABLET | Refills: 3 | Status: SHIPPED | OUTPATIENT
Start: 2024-06-18

## 2024-06-18 NOTE — PROGRESS NOTES
[facility]    Liliana Vasquez is a 63 y.o. female, pmhx of Coronary artery disease status post CABG in November 2010. Cardiomyopathy, hypertension, paroxysmal atrial fibrillation, hyperlipidemia, diabetes, breast cancer status post mastectomy and chemotherapy, thoracic aortic aneurysm status post EVAR in 08/2022 and tobacco abuse    Patient was admitted to [facility] for repair of thoracic aortic aneurysm by Dr. Loja 8/18/2022. R groin hematoma, s/p washout by Dr. Loja 8/19/22.     Patient was admitted to hospital in 11/2022 with chest pain.  Patient underwent LHC because of abnormal stress test.  LHC in 11/2022 showed native vessel CAD but patent LIMA to LAD and patent triple sequential vein graft. Medical management was recommended.     Patient reports her BP has been difficult to control, despite being on multiple antihypertensives. She had a CT scan in January 2024 which showed severe BL arterial stenosis.  She has also been experiencing headaches which improved with taking her blood pressure medicines, as well as feeling fatigued and having no energy.  She has been having to see an ENT specialist for recurrent nosebleeds requiring cauterization.     Patient is here for follow up. Patient recently went to the ER on 6/8/24 for hypertensive urgency and epistaxis. She has been experiencing headaches which have improved with increased fluid intake. She is scheduled to see a vascular surgeon in July.     Denies CP, SOB, diaphoresis, N/V/D, weight gain, LE edema, orthopnea, PND, palpitations, near syncope or syncope, dizziness, melena, BRBPR, hemoptysis.     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

## 2024-06-18 NOTE — PATIENT INSTRUCTIONS
New Medication/Medication Changes/Medication Refill      **please allow 24-48 hrs for medication to be escribed to pharmacy** If you need any refills on medications please contact your pharmacy so that the request can be escribed to the provider for review seven to 10 days prior to being out of medication.

## 2024-06-18 NOTE — PROGRESS NOTES
Identified pt with two pt identifiers(name and ). Reviewed record in preparation for visit and have obtained necessary documentation.    Liliana Vasquez presents today for   Chief Complaint   Patient presents with    Follow-up     Post echo        Pt c/o  HEADACHES            Liliana Vasquez preferred language for health care discussion is english/other.    Personal Protective Equipment:   Personal Protective Equipment was used including: mask-surgical and hands-gloves. Patient was placed on no precaution(s). Patient was masked.    Precautions:   Patient currently on None  Patient currently roomed with door closed.    Is someone accompanying this pt? no    Is the patient using any DME equipment during OV? no    Depression Screenin/8/2023    10:17 AM 2022    11:11 AM 2022     8:57 AM 3/31/2022     8:16 AM   PHQ-9 Questionaire   Little interest or pleasure in doing things 0 0 0 0   Feeling down, depressed, or hopeless 0 0 0 0   PHQ-9 Total Score 0 0 0 0        Learning Assessment:  No question data found.    Abuse Screenin/16/2024     9:00 AM   AMB Abuse Screening   Do you ever feel afraid of your partner? N   Are you in a relationship with someone who physically or mentally threatens you? N   Is it safe for you to go home? Y          Fall Risk      2024     9:47 AM   Fall Risk   2 or more falls in past year? no   Fall with injury in past year? no         Pt currently taking Anticoagulant /Antiplatelet therapy? Eliquis 5 mg tab BID    Coordination of Care:  1. Have you been to the ER, urgent care clinic since your last visit? Hospitalized since your last visit? no    2. Have you seen or consulted any other health care providers outside of the [facility] since your last visit? Include any pap smears or colon screening. no      Please see Red banners under Allergies and Med Rec to remove outside inquires. All correct information has been verified with patient

## 2024-07-02 ENCOUNTER — NURSE ONLY (OUTPATIENT)
Age: 64
End: 2024-07-02

## 2024-07-02 VITALS
DIASTOLIC BLOOD PRESSURE: 55 MMHG | BODY MASS INDEX: 29.24 KG/M2 | SYSTOLIC BLOOD PRESSURE: 120 MMHG | WEIGHT: 198 LBS | HEART RATE: 62 BPM

## 2024-07-02 RX ORDER — HYDRALAZINE HYDROCHLORIDE 50 MG/1
75 TABLET, FILM COATED ORAL 3 TIMES DAILY
Qty: 405 TABLET | Refills: 3 | Status: SHIPPED | OUTPATIENT
Start: 2024-07-02

## 2024-07-02 ASSESSMENT — PATIENT HEALTH QUESTIONNAIRE - PHQ9
DEPRESSION UNABLE TO ASSESS: PT REFUSES
SUM OF ALL RESPONSES TO PHQ QUESTIONS 1-9: 0
SUM OF ALL RESPONSES TO PHQ QUESTIONS 1-9: 0
SUM OF ALL RESPONSES TO PHQ9 QUESTIONS 1 & 2: 0
SUM OF ALL RESPONSES TO PHQ QUESTIONS 1-9: 0
2. FEELING DOWN, DEPRESSED OR HOPELESS: NOT AT ALL
SUM OF ALL RESPONSES TO PHQ QUESTIONS 1-9: 0
1. LITTLE INTEREST OR PLEASURE IN DOING THINGS: NOT AT ALL

## 2024-07-02 NOTE — TELEPHONE ENCOUNTER
PCP: Melyssa King MD    Last appt:  9/8/2023   Future Appointments   Date Time Provider Department Center   7/16/2024 12:30 PM [facility] ECHO 1 [facility] BS AMB   9/19/2024  9:00 AM Larry Murillo MD [facility]   11/19/2024  2:15 PM Larry Murillo MD [facility] BS AMB       Requested Prescriptions     Pending Prescriptions Disp Refills    hydrALAZINE (APRESOLINE) 50 MG tablet 405 tablet 3     Sig: Take 1.5 tablets by mouth 3 times daily       Request for a 30 or 90 day supply? Provider Discretion    Pharmacy: mailorder    Other Comments:n/a

## 2024-07-02 NOTE — PROGRESS NOTES
Lilianabrad Vasquez was seen in our office today for BP evaluation. Listed below are the patients current meds:      Current Outpatient Medications:     hydrALAZINE (APRESOLINE) 50 MG tablet, Take 1.5 tablets by mouth 3 times daily, Disp: 405 tablet, Rfl: 3    carvedilol (COREG) 3.125 MG tablet, Take 1 tablet by mouth 2 times daily, Disp: 60 tablet, Rfl: 3    apixaban (ELIQUIS) 5 MG TABS tablet, Take 1 tablet by mouth 2 times daily, Disp: 180 tablet, Rfl: 3    sacubitril-valsartan (ENTRESTO)  MG per tablet, Take 1 tablet by mouth 2 times daily, Disp: 180 tablet, Rfl: 3    acetaminophen (TYLENOL) 500 MG tablet, Take 1 tablet by mouth every 6 hours as needed, Disp: , Rfl:     albuterol sulfate HFA (PROVENTIL;VENTOLIN;PROAIR) 108 (90 Base) MCG/ACT inhaler, Inhale 1 puff into the lungs every 4 hours as needed, Disp: , Rfl:     amoxicillin (AMOXIL) 875 MG tablet, Take 1 tablet by mouth 2 times daily, Disp: , Rfl:     budesonide-formoterol (SYMBICORT) 160-4.5 MCG/ACT AERO, Inhale 2 puffs into the lungs 2 times daily, Disp: , Rfl:     esomeprazole (NEXIUM) 40 MG delayed release capsule, Take 1 capsule by mouth daily, Disp: , Rfl:     ipratropium-albuterol (DUONEB) 0.5-2.5 (3) MG/3ML SOLN nebulizer solution, Inhale 3 mLs into the lungs every 6 hours as needed, Disp: , Rfl:     levothyroxine (SYNTHROID) 150 MCG tablet, Take 1 tablet by mouth every morning (before breakfast), Disp: , Rfl:     metFORMIN, MOD, (GLUMETZA) 500 MG extended release tablet, Take 500 mg by mouth 2 times daily (Patient not taking: Reported on 5/16/2024), Disp: , Rfl:     nitroGLYCERIN (NITROSTAT) 0.4 MG SL tablet, Place 1 tablet under the tongue, Disp: , Rfl:     nystatin (MYCOSTATIN) 289604 UNIT/GM cream, Apply 30 g topically 2 times daily as needed, Disp: , Rfl:     polyethylene glycol (GLYCOLAX) 17 GM/SCOOP powder, Take 17 g by mouth daily, Disp: , Rfl:     QUEtiapine (SEROQUEL XR) 50 MG extended release tablet, Take 1 tablet by mouth daily,  Disp: , Rfl:     rosuvastatin (CRESTOR) 20 MG tablet, Take 1 tablet by mouth, Disp: , Rfl:       No current facility-administered medications for this visit.    Vitals:    07/02/24 0946   BP: (!) 120/55   Site: Right Upper Arm   Position: Sitting   Cuff Size: Large Adult   Pulse: 62   Weight: 89.8 kg (198 lb)        Plan:     Patient to continue current medications. Advised patient that I would forward to Dr. Murillo for review and further instructions. Advised patient that we would call within 24-48 hours with any changes. Patient verbalized understanding.

## 2024-08-12 ENCOUNTER — TELEPHONE (OUTPATIENT)
Age: 64
End: 2024-08-12

## 2024-08-12 NOTE — TELEPHONE ENCOUNTER
Patient went in and had some fluid in her ears and they gave her prednisone and zyrtek. She is wondering if it's ok to take with her current heart medications

## 2024-08-13 NOTE — TELEPHONE ENCOUNTER
Contacted pt at  number.  Two patient Identifiers confirmed. Informed per Dr Murillo notes. Pt verbalized understanding.

## 2024-09-19 ENCOUNTER — OFFICE VISIT (OUTPATIENT)
Age: 64
End: 2024-09-19
Payer: MEDICARE

## 2024-09-19 VITALS
WEIGHT: 200 LBS | SYSTOLIC BLOOD PRESSURE: 181 MMHG | HEIGHT: 69 IN | OXYGEN SATURATION: 99 % | HEART RATE: 78 BPM | DIASTOLIC BLOOD PRESSURE: 78 MMHG | BODY MASS INDEX: 29.62 KG/M2

## 2024-09-19 DIAGNOSIS — I10 HYPERTENSION, UNSPECIFIED TYPE: Primary | ICD-10-CM

## 2024-09-19 PROCEDURE — 3017F COLORECTAL CA SCREEN DOC REV: CPT | Performed by: INTERNAL MEDICINE

## 2024-09-19 PROCEDURE — G8419 CALC BMI OUT NRM PARAM NOF/U: HCPCS | Performed by: INTERNAL MEDICINE

## 2024-09-19 PROCEDURE — 4004F PT TOBACCO SCREEN RCVD TLK: CPT | Performed by: INTERNAL MEDICINE

## 2024-09-19 PROCEDURE — 3078F DIAST BP <80 MM HG: CPT | Performed by: INTERNAL MEDICINE

## 2024-09-19 PROCEDURE — 3077F SYST BP >= 140 MM HG: CPT | Performed by: INTERNAL MEDICINE

## 2024-09-19 PROCEDURE — 99214 OFFICE O/P EST MOD 30 MIN: CPT | Performed by: INTERNAL MEDICINE

## 2024-09-19 PROCEDURE — G8428 CUR MEDS NOT DOCUMENT: HCPCS | Performed by: INTERNAL MEDICINE

## 2024-09-19 RX ORDER — CARVEDILOL 6.25 MG/1
6.25 TABLET ORAL 2 TIMES DAILY
Qty: 180 TABLET | Refills: 2 | Status: SHIPPED | OUTPATIENT
Start: 2024-09-19

## 2024-09-19 ASSESSMENT — PATIENT HEALTH QUESTIONNAIRE - PHQ9
SUM OF ALL RESPONSES TO PHQ9 QUESTIONS 1 & 2: 0
SUM OF ALL RESPONSES TO PHQ QUESTIONS 1-9: 0
1. LITTLE INTEREST OR PLEASURE IN DOING THINGS: NOT AT ALL
SUM OF ALL RESPONSES TO PHQ QUESTIONS 1-9: 0
2. FEELING DOWN, DEPRESSED OR HOPELESS: NOT AT ALL

## 2024-10-14 ENCOUNTER — TELEPHONE (OUTPATIENT)
Age: 64
End: 2024-10-14

## 2024-10-14 NOTE — TELEPHONE ENCOUNTER
Patient called and would like to discuss her bp medications and blood thinner medications. She's having problem with her nose.

## 2024-10-15 NOTE — TELEPHONE ENCOUNTER
Attempted to contact pt at  number, no answer. Busy signal. Will continue to try to contact pt.

## 2024-10-16 NOTE — TELEPHONE ENCOUNTER
Contacted pt at  number. Two patient Identifiers confirmed. Pt stated she went to ER and mediations were changed. Pt stated she was was advised that the hydralazine was a blood thinner and may have caused her to have her nose bleeds. Informed pt I would review with Dr Murillo once in office tomorrow.

## 2024-10-18 NOTE — TELEPHONE ENCOUNTER
Contacted pt at  number. Two patient Identifiers confirmed. Informed pt per Dr Murillo notes. Pt verbalized understanding.

## 2024-10-18 NOTE — TELEPHONE ENCOUNTER
Verbal order with read back Larry Murillo MD.  I do not believe hydralazine is causing nose bleed. It maybe the eliquis. I spoke with patient before regarding watchman but she had declined at that time.

## 2024-10-24 NOTE — HOME HEALTH
Patient was being seen for home care physical therapy after a prolonged hospitalization for AAA repair with prolonged intubation . She demonstrates reduced strength, independence with mobility, reduced balance, endurance and safety. She was seen for one visit after the PT evaluation and has since requested a discharge from home care PT at this time. She feels overwhelmed and has decided to cancel PT at this time. She reports that she will continue to work on therapeutic exercise and mobility and will continued to allow nursing to follow her recovery at this time. She has been educated on a HEP and on safety with mobility and transfers.  She is being discharged from PT at this time due to patient request.
Patient/EMS

## 2024-12-17 NOTE — TELEPHONE ENCOUNTER
PCP: Melyssa King MD    Last appt:  9/19/2024   Future Appointments   Date Time Provider Department Center   1/9/2025 11:45 AM Larry Murillo MD [facility] BS AMB       Requested Prescriptions     Pending Prescriptions Disp Refills    sacubitril-valsartan (ENTRESTO)  MG per tablet 180 tablet 3     Sig: Take 1 tablet by mouth 2 times daily       Request for a 30 or 90 day supply? Provider Discretion    Pharmacy: [facility] pharmacy- cover meds    Other Comments:  N/a

## 2025-02-25 ENCOUNTER — OFFICE VISIT (OUTPATIENT)
Age: 65
End: 2025-02-25
Payer: MEDICARE

## 2025-02-25 VITALS
OXYGEN SATURATION: 97 % | WEIGHT: 200 LBS | HEIGHT: 69 IN | DIASTOLIC BLOOD PRESSURE: 63 MMHG | BODY MASS INDEX: 29.62 KG/M2 | SYSTOLIC BLOOD PRESSURE: 134 MMHG | HEART RATE: 57 BPM

## 2025-02-25 DIAGNOSIS — I25.10 CORONARY ARTERY DISEASE DUE TO LIPID RICH PLAQUE: Primary | ICD-10-CM

## 2025-02-25 DIAGNOSIS — I10 ESSENTIAL HYPERTENSION WITH GOAL BLOOD PRESSURE LESS THAN 140/90: ICD-10-CM

## 2025-02-25 DIAGNOSIS — F17.200 TOBACCO DEPENDENCE: ICD-10-CM

## 2025-02-25 DIAGNOSIS — I25.83 CORONARY ARTERY DISEASE DUE TO LIPID RICH PLAQUE: Primary | ICD-10-CM

## 2025-02-25 DIAGNOSIS — E78.00 PURE HYPERCHOLESTEROLEMIA: ICD-10-CM

## 2025-02-25 PROCEDURE — 99214 OFFICE O/P EST MOD 30 MIN: CPT | Performed by: INTERNAL MEDICINE

## 2025-02-25 PROCEDURE — G8428 CUR MEDS NOT DOCUMENT: HCPCS | Performed by: INTERNAL MEDICINE

## 2025-02-25 PROCEDURE — 3078F DIAST BP <80 MM HG: CPT | Performed by: INTERNAL MEDICINE

## 2025-02-25 PROCEDURE — 3017F COLORECTAL CA SCREEN DOC REV: CPT | Performed by: INTERNAL MEDICINE

## 2025-02-25 PROCEDURE — G8419 CALC BMI OUT NRM PARAM NOF/U: HCPCS | Performed by: INTERNAL MEDICINE

## 2025-02-25 PROCEDURE — 3075F SYST BP GE 130 - 139MM HG: CPT | Performed by: INTERNAL MEDICINE

## 2025-02-25 PROCEDURE — 99406 BEHAV CHNG SMOKING 3-10 MIN: CPT | Performed by: INTERNAL MEDICINE

## 2025-02-25 PROCEDURE — 4004F PT TOBACCO SCREEN RCVD TLK: CPT | Performed by: INTERNAL MEDICINE

## 2025-02-25 ASSESSMENT — PATIENT HEALTH QUESTIONNAIRE - PHQ9
SUM OF ALL RESPONSES TO PHQ QUESTIONS 1-9: 0
SUM OF ALL RESPONSES TO PHQ QUESTIONS 1-9: 0
2. FEELING DOWN, DEPRESSED OR HOPELESS: NOT AT ALL
SUM OF ALL RESPONSES TO PHQ QUESTIONS 1-9: 0
SUM OF ALL RESPONSES TO PHQ9 QUESTIONS 1 & 2: 0
1. LITTLE INTEREST OR PLEASURE IN DOING THINGS: NOT AT ALL
SUM OF ALL RESPONSES TO PHQ QUESTIONS 1-9: 0

## 2025-02-25 NOTE — PROGRESS NOTES
Identified pt with two pt identifiers(name and ). Reviewed record in preparation for visit and have obtained necessary documentation.    Liliana Vasquez presents today for   Chief Complaint   Patient presents with    Follow-up     9m post limited echo       Pt denies DIZZINESS, SOB, CHEST PAIN/ PRESSURE, FATIGUE/WEAKNESS, HEADACHES, SWELLING.             Liliana Vasquez preferred language for health care discussion is english/other.    Personal Protective Equipment:   Personal Protective Equipment was used including: mask-surgical and hands-gloves. Patient was placed on no precaution(s). Patient was not masked.    Precautions:   Patient currently on None  Patient currently roomed with door closed.    Is someone accompanying this pt? no    Is the patient using any DME equipment during OV? no    Depression Screenin/25/2025     9:16 AM 2024     8:50 AM 2024     9:47 AM 2023    10:17 AM 2022    11:11 AM 2022     8:57 AM 3/31/2022     8:16 AM   PHQ-9 Questionaire   Little interest or pleasure in doing things 0 0 0 0 0 0 0   Feeling down, depressed, or hopeless 0 0 0 0 0 0 0   PHQ-9 Total Score 0 0 0 0 0 0 0        Learning Assessment:  Who is the primary learner? Patient    What is the preferred language for health care of the primary learner? ENGLISH    How does the primary learner prefer to learn new concepts? DEMONSTRATION    Answered By patient    Relationship to Learner SELF        Abuse Screenin/25/2025     9:00 AM 2024     8:00 AM 2024     9:00 AM   AMB Abuse Screening   Do you ever feel afraid of your partner? N N N   Are you in a relationship with someone who physically or mentally threatens you? N N N   Is it safe for you to go home? Y Y Y          Fall Risk      2025     9:15 AM 2024     8:51 AM 2024     9:47 AM   Fall Risk   Do you feel unsteady or are you worried about falling?  no no no   2 or more falls in past year? no no no 
function. TAPSE is normal. TAPSE is 1.7 cm. RV Peak S' is 7 cm/s. TDI systolic excursion is abnormal.    Tricuspid Valve: Mild regurgitation with an eccentrically directed jet and may underestimate severity. RVSP may be underestimated in the setting of eccentricity of TR jet. The estimated RVSP is 30 mmHg.    Aorta: Normal sized ascending aorta. Dilated aortic root. Ao root diameter is 3.7 cm.    CARDIAC CATH(11/2011)  1. LM: Normal.  2. LAD: Eccentric ostial 70-80%, mid to distal LAD is a very small-caliber vessel, probably about a 2-mm vessel. Competitive filing from LIMA   3. DIAGONAL: Ostial 40-50% proximal moderate disease.  4. RAMUS: Proximal 100% in stent.  5. LCx/OM: OM2 mid 100%, . Native LCx diffuse luminal irregularities without any obstructive stenosis. less than 2-mm vessel.  7. RCA: Prox and Mid stent diffuse 20% in-stent restenosis. Otherwise, no obstructive disease. RPLS 60-70% tubular stenosis which appears unchanged from prior angiogram about a year ago.  8. SEQUENTIAL SVG TO THE DIAGONA, RAMUS, OM:  patent without any significant obstructive stenosis. There appears to be venous valve system distally into the graft. Native vessel which includes diagonal, ramus and obtuse marginal beyond graft appears to be patent and a very small-caliber vessel, less than 2-mm.  9. LIMA TO LAD: widely patent. Distally, LAD has no significant stenosis. This appears to be a less than 2-mm vessel.    Hospitalizations   06/08/24 ER visit epistaxis and hypertensive urgency      IMPRESSION & PLAN:  Liliana Vasquez is a 64 y.o. female with:     Coronary artery disease:    CABG x 4 in 2010.    NST May 2019 which showed inferior scar, no ischemia.  LHC in 11/2022 showed native vessel CAD but patent LIMA to LAD and patent triple sequential vein graft.  Medical management was recommended  Currently on apixaban,beta-blocker and statin  No angina or use of S/L NTG since last time.      Cardiomyopathy:    Remote history of

## 2025-03-04 ENCOUNTER — TELEPHONE (OUTPATIENT)
Age: 65
End: 2025-03-04

## 2025-03-04 NOTE — TELEPHONE ENCOUNTER
Patient called stating that nurse Cinthia submitted a form for her Eliquis but the date on the form was missing. Patient asked if the form could be resubmitted. Patient would like a call back.

## 2025-03-19 ENCOUNTER — TELEPHONE (OUTPATIENT)
Age: 65
End: 2025-03-19

## 2025-03-19 NOTE — TELEPHONE ENCOUNTER
Patient called, asking to speak with Nurse evens. She states she applied for [employer]'s Patient Assistance program but was advised she was denied. Patient asking if a refill script for Eloquis can be sent to the [facility] on file.

## 2025-03-21 NOTE — TELEPHONE ENCOUNTER
PCP: Melyssa King MD    Last appt:  2/25/2025   Future Appointments   Date Time Provider Department Center   12/30/2025  9:30 AM Wendy Love PA-C [facility] BS AMB       Requested Prescriptions     Pending Prescriptions Disp Refills    apixaban (ELIQUIS) 5 MG TABS tablet 180 tablet 3     Sig: Take 1 tablet by mouth 2 times daily       Request for a 30 or 90 day supply? Provider Discretion    Pharmacy: confirmed     Other Comments:pt needs to fulfill 3% out of pocket before PAP

## 2025-04-28 RX ORDER — HYDRALAZINE HYDROCHLORIDE 50 MG/1
75 TABLET, FILM COATED ORAL 3 TIMES DAILY
Qty: 405 TABLET | Refills: 3 | Status: SHIPPED | OUTPATIENT
Start: 2025-04-28

## 2025-04-28 NOTE — TELEPHONE ENCOUNTER
PCP: Melyssa King MD    Last appt:  2/25/2025   Future Appointments   Date Time Provider Department Center   12/30/2025  9:30 AM Wendy Love PA-C [facility] BS AMB       Requested Prescriptions     Pending Prescriptions Disp Refills    hydrALAZINE (APRESOLINE) 50 MG tablet [Pharmacy Med Name: hydrALAZINE HCl Oral Tablet 50 MG] 405 tablet 3     Sig: TAKE 1.5 TABLETS BY MOUTH 3 TIMES DAILY       Request for a 30 or 90 day supply? Provider Discretion    Pharmacy: confirmed    Other Comments:n/a

## 2025-05-07 ENCOUNTER — TELEPHONE (OUTPATIENT)
Age: 65
End: 2025-05-07

## 2025-05-07 NOTE — TELEPHONE ENCOUNTER
Contacted pt at  number.  Two patient Identifiers confirmed. She stated she was recently in ER and that was advised they wanted to decrease her entresto form 97/103 to entresto 49/51 mg tab due to her lab results. She stated her iron was low and she believes that was part of the issue. Pt stated she did have iron infusion yesterday and is feeling much better. Pt stated she wanted to know exactly what to do with entresto since the ED stopped medication on Saturday. Informed pt I would forward to Dr Murillo for review of results at ER and labs. Informed pt I would forward to Dr Murillo and [facility] office clinic due to me being out of the office.  Pt verbalized understanding.

## 2025-05-07 NOTE — TELEPHONE ENCOUNTER
Patient was in ed to stop taking entresto patient wants to confirm if it was okay to stop taking medication or change dosage.

## 2025-05-08 NOTE — TELEPHONE ENCOUNTER
Patient called again and left a voice message stating that she has called three times with no answer.   Once on Tuesday 5/6/25 at 104pm  Then Wednesday 5/7/25  and Cinthia spoke with her and it is documented.  Called again today and left a voice message asking to speak with a supervisor, since she has not gotten a return call.

## 2025-05-09 NOTE — TELEPHONE ENCOUNTER
Contacted pt at  number.  Two patient Identifiers confirmed. Pt stated she spoke with Dr King and was advised that she started back taking  half tab of the entresto 97/103 and feels a lot better. Pt stated she was told by PCP to just take 1/2 tab if she feels better with taking it. She stated the PCP stated she would reach out to Dr Murillo and advise of her recommendations. Informed pt if provider had any alternate recommendations I would contact her if not to continue taking per PCP.  Pt verbalized understanding.

## 2025-05-12 NOTE — TELEPHONE ENCOUNTER
Larry Murillo MD  You8 minutes ago (11:37 AM)     Half tab twice day ok  Keep checkign  BP regularly

## 2025-05-12 NOTE — TELEPHONE ENCOUNTER
Contacted pt at number. Two patient Identifiers confirmed. Informed pt per Dr Murillo notes. Pt verbalized understanding.

## (undated) DEVICE — KIT CLN UP BON SECOURS MARYV

## (undated) DEVICE — DECANTER BAG 9": Brand: MEDLINE INDUSTRIES, INC.

## (undated) DEVICE — GLOVE SURG SZ 7 L11.33IN FNGR THK9.8MIL STRW LTX POLYMER

## (undated) DEVICE — INTRODUCER SHTH 6FR CANN L11CM DIL TIP 35MM GRN TUNGSTEN

## (undated) DEVICE — SYR 50ML SLIP TIP NSAF LF STRL --

## (undated) DEVICE — Device: Brand: JELCO

## (undated) DEVICE — GAUZE,SPONGE,4"X4",16PLY,STRL,LF,10/TRAY: Brand: MEDLINE

## (undated) DEVICE — PREP SKN CHLRAPRP APL 26ML STR --

## (undated) DEVICE — SUTURE PERMAHAND SZ 2-0 L30IN NONABSORBABLE BLK SILK W/O A305H

## (undated) DEVICE — MAYO STAND COVER: Brand: CONVERTORS

## (undated) DEVICE — CATH BLLN STENT GRAF 12FRX46MM -- RELIANT

## (undated) DEVICE — FLEX ADVANTAGE 1500CC: Brand: FLEX ADVANTAGE

## (undated) DEVICE — FORCEPS BX L240CM JAW DIA2.8MM L CAP W/ NDL MIC MESH TOOTH

## (undated) DEVICE — KENDALL 500 SERIES DIAPHORETIC FOAM MONITORING ELECTRODE - TEAR DROP SHAPE ( 30/PK): Brand: KENDALL

## (undated) DEVICE — TRAY,URINE METER,100% SILICONE,16FR10ML: Brand: MEDLINE

## (undated) DEVICE — ANGIO-SEAL VIP VASCULAR CLOSURE DEVICE: Brand: ANGIO-SEAL

## (undated) DEVICE — CONTAINER PREFIL FRMLN 15ML --

## (undated) DEVICE — SUTURE MCRYL SZ 4-0 L18IN ABSRB UD L19MM PS-2 3/8 CIR PRIM Y496G

## (undated) DEVICE — SOLUTION IV 1000ML 0.9% SOD CHL

## (undated) DEVICE — PRESSURE MONITORING SET: Brand: TRUWAVE

## (undated) DEVICE — CATHETER 46419 EDM LUMBAR 80CM W/TIP

## (undated) DEVICE — BLADE ASSEMB CLP HAIR FINE --

## (undated) DEVICE — PENCIL ES L3M BTTN SWCH S STL HEX LOK BLDE ELECTRD HOLSTER

## (undated) DEVICE — HEX-LOCKING BLADE ELECTRODE: Brand: EDGE

## (undated) DEVICE — KIT COLON W/ 1.1OZ LUB AND 2 END

## (undated) DEVICE — CATH DIAG 4/ BERN/100/035 -- IMAGER II 31-606

## (undated) DEVICE — SUTURE MCRYL SZ 2-0 L36IN ABSRB UD L36MM CT-1 1/2 CIR Y945H

## (undated) DEVICE — MEDI-VAC NON-CONDUCTIVE SUCTION TUBING: Brand: CARDINAL HEALTH

## (undated) DEVICE — COVER US PRB W15XL120CM W/ GEL RUBBERBAND TAPE STRP FLD GEN

## (undated) DEVICE — RADIFOCUS GLIDEWIRE: Brand: GLIDEWIRE

## (undated) DEVICE — ADHESIVE SKN CLSR HI VISC 2-O --

## (undated) DEVICE — GUIDEWIRE VASC L180CM DIA0035IN L15CM STR TIP PTFE HEP S STL

## (undated) DEVICE — SOLUTION SURG SCRB 8OZ 4% STRENGTH CHG BTL HIBICLN

## (undated) DEVICE — CATHETER ART 20 GAX4 CM 22 GA RADIAL FEP

## (undated) DEVICE — SPONGE LAP 18X18IN STRL -- 5/PK

## (undated) DEVICE — INTENDED FOR TISSUE SEPARATION, AND OTHER PROCEDURES THAT REQUIRE A SHARP SURGICAL BLADE TO PUNCTURE OR CUT.: Brand: BARD-PARKER SAFETY BLADES SIZE 11, STERILE

## (undated) DEVICE — 3M™ IOBAN™ 2 ANTIMICROBIAL INCISE DRAPE 6650EZ: Brand: IOBAN™ 2

## (undated) DEVICE — O.R TOWEL, X-RAY DETECTABLE: Brand: DEROYAL

## (undated) DEVICE — BASIN EMESIS 500CC ROSE 250/CS 60/PLT: Brand: MEDEGEN MEDICAL PRODUCTS, LLC

## (undated) DEVICE — 3M(TM) MEDIPORE(TM) +PAD SOFT CLOTH ADHESIVE WOUND DRESSING 3569: Brand: 3M™ MEDIPORE™

## (undated) DEVICE — CATHETER ANGIO 5FR L100CM GWIRE 0.035IN 1CM SPC OMNI FLSH

## (undated) DEVICE — SUTURE MCRYL SZ 4 0 L18IN ABSRB VLT PS 1 L24MM 3 8 CIR REV Y682H

## (undated) DEVICE — INTRODUCER SHTH 11FR CANN L11CM DIL TIP 45MM YEL TUNGSTEN

## (undated) DEVICE — TABLE COVER: Brand: CONVERTORS

## (undated) DEVICE — NEEDLE ANGIO 1 WALL ULT SMOOTH 19GAX7CM MERIT AVANCE

## (undated) DEVICE — APPLIER CLP L9.38IN M LIG TI DISP STR RNG HNDL LIGACLP

## (undated) DEVICE — SYR ART 700 CLEAR MARK 7 -- ARTERION

## (undated) DEVICE — 3M™ MEDIPORE™ H SOFT CLOTH SURGICAL TAPE 2864, 4 INCH X 10 YARD (10CM X 9,14M), 12 ROLLS/CASE: Brand: 3M™ MEDIPORE™

## (undated) DEVICE — DRAPE XR C ARM 41X74IN LF --

## (undated) DEVICE — SYR LR LCK 1ML GRAD NSAF 30ML --

## (undated) DEVICE — INTENDED FOR TISSUE SEPARATION, AND OTHER PROCEDURES THAT REQUIRE A SHARP SURGICAL BLADE TO PUNCTURE OR CUT.: Brand: BARD-PARKER SAFETY BLADES SIZE 10, STERILE

## (undated) DEVICE — TUBING PRSS 48IN 1200PSI CLR HI PRSS INJ EXCITE FLX

## (undated) DEVICE — PREMIUM DRY TRAY LF: Brand: MEDLINE INDUSTRIES, INC.

## (undated) DEVICE — INFLATION DEVICE: Brand: ENCORE™ 26

## (undated) DEVICE — INFUSOR PRSS BG CUF 500ML -- MEDICHOICE

## (undated) DEVICE — HANDPIECE SET WITH HIGH FLOW TIP AND SUCTION TUBE: Brand: INTERPULSE

## (undated) DEVICE — SYR 10ML LUER LOK 1/5ML GRAD --

## (undated) DEVICE — GUIDEWIRE VASC L260CM DIA0.035IN COIL L15CM FLX TIP L4CM

## (undated) DEVICE — DRAPE,TOP,102X53,STERILE: Brand: MEDLINE

## (undated) DEVICE — SUTURE PROL SZ 7-0 L30IN NONABSORBABLE BLU L9.3MM BV-1 1/2 8703H

## (undated) DEVICE — 450 ML BOTTLE OF 0.05% CHLORHEXIDINE GLUCONATE IN 99.95% STERILE WATER FOR IRRIGATION, USP AND APPLICATOR.: Brand: IRRISEPT ANTIMICROBIAL WOUND LAVAGE

## (undated) DEVICE — COVER LT HNDL UNIV PUR FLX DISP 2 PER PK

## (undated) DEVICE — Device: Brand: VISIONS PV .035 DIGITAL IVUS CATHETER

## (undated) DEVICE — GLOVE SURG SZ 7.5 L11.73IN FNGR THK9.8MIL STRW LTX POLYMER

## (undated) DEVICE — TRAY PREP DRY W/ PREM GLV 2 APPL 6 SPNG 2 UNDPD 1 OVERWRAP

## (undated) DEVICE — SUTURE MCRYL SZ 3-0 L27IN ABSRB UD L26MM SH 1/2 CIR Y416H

## (undated) DEVICE — SKIN MARKER,REGULAR TIP WITH RULER AND LABELS: Brand: DEVON

## (undated) DEVICE — REM POLYHESIVE ADULT PATIENT RETURN ELECTRODE: Brand: VALLEYLAB

## (undated) DEVICE — SUTURE PROL SZ 5-0 L36IN NONABSORBABLE BLU L13MM C-1 3/8 8720H

## (undated) DEVICE — INTRODUCER SHTH 18FR L28CM GWIRE 0035IN HYDRPHLC REINF

## (undated) DEVICE — Device

## (undated) DEVICE — SUT PROL 6-0 30IN C1 DA BLU --

## (undated) DEVICE — CATHETER ANGIO BER2 038 AD 4 FRX100 CM TEMPO AQUA

## (undated) DEVICE — DEVICE INFL 60ML 12ATM CONVENIENT LOK REL HNDL HI PRSS FLX

## (undated) DEVICE — GEL ULT/PHONIC CPL MED STRL --

## (undated) DEVICE — INTRODUCER SHTH 11FR CANN L23CM DIL TIP 45MM YEL W/O MINI